# Patient Record
Sex: FEMALE | Race: WHITE | NOT HISPANIC OR LATINO | URBAN - METROPOLITAN AREA
[De-identification: names, ages, dates, MRNs, and addresses within clinical notes are randomized per-mention and may not be internally consistent; named-entity substitution may affect disease eponyms.]

---

## 2019-09-28 ENCOUNTER — EMERGENCY (EMERGENCY)
Facility: HOSPITAL | Age: 85
LOS: 0 days | Discharge: HOME | End: 2019-09-28
Attending: EMERGENCY MEDICINE
Payer: MEDICARE

## 2019-09-28 VITALS
DIASTOLIC BLOOD PRESSURE: 77 MMHG | SYSTOLIC BLOOD PRESSURE: 156 MMHG | OXYGEN SATURATION: 100 % | TEMPERATURE: 98 F | WEIGHT: 104.06 LBS | RESPIRATION RATE: 18 BRPM | HEART RATE: 86 BPM

## 2019-09-28 VITALS
TEMPERATURE: 98 F | OXYGEN SATURATION: 99 % | RESPIRATION RATE: 17 BRPM | SYSTOLIC BLOOD PRESSURE: 151 MMHG | DIASTOLIC BLOOD PRESSURE: 79 MMHG | HEART RATE: 79 BPM

## 2019-09-28 DIAGNOSIS — Z23 ENCOUNTER FOR IMMUNIZATION: ICD-10-CM

## 2019-09-28 DIAGNOSIS — I10 ESSENTIAL (PRIMARY) HYPERTENSION: ICD-10-CM
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
## 2019-09-28 DIAGNOSIS — S01.01XA LACERATION WITHOUT FOREIGN BODY OF SCALP, INITIAL ENCOUNTER: ICD-10-CM

## 2019-09-28 DIAGNOSIS — S09.90XA UNSPECIFIED INJURY OF HEAD, INITIAL ENCOUNTER: ICD-10-CM
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
## 2019-09-28 LAB
ALBUMIN SERPL ELPH-MCNC: 3.9 G/DL — SIGNIFICANT CHANGE UP (ref 3.3–5)
ALP SERPL-CCNC: 80 U/L — SIGNIFICANT CHANGE UP (ref 40–120)
ALT FLD-CCNC: 17 U/L — SIGNIFICANT CHANGE UP (ref 12–78)
ANION GAP SERPL CALC-SCNC: 10 MMOL/L — SIGNIFICANT CHANGE UP (ref 5–17)
AST SERPL-CCNC: 20 U/L — SIGNIFICANT CHANGE UP (ref 15–37)
BILIRUB SERPL-MCNC: 0.3 MG/DL — SIGNIFICANT CHANGE UP (ref 0.2–1.2)
BUN SERPL-MCNC: 35 MG/DL — HIGH (ref 7–23)
CALCIUM SERPL-MCNC: 9.4 MG/DL — SIGNIFICANT CHANGE UP (ref 8.5–10.1)
CHLORIDE SERPL-SCNC: 104 MMOL/L — SIGNIFICANT CHANGE UP (ref 96–108)
CO2 SERPL-SCNC: 26 MMOL/L — SIGNIFICANT CHANGE UP (ref 22–31)
CREAT SERPL-MCNC: 1.14 MG/DL — SIGNIFICANT CHANGE UP (ref 0.5–1.3)
GLUCOSE SERPL-MCNC: 132 MG/DL — HIGH (ref 70–99)
HCT VFR BLD CALC: 35.7 % — SIGNIFICANT CHANGE UP (ref 34.5–45)
HGB BLD-MCNC: 11.8 G/DL — SIGNIFICANT CHANGE UP (ref 11.5–15.5)
MCHC RBC-ENTMCNC: 31.6 PG — SIGNIFICANT CHANGE UP (ref 27–34)
MCHC RBC-ENTMCNC: 33.1 GM/DL — SIGNIFICANT CHANGE UP (ref 32–36)
MCV RBC AUTO: 95.5 FL — SIGNIFICANT CHANGE UP (ref 80–100)
PLATELET # BLD AUTO: 285 K/UL — SIGNIFICANT CHANGE UP (ref 150–400)
POTASSIUM SERPL-MCNC: 3.9 MMOL/L — SIGNIFICANT CHANGE UP (ref 3.5–5.3)
POTASSIUM SERPL-SCNC: 3.9 MMOL/L — SIGNIFICANT CHANGE UP (ref 3.5–5.3)
PROT SERPL-MCNC: 7.8 GM/DL — SIGNIFICANT CHANGE UP (ref 6–8.3)
RBC # BLD: 3.74 M/UL — LOW (ref 3.8–5.2)
RBC # FLD: 12.6 % — SIGNIFICANT CHANGE UP (ref 10.3–14.5)
SODIUM SERPL-SCNC: 140 MMOL/L — SIGNIFICANT CHANGE UP (ref 135–145)
WBC # BLD: 10.36 K/UL — SIGNIFICANT CHANGE UP (ref 3.8–10.5)
WBC # FLD AUTO: 10.36 K/UL — SIGNIFICANT CHANGE UP (ref 3.8–10.5)

## 2019-09-28 PROCEDURE — 12001 RPR S/N/AX/GEN/TRNK 2.5CM/<: CPT

## 2019-09-28 PROCEDURE — 99284 EMERGENCY DEPT VISIT MOD MDM: CPT

## 2019-09-28 PROCEDURE — 70450 CT HEAD/BRAIN W/O DYE: CPT | Mod: 26

## 2019-09-28 PROCEDURE — 93010 ELECTROCARDIOGRAM REPORT: CPT

## 2019-09-28 PROCEDURE — 99284 EMERGENCY DEPT VISIT MOD MDM: CPT | Mod: 25

## 2019-09-28 PROCEDURE — 36415 COLL VENOUS BLD VENIPUNCTURE: CPT

## 2019-09-28 PROCEDURE — 90471 IMMUNIZATION ADMIN: CPT

## 2019-09-28 PROCEDURE — 85027 COMPLETE CBC AUTOMATED: CPT

## 2019-09-28 PROCEDURE — 80053 COMPREHEN METABOLIC PANEL: CPT

## 2019-09-28 PROCEDURE — 90714 TD VACC NO PRESV 7 YRS+ IM: CPT

## 2019-09-28 PROCEDURE — 70450 CT HEAD/BRAIN W/O DYE: CPT

## 2019-09-28 PROCEDURE — 93005 ELECTROCARDIOGRAM TRACING: CPT | Mod: XU

## 2019-09-28 RX ORDER — TETANUS AND DIPHTHERIA TOXOIDS ADSORBED 2; 2 [LF]/.5ML; [LF]/.5ML
0.5 INJECTION INTRAMUSCULAR ONCE
Refills: 0 | Status: COMPLETED | OUTPATIENT
Start: 2019-09-28 | End: 2019-09-28

## 2019-09-28 RX ADMIN — TETANUS AND DIPHTHERIA TOXOIDS ADSORBED 0.5 MILLILITER(S): 2; 2 INJECTION INTRAMUSCULAR at 16:56

## 2019-09-28 NOTE — ED PROVIDER NOTE - SKIN, MLM
Skin normal color for race, warm, dry and intact. No evidence of rash. Skin normal color for race, warm, dry. No evidence of rash. +2cm bump on back of head, soft. No step off. +dried blood in hair

## 2019-09-28 NOTE — ED PROVIDER NOTE - PATIENT PORTAL LINK FT
You can access the FollowMyHealth Patient Portal offered by [facility] by registering at the following website: [email or web address]. By joining [employer]’s FollowMyHealth portal, you will also be able to view your health information using other applications (apps) compatible with our system.

## 2019-09-28 NOTE — ED PROVIDER NOTE - OBJECTIVE STATEMENT
89 y/o female with a PMHx of HTN on metoprolol presents to the ED c/o mechanical fall this morning. Pt was standing in the kitchen when she slipped on the kitchen floor and fell backward, hit head on cabinet. No LOC. Now with laceration to scalp in occipital region and bruising Pt was able to get up and ambulate immediately after fall. Denies neck pain, hip pain, HA, n/v/d, CP, SOB, dizziness. Bleeding stopped, but then pt brushed her hair and bleeding started again. Denies tobacco use, illicit drug use. +EtOH use- one glass of wine per day. NKDA.

## 2019-09-28 NOTE — ED ADULT TRIAGE NOTE - CHIEF COMPLAINT QUOTE
pt presents to ED due to fall on ASA 81mg hit head lac to back of head as per EMS son stated  she fell at 12pm

## 2019-09-28 NOTE — ED PROVIDER NOTE - CARDIAC, MLM
Normal rate, regular rhythm.  Heart sounds S1, S2.  No rubs or gallops. 3/6 NAJMA. +1 bilateral ankle edema

## 2019-10-04 ENCOUNTER — EMERGENCY (EMERGENCY)
Facility: HOSPITAL | Age: 85
LOS: 0 days | Discharge: HOME | End: 2019-10-04
Attending: EMERGENCY MEDICINE
Payer: MEDICARE

## 2019-10-04 VITALS — WEIGHT: 100.97 LBS | HEIGHT: 56 IN

## 2019-10-04 VITALS
TEMPERATURE: 98 F | HEART RATE: 86 BPM | OXYGEN SATURATION: 97 % | DIASTOLIC BLOOD PRESSURE: 70 MMHG | SYSTOLIC BLOOD PRESSURE: 185 MMHG | RESPIRATION RATE: 18 BRPM

## 2019-10-04 DIAGNOSIS — Z48.02 ENCOUNTER FOR REMOVAL OF SUTURES: ICD-10-CM

## 2019-10-04 DIAGNOSIS — I10 ESSENTIAL (PRIMARY) HYPERTENSION: ICD-10-CM

## 2019-10-04 PROCEDURE — G0463: CPT

## 2019-10-04 NOTE — ED STATDOCS - OBJECTIVE STATEMENT
89 y/o female with a PMHx of HTN presents to the ED c/o staple removal. Pt had three staples in the back of her head.

## 2019-10-04 NOTE — ED STATDOCS - ATTENDING CONTRIBUTION TO CARE
I, Tera Cope, performed the initial face to face bedside interview with this patient regarding history of present illness, review of symptoms and relevant past medical, social and family history.  I completed an independent physical examination.  I was the initial provider who evaluated this patient. I have signed out the follow up of any pending tests (i.e. labs, radiological studies) to the ACP.  I have communicated the patient’s plan of care and disposition with the ACP.  The history, relevant review of systems, past medical and surgical history, medical decision making, and physical examination was documented by the scribe in my presence and I attest to the accuracy of the documentation.

## 2019-10-04 NOTE — ED STATDOCS - PATIENT PORTAL LINK FT
You can access the FollowMyHealth Patient Portal offered by [facility] by registering at the following website: [email or web address]. By joining [employer]’s FollowMyHealth portal, you will also be able to view your health information using other applications (apps) compatible with our system.

## 2019-10-04 NOTE — ED STATDOCS - PROGRESS NOTE DETAILS
89 yo female presents with staple removal. Staples were placed on 9/28/19 s/p fall. Denies current pain, bleeding, discharge, fever. Staple removal and dc/h ome. BP slightly elevated because pt did not take her BP meds this morning. Daughter who accompanied pt will give her medication when they come home. -Michael Davis PA-C

## 2019-10-04 NOTE — ED PROVIDER NOTE - PATIENT PORTAL LINK FT
You can access the FollowMyHealth Patient Portal offered by [facility] by registering at the following website: [email or web address]. By joining [email or web address]’s FollowMyHealth portal, you will also be able to view your health information using other applications (apps) compatible with our system.

## 2020-01-14 NOTE — ED STATDOCS - CLINICAL SUMMARY MEDICAL DECISION MAKING FREE TEXT BOX
Sunita Moreno presents today for   Chief Complaint   Patient presents with   [facility] Follow Up       Is someone accompanying this pt? No    Is the patient using any DME equipment during OV? No    -DME Company None    Depression Screening:  3 most recent PHQ Screens 4/11/2019   Little interest or pleasure in doing things Not at all   Feeling down, depressed, irritable, or hopeless Not at all   Total Score PHQ 2 0       Learning Assessment:  Learning Assessment 8/17/2016   PRIMARY LEARNER Patient   CO-LEARNER CAREGIVER -   PRIMARY LANGUAGE ENGLISH   LEARNER PREFERENCE PRIMARY LISTENING     -   ANSWERED BY patient   RELATIONSHIP SELF   ASSESSMENT COMMENT -       Abuse Screening:  No flowsheet data found. Fall Risk  No flowsheet data found. Coordination of Care:  1. Have you been to the ER, urgent care clinic since your last visit? Hospitalized since your last visit? Yes; Where: [facility] , When: 12/2019    2. Have you seen or consulted any other health care providers outside of the [address] [place] [address] since your last visit? Include any pap smears or colon screening. No    Patient refused to be weighed. Brandon Wellington NP informed. Staple removal and d/c home. -Michael Davis PA-C

## 2024-05-21 NOTE — ED ADULT TRIAGE NOTE - DIRECT TO ROOM CARE INITIATED:
Requested Prescriptions     Refused Prescriptions Disp Refills    losartan (COZAAR) 25 MG tablet [Pharmacy Med Name: LOSARTAN POTASSIUM 25 MG TAB] 180 tablet 1     Sig: TAKE 1 TABLET BY MOUTH TWICE A DAY    Medication was sent to pharmacy 05/09/2024. Refill too soon.    04-Oct-2019 13:01

## 2024-05-21 NOTE — ED STATDOCS - CARE PLAN
Last Visit Date: 2/28/2024   Next Visit Date: 5/21/2024    Principal Discharge DX:	Removal of staples

## 2024-07-28 ENCOUNTER — INPATIENT (INPATIENT)
Facility: HOSPITAL | Age: 89
LOS: 6 days | Discharge: NURSING, REHAB OR LONG-TERM CARE | DRG: 948 | End: 2024-08-04
Attending: STUDENT IN AN ORGANIZED HEALTH CARE EDUCATION/TRAINING PROGRAM | Admitting: INTERNAL MEDICINE
Payer: MEDICARE

## 2024-07-28 VITALS
RESPIRATION RATE: 18 BRPM | SYSTOLIC BLOOD PRESSURE: 201 MMHG | TEMPERATURE: 97 F | DIASTOLIC BLOOD PRESSURE: 110 MMHG | HEART RATE: 96 BPM | OXYGEN SATURATION: 98 %

## 2024-07-28 DIAGNOSIS — R41.82 ALTERED MENTAL STATUS, UNSPECIFIED: ICD-10-CM

## 2024-07-28 DIAGNOSIS — Z29.9 ENCOUNTER FOR PROPHYLACTIC MEASURES, UNSPECIFIED: ICD-10-CM

## 2024-07-28 DIAGNOSIS — I16.0 HYPERTENSIVE URGENCY: ICD-10-CM

## 2024-07-28 DIAGNOSIS — R79.89 OTHER SPECIFIED ABNORMAL FINDINGS OF BLOOD CHEMISTRY: ICD-10-CM

## 2024-07-28 PROBLEM — I10 ESSENTIAL (PRIMARY) HYPERTENSION: Chronic | Status: ACTIVE | Noted: 2019-10-04

## 2024-07-28 LAB
ABO RH CONFIRMATION: SIGNIFICANT CHANGE UP
ALBUMIN SERPL ELPH-MCNC: 3.9 G/DL — SIGNIFICANT CHANGE UP (ref 3.3–5)
ALP SERPL-CCNC: 104 U/L — SIGNIFICANT CHANGE UP (ref 40–120)
ALT FLD-CCNC: 19 U/L — SIGNIFICANT CHANGE UP (ref 12–78)
AMMONIA BLD-MCNC: 15 UMOL/L — SIGNIFICANT CHANGE UP (ref 11–32)
ANION GAP SERPL CALC-SCNC: 10 MMOL/L — SIGNIFICANT CHANGE UP (ref 5–17)
APPEARANCE UR: CLEAR — SIGNIFICANT CHANGE UP
APTT BLD: 26.1 SEC — SIGNIFICANT CHANGE UP (ref 24.5–35.6)
AST SERPL-CCNC: 32 U/L — SIGNIFICANT CHANGE UP (ref 15–37)
B-OH-BUTYR SERPL-SCNC: 0.1 MMOL/L — SIGNIFICANT CHANGE UP
BACTERIA # UR AUTO: NEGATIVE /HPF — SIGNIFICANT CHANGE UP
BASE EXCESS BLDV CALC-SCNC: -2.5 MMOL/L — LOW (ref -2–3)
BASOPHILS # BLD AUTO: 0.02 K/UL — SIGNIFICANT CHANGE UP (ref 0–0.2)
BASOPHILS NFR BLD AUTO: 0.1 % — SIGNIFICANT CHANGE UP (ref 0–2)
BILIRUB SERPL-MCNC: 0.7 MG/DL — SIGNIFICANT CHANGE UP (ref 0.2–1.2)
BILIRUB UR-MCNC: NEGATIVE — SIGNIFICANT CHANGE UP
BLD GP AB SCN SERPL QL: SIGNIFICANT CHANGE UP
BUN SERPL-MCNC: 36 MG/DL — HIGH (ref 7–23)
CALCIUM SERPL-MCNC: 10.2 MG/DL — HIGH (ref 8.5–10.1)
CAST: 2 /LPF — SIGNIFICANT CHANGE UP (ref 0–4)
CHLORIDE SERPL-SCNC: 105 MMOL/L — SIGNIFICANT CHANGE UP (ref 96–108)
CK SERPL-CCNC: 570 U/L — HIGH (ref 26–192)
CO2 SERPL-SCNC: 21 MMOL/L — LOW (ref 22–31)
COLOR SPEC: YELLOW — SIGNIFICANT CHANGE UP
CREAT SERPL-MCNC: 1.52 MG/DL — HIGH (ref 0.5–1.3)
DIFF PNL FLD: ABNORMAL
EGFR: 31 ML/MIN/1.73M2 — LOW
EOSINOPHIL # BLD AUTO: 0 K/UL — SIGNIFICANT CHANGE UP (ref 0–0.5)
EOSINOPHIL NFR BLD AUTO: 0 % — SIGNIFICANT CHANGE UP (ref 0–6)
FLUAV AG NPH QL: SIGNIFICANT CHANGE UP
FLUBV AG NPH QL: SIGNIFICANT CHANGE UP
GAS PNL BLDV: SIGNIFICANT CHANGE UP
GLUCOSE BLDC GLUCOMTR-MCNC: 136 MG/DL — HIGH (ref 70–99)
GLUCOSE SERPL-MCNC: 231 MG/DL — HIGH (ref 70–99)
GLUCOSE UR QL: 500 MG/DL
HCO3 BLDV-SCNC: 22 MMOL/L — SIGNIFICANT CHANGE UP (ref 22–29)
HCT VFR BLD CALC: 40.2 % — SIGNIFICANT CHANGE UP (ref 34.5–45)
HGB BLD-MCNC: 13.5 G/DL — SIGNIFICANT CHANGE UP (ref 11.5–15.5)
IMM GRANULOCYTES NFR BLD AUTO: 0.7 % — SIGNIFICANT CHANGE UP (ref 0–0.9)
INR BLD: 0.89 RATIO — SIGNIFICANT CHANGE UP (ref 0.85–1.18)
KETONES UR-MCNC: ABNORMAL MG/DL
LACTATE SERPL-SCNC: 3.9 MMOL/L — HIGH (ref 0.7–2)
LACTATE SERPL-SCNC: 4.5 MMOL/L — CRITICAL HIGH (ref 0.7–2)
LEUKOCYTE ESTERASE UR-ACNC: ABNORMAL
LYMPHOCYTES # BLD AUTO: 1.16 K/UL — SIGNIFICANT CHANGE UP (ref 1–3.3)
LYMPHOCYTES # BLD AUTO: 5.2 % — LOW (ref 13–44)
MAGNESIUM SERPL-MCNC: 1.7 MG/DL — SIGNIFICANT CHANGE UP (ref 1.6–2.6)
MCHC RBC-ENTMCNC: 31.8 PG — SIGNIFICANT CHANGE UP (ref 27–34)
MCHC RBC-ENTMCNC: 33.6 GM/DL — SIGNIFICANT CHANGE UP (ref 32–36)
MCV RBC AUTO: 94.6 FL — SIGNIFICANT CHANGE UP (ref 80–100)
MONOCYTES # BLD AUTO: 1.06 K/UL — HIGH (ref 0–0.9)
MONOCYTES NFR BLD AUTO: 4.8 % — SIGNIFICANT CHANGE UP (ref 2–14)
NEUTROPHILS # BLD AUTO: 19.89 K/UL — HIGH (ref 1.8–7.4)
NEUTROPHILS NFR BLD AUTO: 89.2 % — HIGH (ref 43–77)
NITRITE UR-MCNC: NEGATIVE — SIGNIFICANT CHANGE UP
PCO2 BLDV: 39 MMHG — SIGNIFICANT CHANGE UP (ref 39–42)
PH BLDV: 7.37 — SIGNIFICANT CHANGE UP (ref 7.32–7.43)
PH UR: 5.5 — SIGNIFICANT CHANGE UP (ref 5–8)
PHOSPHATE SERPL-MCNC: 3.7 MG/DL — SIGNIFICANT CHANGE UP (ref 2.5–4.5)
PLATELET # BLD AUTO: 322 K/UL — SIGNIFICANT CHANGE UP (ref 150–400)
PO2 BLDV: 55 MMHG — HIGH (ref 25–45)
POTASSIUM SERPL-MCNC: 4 MMOL/L — SIGNIFICANT CHANGE UP (ref 3.5–5.3)
POTASSIUM SERPL-SCNC: 4 MMOL/L — SIGNIFICANT CHANGE UP (ref 3.5–5.3)
PROT SERPL-MCNC: 8.8 GM/DL — HIGH (ref 6–8.3)
PROT UR-MCNC: 100 MG/DL
PROTHROM AB SERPL-ACNC: 10.1 SEC — SIGNIFICANT CHANGE UP (ref 9.5–13)
RBC # BLD: 4.25 M/UL — SIGNIFICANT CHANGE UP (ref 3.8–5.2)
RBC # FLD: 12.7 % — SIGNIFICANT CHANGE UP (ref 10.3–14.5)
RBC CASTS # UR COMP ASSIST: 5 /HPF — HIGH (ref 0–4)
RSV RNA NPH QL NAA+NON-PROBE: SIGNIFICANT CHANGE UP
SALICYLATES SERPL-MCNC: <1.7 MG/DL — LOW (ref 2.8–20)
SAO2 % BLDV: 85 % — SIGNIFICANT CHANGE UP (ref 67–88)
SARS-COV-2 RNA SPEC QL NAA+PROBE: SIGNIFICANT CHANGE UP
SODIUM SERPL-SCNC: 136 MMOL/L — SIGNIFICANT CHANGE UP (ref 135–145)
SP GR SPEC: >1.03 — HIGH (ref 1–1.03)
SQUAMOUS # UR AUTO: 4 /HPF — SIGNIFICANT CHANGE UP (ref 0–5)
TROPONIN I, HIGH SENSITIVITY RESULT: 1692.82 NG/L — HIGH
TROPONIN I, HIGH SENSITIVITY RESULT: 1783.7 NG/L — HIGH
UROBILINOGEN FLD QL: 0.2 MG/DL — SIGNIFICANT CHANGE UP (ref 0.2–1)
WBC # BLD: 22.29 K/UL — HIGH (ref 3.8–10.5)
WBC # FLD AUTO: 22.29 K/UL — HIGH (ref 3.8–10.5)
WBC UR QL: 13 /HPF — HIGH (ref 0–5)

## 2024-07-28 PROCEDURE — 80061 LIPID PANEL: CPT

## 2024-07-28 PROCEDURE — 84443 ASSAY THYROID STIM HORMONE: CPT

## 2024-07-28 PROCEDURE — 82140 ASSAY OF AMMONIA: CPT

## 2024-07-28 PROCEDURE — 99223 1ST HOSP IP/OBS HIGH 75: CPT

## 2024-07-28 PROCEDURE — 92507 TX SP LANG VOICE COMM INDIV: CPT | Mod: GN

## 2024-07-28 PROCEDURE — 97166 OT EVAL MOD COMPLEX 45 MIN: CPT | Mod: GO

## 2024-07-28 PROCEDURE — 80053 COMPREHEN METABOLIC PANEL: CPT

## 2024-07-28 PROCEDURE — 70498 CT ANGIOGRAPHY NECK: CPT | Mod: 26,MC

## 2024-07-28 PROCEDURE — 93005 ELECTROCARDIOGRAM TRACING: CPT

## 2024-07-28 PROCEDURE — 97530 THERAPEUTIC ACTIVITIES: CPT | Mod: GO

## 2024-07-28 PROCEDURE — 93306 TTE W/DOPPLER COMPLETE: CPT

## 2024-07-28 PROCEDURE — 87040 BLOOD CULTURE FOR BACTERIA: CPT

## 2024-07-28 PROCEDURE — 95816 EEG AWAKE AND DROWSY: CPT

## 2024-07-28 PROCEDURE — 80307 DRUG TEST PRSMV CHEM ANLYZR: CPT

## 2024-07-28 PROCEDURE — 84484 ASSAY OF TROPONIN QUANT: CPT

## 2024-07-28 PROCEDURE — 74177 CT ABD & PELVIS W/CONTRAST: CPT | Mod: 26,MC

## 2024-07-28 PROCEDURE — 85025 COMPLETE CBC W/AUTO DIFF WBC: CPT

## 2024-07-28 PROCEDURE — 82550 ASSAY OF CK (CPK): CPT

## 2024-07-28 PROCEDURE — 92523 SPEECH SOUND LANG COMPREHEN: CPT | Mod: GN

## 2024-07-28 PROCEDURE — 82962 GLUCOSE BLOOD TEST: CPT

## 2024-07-28 PROCEDURE — 80048 BASIC METABOLIC PNL TOTAL CA: CPT

## 2024-07-28 PROCEDURE — 99285 EMERGENCY DEPT VISIT HI MDM: CPT

## 2024-07-28 PROCEDURE — 83036 HEMOGLOBIN GLYCOSYLATED A1C: CPT

## 2024-07-28 PROCEDURE — 84146 ASSAY OF PROLACTIN: CPT

## 2024-07-28 PROCEDURE — 36415 COLL VENOUS BLD VENIPUNCTURE: CPT

## 2024-07-28 PROCEDURE — 92526 ORAL FUNCTION THERAPY: CPT | Mod: GN

## 2024-07-28 PROCEDURE — 85027 COMPLETE CBC AUTOMATED: CPT

## 2024-07-28 PROCEDURE — 84100 ASSAY OF PHOSPHORUS: CPT

## 2024-07-28 PROCEDURE — 70551 MRI BRAIN STEM W/O DYE: CPT | Mod: MC

## 2024-07-28 PROCEDURE — 83735 ASSAY OF MAGNESIUM: CPT

## 2024-07-28 PROCEDURE — 97116 GAIT TRAINING THERAPY: CPT | Mod: GP

## 2024-07-28 PROCEDURE — 70496 CT ANGIOGRAPHY HEAD: CPT | Mod: 26,MC

## 2024-07-28 PROCEDURE — 92610 EVALUATE SWALLOWING FUNCTION: CPT | Mod: GN

## 2024-07-28 PROCEDURE — 71275 CT ANGIOGRAPHY CHEST: CPT | Mod: 26,MC

## 2024-07-28 RX ORDER — LABETALOL HCL 200 MG
10 TABLET ORAL ONCE
Refills: 0 | Status: COMPLETED | OUTPATIENT
Start: 2024-07-28 | End: 2024-07-28

## 2024-07-28 RX ORDER — GLUCAGON INJECTION, SOLUTION 0.5 MG/.1ML
1 INJECTION, SOLUTION SUBCUTANEOUS ONCE
Refills: 0 | Status: DISCONTINUED | OUTPATIENT
Start: 2024-07-28 | End: 2024-08-04

## 2024-07-28 RX ORDER — VANCOMYCIN HYDROCHLORIDE 5 G/100ML
750 INJECTION, POWDER, LYOPHILIZED, FOR SOLUTION INTRAVENOUS EVERY 24 HOURS
Refills: 0 | Status: DISCONTINUED | OUTPATIENT
Start: 2024-07-29 | End: 2024-07-29

## 2024-07-28 RX ORDER — PIPERACILLIN SODIUM, TAZOBACTAM SODIUM 3; .375 G/15ML; G/15ML
3.38 INJECTION, POWDER, LYOPHILIZED, FOR SOLUTION INTRAVENOUS ONCE
Refills: 0 | Status: COMPLETED | OUTPATIENT
Start: 2024-07-28 | End: 2024-07-28

## 2024-07-28 RX ORDER — ATORVASTATIN CALCIUM 40 MG/1
80 TABLET, FILM COATED ORAL AT BEDTIME
Refills: 0 | Status: DISCONTINUED | OUTPATIENT
Start: 2024-07-28 | End: 2024-08-04

## 2024-07-28 RX ORDER — BACTERIOSTATIC SODIUM CHLORIDE 0.9 %
1000 VIAL (ML) INJECTION
Refills: 0 | Status: DISCONTINUED | OUTPATIENT
Start: 2024-07-28 | End: 2024-07-31

## 2024-07-28 RX ORDER — VANCOMYCIN HYDROCHLORIDE 5 G/100ML
750 INJECTION, POWDER, LYOPHILIZED, FOR SOLUTION INTRAVENOUS ONCE
Refills: 0 | Status: COMPLETED | OUTPATIENT
Start: 2024-07-28 | End: 2024-07-28

## 2024-07-28 RX ORDER — VANCOMYCIN HYDROCHLORIDE 5 G/100ML
1000 INJECTION, POWDER, LYOPHILIZED, FOR SOLUTION INTRAVENOUS ONCE
Refills: 0 | Status: DISCONTINUED | OUTPATIENT
Start: 2024-07-28 | End: 2024-07-28

## 2024-07-28 RX ORDER — ASPIRIN 500 MG
81 TABLET ORAL DAILY
Refills: 0 | Status: DISCONTINUED | OUTPATIENT
Start: 2024-07-28 | End: 2024-08-04

## 2024-07-28 RX ORDER — DEXTROSE 4 G
12.5 TABLET,CHEWABLE ORAL ONCE
Refills: 0 | Status: DISCONTINUED | OUTPATIENT
Start: 2024-07-28 | End: 2024-08-04

## 2024-07-28 RX ORDER — DEXTROSE 4 G
25 TABLET,CHEWABLE ORAL ONCE
Refills: 0 | Status: DISCONTINUED | OUTPATIENT
Start: 2024-07-28 | End: 2024-08-04

## 2024-07-28 RX ORDER — CEFEPIME HYDROCHLORIDE 1 G/1
1000 INJECTION, POWDER, FOR SOLUTION INTRAMUSCULAR; INTRAVENOUS EVERY 12 HOURS
Refills: 0 | Status: DISCONTINUED | OUTPATIENT
Start: 2024-07-28 | End: 2024-07-28

## 2024-07-28 RX ORDER — INSULIN LISPRO 100/ML
VIAL (ML) SUBCUTANEOUS
Refills: 0 | Status: DISCONTINUED | OUTPATIENT
Start: 2024-07-28 | End: 2024-08-04

## 2024-07-28 RX ORDER — BACTERIOSTATIC SODIUM CHLORIDE 0.9 %
1000 VIAL (ML) INJECTION ONCE
Refills: 0 | Status: COMPLETED | OUTPATIENT
Start: 2024-07-28 | End: 2024-07-28

## 2024-07-28 RX ORDER — ONDANSETRON HCL/PF 4 MG/2 ML
4 VIAL (ML) INJECTION EVERY 8 HOURS
Refills: 0 | Status: DISCONTINUED | OUTPATIENT
Start: 2024-07-28 | End: 2024-08-04

## 2024-07-28 RX ORDER — BACTERIOSTATIC SODIUM CHLORIDE 0.9 %
1000 VIAL (ML) INJECTION ONCE
Refills: 0 | Status: DISCONTINUED | OUTPATIENT
Start: 2024-07-28 | End: 2024-07-28

## 2024-07-28 RX ORDER — DEXTROSE MONOHYDRATE, SODIUM CHLORIDE, SODIUM LACTATE, CALCIUM CHLORIDE, MAGNESIUM CHLORIDE 1.5; 538; 448; 18.4; 5.08 G/100ML; MG/100ML; MG/100ML; MG/100ML; MG/100ML
1000 SOLUTION INTRAPERITONEAL
Refills: 0 | Status: DISCONTINUED | OUTPATIENT
Start: 2024-07-28 | End: 2024-08-04

## 2024-07-28 RX ORDER — ASPIRIN 500 MG
300 TABLET ORAL ONCE
Refills: 0 | Status: COMPLETED | OUTPATIENT
Start: 2024-07-28 | End: 2024-07-28

## 2024-07-28 RX ORDER — CEFEPIME HYDROCHLORIDE 1 G/1
1000 INJECTION, POWDER, FOR SOLUTION INTRAMUSCULAR; INTRAVENOUS EVERY 12 HOURS
Refills: 0 | Status: DISCONTINUED | OUTPATIENT
Start: 2024-07-28 | End: 2024-07-29

## 2024-07-28 RX ORDER — DEXTROSE 4 G
15 TABLET,CHEWABLE ORAL ONCE
Refills: 0 | Status: DISCONTINUED | OUTPATIENT
Start: 2024-07-28 | End: 2024-08-04

## 2024-07-28 RX ADMIN — Medication 10 MILLIGRAM(S): at 11:36

## 2024-07-28 RX ADMIN — VANCOMYCIN HYDROCHLORIDE 250 MILLIGRAM(S): 5 INJECTION, POWDER, LYOPHILIZED, FOR SOLUTION INTRAVENOUS at 11:35

## 2024-07-28 RX ADMIN — Medication 1000 MILLILITER(S): at 11:37

## 2024-07-28 RX ADMIN — PIPERACILLIN SODIUM, TAZOBACTAM SODIUM 200 GRAM(S): 3; .375 INJECTION, POWDER, LYOPHILIZED, FOR SOLUTION INTRAVENOUS at 11:36

## 2024-07-28 RX ADMIN — CEFEPIME HYDROCHLORIDE 1000 MILLIGRAM(S): 1 INJECTION, POWDER, FOR SOLUTION INTRAMUSCULAR; INTRAVENOUS at 22:56

## 2024-07-28 RX ADMIN — Medication 300 MILLIGRAM(S): at 14:31

## 2024-07-28 NOTE — ED ADULT NURSE NOTE - OBJECTIVE STATEMENT
pt presents to the ED after being found by a family member 'covered In vomit and hanging off the side of the bed'. pt minimally responsive and only responding to painful stimuli upon arrival to ED. family members at bedside. hx of HTN. no recent medical emergencies or complaints as per family. placed on cardiac monitor in trauma room.

## 2024-07-28 NOTE — DISCHARGE NOTE NURSING/CASE MANAGEMENT/SOCIAL WORK - NSDCVIVACCINE_GEN_ALL_CORE_FT
Td (adult) preservative free; 28-Sep-2019 16:56; Klever Davis (TIFFANIE); [employer]; [id number] (Exp. Date: 04-Jul-2021); IntraMuscular; Deltoid Left.; 0.5 milliLiter(s); VIS (VIS Published: 28-Sep-2019, VIS Presented: 28-Sep-2019);

## 2024-07-28 NOTE — DISCHARGE NOTE NURSING/CASE MANAGEMENT/SOCIAL WORK - PATIENT PORTAL LINK FT
You can access the FollowMyHealth Patient Portal offered by [facility] by registering at the following website: [email or web address]. By joining [employer]’s FollowMyHealth portal, you will also be able to view your health information using other applications (apps) compatible with our system.

## 2024-07-28 NOTE — H&P ADULT - HISTORY OF PRESENT ILLNESS
89F with pmhx HTN (no longer on meds) came to the hospital today after she was found down by granddaughter this morning. Last known well the day prior. She was reportedly noted to have dried coffee ground emesis on her face and hair and was lethargic.     In ED vitals stable, CBC noted for WBC 22.29, H/H 13/40, Plt 322. CMP noted for BUN/Cr 36/1.52, Glu 231. Trop 1692 > 1783. UA noted for proteinuria, glucosuria, ketones, hematuria, mod leuks, 13 wbcs, neg bacteria. COVID/flu neg, CT Chest/abd/pelvis neg for PE, or bowel obstruction. noted for bronchiectasis in the posterior right upper and right middle   lobes with distal airway impaction and clusters of small nodules. Stroke w/u with CTH, CTA head and neck neg for acute infarct. Septic w/u was sent, patient was loaded with aspirin, given broad spectrum abx with vanc/zosyn.    89F with pmhx HTN (no longer on meds) came to the hospital today after she was found down by granddaughter this morning. Patient unable to provide history. History as per chart review. Patients last known well was on 7/26. She was reportedly noted to have dried coffee ground emesis on her face and hair and was lethargic.   In ED vitals stable, CBC noted for WBC 22.29, H/H 13/40, Plt 322. CMP noted for BUN/Cr 36/1.52, Glu 231. Trop 1692 > 1783. UA noted for proteinuria, glucosuria, ketones, hematuria, mod leuks, 13 wbcs, neg bacteria. COVID/flu neg, CT Chest/abd/pelvis neg for PE, or bowel obstruction. noted for bronchiectasis in the posterior right upper and right middle   lobes with distal airway impaction and clusters of small nodules. Stroke w/u with CTH, CTA head and neck neg for acute infarct. Septic w/u was sent, patient was loaded with aspirin, given broad spectrum abx with vanc/zosyn.

## 2024-07-28 NOTE — PATIENT PROFILE ADULT - VISION (WITH CORRECTIVE LENSES IF THE PATIENT USUALLY WEARS THEM):
Normal vision: sees adequately in most situations; can see medication labels, newsprint
Ventriculoperitoneal shunt

## 2024-07-28 NOTE — CONSULT NOTE ADULT - SUBJECTIVE AND OBJECTIVE BOX
Patient is a 89y old  Female who presents with a chief complaint of     HPI:   89-year-old female past medical history of hypertension not on medications presents ED brought in by EMS from home after being found slumped over in her bed covered in vomit.  Patient last was seen by family at 1 PM yesterday and was discovered by family member this morning in bed slumped over.  Patient was normal yesterday cooking doing normal activities.  Patient arrived to ER hypertensive in the 200s systolic, responsive to pain but not talking or providing any history covered in either feculent vomiting or hematemesis as it is very dark color    PAST MEDICAL & SURGICAL HISTORY:  HTN (hypertension)            Allergies    No Known Allergies    Intolerances        FAMILY HISTORY: unable to obtain    SOCIAL HISTORY  Unable to obtain    REVIEW OF SYSTEMS:  Unable to obtain    Vital Signs Last 24 Hrs  T(C): 36.8 (28 Jul 2024 16:02), Max: 36.8 (28 Jul 2024 16:02)  T(F): 98.3 (28 Jul 2024 16:02), Max: 98.3 (28 Jul 2024 16:02)  HR: 81 (28 Jul 2024 16:02) (74 - 96)  BP: 147/72 (28 Jul 2024 16:02) (147/72 - 201/110)  BP(mean): 102 (28 Jul 2024 12:13) (102 - 102)  RR: 18 (28 Jul 2024 16:02) (18 - 18)  SpO2: 99% (28 Jul 2024 16:02) (98% - 99%)    Parameters below as of 28 Jul 2024 16:02  Patient On (Oxygen Delivery Method): room air        Daily     Daily     I&O's Detail      PHYSICAL EXAM:  Appearance: No distress  HEENT:   Normal oral mucosa  Cardiovascular: Normal S1 S2, No JVD, No cardiac murmurs, No carotid bruits, No peripheral edema  Respiratory: Lungs clear to auscultation	  Psychiatry: does not respond to questions, awake  Gastrointestinal:  Soft, Non-tender, + BS, no bruits	  Skin: No rashes, No ecchymoses, No cyanosis  Neurologic: uncooperative with exam  Extremities: Normal range of motion, No clubbing, cyanosis or edema  Vascular: Peripheral pulses palpable 2+ bilaterally    EKG: SR, LVH    LABS:                        13.5   22.29 )-----------( 322      ( 28 Jul 2024 10:19 )             40.2     07-28    136  |  105  |  36<H>  ----------------------------<  231<H>  4.0   |  21<L>  |  1.52<H>    Ca    10.2<H>      28 Jul 2024 10:19  Phos  3.7     07-28  Mg     1.7     07-28    TPro  8.8<H>  /  Alb  3.9  /  TBili  0.7  /  DBili  x   /  AST  32  /  ALT  19  /  AlkPhos  104  07-28    PT/INR - ( 28 Jul 2024 10:19 )   PT: 10.1 sec;   INR: 0.89 ratio         PTT - ( 28 Jul 2024 10:19 )  PTT:26.1 sec    Troponin I, High Sensitivity Result: 1783.70 ng/L *H* (07-28-24 @ 13:22)  Troponin I, High Sensitivity Result: 1692.82 ng/L *H* (07-28-24 @ 10:19)        Admitting attending: Michaelle Mane  Outpatient provider:

## 2024-07-28 NOTE — ED ADULT TRIAGE NOTE - CHIEF COMPLAINT QUOTE
Pt brought in by ambulance from home, found unresponsive by daughter with blood and vomit on her face with her head hanging off the bed. Pt lives at home by herself, family lives down the block and checks on her a few times a day. Pt is very stubborn and will not move in with any of them. Pt takes aspirin and blood pressure medication but has not seen a physician in over 20 years and family does not know where she got it from.

## 2024-07-28 NOTE — ED PROVIDER NOTE - WET READ LAUNCH FT
-- DO NOT REPLY / DO NOT REPLY ALL --  -- Message is from [employer] ([facility]) --    General Patient Message:     ernst from [facility] called and would like to know if Dr. Medrano is in agreement and will follow the patient's plan of care?  Please call as soon as you can to confirm.  Thank You      Caller Information       Type Contact Phone/Fax    08/10/2023 09:42 AM CDT Phone (Incoming) janetfelicia from [facility] [phone]        Alternative phone number: na    Can a detailed message be left? Yes    Message Turnaround:     Is it Working Hours? Yes - Working Hours     [place]:    Please give this turnaround time to the caller:   \"This message will be sent to [state Provider's name]. The clinical team will fulfill your request as soon as they review your message.\"                
-- DO NOT REPLY / DO NOT REPLY ALL --  -- Message is from [employer] ([facility]) --    General Patient Message: just needs a verbal ok to start at home services for patient. No paperwork was sent as of yet. Please have doctor call back to give a verbal ok. Thank you      Caller Information       Type Contact Phone/Fax    08/10/2023 09:42 AM CDT Phone (Incoming) ernst from [facility] [phone]    08/11/2023 12:11 PM CDT Phone (Incoming) ernst from [facility] [phone]        Alternative phone number: no    Can a detailed message be left? Yes    Message Turnaround:     Is it Working Hours? Yes - Working Hours     [place]:    Please give this turnaround time to the caller:   \"This message will be sent to [state Provider's name]. The clinical team will fulfill your request as soon as they review your message.\"                
Called and let [facility] know to fax over the plan to us   
I have not received any paperwork about plan  Have them fax to [phone]
Ok to do home health  
There are no Wet Read(s) to document.

## 2024-07-28 NOTE — H&P ADULT - NSHPLABSRESULTS_GEN_ALL_CORE
LABS:  cret                        13.5   22.29 )-----------( 322      ( 28 Jul 2024 10:19 )             40.2     07-28    136  |  105  |  36<H>  ----------------------------<  231<H>  4.0   |  21<L>  |  1.52<H>    Ca    10.2<H>      28 Jul 2024 10:19  Phos  3.7     07-28  Mg     1.7     07-28    TPro  8.8<H>  /  Alb  3.9  /  TBili  0.7  /  DBili  x   /  AST  32  /  ALT  19  /  AlkPhos  104  07-28    PT/INR - ( 28 Jul 2024 10:19 )   PT: 10.1 sec;   INR: 0.89 ratio         PTT - ( 28 Jul 2024 10:19 )  PTT:26.1 sec      Urinalysis with Rflx Culture (collected 07-28-24 @ 13:44)      < from: CT Abdomen and Pelvis w/ IV Cont (07.28.24 @ 10:55) >    IMPRESSION:  No pulmonary embolism. No aortic dissection.    No bowel obstruction. Large rectal stool ball.    Other incidental/chronic findings, as above.    --- End of Report ---    < end of copied text >    < from: CT Angio Neck w/ IV Cont (07.28.24 @ 10:54) >    IMPRESSION:    HEAD CT:  1. No acute intracranial hemorrhage, mass effect, or shift of the midline   structures.  2. Mild chronic white matter microvascular type changes.    CTA NECK:  1. Atherosclerotic plaque affecting the bilateral carotid bifurcations,   and proximal internal carotid arteries with associated mild (less than   50%) stenosis of the bilateral internal carotid artery origins and   proximal segments by NASCET criteria.  2. Otherwise, no large vessel occlusion or major stenosis.    CTA HEAD:  1. No large vessel occlusion or major stenosis.    --- End of Report ---    < end of copied text >

## 2024-07-28 NOTE — ED PROVIDER NOTE - CLINICAL SUMMARY MEDICAL DECISION MAKING FREE TEXT BOX
Kamar Angeles DO (Attending): 89-year-old female past medical history of hypertension not on medications presents ED brought in by EMS from home after being found slumped over in her bed covered in vomit.  Patient last was seen by family at 1 PM yesterday and was discovered by family member this morning in bed slumped over.  Patient was normal yesterday cooking doing normal activities.  Patient arrives to ER hypertensive in the 200s systolic, responsive to pain but not talking or providing any history covered in either feculent vomiting or hematemesis as it is very dark color but not hypoxic or tachycardic and afebrile.  Access was established, EKG and fingerstick were obtained fingerstick was mildly elevated EKG was nonactionable.  Patient was brought to CAT scan for CT CTA head and neck, CTA of the chest, CTAP.  Differential for patient's altered mental status and vomiting includes not limited to ICH, LVO, SBO, ACS, infection.  Plan for septic workup, CTs, empiric antibiotics, troponin, telemetry.  Reassess

## 2024-07-28 NOTE — CONSULT NOTE ADULT - SUBJECTIVE AND OBJECTIVE BOX
CC:     HPI: 89F with pmhx HTN (no longer on meds) came to the [facility] today after she was found     Patient seen and examined at bedside.     Med rec:    ROS:   CONSTITUTIONAL: (-) fever, (-) chills  RESPIRATORY: (-) SOB, (-) cough  CV: (-) chest pain, (-) palpitations  GI: (-) abdominal pain, (-) nausea, (-) vomiting, (-) diarrhea, (-) constipation   NEURO: (-) headache, (-) weakness, (-) visual changes    [  ] Due to altered mental status/intubation, subjective information was not able to be obtained from the patient. History was obtained, to the extent possible, from review of the chart and collateral sources of information.    PMHx:   PSHx:  FamHx:   Social Hx:   Allergies:    PAST MEDICAL & SURGICAL HISTORY:  HTN (hypertension)          SOCIAL HISTORY:  EtOH:   Smoking:   Recreational drug use:     Medications:                                  ICU Vital Signs Last 24 Hrs  T(C): 36.8 (28 Jul 2024 16:02), Max: 36.8 (28 Jul 2024 16:02)  T(F): 98.3 (28 Jul 2024 16:02), Max: 98.3 (28 Jul 2024 16:02)  HR: 81 (28 Jul 2024 16:02) (74 - 96)  BP: 147/72 (28 Jul 2024 16:02) (147/72 - 201/110)  BP(mean): 102 (28 Jul 2024 12:13) (102 - 102)  ABP: --  ABP(mean): --  RR: 18 (28 Jul 2024 16:02) (18 - 18)  SpO2: 99% (28 Jul 2024 16:02) (98% - 99%)    O2 Parameters below as of 28 Jul 2024 16:02  Patient On (Oxygen Delivery Method): room air            Physical Examination:    General: Laying in bed comfortably in no acute distress  HEENT: Pupils equal, reactive to light.  Symmetric.  PULM: Clear to auscultation bilaterally, unlabored respirations on room air   CVS: Regular rate and rhythm, no murmurs, rubs, or gallops  ABD: Soft, nondistended, nontender, normoactive bowel sounds, no masses  EXT: No edema, nontender  SKIN: Warm and well perfused  NEURO: interactive, nonfocal         I&O's Detail      LABS:                        13.5   22.29 )-----------( 322      ( 28 Jul 2024 10:19 )             40.2     07-28    136  |  105  |  36<H>  ----------------------------<  231<H>  4.0   |  21<L>  |  1.52<H>    Ca    10.2<H>      28 Jul 2024 10:19  Phos  3.7     07-28  Mg     1.7     07-28    TPro  8.8<H>  /  Alb  3.9  /  TBili  0.7  /  DBili  x   /  AST  32  /  ALT  19  /  AlkPhos  104  07-28          CAPILLARY BLOOD GLUCOSE      POCT Blood Glucose.: 270 mg/dL (28 Jul 2024 10:19)    PT/INR - ( 28 Jul 2024 10:19 )   PT: 10.1 sec;   INR: 0.89 ratio         PTT - ( 28 Jul 2024 10:19 )  PTT:26.1 sec  Urinalysis Basic - ( 28 Jul 2024 13:44 )    Color: Yellow / Appearance: Clear / SG: >1.030 / pH: x  Gluc: x / Ketone: Trace mg/dL  / Bili: Negative / Urobili: 0.2 mg/dL   Blood: x / Protein: 100 mg/dL / Nitrite: Negative   Leuk Esterase: Moderate / RBC: 5 /HPF / WBC 13 /HPF   Sq Epi: x / Non Sq Epi: 4 /HPF / Bacteria: Negative /HPF      CULTURES:        RADIOLOGY: ***      INVASIVE LINES:  INDWELLING BECKER:  VTE PROPHYLAXIS:  CODE STATUS:      CRITICAL CARE TIME SPENT: *** minutes spent performing frequent bedside reassessments and augmenting plan of care to address problems of acute critical illness that pose high probability of life threatening deterioration and/or end organ damage/dysfunction and discussing goals of care, non-inclusive of time spent on procedures performed. Date of entry of this note is equal to the date of services rendered.    Admitting 40+/55+/75+  Follow up 25+/35+/50+    Time spent on this patient encounter, which includes documenting this note in the electronic medical record, was * minutes including assessing the presenting problems with associated risks, reviewing the medical record to prepare for the encounter, and meeting face to face with the patient to obtain additional history. I have also performed an appropriate physical exam, made interventions listed and ordered and interpreted appropriate diagnostic studies as documented. To improve communication and patient safety, I have coordinated care with the multidisciplinary team including the bedside nurse, appropriate attending of record and consultants as needed. Date of entry of this note is equal to the date of services rendered.       CC: AMS    HPI: 89F with pmhx HTN (no longer on meds) came to the hospital today after she was found down by granddaughter this morning. Last known well the day prior. She was reportedly noted to have dried coffee ground emesis on her face and hair and was lethargic. On arrival to the ED, labs significant for lactate 4.5, BUN/SUB 36/1.52, trop 1600. Hgb and ABG wnl. CT angio head/neck negative for acute pathology or LVO and CT c/a/pnegative for PE, aortic disection but revealed large rectal stool ball. ICU called given lethargy.    Events: Pt seen at bedside in the Ed. Hx provided as above by daughter and son in law. Pt with fixed right sided gaze. Moving purposefully but not following commands.     ROS:  Due to altered mental status/intubation, subjective information was not able to be obtained from the patient. History was obtained, to the extent possible, from review of the chart and collateral sources of information.      PAST MEDICAL & SURGICAL HISTORY:  HTN (hypertension)      Medications:      ICU Vital Signs Last 24 Hrs  T(C): 36.8 (28 Jul 2024 16:02), Max: 36.8 (28 Jul 2024 16:02)  T(F): 98.3 (28 Jul 2024 16:02), Max: 98.3 (28 Jul 2024 16:02)  HR: 81 (28 Jul 2024 16:02) (74 - 96)  BP: 147/72 (28 Jul 2024 16:02) (147/72 - 201/110)  BP(mean): 102 (28 Jul 2024 12:13) (102 - 102)  ABP: --  ABP(mean): --  RR: 18 (28 Jul 2024 16:02) (18 - 18)  SpO2: 99% (28 Jul 2024 16:02) (98% - 99%)    O2 Parameters below as of 28 Jul 2024 16:02  Patient On (Oxygen Delivery Method): room air    Physical Examination:    General: Laying in bed, opens eyes  HEENT: Pupils equal, reactive to light.  Symmetric.  PULM: Clear to auscultation bilaterally, unlabored respirations on room air   CVS: Regular rate and rhythm, no murmurs  ABD: Soft, nondistended, nontender, normoactive bowel sounds  EXT: No edema, nontender  SKIN: Warm and well perfused  NEURO: moving all extremities x4, nonpurposeful, does not follow commands       I&O's Detail      LABS:                        13.5   22.29 )-----------( 322      ( 28 Jul 2024 10:19 )             40.2     07-28    136  |  105  |  36<H>  ----------------------------<  231<H>  4.0   |  21<L>  |  1.52<H>    Ca    10.2<H>      28 Jul 2024 10:19  Phos  3.7     07-28  Mg     1.7     07-28    TPro  8.8<H>  /  Alb  3.9  /  TBili  0.7  /  DBili  x   /  AST  32  /  ALT  19  /  AlkPhos  104  07-28          CAPILLARY BLOOD GLUCOSE      POCT Blood Glucose.: 270 mg/dL (28 Jul 2024 10:19)    PT/INR - ( 28 Jul 2024 10:19 )   PT: 10.1 sec;   INR: 0.89 ratio         PTT - ( 28 Jul 2024 10:19 )  PTT:26.1 sec  Urinalysis Basic - ( 28 Jul 2024 13:44 )    Color: Yellow / Appearance: Clear / SG: >1.030 / pH: x  Gluc: x / Ketone: Trace mg/dL  / Bili: Negative / Urobili: 0.2 mg/dL   Blood: x / Protein: 100 mg/dL / Nitrite: Negative   Leuk Esterase: Moderate / RBC: 5 /HPF / WBC 13 /HPF   Sq Epi: x / Non Sq Epi: 4 /HPF / Bacteria: Negative /HPF      CULTURES:     pend    RADIOLOGY: < from: CT Angio Neck w/ IV Cont (07.28.24 @ 10:54) >    ACC: [id number] EXAM:  CT ANGIO NECK (W)AW IC   ORDERED BY: ADITHYA GODINEZ     ACC: [id number] EXAM:  CT ANGIO BRAIN (W)AW IC   ORDERED BY: ADITHYA GODINEZ     PROCEDURE DATE:  07/28/2024          INTERPRETATION:  .    CLINICAL INFORMATION: Altered mentalstatus. Evaluate for large vessel   occlusion.    TECHNIQUE: Transaxial CT images were acquired through the head without   the administration of IV contrast. Thin section CT angiography from the   aortic arch to the cranial vertex was also performed using a multislice   CT scanner, following the infusion of intravenous contrast material. 70   cc's of IV Omnipaque 350 was administered without immediate complication.   0 cc's was discarded. Sagittal and coronal MIP reformatted images were   obtained from the source data. Both the axial source and reconstructed   images were reviewed.    COMPARISON: Prior head CT exam from 9/28/2019. No prior CT angiogram   studies of the head or neck are available for comparison.    FINDINGS:    Head CT: There is no acute intracranial hemorrhage, mass effect, shift of   the midline structures, herniation, extra-axial fluid collection, or   hydrocephalus.    There is diffuse cerebral volume loss with prominence of the sulci,   fissures, and cisternal spaces which is normal for the patient's age.   Mild ventriculomegaly appears unchanged. There is mild deep and   periventricular white matter hypoattenuation statistically compatible   with microvascular changes given calcific atherosclerotic disease of the  intracranial arteries.    The paranasal sinuses and tympanomastoid cavities are clear. The   calvarium is intact. The imaged orbits are unremarkable.    CTA NECK: There is a standard anatomic configuration to the aortic arch.   Atherosclerosis affects the arch and origins of the great vessels without   associated stenosis.    The bilateral common carotid arteries demonstrate scattered calcified   plaque without significant stenosis.    Atherosclerotic plaque affects the right  carotid bifurcation and   proximal internal carotid artery with associated mild (less than 50%)   stenosis of the right internal carotid artery origin and proximal   internal carotid artery by NASCET criteria. The remainder of the right   internal carotid artery is patent extending to the skull base.    Calcified plaque affects the left carotid bifurcation and proximal left   internal carotid artery with associated mild (less than 50%) stenosis of   the left proximal internal carotid artery by NASCET criteria. The  remainder of the left internal carotid artery is patent extending to the   skull base.    The origins of the bilateral vertebral arteries demonstrate calcified   plaques without associated stenosis. The right vertebral artery is   dominant comparedto the left. 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 The bilateral vertebral arteries are patent.    CTA HEAD: Calcified plaques affect the bilateral V4 segments without   significant stenosis. The left V4 segment is hypoplastic and terminates   in PICA. The right V4 segment is dominant and continues as the basilar   artery. The basilar artery and basilar tip appear unremarkable as well as   the bilateral posterior cerebral arteries.    The anterior and bilateral posterior communicating arteries are seen.    The left A1 segment is hypoplastic.    Calcified plaques affect the bilateral carotid siphons without   significant stenosis. Otherwise, the bilateral intracranial internal   carotid, anterior, and middle cerebral arteries appear unremarkable.    The intracranial venous structures are patent.    IMPRESSION:    HEAD CT:  1. No acute intracranial hemorrhage, mass effect, or shift of the midline   structures.  2. Mild chronic white matter microvascular type changes.    CTA NECK:  1. Atherosclerotic plaque affecting the bilateral carotid bifurcations,   and proximal internal carotid arteries with associated mild (less than   50%) stenosis of the bilateral internal carotid artery origins and   proximal segments by NASCET criteria.  2. Otherwise, no large vessel occlusion or major stenosis.    CTA HEAD:  1. No large vessel occlusion or major stenosis.    --- End of Report ---    < end of copied text >  < from: CT Abdomen and Pelvis w/ IV Cont (07.28.24 @ 10:55) >  ACC: [id number] EXAM:  CT ABDOMEN AND PELVIS IC   ORDERED BY: ADITHYA GODINEZ     ACC: [id number] EXAM:  CT ANGIO CHEST PULM ART WAWIC   ORDERED BY: ADITHYA GODINEZ     PROCEDURE DATE:  07/28/2024          INTERPRETATION:  CLINICAL INDICATION: Altered mental status, vomiting,   possible hematemesis. Evaluate for pulmonary embolism    TECHNIQUE: Enhanced helical images were obtained of the chest, abdomen   and pelvis. Coronal and sagittal images were reconstructed.  Images were   obtained after the uneventful administration of 70 cc of nonionic   intravenous contrast (Omnipaque 350) and enteric contrast. 3D MIP images   were provided.    COMPARISON: None    FINDINGS:  CTA: No pulmonary embolism.. 3.7 cm mid ascending aorta. No aortic   dissection.The heart is mildly enlarged. No pericardial effusion. Aortic   valvular, coronary arterial and mitral annular calcification.    Lungs/Airways/Pleura: Mild dependent lingula and dependent lower lobe   atelectasis. No pleural effusion or pneumothorax.The central airways are   patent. Mild bronchiectasis in the posterior right upper and right middle   lobes with distal airway impaction and clusters of small nodules, related   to a small airways process.    Mediastinum/Lymph nodes: No thoracic adenopathy.    Hepatobiliary: Unremarkable liver. Normal caliber bile ducts.   Nondistended gallbladder.    Pancreas: Unremarkable.    Spleen: Unremarkable.    Adrenal glands: Unremarkable.    Kidneys: Left upper renal scarring. No hydronephrosis.    Bowel: No bowel obstruction. The appendix is not visualized; no   periappendiceal fat stranding. Large rectal stool ball.    Abdominal lymph nodes: No lymphadenopathy.    Abdominal vessels. Atherosclerotic changes.    Peritoneum: No ascites.    Pelvis: Limited evaluation due to metallic streak artifact from hip   arthroplasties. 1.5 cm left ovarian cyst. Calcified uterine fibroid.    Bones/soft tissues: Bilateral hip arthroplasties. Old bilateral anterior   rib and right inferior pubic ramus fractures. Diffuse bone   demineralization. Multilevel thoracolumbar compression deformities, most   severe at T9, T12 and L1.    IMPRESSION:  No pulmonary embolism. No aortic dissection.    No bowel obstruction. Large rectal stool ball.    Other incidental/chronic findings, as above.    --- End of Report ---      < end of copied text >    Time spent on this patient encounter, which includes documenting this note in the electronic medical record, was 55 minutes including assessing the presenting problems with associated risks, reviewing the medical record to prepare for the encounter, and meeting face to face with the patient to obtain additional history. I have also performed an appropriate physical exam, made interventions listed and ordered and interpreted appropriate diagnostic studies as documented. To improve communication and patient safety, I have coordinated care with the multidisciplinary team including the bedside nurse, appropriate attending of record and consultants as needed. Date of entry of this note is equal to the date of services rendered.       CC: AMS    HPI: 89F with pmhx HTN (no longer on meds) came to the hospital today after she was found down by granddaughter this morning. Last known well the day prior. She was reportedly noted to have dried coffee ground emesis on her face and hair and was lethargic. On arrival to the ED, labs significant for lactate 4.5, BUN/SUB 36/1.52, trop 1600. Hgb and ABG wnl. CT angio head/neck negative for acute pathology or LVO and CT c/a/pnegative for PE, aortic disection but revealed large rectal stool ball. ICU called given lethargy.    Events: Pt seen at bedside in the Ed. Hx provided as above by daughter and son in law. Pt with fixed right sided gaze. Moving purposefully but not following commands.     ROS:  Due to altered mental status/intubation, subjective information was not able to be obtained from the patient. History was obtained, to the extent possible, from review of the chart and collateral sources of information.      PAST MEDICAL & SURGICAL HISTORY:  HTN (hypertension)      Medications:      ICU Vital Signs Last 24 Hrs  T(C): 36.8 (28 Jul 2024 16:02), Max: 36.8 (28 Jul 2024 16:02)  T(F): 98.3 (28 Jul 2024 16:02), Max: 98.3 (28 Jul 2024 16:02)  HR: 81 (28 Jul 2024 16:02) (74 - 96)  BP: 147/72 (28 Jul 2024 16:02) (147/72 - 201/110)  BP(mean): 102 (28 Jul 2024 12:13) (102 - 102)  ABP: --  ABP(mean): --  RR: 18 (28 Jul 2024 16:02) (18 - 18)  SpO2: 99% (28 Jul 2024 16:02) (98% - 99%)    O2 Parameters below as of 28 Jul 2024 16:02  Patient On (Oxygen Delivery Method): room air    Physical Examination:    General: Laying in bed, opens eyes  HEENT: Pupils equal, reactive to light.  Symmetric.  PULM: Clear to auscultation bilaterally, unlabored respirations on room air   CVS: Regular rate and rhythm, no murmurs  ABD: Soft, nondistended, nontender, normoactive bowel sounds  EXT: No edema, nontender  SKIN: Warm and well perfused  NEURO: moving all extremities x4, nonpurposeful, does not follow commands. R sided gaze no nystagmus      I&O's Detail      LABS:                        13.5   22.29 )-----------( 322      ( 28 Jul 2024 10:19 )             40.2     07-28    136  |  105  |  36<H>  ----------------------------<  231<H>  4.0   |  21<L>  |  1.52<H>    Ca    10.2<H>      28 Jul 2024 10:19  Phos  3.7     07-28  Mg     1.7     07-28    TPro  8.8<H>  /  Alb  3.9  /  TBili  0.7  /  DBili  x   /  AST  32  /  ALT  19  /  AlkPhos  104  07-28          CAPILLARY BLOOD GLUCOSE      POCT Blood Glucose.: 270 mg/dL (28 Jul 2024 10:19)    PT/INR - ( 28 Jul 2024 10:19 )   PT: 10.1 sec;   INR: 0.89 ratio         PTT - ( 28 Jul 2024 10:19 )  PTT:26.1 sec  Urinalysis Basic - ( 28 Jul 2024 13:44 )    Color: Yellow / Appearance: Clear / SG: >1.030 / pH: x  Gluc: x / Ketone: Trace mg/dL  / Bili: Negative / Urobili: 0.2 mg/dL   Blood: x / Protein: 100 mg/dL / Nitrite: Negative   Leuk Esterase: Moderate / RBC: 5 /HPF / WBC 13 /HPF   Sq Epi: x / Non Sq Epi: 4 /HPF / Bacteria: Negative /HPF      CULTURES:     pend    RADIOLOGY: < from: CT Angio Neck w/ IV Cont (07.28.24 @ 10:54) >    ACC: [id number] EXAM:  CT ANGIO NECK (W)AW IC   ORDERED BY: ADITHYA GODINEZ     ACC: [id number] EXAM:  CT ANGIO BRAIN (W)AW IC   ORDERED BY: ADITHYA GODINEZ     PROCEDURE DATE:  07/28/2024          INTERPRETATION:  .    CLINICAL INFORMATION: Altered mentalstatus. Evaluate for large vessel   occlusion.    TECHNIQUE: Transaxial CT images were acquired through the head without   the administration of IV contrast. Thin section CT angiography from the   aortic arch to the cranial vertex was also performed using a multislice   CT scanner, following the infusion of intravenous contrast material. 70   cc's of IV Omnipaque 350 was administered without immediate complication.   0 cc's was discarded. Sagittal and coronal MIP reformatted images were   obtained from the source data. Both the axial source and reconstructed   images were reviewed.    COMPARISON: Prior head CT exam from 9/28/2019. No prior CT angiogram   studies of the head or neck are available for comparison.    FINDINGS:    Head CT: There is no acute intracranial hemorrhage, mass effect, shift of   the midline structures, herniation, extra-axial fluid collection, or   hydrocephalus.    There is diffuse cerebral volume loss with prominence of the sulci,   fissures, and cisternal spaces which is normal for the patient's age.   Mild ventriculomegaly appears unchanged. There is mild deep and   periventricular white matter hypoattenuation statistically compatible   with microvascular changes given calcific atherosclerotic disease of the  intracranial arteries.    The paranasal sinuses and tympanomastoid cavities are clear. The   calvarium is intact. The imaged orbits are unremarkable.    CTA NECK: There is a standard anatomic configuration to the aortic arch.   Atherosclerosis affects the arch and origins of the great vessels without   associated stenosis.    The bilateral common carotid arteries demonstrate scattered calcified   plaque without significant stenosis.    Atherosclerotic plaque affects the right  carotid bifurcation and   proximal internal carotid artery with associated mild (less than 50%)   stenosis of the right internal carotid artery origin and proximal   internal carotid artery by NASCET criteria. The remainder of the right   internal carotid artery is patent extending to the skull base.    Calcified plaque affects the left carotid bifurcation and proximal left   internal carotid artery with associated mild (less than 50%) stenosis of   the left proximal internal carotid artery by NASCET criteria. The  remainder of the left internal carotid artery is patent extending to the   skull base.    The origins of the bilateral vertebral arteries demonstrate calcified   plaques without associated stenosis. The right vertebral artery is   dominant comparedto the left. 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 The bilateral vertebral arteries are patent.    CTA HEAD: Calcified plaques affect the bilateral V4 segments without   significant stenosis. The left V4 segment is hypoplastic and terminates   in PICA. The right V4 segment is dominant and continues as the basilar   artery. The basilar artery and basilar tip appear unremarkable as well as   the bilateral posterior cerebral arteries.    The anterior and bilateral posterior communicating arteries are seen.    The left A1 segment is hypoplastic.    Calcified plaques affect the bilateral carotid siphons without   significant stenosis. Otherwise, the bilateral intracranial internal   carotid, anterior, and middle cerebral arteries appear unremarkable.    The intracranial venous structures are patent.    IMPRESSION:    HEAD CT:  1. No acute intracranial hemorrhage, mass effect, or shift of the midline   structures.  2. Mild chronic white matter microvascular type changes.    CTA NECK:  1. Atherosclerotic plaque affecting the bilateral carotid bifurcations,   and proximal internal carotid arteries with associated mild (less than   50%) stenosis of the bilateral internal carotid artery origins and   proximal segments by NASCET criteria.  2. Otherwise, no large vessel occlusion or major stenosis.    CTA HEAD:  1. No large vessel occlusion or major stenosis.    --- End of Report ---    < end of copied text >  < from: CT Abdomen and Pelvis w/ IV Cont (07.28.24 @ 10:55) >  ACC: [id number] EXAM:  CT ABDOMEN AND PELVIS IC   ORDERED BY: ADITHYA GODINEZ     ACC: [id number] EXAM:  CT ANGIO CHEST PULM ART WAWIC   ORDERED BY: ADITHYA GODINEZ     PROCEDURE DATE:  07/28/2024          INTERPRETATION:  CLINICAL INDICATION: Altered mental status, vomiting,   possible hematemesis. Evaluate for pulmonary embolism    TECHNIQUE: Enhanced helical images were obtained of the chest, abdomen   and pelvis. Coronal and sagittal images were reconstructed.  Images were   obtained after the uneventful administration of 70 cc of nonionic   intravenous contrast (Omnipaque 350) and enteric contrast. 3D MIP images   were provided.    COMPARISON: None    FINDINGS:  CTA: No pulmonary embolism.. 3.7 cm mid ascending aorta. No aortic   dissection.The heart is mildly enlarged. No pericardial effusion. Aortic   valvular, coronary arterial and mitral annular calcification.    Lungs/Airways/Pleura: Mild dependent lingula and dependent lower lobe   atelectasis. No pleural effusion or pneumothorax.The central airways are   patent. Mild bronchiectasis in the posterior right upper and right middle   lobes with distal airway impaction and clusters of small nodules, related   to a small airways process.    Mediastinum/Lymph nodes: No thoracic adenopathy.    Hepatobiliary: Unremarkable liver. Normal caliber bile ducts.   Nondistended gallbladder.    Pancreas: Unremarkable.    Spleen: Unremarkable.    Adrenal glands: Unremarkable.    Kidneys: Left upper renal scarring. No hydronephrosis.    Bowel: No bowel obstruction. The appendix is not visualized; no   periappendiceal fat stranding. Large rectal stool ball.    Abdominal lymph nodes: No lymphadenopathy.    Abdominal vessels. Atherosclerotic changes.    Peritoneum: No ascites.    Pelvis: Limited evaluation due to metallic streak artifact from hip   arthroplasties. 1.5 cm left ovarian cyst. Calcified uterine fibroid.    Bones/soft tissues: Bilateral hip arthroplasties. Old bilateral anterior   rib and right inferior pubic ramus fractures. Diffuse bone   demineralization. Multilevel thoracolumbar compression deformities, most   severe at T9, T12 and L1.    IMPRESSION:  No pulmonary embolism. No aortic dissection.    No bowel obstruction. Large rectal stool ball.    Other incidental/chronic findings, as above.    --- End of Report ---      < end of copied text >    Time spent on this patient encounter, which includes documenting this note in the electronic medical record, was 55 minutes including assessing the presenting problems with associated risks, reviewing the medical record to prepare for the encounter, and meeting face to face with the patient to obtain additional history. I have also performed an appropriate physical exam, made interventions listed and ordered and interpreted appropriate diagnostic studies as documented. To improve communication and patient safety, I have coordinated care with the multidisciplinary team including the bedside nurse, appropriate attending of record and consultants as needed. Date of entry of this note is equal to the date of services rendered.

## 2024-07-28 NOTE — CONSULT NOTE ADULT - ASSESSMENT
89F with pmhx HTN (no longer on meds) came to the hospital today after she was found down by granddaughter this morning, now with AMS/lethargy of unclear etiology. Also with ?JACKY and lactic acidosis. DDx includes hypertensive encephalopathy, metabolic encephalopathy.     - unclear etiology of event  - pt's family states she stopped taking her two antihypertensive agents a few months back as she did not wish to follow up with [facility]  - takes large amounts of asa for her arthritis per daughter, consider tox/sal level   - hgb stable, no hypotension, no stigmata of active GIB  - CTH/CTA h/n negative for acute pathology  - lactate improving  - BP improved s/p labetalol    - would consult neuro will likely need MR head   - would pursue infectious workup, may need LP   - discussed with daughter/HCP at bedside pt is DNR/DNI, she provides HCP form naming her, would need MOL[facility] paperwork completed with pt's daughter for validity inpatient  - VSS, now reportedly more awake, VSS, pt has no icu needs at this time, please reconsult as needed    - above discussed with ED attending Dr. Angeles     case d/w ICU attending Dr. garcia   89F with pmhx HTN (no longer on meds) came to the hospital today after she was found down by granddaughter this morning, now with AMS/lethargy of unclear etiology. Also with ?JACKY and lactic acidosis. DDx includes hypertensive encephalopathy, metabolic encephalopathy.     - unclear etiology of event  - pt's family states she stopped taking her two antihypertensive agents a few months back as she did not wish to follow up with [facility]  - takes large amounts of asa for her arthritis per daughter, consider tox/sal level   - hgb stable, no hypotension, no stigmata of active GIB  - CTH/CTA h/n negative for acute pathology  - lactate improving  - BP improved s/p labetalol    - would consult neuro will likely need MR head   - would pursue infectious workup, may need LP   - discussed with daughter/HCP at bedside pt is DNR/DNI, she provides HCP form naming her, would need [facility] paperwork completed with pt's daughter for validity inpatient  - VSS, now reportedly more awake, VSS, pt has no icu needs at this time,rec admission to tele, please reconsult as needed    - above discussed with ED attending Dr. Angeles     case d/w ICU attending Dr. garcia

## 2024-07-28 NOTE — ED ADULT NURSE REASSESSMENT NOTE - NS ED NURSE REASSESS COMMENT FT1
Handoff report received from TIFFANIE Eduardo. Pt is resting comfortably in the stretcher. No acute distress noted. Breathing is even and unlabored at this time.

## 2024-07-28 NOTE — PHARMACOTHERAPY INTERVENTION NOTE - COMMENTS
Medication reconciliation complete.  Home medications reviewed with patient's family at bedside and confirmed against Dr. First murali garner.  As far as the family is aware, the patient only takes two blood pressure medications and takes Aspirin although they are unsure of the dose.  Family reports no known medication allergies.    Home Medications:  amlodipine-benazepril 5 mg-20 mg oral capsule: 1 cap(s) orally once a day ***LAST FILLED 03/03/24 for a 90 day supply*** (28 Jul 2024 17:10)  aspirin:  (28 Jul 2024 17:12)  metoprolol succinate 50 mg oral tablet, extended release: 1 tab(s) orally once a day ***LAST FILLED 12/04/23 for a 90 day supply*** (28 Jul 2024 17:12)

## 2024-07-28 NOTE — PATIENT PROFILE ADULT - FALL HARM RISK - HARM RISK INTERVENTIONS
Assistance with ambulation/Assistance OOB with selected safe patient handling equipment/Communicate Risk of Fall with Harm to all staff/Discuss with provider need for PT consult/Monitor gait and stability/Provide patient with walking aids - walker, cane, crutches/Reinforce activity limits and safety measures with patient and family/Sit up slowly, dangle for a short time, stand at bedside before walking/Tailored Fall Risk Interventions/Visual Cue: Yellow wristband and red socks/Bed in lowest position, wheels locked, appropriate side rails in place/Call bell, personal items and telephone in reach/Instruct patient to call for assistance before getting out of bed or chair/Non-slip footwear when patient is out of bed/[place] to call system/Physically safe environment - no spills, clutter or unnecessary equipment/Purposeful Proactive Rounding/Room/bathroom lighting operational, light cord in reach

## 2024-07-28 NOTE — ED PROVIDER NOTE - PROGRESS NOTE DETAILS
Kamar Angeles DO (Attending): His labs show leukocytosis 22,000, elevated troponin of 1600 which would be repeated, repeat EKG is unchanged and shows no ischemia, elevated lactate of 4.5.  CTs showed no acute findings aside from large stool burden.  Patient given 10 mg of labetalol IV push for blood pressure control, blood pressure responded to systolic 160.  Patient now looking around much more alert but still not talking.  ICU was consulted and will evaluate patient.  Differential includes now but not limited to press syndrome, hypertensive encephalopathy, patient pending ICU evaluation, urine, repeat troponin and lactate. Kamar Angeles DO (Attending): ICU rejects patient, will admit to medicine on telemetry.

## 2024-07-28 NOTE — ED PROVIDER NOTE - PHYSICAL EXAMINATION
GEN: Patient responsive to painful stimuli, protecting airway  HEENT: normocephalic, atraumatic, EOMI, no scleral icterus, moist MM, face and hair covered in dried vomitus   CARDIAC: RRR, S1, S2, no murmur.   PULM: CTA B/L no wheeze, rhonchi, rales.   ABD: soft NT, ND, no rebound no guarding,  MSK: not following commands, withdraws to pain  NEURO: A&Ox0, responsive to painful stimuli   SKIN: warm, dry, no rash.

## 2024-07-28 NOTE — H&P ADULT - NSHPPHYSICALEXAM_GEN_ALL_CORE
T(C): 37.3 (07-28-24 @ 20:48), Max: 37.3 (07-28-24 @ 20:48)  HR: 83 (07-28-24 @ 20:48) (74 - 96)  BP: 130/61 (07-28-24 @ 20:48) (130/61 - 201/110)  RR: 16 (07-28-24 @ 20:48) (16 - 18)  SpO2: 100% (07-28-24 @ 20:48) (98% - 100%)    General: Ill appearing   HEENT: non-traumatic, perrla, eomi  Cardio: s1s2 regular rate and rhythm  Lungs: comfortable breathing, clear to auscultation  Abdomen: Soft, non-tender, non-distended  Neuro: AOx0

## 2024-07-28 NOTE — H&P ADULT - PROBLEM SELECTOR PLAN 1
unknown etiology, r/o CVA  CTH, CTA head and neck neg for acute infarct  MRI non con   NELDA in am  Permissive HTN  PT/OT/SLP  Bedside dysphagia screen  Neuro-checks  Monitor on tele  Neurology consult unknown etiology, r/o CVA  CTH, CTA head and neck neg for acute infarct  MRI non con   NELDA in am  f/u procalcitonin, CK, ammonia  Permissive HTN  PT/OT/SLP  Bedside dysphagia screen  Neuro-checks  Monitor on tele  Neurology consult

## 2024-07-29 LAB
A1C WITH ESTIMATED AVERAGE GLUCOSE RESULT: 5.4 % — SIGNIFICANT CHANGE UP (ref 4–5.6)
ALBUMIN SERPL ELPH-MCNC: 2.9 G/DL — LOW (ref 3.3–5)
ALP SERPL-CCNC: 66 U/L — SIGNIFICANT CHANGE UP (ref 40–120)
ALT FLD-CCNC: 17 U/L — SIGNIFICANT CHANGE UP (ref 12–78)
ANION GAP SERPL CALC-SCNC: 9 MMOL/L — SIGNIFICANT CHANGE UP (ref 5–17)
AST SERPL-CCNC: 37 U/L — SIGNIFICANT CHANGE UP (ref 15–37)
BASOPHILS # BLD AUTO: 0.03 K/UL — SIGNIFICANT CHANGE UP (ref 0–0.2)
BASOPHILS NFR BLD AUTO: 0.2 % — SIGNIFICANT CHANGE UP (ref 0–2)
BILIRUB SERPL-MCNC: 0.8 MG/DL — SIGNIFICANT CHANGE UP (ref 0.2–1.2)
BUN SERPL-MCNC: 29 MG/DL — HIGH (ref 7–23)
CALCIUM SERPL-MCNC: 9.1 MG/DL — SIGNIFICANT CHANGE UP (ref 8.5–10.1)
CHLORIDE SERPL-SCNC: 112 MMOL/L — HIGH (ref 96–108)
CHOLEST SERPL-MCNC: 244 MG/DL — HIGH
CO2 SERPL-SCNC: 22 MMOL/L — SIGNIFICANT CHANGE UP (ref 22–31)
CREAT SERPL-MCNC: 1.32 MG/DL — HIGH (ref 0.5–1.3)
CULTURE RESULTS: SIGNIFICANT CHANGE UP
EGFR: 37 ML/MIN/1.73M2 — LOW
EOSINOPHIL # BLD AUTO: 0.01 K/UL — SIGNIFICANT CHANGE UP (ref 0–0.5)
EOSINOPHIL NFR BLD AUTO: 0.1 % — SIGNIFICANT CHANGE UP (ref 0–6)
ESTIMATED AVERAGE GLUCOSE: 108 MG/DL — SIGNIFICANT CHANGE UP (ref 68–114)
GLUCOSE BLDC GLUCOMTR-MCNC: 109 MG/DL — HIGH (ref 70–99)
GLUCOSE BLDC GLUCOMTR-MCNC: 117 MG/DL — HIGH (ref 70–99)
GLUCOSE BLDC GLUCOMTR-MCNC: 126 MG/DL — HIGH (ref 70–99)
GLUCOSE BLDC GLUCOMTR-MCNC: 99 MG/DL — SIGNIFICANT CHANGE UP (ref 70–99)
GLUCOSE SERPL-MCNC: 115 MG/DL — HIGH (ref 70–99)
HCT VFR BLD CALC: 32.2 % — LOW (ref 34.5–45)
HDLC SERPL-MCNC: 72 MG/DL — SIGNIFICANT CHANGE UP
HGB BLD-MCNC: 10.8 G/DL — LOW (ref 11.5–15.5)
IMM GRANULOCYTES NFR BLD AUTO: 0.5 % — SIGNIFICANT CHANGE UP (ref 0–0.9)
LIPID PNL WITH DIRECT LDL SERPL: 158 MG/DL — HIGH
LYMPHOCYTES # BLD AUTO: 1.91 K/UL — SIGNIFICANT CHANGE UP (ref 1–3.3)
LYMPHOCYTES # BLD AUTO: 12.4 % — LOW (ref 13–44)
MAGNESIUM SERPL-MCNC: 2.1 MG/DL — SIGNIFICANT CHANGE UP (ref 1.6–2.6)
MCHC RBC-ENTMCNC: 31.5 PG — SIGNIFICANT CHANGE UP (ref 27–34)
MCHC RBC-ENTMCNC: 33.5 GM/DL — SIGNIFICANT CHANGE UP (ref 32–36)
MCV RBC AUTO: 93.9 FL — SIGNIFICANT CHANGE UP (ref 80–100)
MONOCYTES # BLD AUTO: 0.99 K/UL — HIGH (ref 0–0.9)
MONOCYTES NFR BLD AUTO: 6.4 % — SIGNIFICANT CHANGE UP (ref 2–14)
NEUTROPHILS # BLD AUTO: 12.38 K/UL — HIGH (ref 1.8–7.4)
NEUTROPHILS NFR BLD AUTO: 80.4 % — HIGH (ref 43–77)
NON HDL CHOLESTEROL: 172 MG/DL — HIGH
PHOSPHATE SERPL-MCNC: 3.4 MG/DL — SIGNIFICANT CHANGE UP (ref 2.5–4.5)
PLATELET # BLD AUTO: 250 K/UL — SIGNIFICANT CHANGE UP (ref 150–400)
POTASSIUM SERPL-MCNC: 3.7 MMOL/L — SIGNIFICANT CHANGE UP (ref 3.5–5.3)
POTASSIUM SERPL-SCNC: 3.7 MMOL/L — SIGNIFICANT CHANGE UP (ref 3.5–5.3)
PROLACTIN SERPL-MCNC: 13.9 NG/ML — SIGNIFICANT CHANGE UP (ref 3.4–24.1)
PROT SERPL-MCNC: 6.5 GM/DL — SIGNIFICANT CHANGE UP (ref 6–8.3)
RBC # BLD: 3.43 M/UL — LOW (ref 3.8–5.2)
RBC # FLD: 13 % — SIGNIFICANT CHANGE UP (ref 10.3–14.5)
SODIUM SERPL-SCNC: 143 MMOL/L — SIGNIFICANT CHANGE UP (ref 135–145)
SPECIMEN SOURCE: SIGNIFICANT CHANGE UP
TRIGL SERPL-MCNC: 84 MG/DL — SIGNIFICANT CHANGE UP
TROPONIN I, HIGH SENSITIVITY RESULT: 1029.27 NG/L — HIGH
TSH SERPL-MCNC: 0.33 UU/ML — LOW (ref 0.34–4.82)
WBC # BLD: 15.4 K/UL — HIGH (ref 3.8–10.5)
WBC # FLD AUTO: 15.4 K/UL — HIGH (ref 3.8–10.5)

## 2024-07-29 PROCEDURE — 99233 SBSQ HOSP IP/OBS HIGH 50: CPT

## 2024-07-29 PROCEDURE — 93010 ELECTROCARDIOGRAM REPORT: CPT

## 2024-07-29 PROCEDURE — 95816 EEG AWAKE AND DROWSY: CPT | Mod: 26

## 2024-07-29 PROCEDURE — 93306 TTE W/DOPPLER COMPLETE: CPT | Mod: 26

## 2024-07-29 PROCEDURE — 99223 1ST HOSP IP/OBS HIGH 75: CPT

## 2024-07-29 RX ORDER — PANTOPRAZOLE SODIUM 20 MG/1
40 TABLET, DELAYED RELEASE ORAL EVERY 12 HOURS
Refills: 0 | Status: DISCONTINUED | OUTPATIENT
Start: 2024-07-29 | End: 2024-07-30

## 2024-07-29 RX ORDER — ASPIRIN 325 MG
10 TABLET ORAL ONCE
Refills: 0 | Status: COMPLETED | OUTPATIENT
Start: 2024-07-29 | End: 2024-07-29

## 2024-07-29 RX ORDER — INSULIN LISPRO 100/ML
VIAL (ML) SUBCUTANEOUS AT BEDTIME
Refills: 0 | Status: DISCONTINUED | OUTPATIENT
Start: 2024-07-29 | End: 2024-08-04

## 2024-07-29 RX ORDER — HYDRALAZINE HYDROCHLORIDE 100 MG/1
10 TABLET ORAL EVERY 6 HOURS
Refills: 0 | Status: DISCONTINUED | OUTPATIENT
Start: 2024-07-29 | End: 2024-08-03

## 2024-07-29 RX ORDER — PIPERACILLIN SODIUM, TAZOBACTAM SODIUM 3; .375 G/15ML; G/15ML
3.38 INJECTION, POWDER, LYOPHILIZED, FOR SOLUTION INTRAVENOUS EVERY 8 HOURS
Refills: 0 | Status: DISCONTINUED | OUTPATIENT
Start: 2024-07-29 | End: 2024-07-31

## 2024-07-29 RX ORDER — BACTERIOSTATIC SODIUM CHLORIDE 0.9 %
1000 VIAL (ML) INJECTION
Refills: 0 | Status: DISCONTINUED | OUTPATIENT
Start: 2024-07-29 | End: 2024-07-31

## 2024-07-29 RX ORDER — LORATADINE 10 MG
17 TABLET,DISINTEGRATING ORAL DAILY
Refills: 0 | Status: DISCONTINUED | OUTPATIENT
Start: 2024-07-29 | End: 2024-08-04

## 2024-07-29 RX ORDER — GUAIFENESIN 100 MG/5ML
1200 SOLUTION ORAL EVERY 12 HOURS
Refills: 0 | Status: DISCONTINUED | OUTPATIENT
Start: 2024-07-29 | End: 2024-08-04

## 2024-07-29 RX ORDER — AMLODIPINE BESYLATE 2.5 MG/1
5 TABLET ORAL DAILY
Refills: 0 | Status: DISCONTINUED | OUTPATIENT
Start: 2024-07-29 | End: 2024-08-03

## 2024-07-29 RX ADMIN — Medication 75 MILLILITER(S): at 17:21

## 2024-07-29 RX ADMIN — Medication 10 MILLIGRAM(S): at 10:16

## 2024-07-29 RX ADMIN — PANTOPRAZOLE SODIUM 40 MILLIGRAM(S): 20 TABLET, DELAYED RELEASE ORAL at 10:17

## 2024-07-29 RX ADMIN — ATORVASTATIN CALCIUM 80 MILLIGRAM(S): 40 TABLET, FILM COATED ORAL at 22:22

## 2024-07-29 RX ADMIN — PIPERACILLIN SODIUM, TAZOBACTAM SODIUM 25 GRAM(S): 3; .375 INJECTION, POWDER, LYOPHILIZED, FOR SOLUTION INTRAVENOUS at 13:19

## 2024-07-29 RX ADMIN — PIPERACILLIN SODIUM, TAZOBACTAM SODIUM 25 GRAM(S): 3; .375 INJECTION, POWDER, LYOPHILIZED, FOR SOLUTION INTRAVENOUS at 22:22

## 2024-07-29 RX ADMIN — GUAIFENESIN 1200 MILLIGRAM(S): 100 SOLUTION ORAL at 22:22

## 2024-07-29 RX ADMIN — PANTOPRAZOLE SODIUM 40 MILLIGRAM(S): 20 TABLET, DELAYED RELEASE ORAL at 22:22

## 2024-07-29 NOTE — SWALLOW BEDSIDE ASSESSMENT ADULT - ASR SWALLOW LABIAL MOBILITY
Date of Surgery Update:  Alona Santos was seen and examined. History and physical has been reviewed. The patient has been examined.  There have been no significant clinical changes since the completion of the originally dated History and Physical.    Signed By: Gela Ceballos MD     May 20, 2021 8:44 AM Unable to formally assess due to altered mentation with reduced active participation/resistive mouth closing on stimulation.

## 2024-07-29 NOTE — SWALLOW BEDSIDE ASSESSMENT ADULT - ASR SWALLOW DENTITION
Unable to formally assess due to altered mentation with reduced active participation/resistive mouth closing on stimulation.

## 2024-07-29 NOTE — SWALLOW BEDSIDE ASSESSMENT ADULT - SWALLOW EVAL: RECOMMENDED DIET
SUGGEST A REGULAR CONSISTENCY DIET WITH THIN LIQUID TEXTURES AS THE PATIENT APPEARED CLINICALLY TOLERANT OF THESE FOOD CONSISTENCIES FROM AN OROPHARYNGEAL SWALLOWING PERSPECTIVE WHEN IN AN ALERT/CALM STATE WHICH MAY NOT ALWAYS BE THE CASE. PT REQUIRED FEEDING ASSISTANCE ON EXAM FOR COGNITIVE REASONS.

## 2024-07-29 NOTE — PHYSICAL THERAPY INITIAL EVALUATION ADULT - REFERRING PHYSICIAN, REHAB EVAL
Findings consistent with right proximal humerus comminuted fracture with mild displacement, DOI 8/29/23. Findings and treatment options were discussed with the patient and her . X-rays were reviewed with them today that revealed stable fractures with evidence of healing. She will start formal physical therapy at this time. Continue to avoid any heavy lifting, pushing or pulling. Follow-up in 6 weeks with repeat x-rays of the right shoulder. All patient's questions were answered to their satisfaction. This note is created using dictation transcription. It may contain typographical errors, grammatical errors, improperly dictated words, background noise and other errors. Dr PRASHANT Card 7/28/24 @ 2109pm due to Neuro Event

## 2024-07-29 NOTE — PHYSICAL THERAPY INITIAL EVALUATION ADULT - LEVEL OF INDEPENDENCE: SIT/STAND, REHAB EVAL
TBA; not tested  due to increasing agitation/restlessness when seated ,required continuous support in sitting due to posterior lean to pushing ; son expressing concerns that pt not ready /too soon so pt eventually returned to supine

## 2024-07-29 NOTE — PROGRESS NOTE ADULT - PROBLEM SELECTOR PLAN 2
·  Problem: Elevated troponin: 1029->1783->1692   ·  Recommendation: high elevation, pt. confused, cannot determine if she is experiencing anginal symptoms, Likely demand ischemia 2/2 HTN urgency, will check TTE for now

## 2024-07-29 NOTE — PHYSICAL THERAPY INITIAL EVALUATION ADULT - FOLLOWS COMMANDS/ANSWERS QUESTIONS, REHAB EVAL
occasionally followed simple commands when repeated/redirected/demonstrated/unable to follow commands

## 2024-07-29 NOTE — PHYSICAL THERAPY INITIAL EVALUATION ADULT - ACTIVE RANGE OF MOTION EXAMINATION, REHAB EVAL
perhaps mild decreased AROM L vs R UE/LE ; pt having difficulty attending/following examiner but is observed Mac purposefully to pull at lines,move blankets/pillow etc ;face appears relatively symmetrical and with much interaction is able to smile B toward end of Rx session/bilateral upper extremity Active ROM was WFL (within functional limits)/bilateral  lower extremity Active ROM was WFL (within functional limits)/deficits as listed below

## 2024-07-29 NOTE — PHYSICAL THERAPY INITIAL EVALUATION ADULT - GENERAL OBSERVATIONS, REHAB EVAL
Pt resting supine in bed with son and grand-daughter at bedside ,per family pt just started speaking again but otherwise "in trance mode ,LA-LA land" not able to name family members ,intermittently staring at wall ; Pt has Gallo catheter in place, IV L proximal forearm and heart moniter. Speech is often jumbled ,exhibits ++perseveration and is difficult to redirect

## 2024-07-29 NOTE — PHYSICAL THERAPY INITIAL EVALUATION ADULT - PERSONAL SAFETY AND JUDGMENT, REHAB EVAL
very pre-occupied with lines during this exam pulling at gown,IV,HM wires,Gallo tubing/at risk behaviors demonstrated

## 2024-07-29 NOTE — SWALLOW BEDSIDE ASSESSMENT ADULT - NS SPL SWALLOW CLINIC TRIAL FT
The pt demonstrated periodically reduced orientation to feeding which was related to altered cognition & she displayed signs of discomfort on right side of mouth. Pt with ecchymosis on right side of tongue apex(likely bit her tongue). Pt was assisted to feed due to cognitive deficits of unclear cause. During intakes, the pt exhibited Oropharyngeal Swallowing integrity which subjectively appeared to be grossly within functional parameters for age when she was in an alert calm state. Bolus formation/transfer were mechanically functional for age & laryngeal lift on palpation during swallowing trials was also felt to be functional for age when pt in alert state. NO behavioral aspiration signs exhibited. No change in O2 sats noted. Suggest a Regular consistency Diet with Thin Liquids as pt tolerates these food textures from an oropharyngeal swallowing perspective when she is alert/calm as she appears clinically tolerant of these food consistencies from an oropharyngeal swallowing perspective during clinical probes.

## 2024-07-29 NOTE — PHYSICAL THERAPY INITIAL EVALUATION ADULT - MANUAL MUSCLE TESTING RESULTS, REHAB EVAL
unable to attend/follow formal MMT testing ,appears at least FAIR range BUE ; BLEs with difficulty/discomfort on SLR ( ~FAIR minus) otherwise >/= FAIR B

## 2024-07-29 NOTE — PHYSICAL THERAPY INITIAL EVALUATION ADULT - PASSIVE RANGE OF MOTION EXAMINATION, REHAB EVAL
pt objects to elevation B legs during exam and does not want anyone else lifting her legs ,she wants to do it herself/bilateral upper extremity Passive ROM was WFL (within functional limits)/bilateral lower extremity Passive ROM was WFL (within functional limits)

## 2024-07-29 NOTE — DIETITIAN INITIAL EVALUATION ADULT - NAME AND PHONE
Mireille Pearl RDN, CDN, [facility] [place]      [phone]   [email or web address]

## 2024-07-29 NOTE — SWALLOW BEDSIDE ASSESSMENT ADULT - ASR SWALLOW LINGUAL MOBILITY
Unable to formally assess due to altered mentation with reduced active participation/resistive mouth closing on stimulation. With that being stated, ecchymosis was noted at site of right tongue apex.

## 2024-07-29 NOTE — CONSULT NOTE ADULT - ASSESSMENT
89 F with pmhx HTN not on meds because she ran out per son, came to the hospital 7/28 after she was found down by omaira in the morning.   Patients last known well was on 7/26 where she was oriented x 4, doing ADL's independently.  As per her son at bedside patient was fpund with "fecal material" on her face and hair and was lethargic.  She was unresponsive, not following commands, blinking her eyes rapidly and body was shaking.  In ED vitals stable, CBC noted for WBC 22.29,  CMP noted for BUN/Cr 36/1.52,  Trop 1783.  Lactate 4.5. UA was neg for bacteria,  Chest/abd/pelvis noted for bronchiectasis in the posterior right upper and right middle lobes with distal airway impaction and clusters of small nodules and large rectal stool ball. Stroke w/u with CTH, CTA head and neck neg for acute infarct.  Septic w/u was sent, patient was loaded with aspirin, given broad spectrum abx with vanc/zosyn. Neurology is consulted for AMS.  On PE she is not engaging, not following commands, +echolalia, global aphasia ?+LNLFD, R gaze deviation seems to be moving all extremities with possible LUE injury.  EEG is neg for epileptiform activity, findings are consistent with diffuse cerebral dysfunction/encephalopathy.      #AMS in the setting of possible sepsis/bronchiectasis-PE with R gaze deviation, ?L NLFD, global aphasia.  Cannot r/o stroke.  -NIHSS 10  -EEG c/w diffuse cerebral dysfunction, no epileptiform activity    Recommendations  -F/U MRI brain  -monitor on tele  -Neurochecks/VS q 4  -Check TTE  -DVT prophylaxis  -S&S eval  -PT eval  -Check A1c, LDL-if MRI + for stroke LDL goal should be <70  -Please give ASA rectally QD as patient is currently NPO, and when able to take NPO please start Atorvastatin 80mg PO QHS  -will follow    D/W Dr. Douglas, Dr. Burks 89 F with pmhx HTN not on meds because she ran out per son, came to the hospital 7/28 after she was found down by omaira in the morning.   Patients last known well was on 7/26 where she was oriented x 4, doing ADL's independently.  As per her son at bedside patient was fpund with "fecal material" on her face and hair and was lethargic.  She was unresponsive, not following commands, blinking her eyes rapidly and body was shaking.  In ED vitals stable, CBC noted for WBC 22.29,  CMP noted for BUN/Cr 36/1.52,  Trop 1783.  Lactate 4.5. UA was neg for bacteria,  Chest/abd/pelvis noted for bronchiectasis in the posterior right upper and right middle lobes with distal airway impaction and clusters of small nodules and large rectal stool ball. Stroke w/u with CTH, CTA head and neck neg for acute infarct.  Septic w/u was sent, patient was loaded with aspirin, given broad spectrum abx with vanc/zosyn. Neurology is consulted for AMS.  On PE she is not engaging, not following commands, +echolalia, global aphasia ?+LNLFD, R gaze deviation seems to be moving all extremities with possible LUE injury.  EEG is neg for epileptiform activity, findings are consistent with diffuse cerebral dysfunction/encephalopathy.      #AMS in the setting of possible sepsis/bronchiectasis-PE with R gaze deviation, ?L NLFD, global aphasia.  Cannot r/o stroke.  -NIHSS 10  -EEG c/w diffuse cerebral dysfunction, no epileptiform activity    Recommendations  -F/U MRI brain  -monitor on tele  -Neurochecks/VS q 4  -Check TTE  -DVT prophylaxis  -S&S eval  -PT eval  -Check A1c, LDL-if MRI + for stroke LDL goal should be <70  -Please give ASA rectally QD if able as patient is currently NPO, and when able to take NPO please start Atorvastatin 80mg PO QHS  -will follow    D/W Dr. Douglas, Dr. Burks 89 F with pmhx HTN not on meds because she ran out per son, came to the hospital 7/28 after she was found down by niece in the morning.   Patients last known well was on 7/26 where she was oriented x 4, doing ADL's independently.  As per her son at bedside patient was found with "fecal material" on her face and hair and was lethargic.  She was unresponsive, not following commands, blinking her eyes rapidly and body was shaking.  In ED vitals stable, CBC noted for WBC 22.29,  CMP noted for BUN/Cr 36/1.52,  Trop 1783.  Lactate 4.5. UA was neg for bacteria,  Chest/abd/pelvis noted for bronchiectasis in the posterior right upper and right middle lobes with distal airway impaction and clusters of small nodules and large rectal stool ball. Stroke w/u with CTH, CTA head and neck neg for acute infarct.  Septic w/u was sent, patient was loaded with aspirin, given broad spectrum abx with vanc/zosyn. Neurology is consulted for AMS.  On PE she is not engaging, not following commands, +echolalia, global aphasia ?+LNLFD, R gaze deviation seems to be moving all extremities with possible LUE injury.  EEG is neg for epileptiform activity, findings are consistent with diffuse cerebral dysfunction/encephalopathy.      #AMS in the setting of possible sepsis/bronchiectasis-PE with R gaze deviation, ?L NLFD, global aphasia.  Cannot r/o stroke.  -NIHSS 10  -EEG c/w diffuse cerebral dysfunction, no epileptiform activity    Recommendations  -F/U MRI brain  -monitor on tele  -Neurochecks/VS q 4  -Check TTE  -DVT prophylaxis  -S&S eval  -PT eval  -Check A1c, LDL-if MRI + for stroke LDL goal should be <70  -Please give ASA rectally QD if able as patient is currently NPO, and when able to take NPO please start Atorvastatin 80mg PO QHS  -will follow    D/W Dr. Douglas, Dr. Burks

## 2024-07-29 NOTE — DIETITIAN INITIAL EVALUATION ADULT - ADD RECOMMEND
Advance to clear liquids to low fiber when medically feasible.  If unable to advance in 24 hours, consider nutritional support.  Suggest Confirm Goals of Care regarding nutrition support. Will provide nutrition/ hydration within goals of care.  MVI w/ minerals daily to ensure 100% RDA met   Record PO intake in EMR after each meal (flowsheet, nursing.)   Consider adding thiamine 100 mg daily 2/2 poor PO intake/ malnutrition  Monitor bowel movements, if no BM for >3 days, consider implementing bowel regimen.  suggest Confirm Goals of Care regarding nutrition support. Will provide nutrition/ hydration within goals of care.   No MOLST in chart  Consider adding appetite stimulant such as Remeron or Marinol 2/2 chronically poor appetite/ PO intake  Monitor PO intake, tolerance, labs and weight.

## 2024-07-29 NOTE — PHYSICAL THERAPY INITIAL EVALUATION ADULT - DIAGNOSIS, PT EVAL
unwitnessed collapse/fall in home AMS, r/o TIA/CVA, bronchiectasis post. RUL/RML , r/o sepsis unwitnessed collapse on bed  in home associated with vomiting ;+ AMS/acute encephalopathy , r/o TIA/CVA, bronchiectasis post. RUL/RML , r/o sepsis

## 2024-07-29 NOTE — DIETITIAN INITIAL EVALUATION ADULT - PROBLEM SELECTOR PLAN 1
unknown etiology, r/o CVA  CTH, CTA head and neck neg for acute infarct  MRI non con   NELDA in am  f/u procalcitonin, CK, ammonia  Permissive HTN  PT/OT/SLP  Bedside dysphagia screen  Neuro-checks  Monitor on tele  Neurology consult

## 2024-07-29 NOTE — SWALLOW BEDSIDE ASSESSMENT ADULT - SWALLOW EVAL: PROGNOSIS
2) The pt demonstrates periodically reduced orientation to feeding which was related to altered cognition & she displayed signs of discomfort on right side of mouth. Pt with ecchymosis on right side of tongue apex(likely bit her tongue). During intakes, the pt exhibited Oropharyngeal Swallowing integrity which subjectively appeared to be grossly within functional parameters for age when she is in an alert calm state. Bolus formation/transfer were mechanically functional for age & laryngeal lift on palpation during swallowing trials was also felt to be functional for age when pt in alert state. NO behavioral aspiration signs exhibited. No change in O2 sats noted.

## 2024-07-29 NOTE — PROGRESS NOTE ADULT - SUBJECTIVE AND OBJECTIVE BOX
Patient is a 89y old  Female who presents with a chief complaint of     HPI:   89-year-old female past medical history of hypertension not on medications presents ED brought in by EMS from home after being found slumped over in her bed covered in vomit.  Patient last was seen by family at 1 PM yesterday and was discovered by family member this morning in bed slumped over.  Patient was normal yesterday cooking doing normal activities.  Patient arrived to ER hypertensive in the 200s systolic, responsive to pain but not talking or providing any history covered in either feculent vomiting or hematemesis as it is very dark color      7/29/24 - seen in bed, sleepy, confused, Tele: NSR       Home Medications:  amlodipine-benazepril 5 mg-20 mg oral capsule: 1 cap(s) orally once a day ***LAST FILLED 03/03/24 for a 90 day supply*** (28 Jul 2024 17:10)  aspirin:  (28 Jul 2024 17:12)  metoprolol succinate 50 mg oral tablet, extended release: 1 tab(s) orally once a day ***LAST FILLED 12/04/23 for a 90 day supply*** (28 Jul 2024 17:12)    MEDICATIONS  (STANDING):  amLODIPine   Tablet 5 milliGRAM(s) Oral daily  aspirin enteric coated 81 milliGRAM(s) Oral daily  atorvastatin 80 milliGRAM(s) Oral at bedtime  dextrose 5%. 1000 milliLiter(s) (50 mL/Hr) IV Continuous <Continuous>  dextrose 5%. 1000 milliLiter(s) (100 mL/Hr) IV Continuous <Continuous>  dextrose 50% Injectable 25 Gram(s) IV Push once  dextrose 50% Injectable 25 Gram(s) IV Push once  dextrose 50% Injectable 12.5 Gram(s) IV Push once  glucagon  Injectable 1 milliGRAM(s) IntraMuscular once  guaiFENesin ER 1200 milliGRAM(s) Oral every 12 hours  insulin lispro (ADMELOG) corrective regimen sliding scale   SubCutaneous three times a day before meals  insulin lispro (ADMELOG) corrective regimen sliding scale.   SubCutaneous at bedtime  pantoprazole  Injectable 40 milliGRAM(s) IV Push every 12 hours  piperacillin/tazobactam IVPB.. 3.375 Gram(s) IV Intermittent every 8 hours  sodium chloride 0.9%. 1000 milliLiter(s) (100 mL/Hr) IV Continuous <Continuous>    MEDICATIONS  (PRN):  dextrose Oral Gel 15 Gram(s) Oral once PRN Blood Glucose LESS THAN 70 milliGRAM(s)/deciliter  hydrALAZINE Injectable 10 milliGRAM(s) IV Push every 6 hours PRN for SBP>150  ondansetron Injectable 4 milliGRAM(s) IV Push every 8 hours PRN Nausea and/or Vomiting  polyethylene glycol 3350 17 Gram(s) Oral daily PRN Constipation      Vital Signs Last 24 Hrs  T(C): 37.6 (29 Jul 2024 09:30), Max: 37.8 (29 Jul 2024 07:59)  T(F): 99.6 (29 Jul 2024 09:30), Max: 100 (29 Jul 2024 07:59)  HR: 91 (29 Jul 2024 07:59) (74 - 91)  BP: 150/60 (29 Jul 2024 07:59) (130/50 - 161/78)  BP(mean): 82 (28 Jul 2024 19:35) (82 - 102)  RR: 18 (29 Jul 2024 07:59) (16 - 18)  SpO2: 94% (29 Jul 2024 07:59) (94% - 100%)    Parameters below as of 29 Jul 2024 07:59  Patient On (Oxygen Delivery Method): room air        PHYSICAL EXAM:  Appearance: No distress  HEENT:   Normal oral mucosa  Cardiovascular: Normal S1 S2, No JVD, No cardiac murmurs, No carotid bruits, No peripheral edema  Respiratory: Lungs clear to auscultation	  Psychiatry: does not respond to questions, awake  Gastrointestinal:  Soft, Non-tender, + BS, no bruits	  Skin: No rashes, No ecchymoses, No cyanosis  Neurologic: uncooperative with exam  Extremities: Normal range of motion, No clubbing, cyanosis or edema  Vascular: Peripheral pulses palpable 2+ bilaterally      LABS: reviewed              CARDIAC MARKERS ( 28 Jul 2024 22:30 )  x     / x     / 570 U/L / x     / x                           10.8   15.40 )-----------( 250      ( 29 Jul 2024 06:34 )             32.2     07-29    143  |  112<H>  |  29<H>  ----------------------------<  115<H>  3.7   |  22  |  1.32<H>    Ca    9.1      29 Jul 2024 06:34  Phos  3.4     07-29  Mg     2.1     07-29    TPro  6.5  /  Alb  2.9<L>  /  TBili  0.8  /  DBili  x   /  AST  37  /  ALT  17  /  AlkPhos  66  07-29    - TroponinI hsT: <-1029.27, <-1783.70, <-1692.82    Troponin I, High Sensitivity Result: 1783.70 ng/L *H* (07-28-24 @ 13:22)  Troponin I, High Sensitivity Result: 1692.82 ng/L *H* (07-28-24 @ 10:19)    Radiology/EKG/TTE: reviewed     TTE pending     EKG: SR, LVH

## 2024-07-29 NOTE — SWALLOW BEDSIDE ASSESSMENT ADULT - SLP GENERAL OBSERVATIONS
On encounter, pt was alert. Eye gaze was often distant. She was communicatively passive/hypoactive & inattentive/internally distractible. Pt was also variably oppositional at times when engaged. She followed 1 step verbal commands with no greater than 20% accuracy or less, given gestural cues/repetition of stimuli. Expressively, pt verbalized on very limited occasions during low structured Q/A dyads. Interrogatives were concrete and personally relevant. At these limited times when verbal responses were elicited, pt's motor speech integrity was relatively preserved. However, her verbalizations were brief, variably fragmented, variably perseverative & variably tangential or contextually irrelevant. 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 Unclear if Cognitive Linguistic Dysfunction is related to Encephalopathy associated with acute illness/infection(i.e leukocytosis, elevated lactate, right bronchiectasis on imaging, elevated Troponin, elevated BP, renal insufficiency, etc) or if Cognitive Linguistic Dysfunction is neurogenic(? CVA, ? seizures, etc). In any case, response effectiveness during Q/A dyads was no greater than 15%. Verbal elaboration cues, and cognitive reorientation/redirection cues were of little communicative benefit.

## 2024-07-29 NOTE — DIETITIAN INITIAL EVALUATION ADULT - PERTINENT LABORATORY DATA
07-29    143  |  112<H>  |  29<H>  ----------------------------<  115<H>  3.7   |  22  |  1.32<H>    Ca    9.1      29 Jul 2024 06:34  Phos  3.4     07-29  Mg     2.1     07-29    TPro  6.5  /  Alb  2.9<L>  /  TBili  0.8  /  DBili  x   /  AST  37  /  ALT  17  /  AlkPhos  66  07-29  POCT Blood Glucose.: 136 mg/dL (07-28-24 @ 23:11)

## 2024-07-29 NOTE — PROGRESS NOTE ADULT - SUBJECTIVE AND OBJECTIVE BOX
HOSPITALIST ATTENDING PROGRESS NOTE     Chart and meds reviewed. Patient seen and examined     Interval Hx/Events: Pt seen and evaluated this AM. Currently, awake, occasionally responding. Family at bedside      All other systems and founds to be negative with exception of what has been described above.       PHYSICAL EXAM:  Vital Signs Last 24 Hrs  T(C): 37.6 (29 Jul 2024 09:30), Max: 37.8 (29 Jul 2024 07:59)  T(F): 99.6 (29 Jul 2024 09:30), Max: 100 (29 Jul 2024 07:59)  HR: 91 (29 Jul 2024 07:59) (83 - 91)  BP: 150/60 (29 Jul 2024 07:59) (130/50 - 150/60)  BP(mean): 82 (28 Jul 2024 19:35) (82 - 82)  RR: 18 (29 Jul 2024 07:59) (16 - 18)  SpO2: 94% (29 Jul 2024 07:59) (94% - 100%)    Parameters below as of 29 Jul 2024 07:59  Patient On (Oxygen Delivery Method): room air      GEN: NAD,   HEENT: EOMI, +unequal pupils but reactive   NECK : Soft and supple, no JVD  LUNG: CTABL, No wheezing, rales or rhonchi  CVS: S1S2+, RRR, no M/G/R  GI: BS+, soft, NT/ND, no guarding, no rebound, +delgado in place   EXTREMITIES: No peripheral edema  VASCULAR: 2+ peripheral pulses  NEURO: AAOxO, limited neurological exam due to pt's   SKIN: +ecchymotic     HOME MEDICATIONS:  Home Medications:  amlodipine-benazepril 5 mg-20 mg oral capsule: 1 cap(s) orally once a day ***LAST FILLED 03/03/24 for a 90 day supply*** (28 Jul 2024 17:10)  aspirin:  (28 Jul 2024 17:12)  metoprolol succinate 50 mg oral tablet, extended release: 1 tab(s) orally once a day ***LAST FILLED 12/04/23 for a 90 day supply*** (28 Jul 2024 17:12)      MEDICATIONS  MEDICATIONS  (STANDING):  amLODIPine   Tablet 5 milliGRAM(s) Oral daily  aspirin enteric coated 81 milliGRAM(s) Oral daily  atorvastatin 80 milliGRAM(s) Oral at bedtime  dextrose 5%. 1000 milliLiter(s) (50 mL/Hr) IV Continuous <Continuous>  dextrose 5%. 1000 milliLiter(s) (100 mL/Hr) IV Continuous <Continuous>  dextrose 50% Injectable 25 Gram(s) IV Push once  dextrose 50% Injectable 25 Gram(s) IV Push once  dextrose 50% Injectable 12.5 Gram(s) IV Push once  glucagon  Injectable 1 milliGRAM(s) IntraMuscular once  guaiFENesin ER 1200 milliGRAM(s) Oral every 12 hours  insulin lispro (ADMELOG) corrective regimen sliding scale   SubCutaneous three times a day before meals  insulin lispro (ADMELOG) corrective regimen sliding scale.   SubCutaneous at bedtime  pantoprazole  Injectable 40 milliGRAM(s) IV Push every 12 hours  piperacillin/tazobactam IVPB.. 3.375 Gram(s) IV Intermittent every 8 hours  sodium chloride 0.9%. 1000 milliLiter(s) (100 mL/Hr) IV Continuous <Continuous>      LABS: All Labs Reviewed:                        10.8   15.40 )-----------( 250      ( 29 Jul 2024 06:34 )             32.2     07-29    143  |  112<H>  |  29<H>  ----------------------------<  115<H>  3.7   |  22  |  1.32<H>    Ca    9.1      29 Jul 2024 06:34  Phos  3.4     07-29  Mg     2.1     07-29    TPro  6.5  /  Alb  2.9<L>  /  TBili  0.8  /  DBili  x   /  AST  37  /  ALT  17  /  AlkPhos  66  07-29    PT/INR - ( 28 Jul 2024 10:19 )   PT: 10.1 sec;   INR: 0.89 ratio       PTT - ( 28 Jul 2024 10:19 )  PTT:26.1 sec    Urinalysis Basic - ( 29 Jul 2024 06:34 )    Color: x / Appearance: x / SG: x / pH: x  Gluc: 115 mg/dL / Ketone: x  / Bili: x / Urobili: x   Blood: x / Protein: x / Nitrite: x   Leuk Esterase: x / RBC: x / WBC x   Sq Epi: x / Non Sq Epi: x / Bacteria: x        Culture - Urine (collected 28 Jul 2024 13:44)  Source: Catheterized None  Final Report (29 Jul 2024 14:57):    <10,000 CFU/mL Normal Urogenital Emilia    Urinalysis with Rflx Culture (collected 28 Jul 2024 13:44)    Culture - Blood (collected 28 Jul 2024 11:24)  Source: .Blood None  Preliminary Report (29 Jul 2024 17:02):    No growth at 24 hours    Culture - Blood (collected 28 Jul 2024 11:24)  Source: .Blood None  Preliminary Report (29 Jul 2024 17:02):    No growth at 24 hours      Blood Culture: 07-28 @ 13:44  Organism --  Gram Stain Blood -- Gram Stain --  Specimen Source Catheterized None  Culture-Blood --    07-28 @ 11:24  Organism --  Gram Stain Blood -- Gram Stain --  Specimen Source .Blood None  Culture-Blood --      I&O's Detail    28 Jul 2024 07:01  -  29 Jul 2024 07:00  --------------------------------------------------------  IN:    sodium chloride 0.9%: 800 mL  Total IN: 800 mL    OUT:    Voided (mL): 1200 mL  Total OUT: 1200 mL    Total NET: -400 mL        CAPILLARY BLOOD GLUCOSE      POCT Blood Glucose.: 117 mg/dL (29 Jul 2024 16:30)  POCT Blood Glucose.: 99 mg/dL (29 Jul 2024 12:11)  POCT Blood Glucose.: 109 mg/dL (29 Jul 2024 08:45)  POCT Blood Glucose.: 136 mg/dL (28 Jul 2024 23:11)        CARDIOLOGY TESTING   CARDIAC MARKERS ( 28 Jul 2024 22:30 )  x     / x     / 570 U/L / x     / x        EKG : reviewed   ECHO: pending       RADIOLOGY  < from: CT Angio Head w/ IV Cont (07.28.24 @ 10:54) >  HEAD CT:  1. No acute intracranial hemorrhage, mass effect, or shift of the midline   structures.  2. Mild chronic white matter microvascular type changes.    CTA NECK:  1. Atherosclerotic plaque affecting the bilateral carotid bifurcations,   and proximal internal carotid arteries with associated mild (less than   50%) stenosis of the bilateral internal carotid artery origins and   proximal segments by NASCET criteria.  2. Otherwise, no large vessel occlusion or major stenosis.    CTA HEAD:  1. No large vessel occlusion or major stenosis.    < from: CT Abdomen and Pelvis w/ IV Cont (07.28.24 @ 10:55) >  FINDINGS:  CTA: No pulmonary embolism.. 3.7 cm mid ascending aorta. No aortic   dissection.The heart is mildly enlarged. No pericardial effusion. Aortic   valvular, coronary arterial and mitral annular calcification.    Lungs/Airways/Pleura: Mild dependent lingula and dependent lower lobe   atelectasis. No pleural effusion or pneumothorax.The central airways are   patent. Mild bronchiectasis in the posterior right upper and right middle   lobes with distal airway impaction and clusters of small nodules, related   to a small airways process.    Mediastinum/Lymph nodes: No thoracic adenopathy.    Hepatobiliary: Unremarkable liver. Normal caliber bile ducts.   Nondistended gallbladder.    Pancreas: Unremarkable.    Spleen: Unremarkable.    Adrenal glands: Unremarkable.    Kidneys: Left upper renal scarring. No hydronephrosis.    Bowel: No bowel obstruction. The appendix is not visualized; no   periappendiceal fat stranding. Large rectal stool ball.    Abdominal lymph nodes: No lymphadenopathy.    Abdominal vessels. Atherosclerotic changes.    Peritoneum: No ascites.    Pelvis: Limited evaluation due to metallic streak artifact from hip   arthroplasties. 1.5 cm left ovarian cyst. Calcified uterine fibroid.    Bones/soft tissues: Bilateral hip arthroplasties. Old bilateral anterior   rib and right inferior pubic ramus fractures. Diffuse bone   demineralization. Multilevel thoracolumbar compression deformities, most   severe at T9, T12 and L1.    IMPRESSION:  No pulmonary embolism. No aortic dissection.    No bowel obstruction. Large rectal stool ball.    Other incidental/chronic findings, as above.    < end of copied text >     HOSPITALIST ATTENDING PROGRESS NOTE     Chart and meds reviewed. Patient seen and examined     Interval Hx/Events: Pt seen and evaluated this AM. Currently, awake, alert. occasionally responding appropriately.  Family at bedside      All other systems and founds to be negative with exception of what has been described above.       PHYSICAL EXAM:  Vital Signs Last 24 Hrs  T(C): 37.6 (29 Jul 2024 09:30), Max: 37.8 (29 Jul 2024 07:59)  T(F): 99.6 (29 Jul 2024 09:30), Max: 100 (29 Jul 2024 07:59)  HR: 91 (29 Jul 2024 07:59) (83 - 91)  BP: 150/60 (29 Jul 2024 07:59) (130/50 - 150/60)  BP(mean): 82 (28 Jul 2024 19:35) (82 - 82)  RR: 18 (29 Jul 2024 07:59) (16 - 18)  SpO2: 94% (29 Jul 2024 07:59) (94% - 100%)    Parameters below as of 29 Jul 2024 07:59  Patient On (Oxygen Delivery Method): room air      GEN: NAD,   HEENT: EOMI, +unequal pupils but reactive   NECK : Soft and supple, no JVD  LUNG: CTABL, No wheezing, rales or rhonchi  CVS: S1S2+, RRR, no M/G/R  GI: BS+, soft, NT/ND, no guarding, no rebound, +delgado in place   EXTREMITIES: No peripheral edema  VASCULAR: 2+ peripheral pulses  NEURO: AAOxO, limited neurological exam due to pt's   SKIN: +ecchymotic     HOME MEDICATIONS:  Home Medications:  amlodipine-benazepril 5 mg-20 mg oral capsule: 1 cap(s) orally once a day ***LAST FILLED 03/03/24 for a 90 day supply*** (28 Jul 2024 17:10)  aspirin:  (28 Jul 2024 17:12)  metoprolol succinate 50 mg oral tablet, extended release: 1 tab(s) orally once a day ***LAST FILLED 12/04/23 for a 90 day supply*** (28 Jul 2024 17:12)      MEDICATIONS  MEDICATIONS  (STANDING):  amLODIPine   Tablet 5 milliGRAM(s) Oral daily  aspirin enteric coated 81 milliGRAM(s) Oral daily  atorvastatin 80 milliGRAM(s) Oral at bedtime  dextrose 5%. 1000 milliLiter(s) (50 mL/Hr) IV Continuous <Continuous>  dextrose 5%. 1000 milliLiter(s) (100 mL/Hr) IV Continuous <Continuous>  dextrose 50% Injectable 25 Gram(s) IV Push once  dextrose 50% Injectable 25 Gram(s) IV Push once  dextrose 50% Injectable 12.5 Gram(s) IV Push once  glucagon  Injectable 1 milliGRAM(s) IntraMuscular once  guaiFENesin ER 1200 milliGRAM(s) Oral every 12 hours  insulin lispro (ADMELOG) corrective regimen sliding scale   SubCutaneous three times a day before meals  insulin lispro (ADMELOG) corrective regimen sliding scale.   SubCutaneous at bedtime  pantoprazole  Injectable 40 milliGRAM(s) IV Push every 12 hours  piperacillin/tazobactam IVPB.. 3.375 Gram(s) IV Intermittent every 8 hours  sodium chloride 0.9%. 1000 milliLiter(s) (100 mL/Hr) IV Continuous <Continuous>      LABS: All Labs Reviewed:                        10.8   15.40 )-----------( 250      ( 29 Jul 2024 06:34 )             32.2     07-29    143  |  112<H>  |  29<H>  ----------------------------<  115<H>  3.7   |  22  |  1.32<H>    Ca    9.1      29 Jul 2024 06:34  Phos  3.4     07-29  Mg     2.1     07-29    TPro  6.5  /  Alb  2.9<L>  /  TBili  0.8  /  DBili  x   /  AST  37  /  ALT  17  /  AlkPhos  66  07-29    PT/INR - ( 28 Jul 2024 10:19 )   PT: 10.1 sec;   INR: 0.89 ratio       PTT - ( 28 Jul 2024 10:19 )  PTT:26.1 sec    Urinalysis Basic - ( 29 Jul 2024 06:34 )    Color: x / Appearance: x / SG: x / pH: x  Gluc: 115 mg/dL / Ketone: x  / Bili: x / Urobili: x   Blood: x / Protein: x / Nitrite: x   Leuk Esterase: x / RBC: x / WBC x   Sq Epi: x / Non Sq Epi: x / Bacteria: x        Culture - Urine (collected 28 Jul 2024 13:44)  Source: Catheterized None  Final Report (29 Jul 2024 14:57):    <10,000 CFU/mL Normal Urogenital Emilia    Urinalysis with Rflx Culture (collected 28 Jul 2024 13:44)    Culture - Blood (collected 28 Jul 2024 11:24)  Source: .Blood None  Preliminary Report (29 Jul 2024 17:02):    No growth at 24 hours    Culture - Blood (collected 28 Jul 2024 11:24)  Source: .Blood None  Preliminary Report (29 Jul 2024 17:02):    No growth at 24 hours      Blood Culture: 07-28 @ 13:44  Organism --  Gram Stain Blood -- Gram Stain --  Specimen Source Catheterized None  Culture-Blood --    07-28 @ 11:24  Organism --  Gram Stain Blood -- Gram Stain --  Specimen Source .Blood None  Culture-Blood --      I&O's Detail    28 Jul 2024 07:01  -  29 Jul 2024 07:00  --------------------------------------------------------  IN:    sodium chloride 0.9%: 800 mL  Total IN: 800 mL    OUT:    Voided (mL): 1200 mL  Total OUT: 1200 mL    Total NET: -400 mL        CAPILLARY BLOOD GLUCOSE      POCT Blood Glucose.: 117 mg/dL (29 Jul 2024 16:30)  POCT Blood Glucose.: 99 mg/dL (29 Jul 2024 12:11)  POCT Blood Glucose.: 109 mg/dL (29 Jul 2024 08:45)  POCT Blood Glucose.: 136 mg/dL (28 Jul 2024 23:11)        CARDIOLOGY TESTING   CARDIAC MARKERS ( 28 Jul 2024 22:30 )  x     / x     / 570 U/L / x     / x        EKG : reviewed   ECHO: pending       RADIOLOGY  < from: CT Angio Head w/ IV Cont (07.28.24 @ 10:54) >  HEAD CT:  1. No acute intracranial hemorrhage, mass effect, or shift of the midline   structures.  2. Mild chronic white matter microvascular type changes.    CTA NECK:  1. Atherosclerotic plaque affecting the bilateral carotid bifurcations,   and proximal internal carotid arteries with associated mild (less than   50%) stenosis of the bilateral internal carotid artery origins and   proximal segments by NASCET criteria.  2. Otherwise, no large vessel occlusion or major stenosis.    CTA HEAD:  1. No large vessel occlusion or major stenosis.    < from: CT Abdomen and Pelvis w/ IV Cont (07.28.24 @ 10:55) >  FINDINGS:  CTA: No pulmonary embolism.. 3.7 cm mid ascending aorta. No aortic   dissection.The heart is mildly enlarged. No pericardial effusion. Aortic   valvular, coronary arterial and mitral annular calcification.    Lungs/Airways/Pleura: Mild dependent lingula and dependent lower lobe   atelectasis. No pleural effusion or pneumothorax.The central airways are   patent. Mild bronchiectasis in the posterior right upper and right middle   lobes with distal airway impaction and clusters of small nodules, related   to a small airways process.    Mediastinum/Lymph nodes: No thoracic adenopathy.    Hepatobiliary: Unremarkable liver. Normal caliber bile ducts.   Nondistended gallbladder.    Pancreas: Unremarkable.    Spleen: Unremarkable.    Adrenal glands: Unremarkable.    Kidneys: Left upper renal scarring. No hydronephrosis.    Bowel: No bowel obstruction. The appendix is not visualized; no   periappendiceal fat stranding. Large rectal stool ball.    Abdominal lymph nodes: No lymphadenopathy.    Abdominal vessels. Atherosclerotic changes.    Peritoneum: No ascites.    Pelvis: Limited evaluation due to metallic streak artifact from hip   arthroplasties. 1.5 cm left ovarian cyst. Calcified uterine fibroid.    Bones/soft tissues: Bilateral hip arthroplasties. Old bilateral anterior   rib and right inferior pubic ramus fractures. Diffuse bone   demineralization. Multilevel thoracolumbar compression deformities, most   severe at T9, T12 and L1.    IMPRESSION:  No pulmonary embolism. No aortic dissection.    No bowel obstruction. Large rectal stool ball.    Other incidental/chronic findings, as above.    < end of copied text >

## 2024-07-29 NOTE — SWALLOW BEDSIDE ASSESSMENT ADULT - COMMENTS
The pt was admitted to  after son found her in a slumped position in bed with altered sensorium. Hospital course is remarkable for elevated Troponin, elevated blood pressure, leukocytosis, elevated lactate, right bronchiectasis on chest imaging, renal insufficiency and encephalopathy. W/U in progress. Brain CT/MRI pending. She has an underlying h/o HTN. The pt was admitted to  after son found her in a slumped position in bed with altered sensorium. Hospital course is remarkable for elevated Troponin, elevated blood pressure, leukocytosis, elevated lactate, right bronchiectasis on chest imaging, renal insufficiency and encephalopathy. W/U in progress. CTH of brain negative for acute stroke. Brain MRI pending. She has an underlying h/o HTN.

## 2024-07-29 NOTE — DIETITIAN INITIAL EVALUATION ADULT - DIET TYPE
Advance to clear liquids to low fiber when medically feasible.  If unable to advance in 24 hours, consider nutritional support.  Suggest Confirm Goals of Care regarding nutrition support. Will provide nutrition/ hydration within goals of care.

## 2024-07-29 NOTE — PHYSICAL THERAPY INITIAL EVALUATION ADULT - PERTINENT HX OF CURRENT PROBLEM, REHAB EVAL
89F with PMH of HTN (no longer on meds) came to the hospital today after she was found down by granddaughter this morning. Patient unable to provide history. History as per chart review. Patients last known well was on 7/26. She was reportedly noted to have dried coffee ground emesis on her face and hair and was lethargic.   In ED vitals stable, CBC noted for WBC 22.29, H/H 13/40, Plt 322. CMP noted for BUN/Cr 36/1.52, Glu 231. Trop 1692 > 1783. UA noted for proteinuria, glucosuria, ketones, hematuria, mod leuks, 13 wbcs, neg bacteria. COVID/flu neg, CT Chest/abd/pelvis neg for PE, or bowel obstruction. noted for bronchiectasis in the posterior right upper and right middle   lobes with distal airway impaction and clusters of small nodules. Stroke w/u with CTH, CTA head and neck neg for acute infarct. Septic w/u was sent, patient was loaded with aspirin, given broad spectrum abx with vanco/zosyn.

## 2024-07-29 NOTE — CONSULT NOTE ADULT - SUBJECTIVE AND OBJECTIVE BOX
CC: AMS    HPI:  89 F with pmhx HTN not on meds because she ran out per son, came to the hospital 7/28 after she was found down by omaira in the morning.   Patients last known well was on 7/26 where she was oriented x 4, doing ADL's independently.  As per her son at bedside patient was fpund with "fecal material" on her face and hair and was lethargic.  She was unresponsive, not following commands, blinking her eyes rapidly and body was shaking.  In ED vitals stable, CBC noted for WBC 22.29,  CMP noted for BUN/Cr 36/1.52,  Trop 1783.  Lactate 4.5.  UA was neg for bacteria,  Chest/abd/pelvis noted for bronchiectasis in the posterior right upper and right middle lobes with distal airway impaction and clusters of small nodules and large rectal stool ball. Stroke w/u with CTH, CTA head and neck neg for acute infarct.  Septic w/u was sent, patient was loaded with aspirin, given broad spectrum abx with vanc/zosyn. Neurology is consulted for AMS.        PAST MEDICAL & SURGICAL HISTORY:  HTN (hypertension)          FAMILY HISTORY: Non-contributory      Social Hx:  Nonsmoker, no drug or alcohol use    MEDICATIONS  (STANDING):  amLODIPine   Tablet 5 milliGRAM(s) Oral daily  aspirin enteric coated 81 milliGRAM(s) Oral daily  atorvastatin 80 milliGRAM(s) Oral at bedtime  dextrose 5%. 1000 milliLiter(s) (50 mL/Hr) IV Continuous <Continuous>  dextrose 5%. 1000 milliLiter(s) (100 mL/Hr) IV Continuous <Continuous>  dextrose 50% Injectable 25 Gram(s) IV Push once  dextrose 50% Injectable 12.5 Gram(s) IV Push once  dextrose 50% Injectable 25 Gram(s) IV Push once  glucagon  Injectable 1 milliGRAM(s) IntraMuscular once  guaiFENesin ER 1200 milliGRAM(s) Oral every 12 hours  insulin lispro (ADMELOG) corrective regimen sliding scale   SubCutaneous three times a day before meals  insulin lispro (ADMELOG) corrective regimen sliding scale.   SubCutaneous at bedtime  pantoprazole  Injectable 40 milliGRAM(s) IV Push every 12 hours  piperacillin/tazobactam IVPB.. 3.375 Gram(s) IV Intermittent every 8 hours  sodium chloride 0.9%. 1000 milliLiter(s) (100 mL/Hr) IV Continuous <Continuous>       Allergies  No Known Allergies        ROS: Pertinent positives in HPI, all other ROS were reviewed and are negative.      Vital Signs Last 24 Hrs  T(C): 37.6 (29 Jul 2024 09:30), Max: 37.8 (29 Jul 2024 07:59)  T(F): 99.6 (29 Jul 2024 09:30), Max: 100 (29 Jul 2024 07:59)  HR: 91 (29 Jul 2024 07:59) (81 - 91)  BP: 150/60 (29 Jul 2024 07:59) (130/50 - 150/60)  BP(mean): 82 (28 Jul 2024 19:35) (82 - 82)  RR: 18 (29 Jul 2024 07:59) (16 - 18)  SpO2: 94% (29 Jul 2024 07:59) (94% - 100%)    Parameters below as of 29 Jul 2024 07:59  Patient On (Oxygen Delivery Method): room air      GEN PE: Well developed  Constitutional: somnolent, but wakes up when name called, NAD  HEAD: Normocephalic  Neck: Supple.  Extremities:  no edema  Musculoskeletal: no abnormal movements  Skin: No rashes    Neurological exam:  HF: awake, not alert, not engaging, not following commands, not speaking at first then when trying to move her LUE she said some words fluently but did not answer questions appropriately.  +Echolalia.  +global aphasia, does not name or repeat  CN: 2mm-1mm JUDE, Gaze deviation to right but can cross midline,  does not blink to threat on L side, grimaces when touched, ?+L NLFD, does not stick out tongue   Motor: Unable to check for drift, moving RUE spontaneously, does not lift up LE's but withdraws with tactile stimulous, LLE seems injured-patient actually stated stop you're hurting my shoulder when trying to move LUE.  Normal bulk and tone, no tremors  Sens: Reacts when touched and moves extremities with tactile stimulous  Reflexes: Symmetric and normal, downgoing toes b/l  Coord:  unable to check  Gait/Balance: Cannot test    NIHSS: 10          Labs:   07-29    143  |  112<H>  |  29<H>  ----------------------------<  115<H>  3.7   |  22  |  1.32<H>    Ca    9.1      29 Jul 2024 06:34  Phos  3.4     07-29  Mg     2.1     07-29    TPro  6.5  /  Alb  2.9<L>  /  TBili  0.8  /  DBili  x   /  AST  37  /  ALT  17  /  AlkPhos  66  07-29 07-29 Chol 244<H> LDL -- HDL 72 Trig 84                          10.8   15.40 )-----------( 250      ( 29 Jul 2024 06:34 )             32.2       Radiology:  < from: CT Angio Head w/ IV Cont (07.28.24 @ 10:54) >    IMPRESSION:    HEAD CT:  1. No acute intracranial hemorrhage, mass effect, or shift of the midline   structures.  2. Mild chronic white matter microvascular type changes.    CTA NECK:  1. Atherosclerotic plaque affecting the bilateral carotid bifurcations,   and proximal internal carotid arteries with associated mild (less than   50%) stenosis of the bilateral internal carotid artery origins and   proximal segments by NASCET criteria.  2. Otherwise, no large vessel occlusion or major stenosis.    CTA HEAD:  1. No large vessel occlusion or major stenosis.      < from: EEG Awake or Drowsy (07.29.24 @ 11:45) >  EEG Summary/Classification:  This was an abnormal EEG study in the awake and drowsy states due to the   presence of mild generalized slowing.    EEG Clinical Correlate/  Impression:  The findings are consistent with diffuse cerebral   dysfunction/encephalopathy.  No epileptiform activity was seen and no clinical events or seizures were   recorded. Consider a repeat study if clinically indicated.                 CC: AMS    HPI:  89 F with pmhx HTN not on meds because she ran out per son, came to the hospital 7/28 after she was found down by omaira in the morning.   Patients last known well was on 7/26 where she was oriented x 4, doing ADL's independently.  As per her son at bedside patient was found with "fecal material" on her face and hair and was lethargic.  She was unresponsive, not following commands, blinking her eyes rapidly and body was shaking.  In ED vitals stable, CBC noted for WBC 22.29,  CMP noted for BUN/Cr 36/1.52,  Trop 1783.  Lactate 4.5.  UA was neg for bacteria,  Chest/abd/pelvis noted for bronchiectasis in the posterior right upper and right middle lobes with distal airway impaction and clusters of small nodules and large rectal stool ball. Stroke w/u with CTH, CTA head and neck neg for acute infarct.  Septic w/u was sent, patient was loaded with aspirin, given broad spectrum abx with vanc/zosyn. Neurology is consulted for AMS.        PAST MEDICAL & SURGICAL HISTORY:  HTN (hypertension)          FAMILY HISTORY: Non-contributory      Social Hx:  Nonsmoker, no drug or alcohol use    MEDICATIONS  (STANDING):  amLODIPine   Tablet 5 milliGRAM(s) Oral daily  aspirin enteric coated 81 milliGRAM(s) Oral daily  atorvastatin 80 milliGRAM(s) Oral at bedtime  dextrose 5%. 1000 milliLiter(s) (50 mL/Hr) IV Continuous <Continuous>  dextrose 5%. 1000 milliLiter(s) (100 mL/Hr) IV Continuous <Continuous>  dextrose 50% Injectable 25 Gram(s) IV Push once  dextrose 50% Injectable 12.5 Gram(s) IV Push once  dextrose 50% Injectable 25 Gram(s) IV Push once  glucagon  Injectable 1 milliGRAM(s) IntraMuscular once  guaiFENesin ER 1200 milliGRAM(s) Oral every 12 hours  insulin lispro (ADMELOG) corrective regimen sliding scale   SubCutaneous three times a day before meals  insulin lispro (ADMELOG) corrective regimen sliding scale.   SubCutaneous at bedtime  pantoprazole  Injectable 40 milliGRAM(s) IV Push every 12 hours  piperacillin/tazobactam IVPB.. 3.375 Gram(s) IV Intermittent every 8 hours  sodium chloride 0.9%. 1000 milliLiter(s) (100 mL/Hr) IV Continuous <Continuous>       Allergies  No Known Allergies        ROS: Pertinent positives in HPI, all other ROS were reviewed and are negative.      Vital Signs Last 24 Hrs  T(C): 37.6 (29 Jul 2024 09:30), Max: 37.8 (29 Jul 2024 07:59)  T(F): 99.6 (29 Jul 2024 09:30), Max: 100 (29 Jul 2024 07:59)  HR: 91 (29 Jul 2024 07:59) (81 - 91)  BP: 150/60 (29 Jul 2024 07:59) (130/50 - 150/60)  BP(mean): 82 (28 Jul 2024 19:35) (82 - 82)  RR: 18 (29 Jul 2024 07:59) (16 - 18)  SpO2: 94% (29 Jul 2024 07:59) (94% - 100%)    Parameters below as of 29 Jul 2024 07:59  Patient On (Oxygen Delivery Method): room air      GEN PE: Well developed  Constitutional: somnolent, but wakes up when name called, NAD  HEAD: Normocephalic  Neck: Supple.  Extremities:  no edema  Musculoskeletal: no abnormal movements  Skin: No rashes    Neurological exam:  HF: awake, not alert, not engaging, not following commands, not speaking at first then when trying to move her LUE she said some words fluently but did not answer questions appropriately.  +Echolalia.  +global aphasia, does not name or repeat  CN: 2mm-1mm JUDE, Gaze deviation to right but can cross midline,  does not blink to threat on L side, grimaces when touched, ?+L NLFD, does not stick out tongue   Motor: Unable to check for drift, moving RUE spontaneously, does not lift up LE's but withdraws with tactile stimulus LLE seems injured-patient actually stated stop you're hurting my shoulder when trying to move LUE.  Normal bulk and tone, no tremors  Sens: Reacts when touched and moves extremities with tactile stimulus  Reflexes: Symmetric and normal, downgoing toes b/l  Coord:  unable to check  Gait/Balance: Cannot test    NIHSS: 10          Labs:   07-29    143  |  112<H>  |  29<H>  ----------------------------<  115<H>  3.7   |  22  |  1.32<H>    Ca    9.1      29 Jul 2024 06:34  Phos  3.4     07-29  Mg     2.1     07-29    TPro  6.5  /  Alb  2.9<L>  /  TBili  0.8  /  DBili  x   /  AST  37  /  ALT  17  /  AlkPhos  66  07-29 07-29 Chol 244<H> LDL -- HDL 72 Trig 84                          10.8   15.40 )-----------( 250      ( 29 Jul 2024 06:34 )             32.2       Radiology:  < from: CT Angio Head w/ IV Cont (07.28.24 @ 10:54) >    IMPRESSION:    HEAD CT:  1. No acute intracranial hemorrhage, mass effect, or shift of the midline   structures.  2. Mild chronic white matter microvascular type changes.    CTA NECK:  1. Atherosclerotic plaque affecting the bilateral carotid bifurcations,   and proximal internal carotid arteries with associated mild (less than   50%) stenosis of the bilateral internal carotid artery origins and   proximal segments by NASCET criteria.  2. Otherwise, no large vessel occlusion or major stenosis.    CTA HEAD:  1. No large vessel occlusion or major stenosis.      < from: EEG Awake or Drowsy (07.29.24 @ 11:45) >  EEG Summary/Classification:  This was an abnormal EEG study in the awake and drowsy states due to the   presence of mild generalized slowing.    EEG Clinical Correlate/  Impression:  The findings are consistent with diffuse cerebral   dysfunction/encephalopathy.  No epileptiform activity was seen and no clinical events or seizures were   recorded. Consider a repeat study if clinically indicated.

## 2024-07-29 NOTE — PHYSICAL THERAPY INITIAL EVALUATION ADULT - LEVEL OF INDEPENDENCE: SCOOT/BRIDGE, REHAB EVAL
poorly cooperative during mobility portion of exam becoming increasingly agitated and  difficult to direct/re-direct/moderate assist (50% patients effort)/maximum assist (25% patients effort)

## 2024-07-29 NOTE — DIETITIAN INITIAL EVALUATION ADULT - OTHER INFO
89F with pmhx HTN (no longer on meds) came to the hospital today after she was found down by granddaughter this morning. Patient unable to provide history. History as per chart review. Patients last known well was on 7/26. She was reportedly noted to have dried coffee ground emesis on her face and hair and was lethargic.   In ED vitals stable, CBC noted for WBC 22.29, H/H 13/40, Plt 322. CMP noted for BUN/Cr 36/1.52, Glu 231. Trop 1692 > 1783. UA noted for proteinuria, glucosuria, ketones, hematuria, mod leuks, 13 wbcs, neg bacteria. COVID/flu neg, CT Chest/abd/pelvis neg for PE, or bowel obstruction. noted for bronchiectasis in the posterior right upper and right middle   lobes with distal airway impaction and clusters of small nodules. Stroke w/u with CTH, CTA head and neck neg for acute infarct. Septic w/u was sent, patient was loaded with aspirin, given broad spectrum abx with vanc/zosyn.  89F with pmhx HTN (no longer on meds) came to the hospital today after she was found down by granddaughter this morning. Patient unable to provide history. History as per chart review. Patients last known well was on 7/26. She was reportedly noted to have dried coffee ground emesis on her face and hair and was lethargic.   In ED vitals stable, CBC noted for WBC 22.29, H/H 13/40, Plt 322. CMP noted for BUN/Cr 36/1.52, Glu 231. Trop 1692 > 1783. UA noted for proteinuria, glucosuria, ketones, hematuria, mod leuks, 13 wbcs, neg bacteria. COVID/flu neg, CT Chest/abd/pelvis neg for PE, or bowel obstruction. noted for bronchiectasis in the posterior right upper and right middle   lobes with distal airway impaction and clusters of small nodules. Stroke w/u with CTH, CTA head and neck neg for acute infarct. Septic w/u was sent, patient was loaded with aspirin, given broad spectrum abx with vanc/zosyn.     Admit dx   AMS  Per EMR, pt was admitted with dried coffee ground emesis.  Pt's son feel it was fecal matter in vomitus.  Son reported that pt's niece had BM on 7/28 in bed, but pt's son reports that pt has been constipated for a while  Pt is NPO  Glucose elevated, suggest check HgbA1c  Pt not on home meds for DM  suggest Confirm Goals of Care regarding nutrition support. Will provide nutrition/ hydration within goals of care, as pt is likely to need nutrition support to meet needs if support is feasible.  NFPE reveals muscle wasting, fat wasting, severe  Recommendations to follow in Plan/Intervention

## 2024-07-29 NOTE — DIETITIAN INITIAL EVALUATION ADULT - PERTINENT MEDS FT
MEDICATIONS  (STANDING):  aspirin enteric coated 81 milliGRAM(s) Oral daily  atorvastatin 80 milliGRAM(s) Oral at bedtime  cefepime  Injectable. 1000 milliGRAM(s) IV Push every 12 hours  dextrose 5%. 1000 milliLiter(s) (50 mL/Hr) IV Continuous <Continuous>  dextrose 5%. 1000 milliLiter(s) (100 mL/Hr) IV Continuous <Continuous>  dextrose 50% Injectable 25 Gram(s) IV Push once  dextrose 50% Injectable 25 Gram(s) IV Push once  dextrose 50% Injectable 12.5 Gram(s) IV Push once  glucagon  Injectable 1 milliGRAM(s) IntraMuscular once  insulin lispro (ADMELOG) corrective regimen sliding scale   SubCutaneous three times a day before meals  pantoprazole  Injectable 40 milliGRAM(s) IV Push every 12 hours  sodium chloride 0.9%. 1000 milliLiter(s) (100 mL/Hr) IV Continuous <Continuous>  vancomycin  IVPB 750 milliGRAM(s) IV Intermittent every 24 hours    MEDICATIONS  (PRN):  dextrose Oral Gel 15 Gram(s) Oral once PRN Blood Glucose LESS THAN 70 milliGRAM(s)/deciliter  ondansetron Injectable 4 milliGRAM(s) IV Push every 8 hours PRN Nausea and/or Vomiting

## 2024-07-29 NOTE — DIETITIAN INITIAL EVALUATION ADULT - ORAL INTAKE PTA/DIET HISTORY
Spoke with pt's son, as pt unable to communicate.  Pt lives with daughter, and she shops and cooks with her.  Pt eats a variety of foods, but usually eats 1 1/2 meals a day.  Pt reported to her son that she "doesn't get hungry anymore."  PO intake estimated < 75% ENN > one month.

## 2024-07-29 NOTE — PHYSICAL THERAPY INITIAL EVALUATION ADULT - PATIENT PROFILE REVIEW, REHAB EVAL
per son normally completely independent though suffers with terrible arthritis B knees and has some difficulty with stairs but able to climb up/down; he last saw her Saturday and she was making meatballs and was fine, on Sunday found with upper body off side of bed , had vomited forcefully "backwards" hitting wall etc

## 2024-07-29 NOTE — SWALLOW BEDSIDE ASSESSMENT ADULT - ORAL PREPARATORY PHASE
Grossly within functional parameters for age when pt in an alert/calm state. See below for specific clinical swallowing information.

## 2024-07-29 NOTE — PROGRESS NOTE ADULT - ASSESSMENT
89F with pmhx HTN (no longer on meds) came to the hospital today after she was found down by granddaughter this morning. Patient unable to provide history. History as per chart review. Patients last known well was on 7/26. She was reportedly noted to have dried coffee ground emesis on her face and hair and was lethargic.     #Acute Encephalopathy, unknown etiology  -EKG: reviewed   -EEG: negative for seizure   -CTA head/neck: negative for acute ischemia   -MRI pending   -TTE: pending   -UA: reviewed   -TSH: noted   -CPK: noted   -A1c: 5.4  -LDL: 158  -UCx/BCx: pending   -s/p Vanc and Cefepime   -Start Zosyn  empirically  -ASA/Atorvastatin   -aspiration precautions   -neurochecks   -neurology following    #           89F with pmhx HTN (no longer on meds) came to the hospital today after she was found down by granddaughter this morning. Patient unable to provide history. History as per chart review. Patients last known well was on 7/26. She was reportedly noted to have dried coffee ground emesis on her face and hair and was lethargic.     #Acute Encephalopathy, unknown etiology  -EKG: reviewed   -EEG: negative for seizure   -CTA head/neck: negative for acute ischemia   -MRI pending   -TTE: pending   -UA: reviewed   -TSH: noted   -CPK: noted   -A1c: 5.4  -LDL: 158  -UCx/BCx: pending   -s/p Vanc and Cefepime   -Start Zosyn  empirically  -ASA/Atorvastatin   -aspiration precautions   -neurochecks   -neurology following    #HTN urgency   -BP improving   -Norvasc   -Hydralazine PRN     #Elevated Troponins  -likely from demand ischemia   -TTE             89F with pmhx HTN (no longer on meds) came to the hospital today after she was found down by granddaughter this morning. Patient unable to provide history. History as per chart review. Patients last known well was on 7/26. She was reportedly noted to have dried coffee ground emesis on her face and hair and was lethargic.     #Acute Encephalopathy, unknown etiology  -EKG: reviewed   -EEG: negative for seizure   -CTA head/neck: negative for acute ischemia   -MRI pending   -TTE: pending   -UA: reviewed   -TSH: noted   -CPK: noted   -A1c: 5.4  -LDL: 158  -UCx/BCx: pending   -s/p Vanc and Cefepime   -Start Zosyn  empirically  -ASA/Atorvastatin   -aspiration precautions   -neurochecks   -neurology following    #HTN urgency   -BP improving   -Norvasc   -Hydralazine PRN     #Elevated Troponins  -likely from demand ischemia   -TTE: pending     #Fecal impaction  -bowel regimen   -monitor BMs     #VTE prophylaxis  -Lovenox    #Advance care directives  -DNR/DNI with trial of NIV  -MOSLT in chart     Discussed with son and daughter at bedside           89F with pmhx HTN (no longer on meds) came to the hospital today after she was found down by granddaughter this morning. Patient unable to provide history. History as per chart review. Patients last known well was on 7/26. She was reportedly noted to have dried coffee ground emesis on her face and hair and was lethargic.     #Acute Encephalopathy, unknown etiology  -EKG: reviewed   -EEG: negative for seizure   -CTA head/neck: negative for acute ischemia   -MRI pending   -TTE: pending   -UA: reviewed   -TSH: noted   -CPK: noted   -A1c: 5.4  -LDL: 158  -UCx/BCx: pending   -s/p Vanc and Cefepime   -Start Zosyn  empirically  -ASA/Atorvastatin   -aspiration precautions   -neurochecks   -neurology following    #HTN urgency   -BP improving   -Norvasc   -Hydralazine PRN     #Elevated Troponins  -likely from demand ischemia   -TTE: pending     #Fecal impaction  -bowel regimen   -monitor BMs     #Severe protein-calorie malnutrition.    #VTE prophylaxis  -Lovenox    #Advance care directives  -DNR/DNI with trial of NIV  -MOSLT in chart     Discussed with son and daughter at bedside

## 2024-07-29 NOTE — DIETITIAN INITIAL EVALUATION ADULT - PROBLEM SELECTOR PROBLEM 4
Prophylactic measure Quinolones Counseling:  I discussed with the patient the risks of fluoroquinolones including but not limited to GI upset, allergic reaction, drug rash, diarrhea, dizziness, photosensitivity, yeast infections, liver function test abnormalities, tendonitis/tendon rupture.

## 2024-07-29 NOTE — SWALLOW BEDSIDE ASSESSMENT ADULT - SWALLOW EVAL: DIAGNOSIS
1) On encounter, pt was alert. Eye gaze was often distant. She was communicatively passive/hypoactive & internally distractible. Pt was also oppositional at times when engaged. She followed 1 step verbal commands with no greater than 20% accuracy or less, despite cues. Expressively, pt verbalized during communicative probes on very limited occasions. At these limited times, pt's motor speech integrity was relatively preserved. However, her verbalizations were brief, variably fragmented, variably perseverative & variably tangential or contextually irrelevant. 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 Unclear if Cognitive Linguistic Dysfunction is related to Encephalopathy associated with acute illness/infection(i.e leukocytosis, elevated lactate, right bronchiectasis on imaging, elevated Troponin, elevated BP, renal insufficiency, etc) or if Cognitive Linguistic Dysfunction is neurogenic(? CVA, ? seizures, etc).

## 2024-07-30 LAB
-  BLOOD PCR PANEL: SIGNIFICANT CHANGE UP
ALBUMIN SERPL ELPH-MCNC: 2.7 G/DL — LOW (ref 3.3–5)
ALP SERPL-CCNC: 64 U/L — SIGNIFICANT CHANGE UP (ref 40–120)
ALT FLD-CCNC: 17 U/L — SIGNIFICANT CHANGE UP (ref 12–78)
ANION GAP SERPL CALC-SCNC: 8 MMOL/L — SIGNIFICANT CHANGE UP (ref 5–17)
AST SERPL-CCNC: 34 U/L — SIGNIFICANT CHANGE UP (ref 15–37)
BASOPHILS # BLD AUTO: 0.03 K/UL — SIGNIFICANT CHANGE UP (ref 0–0.2)
BASOPHILS NFR BLD AUTO: 0.2 % — SIGNIFICANT CHANGE UP (ref 0–2)
BILIRUB SERPL-MCNC: 1.4 MG/DL — HIGH (ref 0.2–1.2)
BUN SERPL-MCNC: 26 MG/DL — HIGH (ref 7–23)
CALCIUM SERPL-MCNC: 8.7 MG/DL — SIGNIFICANT CHANGE UP (ref 8.5–10.1)
CHLORIDE SERPL-SCNC: 112 MMOL/L — HIGH (ref 96–108)
CK SERPL-CCNC: 291 U/L — HIGH (ref 26–192)
CO2 SERPL-SCNC: 22 MMOL/L — SIGNIFICANT CHANGE UP (ref 22–31)
CREAT SERPL-MCNC: 1.33 MG/DL — HIGH (ref 0.5–1.3)
CULTURE RESULTS: ABNORMAL
EGFR: 37 ML/MIN/1.73M2 — LOW
EOSINOPHIL # BLD AUTO: 0.03 K/UL — SIGNIFICANT CHANGE UP (ref 0–0.5)
EOSINOPHIL NFR BLD AUTO: 0.2 % — SIGNIFICANT CHANGE UP (ref 0–6)
GLUCOSE BLDC GLUCOMTR-MCNC: 129 MG/DL — HIGH (ref 70–99)
GLUCOSE BLDC GLUCOMTR-MCNC: 140 MG/DL — HIGH (ref 70–99)
GLUCOSE BLDC GLUCOMTR-MCNC: 148 MG/DL — HIGH (ref 70–99)
GLUCOSE BLDC GLUCOMTR-MCNC: 211 MG/DL — HIGH (ref 70–99)
GLUCOSE SERPL-MCNC: 140 MG/DL — HIGH (ref 70–99)
GRAM STN FLD: ABNORMAL
HCT VFR BLD CALC: 30.6 % — LOW (ref 34.5–45)
HGB BLD-MCNC: 10.3 G/DL — LOW (ref 11.5–15.5)
IMM GRANULOCYTES NFR BLD AUTO: 0.6 % — SIGNIFICANT CHANGE UP (ref 0–0.9)
LYMPHOCYTES # BLD AUTO: 1.63 K/UL — SIGNIFICANT CHANGE UP (ref 1–3.3)
LYMPHOCYTES # BLD AUTO: 12.8 % — LOW (ref 13–44)
MAGNESIUM SERPL-MCNC: 2.1 MG/DL — SIGNIFICANT CHANGE UP (ref 1.6–2.6)
MCHC RBC-ENTMCNC: 31.8 PG — SIGNIFICANT CHANGE UP (ref 27–34)
MCHC RBC-ENTMCNC: 33.7 GM/DL — SIGNIFICANT CHANGE UP (ref 32–36)
MCV RBC AUTO: 94.4 FL — SIGNIFICANT CHANGE UP (ref 80–100)
METHOD TYPE: SIGNIFICANT CHANGE UP
MONOCYTES # BLD AUTO: 0.94 K/UL — HIGH (ref 0–0.9)
MONOCYTES NFR BLD AUTO: 7.4 % — SIGNIFICANT CHANGE UP (ref 2–14)
NEUTROPHILS # BLD AUTO: 10.07 K/UL — HIGH (ref 1.8–7.4)
NEUTROPHILS NFR BLD AUTO: 78.8 % — HIGH (ref 43–77)
ORGANISM # SPEC MICROSCOPIC CNT: ABNORMAL
ORGANISM # SPEC MICROSCOPIC CNT: SIGNIFICANT CHANGE UP
PHOSPHATE SERPL-MCNC: 2.5 MG/DL — SIGNIFICANT CHANGE UP (ref 2.5–4.5)
PLATELET # BLD AUTO: 226 K/UL — SIGNIFICANT CHANGE UP (ref 150–400)
POTASSIUM SERPL-MCNC: 3.3 MMOL/L — LOW (ref 3.5–5.3)
POTASSIUM SERPL-SCNC: 3.3 MMOL/L — LOW (ref 3.5–5.3)
PROT SERPL-MCNC: 6.3 GM/DL — SIGNIFICANT CHANGE UP (ref 6–8.3)
RBC # BLD: 3.24 M/UL — LOW (ref 3.8–5.2)
RBC # FLD: 13 % — SIGNIFICANT CHANGE UP (ref 10.3–14.5)
SODIUM SERPL-SCNC: 142 MMOL/L — SIGNIFICANT CHANGE UP (ref 135–145)
SPECIMEN SOURCE: SIGNIFICANT CHANGE UP
TSH SERPL-MCNC: 0.32 UU/ML — LOW (ref 0.34–4.82)
WBC # BLD: 12.78 K/UL — HIGH (ref 3.8–10.5)
WBC # FLD AUTO: 12.78 K/UL — HIGH (ref 3.8–10.5)

## 2024-07-30 PROCEDURE — 99233 SBSQ HOSP IP/OBS HIGH 50: CPT

## 2024-07-30 PROCEDURE — 70551 MRI BRAIN STEM W/O DYE: CPT | Mod: 26

## 2024-07-30 PROCEDURE — 99232 SBSQ HOSP IP/OBS MODERATE 35: CPT

## 2024-07-30 RX ORDER — POTASSIUM CHLORIDE 1500 MG/1
40 TABLET, EXTENDED RELEASE ORAL ONCE
Refills: 0 | Status: COMPLETED | OUTPATIENT
Start: 2024-07-30 | End: 2024-07-30

## 2024-07-30 RX ORDER — LORAZEPAM 1 MG/1
1 TABLET ORAL ONCE
Refills: 0 | Status: DISCONTINUED | OUTPATIENT
Start: 2024-07-30 | End: 2024-07-30

## 2024-07-30 RX ORDER — LORAZEPAM 1 MG/1
1 TABLET ORAL ONCE
Refills: 0 | Status: COMPLETED | OUTPATIENT
Start: 2024-07-30

## 2024-07-30 RX ORDER — PANTOPRAZOLE SODIUM 20 MG/1
40 TABLET, DELAYED RELEASE ORAL
Refills: 0 | Status: DISCONTINUED | OUTPATIENT
Start: 2024-07-30 | End: 2024-08-04

## 2024-07-30 RX ORDER — ENOXAPARIN SODIUM 120 MG/.8ML
30 INJECTION SUBCUTANEOUS EVERY 24 HOURS
Refills: 0 | Status: DISCONTINUED | OUTPATIENT
Start: 2024-07-30 | End: 2024-08-04

## 2024-07-30 RX ORDER — ENOXAPARIN SODIUM 120 MG/.8ML
40 INJECTION SUBCUTANEOUS EVERY 24 HOURS
Refills: 0 | Status: DISCONTINUED | OUTPATIENT
Start: 2024-07-30 | End: 2024-07-30

## 2024-07-30 RX ADMIN — PIPERACILLIN SODIUM, TAZOBACTAM SODIUM 25 GRAM(S): 3; .375 INJECTION, POWDER, LYOPHILIZED, FOR SOLUTION INTRAVENOUS at 05:15

## 2024-07-30 RX ADMIN — ENOXAPARIN SODIUM 30 MILLIGRAM(S): 120 INJECTION SUBCUTANEOUS at 10:50

## 2024-07-30 RX ADMIN — LORAZEPAM 1 MILLIGRAM(S): 1 TABLET ORAL at 10:48

## 2024-07-30 RX ADMIN — PIPERACILLIN SODIUM, TAZOBACTAM SODIUM 25 GRAM(S): 3; .375 INJECTION, POWDER, LYOPHILIZED, FOR SOLUTION INTRAVENOUS at 21:10

## 2024-07-30 RX ADMIN — GUAIFENESIN 1200 MILLIGRAM(S): 100 SOLUTION ORAL at 10:13

## 2024-07-30 RX ADMIN — AMLODIPINE BESYLATE 5 MILLIGRAM(S): 2.5 TABLET ORAL at 10:13

## 2024-07-30 RX ADMIN — GUAIFENESIN 1200 MILLIGRAM(S): 100 SOLUTION ORAL at 21:11

## 2024-07-30 RX ADMIN — POTASSIUM CHLORIDE 40 MILLIEQUIVALENT(S): 1500 TABLET, EXTENDED RELEASE ORAL at 10:13

## 2024-07-30 RX ADMIN — Medication 81 MILLIGRAM(S): at 10:13

## 2024-07-30 RX ADMIN — ATORVASTATIN CALCIUM 80 MILLIGRAM(S): 40 TABLET, FILM COATED ORAL at 21:11

## 2024-07-30 NOTE — PROGRESS NOTE ADULT - ASSESSMENT
89 F with pmhx HTN not on meds because she ran out per son, came to the hospital 7/28 after she was found down by niece in the morning.   Patients last known well was on 7/26 where she was oriented x 4, doing ADL's independently.  As per her son at bedside patient was found with "fecal material" on her face and hair and was lethargic.  She was unresponsive, not following commands, blinking her eyes rapidly and body was shaking.  In ED vitals stable, CBC noted for WBC 22.29,  CMP noted for BUN/Cr 36/1.52,  Trop 1783.  Lactate 4.5. UA was neg for bacteria,  Chest/abd/pelvis noted for bronchiectasis in the posterior right upper and right middle lobes with distal airway impaction and clusters of small nodules and large rectal stool ball. Stroke w/u with CTH, CTA head and neck neg for acute infarct.  Septic w/u was sent, patient was loaded with aspirin, given broad spectrum abx with vanc/zosyn. Neurology is consulted for AMS.  On PE she is not engaging, not following commands, +echolalia, global aphasia ?+LNLFD, R gaze deviation seems to be moving all extremities with possible LUE injury.  EEG is neg for epileptiform activity, findings are consistent with diffuse cerebral dysfunction/encephalopathy.      #AMS in the setting of possible sepsis/bronchiectasis-PE with R gaze deviation, ?L NLFD, global aphasia.  Cannot r/o stroke.  -NIHSS 10  -EEG c/w diffuse cerebral dysfunction, no epileptiform activity    #Found on Echo: severe AS and dilated ascending aorta 4.05 cm    Recommendations  -F/U MRI brain- Dr. Douglas d/w hospitalist to give anxiolytic prior to MRI since patient was combative last night  -monitor on tele  -Neurochecks/VS q 4  -would be cautious about BP and bring down toward normotension <140 with dilated ascending aorta finding  -DVT prophylaxis  -PT eval  -Check A1c, LDL-if MRI + for stroke LDL goal should be <70  -Continue ASA 81mg QD, and Atorvastatin 80mg QD  -will follow    D/W Dr. Douglas, Dr. Burks     89 F with pmhx HTN not on meds because she ran out per son, came to the hospital 7/28 after she was found down by niece in the morning.   Patients last known well was on 7/26 where she was oriented x 4, doing ADL's independently.  As per her son at bedside patient was found with "fecal material" on her face and hair and was lethargic.  She was unresponsive, not following commands, blinking her eyes rapidly and body was shaking.  In ED vitals stable, CBC noted for WBC 22.29,  CMP noted for BUN/Cr 36/1.52,  Trop 1783.  Lactate 4.5. UA was neg for bacteria,  Chest/abd/pelvis noted for bronchiectasis in the posterior right upper and right middle lobes with distal airway impaction and clusters of small nodules and large rectal stool ball. Stroke w/u with CTH, CTA head and neck neg for acute infarct.  Septic w/u was sent, patient was loaded with aspirin, given broad spectrum abx with vanc/zosyn. Neurology is consulted for AMS.  On PE she is not engaging, not following commands, +echolalia, global aphasia ?+LNLFD, R gaze deviation seems to be moving all extremities with possible LUE injury.  EEG is neg for epileptiform activity, findings are consistent with diffuse cerebral dysfunction/encephalopathy.      #AMS in the setting of possible sepsis/bronchiectasis-PE with R gaze deviation, ?L NLFD, global aphasia.  Cannot r/o stroke.  Evidence of R tongue bite on exam today  -NIHSS 10  -EEG c/w diffuse cerebral dysfunction, no epileptiform activity    #Found on Echo: severe AS and dilated ascending aorta 4.05 cm    Recommendations  -F/U MRI brain- Dr. Douglas d/w hospitalist to give anxiolytic prior to MRI since patient was combative last night  -monitor on tele  -consider v EEG once MRI done   -Neurochecks/VS q 4  -would be cautious about BP and bring down toward normotension <140 with dilated ascending aorta finding  -DVT prophylaxis  -PT eval  -Check A1c, LDL-if MRI + for stroke LDL goal should be <70  -Continue ASA 81mg QD, and Atorvastatin 80mg QD  -will follow    D/W Dr. Douglas, Dr. Burks

## 2024-07-30 NOTE — PROGRESS NOTE ADULT - SUBJECTIVE AND OBJECTIVE BOX
No acute events overnight, in mittens for pulling at lines.  Awaiting MRI.  Patient was combative for MRI last night so will speak to medicine team to order anxiolytic prior to MRI.  Severe AS and dilated ascending aorta on echo.    ROS: As stated above otherwise neg    MEDICATIONS  (STANDING):  amLODIPine   Tablet 5 milliGRAM(s) Oral daily  aspirin enteric coated 81 milliGRAM(s) Oral daily  atorvastatin 80 milliGRAM(s) Oral at bedtime  dextrose 5%. 1000 milliLiter(s) (50 mL/Hr) IV Continuous <Continuous>  dextrose 5%. 1000 milliLiter(s) (100 mL/Hr) IV Continuous <Continuous>  dextrose 50% Injectable 25 Gram(s) IV Push once  dextrose 50% Injectable 12.5 Gram(s) IV Push once  dextrose 50% Injectable 25 Gram(s) IV Push once  enoxaparin Injectable 30 milliGRAM(s) SubCutaneous every 24 hours  glucagon  Injectable 1 milliGRAM(s) IntraMuscular once  guaiFENesin ER 1200 milliGRAM(s) Oral every 12 hours  insulin lispro (ADMELOG) corrective regimen sliding scale   SubCutaneous three times a day before meals  insulin lispro (ADMELOG) corrective regimen sliding scale.   SubCutaneous at bedtime  LORazepam   Injectable 1 milliGRAM(s) IV Push once  pantoprazole    Tablet 40 milliGRAM(s) Oral before breakfast  piperacillin/tazobactam IVPB.. 3.375 Gram(s) IV Intermittent every 12 hours  sodium chloride 0.9%. 1000 milliLiter(s) (100 mL/Hr) IV Continuous <Continuous>  sodium chloride 0.9%. 1000 milliLiter(s) (75 mL/Hr) IV Continuous <Continuous>      Vital Signs Last 24 Hrs  T(C): 37.1 (2024 10:05), Max: 37.1 (2024 20:39)  T(F): 98.7 (2024 10:05), Max: 98.7 (2024 20:39)  HR: 94 (2024 10:05) (94 - 96)  BP: 178/73 (2024 10:05) (158/68 - 178/73)  BP(mean): 101 (2024 10:05) (101 - 101)  RR: 18 (2024 10:05) (18 - 18)  SpO2: 98% (2024 10:05) (98% - 98%)    Parameters below as of 2024 10:05  Patient On (Oxygen Delivery Method): room air      Neurological exam:  HF: awake and alert, oriented to  only.  Needs multiple redirection to engage, follows simple commands, tangential speech, fluent, ?receptive aphasia, ?paraphrasing.  Can name and repeat  CN: 3mm-2mm JUDE, EOMI, sensation intact don't see much of a NLFD today, when smiling muscles are intact, No tongue deviation, palate rises equally  Motor: NO drift upper extremities, but does not cooperate with LLE so not clear if there is a drift.  4/5 b/l UE, 3/5 b/l LE.  Poor effort.  Spontaneously moving all extremities.  Normal bulk and tone, no tremors  Sens: Intact to FT  Reflexes: Symmetric and normal, downgoing toes b/l  Coord:  unable to check   Gait/Balance: Cannot test    NIHSS: 10-->5                        10.3   12.78 )-----------( 226      ( 2024 06:48 )             30.6     07-30    142  |  112<H>  |  26<H>  ----------------------------<  140<H>  3.3<L>   |  22  |  1.33<H>    Ca    8.7      2024 06:48  Phos  2.5     07-30  Mg     2.1     07-30    TPro  6.3  /  Alb  2.7<L>  /  TBili  1.4<H>  /  DBili  x   /  AST  34  /  ALT  17  /  AlkPhos  64  07-30      07-29 Chol 244<H> LDL -- HDL 72 Trig 84    Radiology report:  < from: CT Angio Head w/ IV Cont (24 @ 10:54) >    IMPRESSION:  HEAD CT:  1. No acute intracranial hemorrhage, mass effect, or shift of the midline   structures.  2. Mild chronic white matter microvascular type changes.    CTA NECK:  1. Atherosclerotic plaque affecting the bilateral carotid bifurcations,   and proximal internal carotid arteries with associated mild (less than   50%) stenosis of the bilateral internal carotid artery origins and   proximal segments by NASCET criteria.  2. Otherwise, no large vessel occlusion or major stenosis.    CTA HEAD:  1. No large vessel occlusion or major stenosis.      < from: EEG Awake or Drowsy (24 @ 11:45) >  EEG Summary/Classification:  This was an abnormal EEG study in the awake and drowsy states due to the   presence of mild generalized slowing.    EEG Clinical Correlate/  Impression:  The findings are consistent with diffuse cerebral   dysfunction/encephalopathy.  No epileptiform activity was seen and no clinical events or seizures were   recorded. Consider a repeat study if clinically indicated.    < from: TTE W or WO Ultrasound Enhancing Agent (24 @ 13:38) >     CONCLUSIONS:      1. Left ventricular wall thickness is mildly increased. Left ventricular systolic function is normal with an ejection fraction of 57 % by Neal's method of disks.   2. There is mild (grade 1) left ventricular diastolic dysfunction.   3. Normal right ventricular cavity size and normal right ventricular systolic function.   4. The right atrium is mildly dilated.   5. Mild mitral regurgitation.   6. Structurally normal pulmonic valve with normal leaflet excursion.   7. Structurally normal tricuspid valve with normal leaflet excursion. Trace tricuspid regurgitation.   8. Ascending aorta diameter is dilated, measuring 4.05 cm (indexed 2.84 cm/m²).   9. Left atrium is normal in size.  10. Mild left ventricular hypertrophy.  11. Moderate aortic regurgitation.  12. No evidence of pulmonic regurgitation.  13. Technically difficult image quality.  14. There is moderate calcification of the mitral valve annulus.  15. Trileaflet aortic valve with reduced systolic excursion. There is severe calcification of the aortic valve leaflets. Severe aortic stenosis.     No acute events overnight, in mittens for pulling at lines.  Awaiting MRI.  Patient was combative for MRI last night so will speak to medicine team to order anxiolytic prior to MRI.  Severe AS and dilated ascending aorta on echo.  Speech therapist noticed tongue bite R side.    ROS: As stated above otherwise neg    MEDICATIONS  (STANDING):  amLODIPine   Tablet 5 milliGRAM(s) Oral daily  aspirin enteric coated 81 milliGRAM(s) Oral daily  atorvastatin 80 milliGRAM(s) Oral at bedtime  dextrose 5%. 1000 milliLiter(s) (50 mL/Hr) IV Continuous <Continuous>  dextrose 5%. 1000 milliLiter(s) (100 mL/Hr) IV Continuous <Continuous>  dextrose 50% Injectable 25 Gram(s) IV Push once  dextrose 50% Injectable 12.5 Gram(s) IV Push once  dextrose 50% Injectable 25 Gram(s) IV Push once  enoxaparin Injectable 30 milliGRAM(s) SubCutaneous every 24 hours  glucagon  Injectable 1 milliGRAM(s) IntraMuscular once  guaiFENesin ER 1200 milliGRAM(s) Oral every 12 hours  insulin lispro (ADMELOG) corrective regimen sliding scale   SubCutaneous three times a day before meals  insulin lispro (ADMELOG) corrective regimen sliding scale.   SubCutaneous at bedtime  LORazepam   Injectable 1 milliGRAM(s) IV Push once  pantoprazole    Tablet 40 milliGRAM(s) Oral before breakfast  piperacillin/tazobactam IVPB.. 3.375 Gram(s) IV Intermittent every 12 hours  sodium chloride 0.9%. 1000 milliLiter(s) (100 mL/Hr) IV Continuous <Continuous>  sodium chloride 0.9%. 1000 milliLiter(s) (75 mL/Hr) IV Continuous <Continuous>      Vital Signs Last 24 Hrs  T(C): 37.1 (2024 10:05), Max: 37.1 (2024 20:39)  T(F): 98.7 (2024 10:05), Max: 98.7 (2024 20:39)  HR: 94 (2024 10:05) (94 - 96)  BP: 178/73 (2024 10:05) (158/68 - 178/73)  BP(mean): 101 (2024 10:05) (101 - 101)  RR: 18 (2024 10:05) (18 - 18)  SpO2: 98% (2024 10:05) (98% - 98%)    Parameters below as of 2024 10:05  Patient On (Oxygen Delivery Method): room air      Neurological exam:  HF: awake and alert, oriented to  only.  Needs multiple redirection to engage, follows simple commands, tangential speech, fluent, ?receptive aphasia, ?paraphrasing.  Can name and repeat  CN: 3mm-2mm JUDE, EOMI, sensation intact don't see much of a NLFD today, when smiling muscles are intact, No tongue deviation, palate rises equally.  Evidence of R tongue bruise on right, no active bleeding.  Motor: NO drift upper extremities, but does not cooperate with LLE so not clear if there is a drift.  4/5 b/l UE, 3/5 b/l LE.  Poor effort.  Spontaneously moving all extremities.  Normal bulk and tone, no tremors  Sens: Intact to FT  Reflexes: Symmetric and normal, downgoing toes b/l  Coord:  unable to check   Gait/Balance: Cannot test    NIHSS: 10-->5                        10.3   12.78 )-----------( 226      ( 2024 06:48 )             30.6     07-30    142  |  112<H>  |  26<H>  ----------------------------<  140<H>  3.3<L>   |  22  |  1.33<H>    Ca    8.7      2024 06:48  Phos  2.5     07-30  Mg     2.1     07-30    TPro  6.3  /  Alb  2.7<L>  /  TBili  1.4<H>  /  DBili  x   /  AST  34  /  ALT  17  /  AlkPhos  64  07-30      07-29 Chol 244<H> LDL -- HDL 72 Trig 84    Radiology report:  < from: CT Angio Head w/ IV Cont (24 @ 10:54) >    IMPRESSION:  HEAD CT:  1. No acute intracranial hemorrhage, mass effect, or shift of the midline   structures.  2. Mild chronic white matter microvascular type changes.    CTA NECK:  1. Atherosclerotic plaque affecting the bilateral carotid bifurcations,   and proximal internal carotid arteries with associated mild (less than   50%) stenosis of the bilateral internal carotid artery origins and   proximal segments by NASCET criteria.  2. Otherwise, no large vessel occlusion or major stenosis.    CTA HEAD:  1. No large vessel occlusion or major stenosis.      < from: EEG Awake or Drowsy (24 @ 11:45) >  EEG Summary/Classification:  This was an abnormal EEG study in the awake and drowsy states due to the   presence of mild generalized slowing.    EEG Clinical Correlate/  Impression:  The findings are consistent with diffuse cerebral   dysfunction/encephalopathy.  No epileptiform activity was seen and no clinical events or seizures were   recorded. Consider a repeat study if clinically indicated.    < from: TTE W or WO Ultrasound Enhancing Agent (24 @ 13:38) >     CONCLUSIONS:      1. Left ventricular wall thickness is mildly increased. Left ventricular systolic function is normal with an ejection fraction of 57 % by Neal's method of disks.   2. There is mild (grade 1) left ventricular diastolic dysfunction.   3. Normal right ventricular cavity size and normal right ventricular systolic function.   4. The right atrium is mildly dilated.   5. Mild mitral regurgitation.   6. Structurally normal pulmonic valve with normal leaflet excursion.   7. Structurally normal tricuspid valve with normal leaflet excursion. Trace tricuspid regurgitation.   8. Ascending aorta diameter is dilated, measuring 4.05 cm (indexed 2.84 cm/m²).   9. Left atrium is normal in size.  10. Mild left ventricular hypertrophy.  11. Moderate aortic regurgitation.  12. No evidence of pulmonic regurgitation.  13. Technically difficult image quality.  14. There is moderate calcification of the mitral valve annulus.  15. Trileaflet aortic valve with reduced systolic excursion. There is severe calcification of the aortic valve leaflets. Severe aortic stenosis.

## 2024-07-30 NOTE — PROGRESS NOTE ADULT - SUBJECTIVE AND OBJECTIVE BOX
Patient is a 89y old  Female who presents with a chief complaint of     HPI:   89-year-old female past medical history of hypertension not on medications presents ED brought in by EMS from home after being found slumped over in her bed covered in vomit.  Patient last was seen by family at 1 PM yesterday and was discovered by family member this morning in bed slumped over.  Patient was normal yesterday cooking doing normal activities.  Patient arrived to ER hypertensive in the 200s systolic, responsive to pain but not talking or providing any history covered in either feculent vomiting or hematemesis as it is very dark color      7/29/24 - seen in bed, sleepy, confused, Tele: NSR   7/30/24: Pt awake, confused on 1:1.  Without complaints of cp, sob, palpitations or dizziness.  Tele: RSR 80's-100      Home Medications:  amlodipine-benazepril 5 mg-20 mg oral capsule: 1 cap(s) orally once a day ***LAST FILLED 03/03/24 for a 90 day supply*** (28 Jul 2024 17:10)  aspirin:  (28 Jul 2024 17:12)  metoprolol succinate 50 mg oral tablet, extended release: 1 tab(s) orally once a day ***LAST FILLED 12/04/23 for a 90 day supply*** (28 Jul 2024 17:12)    MEDICATIONS  (STANDING):  amLODIPine   Tablet 5 milliGRAM(s) Oral daily  aspirin enteric coated 81 milliGRAM(s) Oral daily  atorvastatin 80 milliGRAM(s) Oral at bedtime  dextrose 5%. 1000 milliLiter(s) (50 mL/Hr) IV Continuous <Continuous>  dextrose 5%. 1000 milliLiter(s) (100 mL/Hr) IV Continuous <Continuous>  dextrose 50% Injectable 25 Gram(s) IV Push once  dextrose 50% Injectable 25 Gram(s) IV Push once  dextrose 50% Injectable 12.5 Gram(s) IV Push once  glucagon  Injectable 1 milliGRAM(s) IntraMuscular once  guaiFENesin ER 1200 milliGRAM(s) Oral every 12 hours  insulin lispro (ADMELOG) corrective regimen sliding scale   SubCutaneous three times a day before meals  insulin lispro (ADMELOG) corrective regimen sliding scale.   SubCutaneous at bedtime  pantoprazole  Injectable 40 milliGRAM(s) IV Push every 12 hours  piperacillin/tazobactam IVPB.. 3.375 Gram(s) IV Intermittent every 8 hours  potassium chloride    Tablet ER 40 milliEquivalent(s) Oral once  sodium chloride 0.9%. 1000 milliLiter(s) (75 mL/Hr) IV Continuous <Continuous>  sodium chloride 0.9%. 1000 milliLiter(s) (100 mL/Hr) IV Continuous <Continuous>    MEDICATIONS  (PRN):  dextrose Oral Gel 15 Gram(s) Oral once PRN Blood Glucose LESS THAN 70 milliGRAM(s)/deciliter  hydrALAZINE Injectable 10 milliGRAM(s) IV Push every 6 hours PRN for SBP>150  ondansetron Injectable 4 milliGRAM(s) IV Push every 8 hours PRN Nausea and/or Vomiting  polyethylene glycol 3350 17 Gram(s) Oral daily PRN Constipation    Vital Signs Last 24 Hrs  T(C): 37.1 (29 Jul 2024 20:39), Max: 37.6 (29 Jul 2024 09:30)  T(F): 98.7 (29 Jul 2024 20:39), Max: 99.6 (29 Jul 2024 09:30)  HR: 96 (29 Jul 2024 20:39) (96 - 96)  BP: 158/68 (29 Jul 2024 20:39) (158/68 - 158/68)  BP(mean): --  RR: 18 (29 Jul 2024 20:39) (18 - 18)  SpO2: 98% (29 Jul 2024 20:39) (98% - 98%)    Parameters below as of 29 Jul 2024 20:39  Patient On (Oxygen Delivery Method): room air        PHYSICAL EXAM:  Appearance: No distress  HEENT:   Normal oral mucosa  Cardiovascular: Normal S1 S2, No JVD, +NAJMA, No carotid bruits, No peripheral edema  Respiratory: Lungs clear to auscultation	  Psychiatry: does not respond to questions, awake, confused  Gastrointestinal:  Soft, Non-tender, + BS, no bruits	  Skin: No rashes, No ecchymoses, No cyanosis  Neurologic: uncooperative with exam  Extremities: Normal range of motion, No clubbing, cyanosis or edema  Vascular: Peripheral pulses palpable 2+ bilaterally      LABS: reviewed                           10.3   12.78 )-----------( 226      ( 30 Jul 2024 06:48 )             30.6     07-30    142  |  112<H>  |  26<H>  ----------------------------<  140<H>  3.3<L>   |  22  |  1.33<H>    Ca    8.7      30 Jul 2024 06:48  Phos  2.5     07-30  Mg     2.1     07-30    TPro  6.3  /  Alb  2.7<L>  /  TBili  1.4<H>  /  DBili  x   /  AST  34  /  ALT  17  /  AlkPhos  64  07-30                 CARDIAC MARKERS ( 28 Jul 2024 22:30 )  x     / x     / 570 U/L / x     / x                           10.8   15.40 )-----------( 250      ( 29 Jul 2024 06:34 )             32.2     07-29    143  |  112<H>  |  29<H>  ----------------------------<  115<H>  3.7   |  22  |  1.32<H>    Ca    9.1      29 Jul 2024 06:34  Phos  3.4     07-29  Mg     2.1     07-29    TPro  6.5  /  Alb  2.9<L>  /  TBili  0.8  /  DBili  x   /  AST  37  /  ALT  17  /  AlkPhos  66  07-29    - TroponinI hsT: <-1029.27, <-1783.70, <-1692.82    Troponin I, High Sensitivity Result: 1783.70 ng/L *H* (07-28-24 @ 13:22)  Troponin I, High Sensitivity Result: 1692.82 ng/L *H* (07-28-24 @ 10:19)      EKG: SR, LVH      < from: TTE W or WO Ultrasound Enhancing Agent (07.29.24 @ 13:38) >  CONCLUSIONS:      1. Left ventricular wall thickness is mildly increased. Left ventricular systolic function is normal with an ejection fraction of 57 % by Neal's method of disks.   2. There is mild (grade 1) left ventricular diastolic dysfunction.   3. Normal right ventricular cavity size and normal right ventricular systolic function.   4. The right atrium is mildly dilated.   5. Mild mitral regurgitation.   6. Structurally normal pulmonic valve with normal leaflet excursion.   7. Structurally normal tricuspid valve with normal leaflet excursion. Trace tricuspid regurgitation.   8. Ascending aorta diameter is dilated, measuring 4.05 cm (indexed 2.84 cm/m²).   9. Left atrium is normal in size.  10. Mild left ventricular hypertrophy.  11. Moderate aortic regurgitation.  12. No evidence of pulmonic regurgitation.  13. Technically difficult image quality.  14. There is moderate calcification of the mitral valve annulus.  15. Trileaflet aortic valve with reduced systolic excursion. There is severe calcification of the aortic valve leaflets. Severe aortic stenosis.    < end of copied text >

## 2024-07-30 NOTE — PROGRESS NOTE ADULT - PROBLEM SELECTOR PLAN 2
·  Problem: Elevated troponin: 1029->1783->1692   ·  Recommendation: Pt confused but denies cp, sob, dizziness.  Possibly due to demand ischemia 2/2 HTN urgency .  Echocardiogram reveals NL LVF, mild LVH. Mild MR, Mod AI, severe AS (PPG 75.7, MPG 43, ALLIE 0.64, uncertain if this is a new.  Will attempt to reach out to family to discuss if pt has had prior echo and if they would want to proceed with further testing ·  Problem: Elevated troponin: 1029->1783->1692   ·  Recommendation: Pt confused but denies cp, sob, dizziness.  Possibly due to demand ischemia 2/2 HTN urgency .  Echocardiogram reveals NL LVF, mild LVH. Mild MR, Mod AI, severe AS (PPG 75.7, MPG 43, ALLIE 0.64, uncertain if this is a new.  Will attempt to reach out to family to discuss     would not recommend any cardiac intervention due to her advanced age , poor mental state ,  consider palliative care evaluation ,

## 2024-07-30 NOTE — PROGRESS NOTE ADULT - SUBJECTIVE AND OBJECTIVE BOX
HOSPITALIST ATTENDING PROGRESS NOTE     Chart and meds reviewed. Patient seen and examined     Interval Hx/Events: Pt seen and evaluated this AM. Currently, awake, alert. occasionally responding appropriately.  Family at bedside    7/30: Pt seen and evaluated. Overnight events noted. Remains confused. MR reviewed       All other systems and founds to be negative with exception of what has been described above.       PHYSICAL EXAM:  Vital Signs Last 24 Hrs  T(C): 37.1 (30 Jul 2024 10:05), Max: 37.1 (29 Jul 2024 20:39)  T(F): 98.7 (30 Jul 2024 10:05), Max: 98.7 (29 Jul 2024 20:39)  HR: 94 (30 Jul 2024 10:05) (94 - 96)  BP: 178/73 (30 Jul 2024 10:05) (158/68 - 178/73)  BP(mean): 101 (30 Jul 2024 10:05) (101 - 101)  RR: 18 (30 Jul 2024 10:05) (18 - 18)  SpO2: 98% (30 Jul 2024 10:05) (98% - 98%)    Parameters below as of 30 Jul 2024 10:05  Patient On (Oxygen Delivery Method): room air        GEN: NAD,   HEENT: EOMI, +unequal pupils but reactive   NECK : Soft and supple, no JVD  LUNG: CTABL, No wheezing, rales or rhonchi  CVS: S1S2+, RRR, no M/G/R  GI: BS+, soft, NT/ND, no guarding, no rebound, +delgado in place   EXTREMITIES: No peripheral edema  VASCULAR: 2+ peripheral pulses  NEURO: AAOxO, limited neurological exam due to pt's   SKIN: +ecchymotic     HOME MEDICATIONS:  Home Medications:  amlodipine-benazepril 5 mg-20 mg oral capsule: 1 cap(s) orally once a day ***LAST FILLED 03/03/24 for a 90 day supply*** (28 Jul 2024 17:10)  aspirin:  (28 Jul 2024 17:12)  metoprolol succinate 50 mg oral tablet, extended release: 1 tab(s) orally once a day ***LAST FILLED 12/04/23 for a 90 day supply*** (28 Jul 2024 17:12)      MEDICATIONS  MEDICATIONS  (STANDING):  amLODIPine   Tablet 5 milliGRAM(s) Oral daily  aspirin enteric coated 81 milliGRAM(s) Oral daily  atorvastatin 80 milliGRAM(s) Oral at bedtime  dextrose 5%. 1000 milliLiter(s) (50 mL/Hr) IV Continuous <Continuous>  dextrose 5%. 1000 milliLiter(s) (100 mL/Hr) IV Continuous <Continuous>  dextrose 50% Injectable 25 Gram(s) IV Push once  dextrose 50% Injectable 25 Gram(s) IV Push once  dextrose 50% Injectable 12.5 Gram(s) IV Push once  enoxaparin Injectable 30 milliGRAM(s) SubCutaneous every 24 hours  glucagon  Injectable 1 milliGRAM(s) IntraMuscular once  guaiFENesin ER 1200 milliGRAM(s) Oral every 12 hours  insulin lispro (ADMELOG) corrective regimen sliding scale   SubCutaneous three times a day before meals  insulin lispro (ADMELOG) corrective regimen sliding scale.   SubCutaneous at bedtime  pantoprazole    Tablet 40 milliGRAM(s) Oral before breakfast  piperacillin/tazobactam IVPB.. 3.375 Gram(s) IV Intermittent every 12 hours  sodium chloride 0.9%. 1000 milliLiter(s) (100 mL/Hr) IV Continuous <Continuous>  sodium chloride 0.9%. 1000 milliLiter(s) (75 mL/Hr) IV Continuous <Continuous>    MEDICATIONS  (PRN):  dextrose Oral Gel 15 Gram(s) Oral once PRN Blood Glucose LESS THAN 70 milliGRAM(s)/deciliter  hydrALAZINE Injectable 10 milliGRAM(s) IV Push every 6 hours PRN for SBP>150  ondansetron Injectable 4 milliGRAM(s) IV Push every 8 hours PRN Nausea and/or Vomiting  polyethylene glycol 3350 17 Gram(s) Oral daily PRN Constipation      LABS: All Labs Reviewed:                        10.3   12.78 )-----------( 226      ( 30 Jul 2024 06:48 )             30.6   07-30    142  |  112<H>  |  26<H>  ----------------------------<  140<H>  3.3<L>   |  22  |  1.33<H>    Ca    8.7      30 Jul 2024 06:48  Phos  2.5     07-30  Mg     2.1     07-30    TPro  6.3  /  Alb  2.7<L>  /  TBili  1.4<H>  /  DBili  x   /  AST  34  /  ALT  17  /  AlkPhos  64  07-30      TPro  6.5  /  Alb  2.9<L>  /  TBili  0.8  /  DBili  x   /  AST  37  /  ALT  17  /  AlkPhos  66  07-29    PT/INR - ( 28 Jul 2024 10:19 )   PT: 10.1 sec;   INR: 0.89 ratio       PTT - ( 28 Jul 2024 10:19 )  PTT:26.1 sec    Urinalysis Basic - ( 29 Jul 2024 06:34 )    Color: x / Appearance: x / SG: x / pH: x  Gluc: 115 mg/dL / Ketone: x  / Bili: x / Urobili: x   Blood: x / Protein: x / Nitrite: x   Leuk Esterase: x / RBC: x / WBC x   Sq Epi: x / Non Sq Epi: x / Bacteria: x        Culture - Urine (collected 28 Jul 2024 13:44)  Source: Catheterized None  Final Report (29 Jul 2024 14:57):    <10,000 CFU/mL Normal Urogenital Emilia    Urinalysis with Rflx Culture (collected 28 Jul 2024 13:44)    Culture - Blood (07.28.24 @ 11:24)    Specimen Source: .Blood None   Culture Results:   No growth at 48 Hours    Culture - Blood (07.28.24 @ 11:24)    -  Blood PCR Panel: NEG   Gram Stain:   Growth in aerobic bottle: Gram Positive Rods   Specimen Source: .Blood None   Organism: Blood Culture PCR   Culture Results:   Growth in aerobic bottle: Corynebacterium aurimucosum group  Direct identification is available within approximately 3-5  hours either by Blood Panel Multiplexed PCR or Direct  MALDI-TOF. Details: [email or web address]   Organism Identification: Blood Culture PCR   Method Type: PCR    I&O's Detail    29 Jul 2024 07:01  -  30 Jul 2024 07:00  --------------------------------------------------------  IN:  Total IN: 0 mL    OUT:    Indwelling Catheter - Urethral (mL): 250 mL  Total OUT: 250 mL    Total NET: -250 mL      30 Jul 2024 07:01  -  30 Jul 2024 17:47  --------------------------------------------------------  IN:  Total IN: 0 mL    OUT:    Indwelling Catheter - Urethral (mL): 500 mL  Total OUT: 500 mL    Total NET: -500 mL    CAPILLARY BLOOD GLUCOSE      POCT Blood Glucose.: 129 mg/dL (30 Jul 2024 16:53)  POCT Blood Glucose.: 148 mg/dL (30 Jul 2024 11:51)  POCT Blood Glucose.: 140 mg/dL (30 Jul 2024 07:45)  POCT Blood Glucose.: 126 mg/dL (29 Jul 2024 20:45)        CARDIOLOGY TESTING   CARDIAC MARKERS ( 28 Jul 2024 22:30 )  x     / x     / 570 U/L / x     / x        EKG : reviewed   ECHO:  < from: TTE W or WO Ultrasound Enhancing Agent (07.29.24 @ 13:38) >  CONCLUSIONS:      1. Left ventricular wall thickness is mildly increased. Left ventricular systolic function is normal with an ejection fraction of 57 % by Neal's method of disks.   2. There is mild (grade 1) left ventricular diastolic dysfunction.   3. Normal right ventricular cavity size and normal right ventricular systolic function.   4. The right atrium is mildly dilated.   5. Mild mitral regurgitation.   6. Structurally normal pulmonic valve with normal leaflet excursion.   7. Structurally normal tricuspid valve with normal leaflet excursion. Trace tricuspid regurgitation.   8. Ascending aorta diameter is dilated, measuring 4.05 cm (indexed 2.84 cm/m²).   9. Left atrium is normal in size.  10. Mild left ventricular hypertrophy.  11. Moderate aortic regurgitation.  12. No evidence of pulmonic regurgitation.  13. Technically difficult image quality.  14. There is moderate calcification of the mitral valve annulus.  15. Trileaflet aortic valve with reduced systolic excursion. There is severe calcification of the aortic valve leaflets. Severe aortic stenosis.    < end of copied text >        RADIOLOGY  < from: CT Angio Head w/ IV Cont (07.28.24 @ 10:54) >  HEAD CT:  1. No acute intracranial hemorrhage, mass effect, or shift of the midline   structures.  2. Mild chronic white matter microvascular type changes.    CTA NECK:  1. Atherosclerotic plaque affecting the bilateral carotid bifurcations,   and proximal internal carotid arteries with associated mild (less than   50%) stenosis of the bilateral internal carotid artery origins and   proximal segments by NASCET criteria.  2. Otherwise, no large vessel occlusion or major stenosis.    CTA HEAD:  1. No large vessel occlusion or major stenosis.    < from: CT Abdomen and Pelvis w/ IV Cont (07.28.24 @ 10:55) >  FINDINGS:  CTA: No pulmonary embolism.. 3.7 cm mid ascending aorta. No aortic   dissection.The heart is mildly enlarged. No pericardial effusion. Aortic   valvular, coronary arterial and mitral annular calcification.    Lungs/Airways/Pleura: Mild dependent lingula and dependent lower lobe   atelectasis. No pleural effusion or pneumothorax.The central airways are   patent. Mild bronchiectasis in the posterior right upper and right middle   lobes with distal airway impaction and clusters of small nodules, related   to a small airways process.    Mediastinum/Lymph nodes: No thoracic adenopathy.    Hepatobiliary: Unremarkable liver. Normal caliber bile ducts.   Nondistended gallbladder.    Pancreas: Unremarkable.    Spleen: Unremarkable.    Adrenal glands: Unremarkable.    Kidneys: Left upper renal scarring. No hydronephrosis.    Bowel: No bowel obstruction. The appendix is not visualized; no   periappendiceal fat stranding. Large rectal stool ball.    Abdominal lymph nodes: No lymphadenopathy.    Abdominal vessels. Atherosclerotic changes.    Peritoneum: No ascites.    Pelvis: Limited evaluation due to metallic streak artifact from hip   arthroplasties. 1.5 cm left ovarian cyst. Calcified uterine fibroid.    Bones/soft tissues: Bilateral hip arthroplasties. Old bilateral anterior   rib and right inferior pubic ramus fractures. Diffuse bone   demineralization. Multilevel thoracolumbar compression deformities, most   severe at T9, T12 and L1.    IMPRESSION:  No pulmonary embolism. No aortic dissection.    No bowel obstruction. Large rectal stool ball.    Other incidental/chronic findings, as above.    < from: MR Head No Cont (07.30.24 @ 11:41) >  FINDINGS:  There is no abnormal restricted diffusion to suggest acute infarction.   Scattered periventricular and subcortical white matter T2 /FLAIR   hyperintensities are seen without mass effect, nonspecific, likely   representing moderate to severe chronic microvascular changes. Normal T2   flow-voids are seen within  the intracranial vasculature. The lateral   ventricles and cortical sulci are age-appropriate in size and   configuration. There is no mass, mass effect, or extra-axial fluid   collection. There is no susceptibility artifact to suggest hemorrhage.   Midline structures are normal.  Mild to moderate inflammatory mucosal   changes are seen throughout the various portions of the paranasal   sinuses. Left intraorbital extraconal lesion along the superior aspect of   the left orbit measures 6.7 x 16 mm on image 12 of series 7 minutes image   27 series 12. Differential diagnosis includes orbital varix versus   orbital cavernous venous malformation. Consider dedicated MRI of the   orbits with contrast as clinically warranted. The orbits and mastoid air   cells are otherwise unremarkable.    IMPRESSION: No acute infarction.

## 2024-07-30 NOTE — PROGRESS NOTE ADULT - ASSESSMENT
89F with pmhx HTN (no longer on meds) came to the hospital today after she was found down by granddaughter this morning. Patient unable to provide history. History as per chart review. Patients last known well was on 7/26. She was reportedly noted to have dried coffee ground emesis on her face and hair and was lethargic.     #Acute Encephalopathy, unknown etiology  -EKG: reviewed   -EEG: negative for seizure   -CTA head/neck: negative for acute ischemia   -MRI: negative for stroke   -TTE: TTE: EF: 57%, grade 1 diastolic dysfunction, severe aortic stenosis  -UA: reviewed   -TSH: noted   -CPK: noted   -A1c: 5.4  -LDL: 158  -UCx: NGTD  -BCx: G+ve in aerobic   -s/p Vanc and Cefepime   -c/w Zosyn  empirically  -ASA/Atorvastatin   -aspiration precautions   -neurochecks   -neurology following    #HTN urgency   -BP improving   -Norvasc   -Hydralazine PRN     #Elevated Troponin  -likely from demand ischemia   TTE: EF: 57%, grade 1 diastolic dysfunction, severe aortic stenosis    #Fecal impaction  -bowel regimen   -monitor BMs     #Severe protein-calorie malnutrition.    #VTE prophylaxis  -Lovenox    #Advance care directives  -DNR/DNI with trial of NIV  -MOSLT in chart     Discussed with son and daughter at bedside           89F with pmhx HTN (no longer on meds) came to the hospital today after she was found down by granddaughter this morning. Patient unable to provide history. History as per chart review. Patients last known well was on 7/26. She was reportedly noted to have dried coffee ground emesis on her face and hair and was lethargic.     #Acute Encephalopathy, unknown etiology  -EKG: reviewed   -EEG: negative for seizure   -CTA head/neck: negative for acute ischemia   -MRI: negative for stroke   -TTE: TTE: EF: 57%, grade 1 diastolic dysfunction, severe aortic stenosis  -UA: reviewed   -TSH: noted   -CPK: noted   -A1c: 5.4  -LDL: 158  -UCx: NGTD  -BCx: G+ve in aerobic   -s/p Vanc and Cefepime   -c/w Zosyn  empirically  -ASA/Atorvastatin   -aspiration precautions   -neurochecks   -neurology following    #HTN urgency   -BP improving   -Norvasc   -Hydralazine PRN     #Elevated Troponin  -likely from demand ischemia   TTE: EF: 57%, grade 1 diastolic dysfunction, severe aortic stenosis    #Fecal impaction  -bowel regimen   -monitor BMs     #Severe protein-calorie malnutrition.    #VTE prophylaxis  -Lovenox    #Advance care directives  -DNR/DNI with trial of NIV  -MOSLT in chart     Discussed with son and daughter at bedside    guarded prognosis

## 2024-07-31 DIAGNOSIS — I35.0 NONRHEUMATIC AORTIC (VALVE) STENOSIS: ICD-10-CM

## 2024-07-31 LAB
-  COAGULASE NEGATIVE STAPHYLOCOCCUS: SIGNIFICANT CHANGE UP
ANION GAP SERPL CALC-SCNC: 8 MMOL/L — SIGNIFICANT CHANGE UP (ref 5–17)
BUN SERPL-MCNC: 22 MG/DL — SIGNIFICANT CHANGE UP (ref 7–23)
CALCIUM SERPL-MCNC: 9.2 MG/DL — SIGNIFICANT CHANGE UP (ref 8.5–10.1)
CHLORIDE SERPL-SCNC: 109 MMOL/L — HIGH (ref 96–108)
CO2 SERPL-SCNC: 21 MMOL/L — LOW (ref 22–31)
CREAT SERPL-MCNC: 1.24 MG/DL — SIGNIFICANT CHANGE UP (ref 0.5–1.3)
EGFR: 40 ML/MIN/1.73M2 — LOW
GLUCOSE BLDC GLUCOMTR-MCNC: 121 MG/DL — HIGH (ref 70–99)
GLUCOSE BLDC GLUCOMTR-MCNC: 143 MG/DL — HIGH (ref 70–99)
GLUCOSE BLDC GLUCOMTR-MCNC: 148 MG/DL — HIGH (ref 70–99)
GLUCOSE BLDC GLUCOMTR-MCNC: 158 MG/DL — HIGH (ref 70–99)
GLUCOSE SERPL-MCNC: 127 MG/DL — HIGH (ref 70–99)
GRAM STN FLD: ABNORMAL
HCT VFR BLD CALC: 31.2 % — LOW (ref 34.5–45)
HGB BLD-MCNC: 10.3 G/DL — LOW (ref 11.5–15.5)
MAGNESIUM SERPL-MCNC: 2.1 MG/DL — SIGNIFICANT CHANGE UP (ref 1.6–2.6)
MCHC RBC-ENTMCNC: 31.4 PG — SIGNIFICANT CHANGE UP (ref 27–34)
MCHC RBC-ENTMCNC: 33 GM/DL — SIGNIFICANT CHANGE UP (ref 32–36)
MCV RBC AUTO: 95.1 FL — SIGNIFICANT CHANGE UP (ref 80–100)
METHOD TYPE: SIGNIFICANT CHANGE UP
PHOSPHATE SERPL-MCNC: 2.4 MG/DL — LOW (ref 2.5–4.5)
PLATELET # BLD AUTO: 226 K/UL — SIGNIFICANT CHANGE UP (ref 150–400)
POTASSIUM SERPL-MCNC: 3.4 MMOL/L — LOW (ref 3.5–5.3)
POTASSIUM SERPL-SCNC: 3.4 MMOL/L — LOW (ref 3.5–5.3)
RBC # BLD: 3.28 M/UL — LOW (ref 3.8–5.2)
RBC # FLD: 12.7 % — SIGNIFICANT CHANGE UP (ref 10.3–14.5)
SODIUM SERPL-SCNC: 138 MMOL/L — SIGNIFICANT CHANGE UP (ref 135–145)
WBC # BLD: 12.91 K/UL — HIGH (ref 3.8–10.5)
WBC # FLD AUTO: 12.91 K/UL — HIGH (ref 3.8–10.5)

## 2024-07-31 PROCEDURE — 99232 SBSQ HOSP IP/OBS MODERATE 35: CPT

## 2024-07-31 PROCEDURE — 99233 SBSQ HOSP IP/OBS HIGH 50: CPT

## 2024-07-31 PROCEDURE — 99223 1ST HOSP IP/OBS HIGH 75: CPT

## 2024-07-31 PROCEDURE — 95816 EEG AWAKE AND DROWSY: CPT | Mod: 26

## 2024-07-31 RX ORDER — ACETAMINOPHEN 500 MG
650 TABLET ORAL EVERY 6 HOURS
Refills: 0 | Status: DISCONTINUED | OUTPATIENT
Start: 2024-07-31 | End: 2024-08-04

## 2024-07-31 RX ORDER — LOSARTAN POTASSIUM 50 MG/1
25 TABLET, FILM COATED ORAL DAILY
Refills: 0 | Status: DISCONTINUED | OUTPATIENT
Start: 2024-07-31 | End: 2024-08-01

## 2024-07-31 RX ORDER — POTASSIUM PHOSPHATE, MONOBASIC AND POTASSIUM PHOSPHATE, DIBASIC 224; 236 MG/ML; MG/ML
30 INJECTION, SOLUTION INTRAVENOUS ONCE
Refills: 0 | Status: COMPLETED | OUTPATIENT
Start: 2024-07-31 | End: 2024-08-01

## 2024-07-31 RX ADMIN — AMLODIPINE BESYLATE 5 MILLIGRAM(S): 2.5 TABLET ORAL at 09:04

## 2024-07-31 RX ADMIN — ENOXAPARIN SODIUM 30 MILLIGRAM(S): 120 INJECTION SUBCUTANEOUS at 09:05

## 2024-07-31 RX ADMIN — ATORVASTATIN CALCIUM 80 MILLIGRAM(S): 40 TABLET, FILM COATED ORAL at 22:38

## 2024-07-31 RX ADMIN — Medication 650 MILLIGRAM(S): at 15:14

## 2024-07-31 RX ADMIN — PANTOPRAZOLE SODIUM 40 MILLIGRAM(S): 20 TABLET, DELAYED RELEASE ORAL at 06:05

## 2024-07-31 RX ADMIN — PIPERACILLIN SODIUM, TAZOBACTAM SODIUM 25 GRAM(S): 3; .375 INJECTION, POWDER, LYOPHILIZED, FOR SOLUTION INTRAVENOUS at 09:04

## 2024-07-31 RX ADMIN — Medication 1: at 16:47

## 2024-07-31 RX ADMIN — GUAIFENESIN 1200 MILLIGRAM(S): 100 SOLUTION ORAL at 22:41

## 2024-07-31 RX ADMIN — Medication 81 MILLIGRAM(S): at 09:04

## 2024-07-31 RX ADMIN — Medication 650 MILLIGRAM(S): at 14:07

## 2024-07-31 RX ADMIN — GUAIFENESIN 1200 MILLIGRAM(S): 100 SOLUTION ORAL at 09:04

## 2024-07-31 RX ADMIN — Medication 12.5 MILLIGRAM(S): at 22:38

## 2024-07-31 NOTE — PROGRESS NOTE ADULT - SUBJECTIVE AND OBJECTIVE BOX
Interval History:    MEDICATIONS  (STANDING):  amLODIPine   Tablet 5 milliGRAM(s) Oral daily  aspirin enteric coated 81 milliGRAM(s) Oral daily  atorvastatin 80 milliGRAM(s) Oral at bedtime  dextrose 5%. 1000 milliLiter(s) (50 mL/Hr) IV Continuous <Continuous>  dextrose 5%. 1000 milliLiter(s) (100 mL/Hr) IV Continuous <Continuous>  dextrose 50% Injectable 25 Gram(s) IV Push once  dextrose 50% Injectable 25 Gram(s) IV Push once  dextrose 50% Injectable 12.5 Gram(s) IV Push once  enoxaparin Injectable 30 milliGRAM(s) SubCutaneous every 24 hours  glucagon  Injectable 1 milliGRAM(s) IntraMuscular once  guaiFENesin ER 1200 milliGRAM(s) Oral every 12 hours  insulin lispro (ADMELOG) corrective regimen sliding scale   SubCutaneous three times a day before meals  insulin lispro (ADMELOG) corrective regimen sliding scale.   SubCutaneous at bedtime  losartan 25 milliGRAM(s) Oral daily  pantoprazole    Tablet 40 milliGRAM(s) Oral before breakfast  sodium chloride 0.9%. 1000 milliLiter(s) (100 mL/Hr) IV Continuous <Continuous>  sodium chloride 0.9%. 1000 milliLiter(s) (75 mL/Hr) IV Continuous <Continuous>    MEDICATIONS  (PRN):  acetaminophen     Tablet .. 650 milliGRAM(s) Oral every 6 hours PRN Mild Pain (1 - 3)  dextrose Oral Gel 15 Gram(s) Oral once PRN Blood Glucose LESS THAN 70 milliGRAM(s)/deciliter  hydrALAZINE Injectable 10 milliGRAM(s) IV Push every 6 hours PRN for SBP>150  ondansetron Injectable 4 milliGRAM(s) IV Push every 8 hours PRN Nausea and/or Vomiting  polyethylene glycol 3350 17 Gram(s) Oral daily PRN Constipation      Allergies    No Known Allergies    Intolerances        PHYSICAL EXAM:  Vital Signs Last 24 Hrs  T(F): 98.6 (07-31-24 @ 07:58)  HR: 95 (07-31-24 @ 07:58)  BP: 164/68 (07-31-24 @ 07:58)  RR: 18 (07-31-24 @ 07:58)    GENERAL: NAD, hands in mittens  HEAD:  Atraumatic, Normocephalic  Neuro:  Awake, alert, follows simple commands with repeated instructions  Able to state her name and that she is in the hospital but could not recall the name of the hospital  Speaks in full sentences, not always answering the question  CN: PERRL, EOMI, no nystagmus, no facial asymmetry noted  motor: moving all extremities equally  sensory: intact to light touch  coordination: no involuntary movements noted  gait: not tested    LABS:                        10.3   12.91 )-----------( 226      ( 31 Jul 2024 06:41 )             31.2     07-31    138  |  109<H>  |  22  ----------------------------<  127<H>  3.4<L>   |  21<L>  |  1.24    Ca    9.2      31 Jul 2024 06:41  Phos  2.4     07-31  Mg     2.1     07-31    TPro  6.3  /  Alb  2.7<L>  /  TBili  1.4<H>  /  DBili  x   /  AST  34  /  ALT  17  /  AlkPhos  64  07-30      Urinalysis Basic - ( 31 Jul 2024 06:41 )    Color: x / Appearance: x / SG: x / pH: x  Gluc: 127 mg/dL / Ketone: x  / Bili: x / Urobili: x   Blood: x / Protein: x / Nitrite: x   Leuk Esterase: x / RBC: x / WBC x   Sq Epi: x / Non Sq Epi: x / Bacteria: x        RADIOLOGY & ADDITIONAL STUDIES:    HEAD CT:  1. No acute intracranial hemorrhage, mass effect, or shift of the midline   structures.  2. Mild chronic white matter microvascular type changes.    CTA NECK:  1. Atherosclerotic plaque affecting the bilateral carotid bifurcations,   and proximal internal carotid arteries with associated mild (less than   50%) stenosis of the bilateral internal carotid artery origins and   proximal segments by NASCET criteria.  2. Otherwise, no large vessel occlusion or major stenosis.    CTA HEAD:  1. No large vessel occlusion or major stenosis.      EEG 7/29/24: mild generalized slowing    TTE 7/29/24:   1. Left ventricular wall thickness is mildly increased. Left ventricular systolic function is normal with an ejection fraction of 57 % by Neal's method of disks.   2. There is mild (grade 1) left ventricular diastolic dysfunction.   3. Normal right ventricular cavity size and normal right ventricular systolic function.   4. The right atrium is mildly dilated.   5. Mild mitral regurgitation.   6. Structurally normal pulmonic valve with normal leaflet excursion.   7. Structurally normal tricuspid valve with normal leaflet excursion. Trace tricuspid regurgitation.   8. Ascending aorta diameter is dilated, measuring 4.05 cm (indexed 2.84 cm/m²).   9. Left atrium is normal in size.  10. Mild left ventricular hypertrophy.  11. Moderate aortic regurgitation.  12. No evidence of pulmonic regurgitation.  13. Technically difficult image quality.  14. There is moderate calcification of the mitral valve annulus.  15. Trileaflet aortic valve with reduced systolic excursion. There is severe calcification of the aortic valve leaflets. Severe aortic stenosis.    MRI brain 7/30/24:  No acute infarction  Mild to moderate inflammatory mucosal changes are seen throughout the various portions of the paranasal sinuses.   Left intraorbital extraconal lesion along the superior aspect of the left orbit measures 6.7 x 16 mm on image 12 of series 7 minutes image 7 series 12. Differential diagnosis includes orbital varix versus orbital cavernous venous malformation.    EEG 7/31/24: mild generalized slowing

## 2024-07-31 NOTE — OCCUPATIONAL THERAPY INITIAL EVALUATION ADULT - PERTINENT HX OF CURRENT PROBLEM, REHAB EVAL
Pt is a 89 y/o female with PMHx HTN (no longer on meds)  admitted on 7/28 for evaluation of being found on floor at home by granddaughter; she was lying with coffee ground emesis on her face, last seen 2 days prior; she lives alone per daughter at bedside. Patient unable to provide history and family at bedside states she is able to live alone taking care of all her daily needs. She never had a problem before. EEG: negative for seizure. CTA head/neck: negative for acute ischemia. MRI: negative for stroke, TTE: TTE: EF: 57%, grade 1 diastolic dysfunction, severe aortic stenosis

## 2024-07-31 NOTE — OCCUPATIONAL THERAPY INITIAL EVALUATION ADULT - MD ORDER
"OT Evaluate and Treat"- MD orders received. Chart reviewed, contents noted, conferred with RN- cleared for OT IE. Please refer to Therapeutic Interventions under Adult A & I for future documentation and treatment notes.

## 2024-07-31 NOTE — OCCUPATIONAL THERAPY INITIAL EVALUATION ADULT - PRECAUTIONS/LIMITATIONS, REHAB EVAL
diet: DASH/TLC, constant observation, elevate HOB 30 degrees, supervise meals/aspiration precautions/cardiac precautions/fall precautions/seizure precautions

## 2024-07-31 NOTE — CONSULT NOTE ADULT - SUBJECTIVE AND OBJECTIVE BOX
Patient is a 89y old  Female who presents with a chief complaint of AMS (29 Jul 2024 11:10)    HPI:  89F with pmhx HTN (no longer on meds)  admitted on 7/28 for evaluation of being found on floor at home by grand daughter; she was lying with coffee ground emesis on her face, last seen 2 days prior; she lives alone per daughter at bedside. Patient unable to provide history and family at bedside states she is able to live alone taking care of all her daily needs. She never had a problem before.           PMH: as above  PSH: as above  Meds: per reconciliation sheet, noted below  MEDICATIONS  (STANDING):  amLODIPine   Tablet 5 milliGRAM(s) Oral daily  aspirin enteric coated 81 milliGRAM(s) Oral daily  atorvastatin 80 milliGRAM(s) Oral at bedtime  dextrose 5%. 1000 milliLiter(s) (50 mL/Hr) IV Continuous <Continuous>  dextrose 5%. 1000 milliLiter(s) (100 mL/Hr) IV Continuous <Continuous>  dextrose 50% Injectable 25 Gram(s) IV Push once  dextrose 50% Injectable 25 Gram(s) IV Push once  dextrose 50% Injectable 12.5 Gram(s) IV Push once  enoxaparin Injectable 30 milliGRAM(s) SubCutaneous every 24 hours  glucagon  Injectable 1 milliGRAM(s) IntraMuscular once  guaiFENesin ER 1200 milliGRAM(s) Oral every 12 hours  insulin lispro (ADMELOG) corrective regimen sliding scale   SubCutaneous three times a day before meals  insulin lispro (ADMELOG) corrective regimen sliding scale.   SubCutaneous at bedtime  losartan 25 milliGRAM(s) Oral daily  pantoprazole    Tablet 40 milliGRAM(s) Oral before breakfast  sodium chloride 0.9%. 1000 milliLiter(s) (75 mL/Hr) IV Continuous <Continuous>  sodium chloride 0.9%. 1000 milliLiter(s) (100 mL/Hr) IV Continuous <Continuous>    MEDICATIONS  (PRN):  dextrose Oral Gel 15 Gram(s) Oral once PRN Blood Glucose LESS THAN 70 milliGRAM(s)/deciliter  hydrALAZINE Injectable 10 milliGRAM(s) IV Push every 6 hours PRN for SBP>150  ondansetron Injectable 4 milliGRAM(s) IV Push every 8 hours PRN Nausea and/or Vomiting  polyethylene glycol 3350 17 Gram(s) Oral daily PRN Constipation    Allergies    No Known Allergies    Intolerances      Social: no smoking, no alcohol, no illegal drugs; no recent travel, no exposure to TB  FAMILY HISTORY: unable to obtain secondary to patient medical condition      ROS unable to obtain secondary to patient medical condition     Vital Signs Last 24 Hrs  T(C): 37 (31 Jul 2024 07:58), Max: 37.4 (30 Jul 2024 20:29)  T(F): 98.6 (31 Jul 2024 07:58), Max: 99.3 (30 Jul 2024 20:29)  HR: 95 (31 Jul 2024 07:58) (86 - 95)  BP: 164/68 (31 Jul 2024 07:58) (133/73 - 164/68)  BP(mean): --  RR: 18 (31 Jul 2024 07:58) (18 - 18)  SpO2: 92% (31 Jul 2024 07:58) (92% - 95%)    Parameters below as of 31 Jul 2024 07:58  Patient On (Oxygen Delivery Method): room air      Daily     Daily     PE:    Constitutional: frail looking  HEENT: NC/AT, EOMI, PERRLA, conjunctivae clear; ears and nose atraumatic; pharynx clear  Neck: supple; thyroid not palpable  Back: no tenderness  Respiratory: respiratory effort normal; clear to auscultation  Cardiovascular: S1S2 regular, no murmurs  Abdomen: soft, not tender, not distended, positive BS; no liver or spleen organomegaly  Genitourinary: no suprapubic tenderness  Musculoskeletal: no muscle tenderness, no joint swelling or tenderness  Neurological/ Psychiatric: Alert but not oriented  Skin: no rashes; no palpable lesions    Labs: all available labs reviewed                        10.3   12.91 )-----------( 226      ( 31 Jul 2024 06:41 )             31.2     07-31    138  |  109<H>  |  22  ----------------------------<  127<H>  3.4<L>   |  21<L>  |  1.24    Ca    9.2      31 Jul 2024 06:41  Phos  2.4     07-31  Mg     2.1     07-31    TPro  6.3  /  Alb  2.7<L>  /  TBili  1.4<H>  /  DBili  x   /  AST  34  /  ALT  17  /  AlkPhos  64  07-30     LIVER FUNCTIONS - ( 30 Jul 2024 06:48 )  Alb: 2.7 g/dL / Pro: 6.3 gm/dL / ALK PHOS: 64 U/L / ALT: 17 U/L / AST: 34 U/L / GGT: x           Urinalysis Basic - ( 31 Jul 2024 06:41 )    Color: x / Appearance: x / SG: x / pH: x  Gluc: 127 mg/dL / Ketone: x  / Bili: x / Urobili: x   Blood: x / Protein: x / Nitrite: x   Leuk Esterase: x / RBC: x / WBC x   Sq Epi: x / Non Sq Epi: x / Bacteria: x      Culture - Urine (07.28.24 @ 13:44)   Specimen Source: Catheterized None  Culture Results:   <10,000 CFU/mL Normal Urogenital FloraCulture - Blood (07.28.24 @ 11:24)   Specimen Source: .Blood None  Culture Results:   No growth at 48 HoursCulture - Blood (07.28.24 @ 11:24)   - Blood PCR Panel: NEG  Gram Stain:   Growth in aerobic bottle: Gram Positive Rods  Specimen Source: .Blood None  Organism: Blood Culture PCR  Culture Results:   Growth in aerobic bottle: Corynebacterium aurimucosum group   "Susceptibilities not performed"   Direct identification is available within approximately 3-5   hours either by Blood Panel Multiplexed PCR or Direct     Radiology: all available radiological tests reviewed    Advanced directives addressed: DNR

## 2024-07-31 NOTE — OCCUPATIONAL THERAPY INITIAL EVALUATION ADULT - BED MOBILITY TRAINING, PT EVAL
Pt will perform sit<->supine with minimal assist in 2 weeks to facilitate participation in EOB ADLs.

## 2024-07-31 NOTE — CONSULT NOTE ADULT - NS ATTEND AMEND GEN_ALL_CORE FT
Altered mental status.  Initial presentation there was some concern for possible seizure with staring/blinking.  Routine EEG is normal.  MRI brain pending to r/o stroke.  Will continue to follow.
case d/w np   agree w/ note  goals at this time is plan for [facility]   dnr dni trial of niv confirmed by team

## 2024-07-31 NOTE — CONSULT NOTE ADULT - SUBJECTIVE AND OBJECTIVE BOX
HPI: Pt is a 89y old Female with hx of HTN (no longer on meds) came to the hospital today after she was found down by granddaughter this morning. Patient unable to provide history. History as per chart review. Patients last known well was on 7/26. She was reportedly noted to have dried coffee ground emesis on her face and hair and was lethargic. Palliative medicine consulted to help establish GOC and advance care planning    7/31/2024 pt seen and examined with daughter at bedside, patient alert and oriented x2 self and place. patient appears comfortable at this time, patient recently was living at home alone. Will reach out to family to schedule GOC meeting     PAIN: ( )Yes   (x )No  DYSPNEA: ( ) Yes  (x ) No  Level:    PAST MEDICAL & SURGICAL HISTORY:  HTN (hypertension)    SOCIAL HX: lives at home alone     Hx opiate tolerance ( )YES  (x )NO    Baseline ADLs  (Prior to Admission)  (x ) Independent   ( )Dependent    FAMILY HISTORY:    Review of Systems:    Unable to obtain/Limited due to: AMS     PHYSICAL EXAM:    Vital Signs Last 24 Hrs  T(C): 37 (31 Jul 2024 07:58), Max: 37.4 (30 Jul 2024 20:29)  T(F): 98.6 (31 Jul 2024 07:58), Max: 99.3 (30 Jul 2024 20:29)  HR: 95 (31 Jul 2024 07:58) (86 - 95)  BP: 164/68 (31 Jul 2024 07:58) (133/73 - 164/68)  RR: 18 (31 Jul 2024 07:58) (18 - 18)  SpO2: 92% (31 Jul 2024 07:58) (92% - 95%)    Parameters below as of 31 Jul 2024 07:58  Patient On (Oxygen Delivery Method): room air    PPSV2: 30-40  %    General: ill appearing female in bed, NAD   Mental Status: alert and oriented x2   HEENT: dry oral mucosa   Lungs: diminuend breath sounds   Cardiac: s1s2 +   GI: nontender, nondistended +BS   : + voiding   Ext: no edema, PATINO   Neuro: confusion       LABS:                        10.3   12.91 )-----------( 226      ( 31 Jul 2024 06:41 )             31.2     07-31    138  |  109<H>  |  22  ----------------------------<  127<H>  3.4<L>   |  21<L>  |  1.24    Ca    9.2      31 Jul 2024 06:41  Phos  2.4     07-31  Mg     2.1     07-31    TPro  6.3  /  Alb  2.7<L>  /  TBili  1.4<H>  /  DBili  x   /  AST  34  /  ALT  17  /  AlkPhos  64  07-30      Albumin: Albumin: 2.7 g/dL (07-30 @ 06:48)      Allergies    No Known Allergies    Intolerances      MEDICATIONS  (STANDING):  amLODIPine   Tablet 5 milliGRAM(s) Oral daily  aspirin enteric coated 81 milliGRAM(s) Oral daily  atorvastatin 80 milliGRAM(s) Oral at bedtime  dextrose 5%. 1000 milliLiter(s) (50 mL/Hr) IV Continuous <Continuous>  dextrose 5%. 1000 milliLiter(s) (100 mL/Hr) IV Continuous <Continuous>  dextrose 50% Injectable 25 Gram(s) IV Push once  dextrose 50% Injectable 25 Gram(s) IV Push once  dextrose 50% Injectable 12.5 Gram(s) IV Push once  enoxaparin Injectable 30 milliGRAM(s) SubCutaneous every 24 hours  glucagon  Injectable 1 milliGRAM(s) IntraMuscular once  guaiFENesin ER 1200 milliGRAM(s) Oral every 12 hours  insulin lispro (ADMELOG) corrective regimen sliding scale   SubCutaneous three times a day before meals  insulin lispro (ADMELOG) corrective regimen sliding scale.   SubCutaneous at bedtime  losartan 25 milliGRAM(s) Oral daily  pantoprazole    Tablet 40 milliGRAM(s) Oral before breakfast  sodium chloride 0.9%. 1000 milliLiter(s) (100 mL/Hr) IV Continuous <Continuous>  sodium chloride 0.9%. 1000 milliLiter(s) (75 mL/Hr) IV Continuous <Continuous>    MEDICATIONS  (PRN):  acetaminophen     Tablet .. 650 milliGRAM(s) Oral every 6 hours PRN Mild Pain (1 - 3)  dextrose Oral Gel 15 Gram(s) Oral once PRN Blood Glucose LESS THAN 70 milliGRAM(s)/deciliter  hydrALAZINE Injectable 10 milliGRAM(s) IV Push every 6 hours PRN for SBP>150  ondansetron Injectable 4 milliGRAM(s) IV Push every 8 hours PRN Nausea and/or Vomiting  polyethylene glycol 3350 17 Gram(s) Oral daily PRN Constipation      RADIOLOGY/ADDITIONAL STUDIES:    ACC: [id number] EXAM:  MR BRAIN   ORDERED BY: BERNARDO BAILON   PROCEDURE DATE:  07/30/2024    INTERPRETATION:  CLINICAL INDICATIONS: Altered mental status, evaluate for CVA  COMPARISON: Head CT dated 7/20/2024.  TECHNIQUE: MRI brain: Multiplanar, multisequence MR imaging of the brain   are obtained without the administration of intravenous Gadavist contrast.  FINDINGS:  There is no abnormal restricted diffusion to suggest acute infarction.   Scattered periventricular and subcortical white matter T2 /FLAIR   hyperintensities are seen without mass effect, nonspecific, likely   representing moderate to severe chronic microvascular changes. Normal T2   flow-voids are seen within  the intracranial vasculature. The lateral   ventricles and cortical sulci are age-appropriate in size and   configuration. There is no mass, mass effect, or extra-axial fluid   collection. There is no susceptibility artifact to suggest hemorrhage.   Midline structures are normal.  Mild to moderate inflammatory mucosal   changes are seen throughout the various portions of the paranasal   sinuses. Left intraorbital extraconal lesion along the superior aspect of   the left orbit measures 6.7 x 16 mm on image 12 of series 7 minutes image   27 series 12. Differential diagnosis includes orbital varix versus   orbital cavernous venous malformation. Consider dedicated MRI of the   orbits with contrast as clinically warranted. The orbits and mastoid air   cells are otherwise unremarkable.    IMPRESSION: No acute infarction.    ACC: [id number] EXAM:  CT ABDOMEN AND PELVIS IC   ORDERED BY: ADITHYA GODINEZ   ACC: [id number] [facility]   ORDERED BY: ADITHYA GODINEZ   PROCEDURE DATE:  07/28/2024    INTERPRETATION:  CLINICAL INDICATION: Altered mental status, vomiting,  possible hematemesis. Evaluate for pulmonary embolism  TECHNIQUE: Enhanced helical images were obtained of the chest, abdomen   and pelvis. Coronal and sagittal images were reconstructed.  Images were   obtained after the uneventful administration of 70 cc of nonionic   intravenous contrast (Omnipaque 350) and enteric contrast. 3D MIP images   were provided.  COMPARISON: None  FINDINGS:  CTA: No pulmonary embolism.. 3.7 cm mid ascending aorta. No aortic   dissection.The heart is mildly enlarged. No pericardial effusion. Aortic   valvular, coronary arterial and mitral annular calcification.  Lungs/Airways/Pleura: Mild dependent lingula and dependent lower lobe   atelectasis. No pleural effusion or pneumothorax.The central airways are   patent. Mild bronchiectasis in the posterior right upper and right middle   lobes with distal airway impaction and clusters of small nodules, related   to a small airways process.  Mediastinum/Lymph nodes: No thoracic adenopathy.  Hepatobiliary: Unremarkable liver. Normal caliber bile ducts.   Nondistended gallbladder.  Pancreas: Unremarkable.  Spleen: Unremarkable.  Adrenal glands: Unremarkable.  Kidneys: Left upper renal scarring. No hydronephrosis.  Bowel: No bowel obstruction. The appendix is not visualized; no   periappendiceal fat stranding. Large rectal stool ball.  Abdominal lymph nodes: No lymphadenopathy.  Abdominal vessels. Atherosclerotic changes.  Peritoneum: No ascites.  Pelvis: Limited evaluation due to metallic streak artifact from hip   arthroplasties. 1.5 cm left ovarian cyst. Calcified uterine fibroid.  Bones/soft tissues: Bilateral hip arthroplasties. Old bilateral anterior   rib and right inferior pubic ramus fractures. Diffuse bone   demineralization. Multilevel thoracolumbar compression deformities, most   severe at T9, T12 and L1.    IMPRESSION:  No pulmonary embolism. No aortic dissection.  No bowel obstruction. Large rectal stool ball.  Other incidental/chronic findings, as above.

## 2024-07-31 NOTE — OCCUPATIONAL THERAPY INITIAL EVALUATION ADULT - GENERAL OBSERVATIONS, REHAB EVAL
Pt rec'd/left semi-paulino position in bed, bed alarmed, 1:1 present, +b/l wrist mitts, +HM, +pure wick, +IV infusing, lines intact, agreeable to OT IE.

## 2024-07-31 NOTE — PROGRESS NOTE ADULT - ASSESSMENT
89F with pmhx HTN (no longer on meds) came to the hospital today after she was found down by granddaughter this morning. Patient unable to provide history. History as per chart review. Patients last known well was on 7/26. She was reportedly noted to have dried coffee ground emesis on her face and hair and was lethargic.     #Acute Encephalopathy, unknown etiology  -EKG: reviewed   -EEG: negative for seizure   -Growth in aerobic bottle: Corynebacterium aurimucosum group  -Growth in anaerobic bottle: Gram positive cocci in pairs  -CTA head/neck: negative for acute ischemia   -MRI: negative for stroke   TTE: EF: 57%, grade 1 diastolic dysfunction, severe aortic stenosis  -UA: reviewed   -TSH: noted   -CPK: noted   -A1c: 5.4  -LDL: 158  -UCx: NGTD  -BCx: G+ve in aerobic   -s/p Vanc and Cefepime   -c/w Zosyn  empirically  -ASA/Atorvastatin   -aspiration precautions   -neurochecks   -neurology following    #HTN urgency   -BP improving   -Norvasc   -Hydralazine PRN     #Elevated Troponin  -likely from demand ischemia   TTE: EF: 57%, grade 1 diastolic dysfunction, severe aortic stenosis    #Fecal impaction  -bowel regimen   -monitor BMs     #Severe protein-calorie malnutrition.    #VTE prophylaxis  -Lovenox    #Advance care directives  -DNR/DNI with trial of NIV  -MOSLT in chart     Discussed with son and daughter at bedside    guarded prognosis        89F with pmhx HTN (no longer on meds) came to the hospital today after she was found down by granddaughter this morning. Patient unable to provide history. History as per chart review. Patients last known well was on 7/26. She was reportedly noted to have dried coffee ground emesis on her face and hair and was lethargic.     #Acute Encephalopathy, unknown etiology  -EKG: reviewed   -EEG: negative for seizure   -Growth in aerobic bottle: Corynebacterium aurimucosum group  -Growth in anaerobic bottle: Gram positive cocci in pairs  -CTA head/neck: negative for acute ischemia   -MRI: negative for stroke   -TTE: EF: 57%, grade 1 diastolic dysfunction, severe aortic stenosis  -UA: reviewed   -TSH: noted   -CPK: noted   -A1c: 5.4  -LDL: 158  -UCx: NGTD  -s/p Vanc and Cefepime   -s/p Zosyn   -ASA/Atorvastatin   -aspiration precautions   -repeat blood culture   -neurochecks   -neurology following    #HTN urgency   -BP improving   -Norvasc   -Hydralazine PRN     #Elevated Troponin  -likely from demand ischemia   -TTE: EF: 57%, grade 1 diastolic dysfunction, severe aortic stenosis  -discussed with family: no further intervention      #Fecal impaction  -bowel regimen   -monitor BMs     #Severe protein-calorie malnutrition.    #VTE prophylaxis  -Lovenox    #Advance care directives  -DNR/DNI with trial of NIV  -MOSLT in chart     Discussed with son and daughter at bedside    guarded prognosis

## 2024-07-31 NOTE — CONSULT NOTE ADULT - CONVERSATION DETAILS
Team spoke with pts daughter/HCP Tammy via phone to discuss goals of care, assist with planning and provide supportive counseling. Pt. confused and cannot be apart of the discussion. Pt. was living alone prior to admission and was independent. Pts current state is a significant change for pt.     Goals of care discussed. Pts daughter would like pt. to go to [facility] upon d/c as she was very independent prior to admission therefore, they would like to see if she can regain strength. We also discussed Home hospice services and the philosophy of care for the future if pt. gets to a point where she only wants to focus on comfort. For now, they would like to pursue [facility].     Plan at this time is for [facility]. Emotional support provided. Our team will continue to follow.

## 2024-07-31 NOTE — OCCUPATIONAL THERAPY INITIAL EVALUATION ADULT - STRENGTHENING, PT EVAL
Pt will improve UE strength by 1/2 grade in order to promote increased ease w/ fxl transfers in 2 weeks.

## 2024-07-31 NOTE — PROGRESS NOTE ADULT - ASSESSMENT
89 F with pmhx HTN not on meds because she ran out per son, came to the hospital 7/28 after she was found down by niece in the morning.   Patients last known well was on 7/26 where she was oriented x 4, doing ADL's independently.  As per her son at bedside patient was found with "fecal material" on her face and hair and was lethargic.  She was unresponsive, not following commands, blinking her eyes rapidly and body was shaking.  In ED vitals stable, CBC noted for WBC 22.29,  CMP noted for BUN/Cr 36/1.52,  Trop 1783.  Lactate 4.5. UA was neg for bacteria,  Chest/abd/pelvis noted for bronchiectasis in the posterior right upper and right middle lobes with distal airway impaction and clusters of small nodules and large rectal stool ball. Stroke w/u with CTH, CTA head and neck neg for acute infarct.  Septic w/u was sent, patient was loaded with aspirin, given broad spectrum abx with vanc/zosyn. Neurology is consulted for AMS.  On PE she is not engaging, not following commands, +echolalia, global aphasia ?+LNLFD, R gaze deviation seems to be moving all extremities with possible LUE injury.  EEG is neg for epileptiform activity, findings are consistent with diffuse cerebral dysfunction/encephalopathy.      #AMS in the setting of possible sepsis/bronchiectasis  -Initially with R gaze deviation, and significant aphasia as well as possible tongue bite.  -Symptoms improving  -routine EEG x 2 showed mild generalized slowing, no epileptiform activity  -MRI brain negative for acute infarct.    Likely gradually resolving encephalopathy in the setting of infection/sepsis  -Continue supportive care      #Found on Echo: severe AS and dilated ascending aorta 4.05 cm  Management as per medicine, cardiology    Will f/u as needed

## 2024-07-31 NOTE — CHART NOTE - NSCHARTNOTEFT_GEN_A_CORE
Per MD,  pt has been advanced to regular diet, thin liquid.  Pt is tolerating.     Will continue to monitor PO intake

## 2024-07-31 NOTE — CONSULT NOTE ADULT - NS ATTEND BILL GEN_ALL_CORE
Attending to bill
Attending to bill
Niacinamide Pregnancy And Lactation Text: These medications are considered safe during pregnancy.

## 2024-07-31 NOTE — OCCUPATIONAL THERAPY INITIAL EVALUATION ADULT - ADDITIONAL COMMENTS
Pt is a questionable historian, daughter at bedside provided history- reports pt lives alone in a 4 story PH, reports her bedroom/bathroom is on the 3rd floor. Pt has a tub/shower combo +curtain, standard toilet. P was (I) with all ADL tasks, walked s AD, non-compliant c her medication per her daughter's reports.

## 2024-07-31 NOTE — GOALS OF CARE CONVERSATION - ADVANCED CARE PLANNING - CONVERSATION DETAILS
HPI:  89F with pmhx HTN (no longer on meds) came to the hospital today after she was found down by granddaughter this morning. Patient unable to provide history.    (28 Jul 2024 20:50)      PERTINENT PMH REVIEWED:  [ X ] YES [ ] NO           Primary Contact: Daughter Tammy     HCP [ X ] Surrogate [   ] Guardian [   ]    Mental Status: [ ] Alert  [  ] Oriented [  ] Confused [ X ] Lethargic [  ]  Concerns of Depression [  ]- Not identified   Anxiety [   ]- Not identified   Baseline ADLs (prior to admission):  Independent [  ] moderately [ X ] fully   Dependent   [ ] moderately [ ] fully    Anticipated Grief: Patient [  ] Family [ X ]    Caregiver [place] Assessed: Yes [ X ] No [  ]    Muslim: Christian     Spiritual Concerns: Not identified     Goals of Care: Comfort [  ] Rehabilitation [ X   ] Curative [  ] Life Prolonging [  ]    Previous Services: None     ADVANCE DIRECTIVES: DNR/DNI     Anticipated D/C Plan: [facility]                     Summary:    Team spoke with pts daughter/HCP Tammy via phone to discuss goals of care, assist with planning and provide supportive counseling. Pt. confused and cannot be apart of the discussion. Pt. was living alone prior to admission and was independent. Pts current state is a significant change for pt.     Goals of care discussed. Pts daughter would like pt. to go to [facility] upon d/c as she was very independent prior to admission therefore, they would like to see if she can regain strength. We also discussed Home hospice services and the philosophy of care for the future if pt. gets to a point where she only wants to focus on comfort. For now, they would like to pursue [facility].     Plan at this time is for [facility]. Emotional support provided. Our team will continue to follow.

## 2024-07-31 NOTE — OCCUPATIONAL THERAPY INITIAL EVALUATION ADULT - DIAGNOSIS, OT EVAL
decreased ADL participation s/p unwitnessed collapse associated with vomiting ;+ AMS/acute encephalopathy

## 2024-07-31 NOTE — OCCUPATIONAL THERAPY INITIAL EVALUATION ADULT - RANGE OF MOTION EXAMINATION, UPPER EXTREMITY
pt with difficulty following AROM assessment, PROM WFL (shoulders assessed to 90 degrees, pt c r/o R shoulder discomfort)

## 2024-07-31 NOTE — CONSULT NOTE ADULT - ASSESSMENT
89F with pmhx HTN (no longer on meds)  admitted on 7/28 for evaluation of being found on floor at home by grand daughter; she was lying with coffee ground emesis on her face, last seen 2 days prior; she lives alone per daughter at bedside. Patient unable to provide history and family at bedside states she is able to live alone taking care of all her daily needs. She never had a problem before.     1. Patient admitted s/p fall; blood culture with Corynebacterium aurimucosum is a skin contaminant and of no clinical significance; the patient also does not have a urinary tract infection  - admitted with altered mental status which is most likely due to early dementia or other metabolic deficiency; no obvious infectious etiology  - will stop all antibiotics and have discussed this at length with the family  - patient family is concerned about patient catching COVID-19 in the hospital but do not "believe" in vaccinations  2. other issues; per medicine    [facility] token not applicable as patient is not on antibiotics

## 2024-07-31 NOTE — PROGRESS NOTE ADULT - SUBJECTIVE AND OBJECTIVE BOX
HOSPITALIST ATTENDING PROGRESS NOTE     Chart and meds reviewed. Patient seen and examined     Interval Hx/Events: Pt seen and evaluated this AM. Currently, awake, alert. occasionally responding appropriately.  Family at bedside    7/30: Pt seen and evaluated. Overnight events noted. Remains confused. MR reviewed     7/31: Pt seen and evaluated. More alert and awake, however, still confused.   -d/c delgado     All other systems and founds to be negative with exception of what has been described above.       PHYSICAL EXAM:  Vital Signs Last 24 Hrs  T(C): 37 (31 Jul 2024 07:58), Max: 37.4 (30 Jul 2024 20:29)  T(F): 98.6 (31 Jul 2024 07:58), Max: 99.3 (30 Jul 2024 20:29)  HR: 95 (31 Jul 2024 07:58) (86 - 95)  BP: 164/68 (31 Jul 2024 07:58) (133/73 - 164/68)  RR: 18 (31 Jul 2024 07:58) (18 - 18)  SpO2: 92% (31 Jul 2024 07:58) (92% - 95%)    Parameters below as of 31 Jul 2024 07:58  Patient On (Oxygen Delivery Method): room air            GEN: NAD,   HEENT: EOMI, +unequal pupils but reactive   NECK : Soft and supple, no JVD  LUNG: CTABL, No wheezing, rales or rhonchi  CVS: S1S2+, RRR, no M/G/R  GI: BS+, soft, NT/ND, no guarding, no rebound,   EXTREMITIES: No peripheral edema  VASCULAR: 2+ peripheral pulses  NEURO: AAOx1-2, limited neurological exam   SKIN: +ecchymotic     HOME MEDICATIONS:  Home Medications:  amlodipine-benazepril 5 mg-20 mg oral capsule: 1 cap(s) orally once a day ***LAST FILLED 03/03/24 for a 90 day supply*** (28 Jul 2024 17:10)  aspirin:  (28 Jul 2024 17:12)  metoprolol succinate 50 mg oral tablet, extended release: 1 tab(s) orally once a day ***LAST FILLED 12/04/23 for a 90 day supply*** (28 Jul 2024 17:12)      MEDICATIONS  MEDICATIONS  (STANDING):  amLODIPine   Tablet 5 milliGRAM(s) Oral daily  aspirin enteric coated 81 milliGRAM(s) Oral daily  atorvastatin 80 milliGRAM(s) Oral at bedtime  dextrose 5%. 1000 milliLiter(s) (50 mL/Hr) IV Continuous <Continuous>  dextrose 5%. 1000 milliLiter(s) (100 mL/Hr) IV Continuous <Continuous>  dextrose 50% Injectable 25 Gram(s) IV Push once  dextrose 50% Injectable 12.5 Gram(s) IV Push once  dextrose 50% Injectable 25 Gram(s) IV Push once  enoxaparin Injectable 30 milliGRAM(s) SubCutaneous every 24 hours  glucagon  Injectable 1 milliGRAM(s) IntraMuscular once  guaiFENesin ER 1200 milliGRAM(s) Oral every 12 hours  insulin lispro (ADMELOG) corrective regimen sliding scale   SubCutaneous three times a day before meals  insulin lispro (ADMELOG) corrective regimen sliding scale.   SubCutaneous at bedtime  losartan 25 milliGRAM(s) Oral daily  pantoprazole    Tablet 40 milliGRAM(s) Oral before breakfast  potassium phosphate IVPB 30 milliMole(s) IV Intermittent once    MEDICATIONS  (PRN):  acetaminophen     Tablet .. 650 milliGRAM(s) Oral every 6 hours PRN Mild Pain (1 - 3)  dextrose Oral Gel 15 Gram(s) Oral once PRN Blood Glucose LESS THAN 70 milliGRAM(s)/deciliter  hydrALAZINE Injectable 10 milliGRAM(s) IV Push every 6 hours PRN for SBP>150  ondansetron Injectable 4 milliGRAM(s) IV Push every 8 hours PRN Nausea and/or Vomiting  polyethylene glycol 3350 17 Gram(s) Oral daily PRN Constipation      LABS: All Labs Reviewed:                        10.3   12.91 )-----------( 226      ( 31 Jul 2024 06:41 )             31.2   07-31    138  |  109<H>  |  22  ----------------------------<  127<H>  3.4<L>   |  21<L>  |  1.24    Ca    9.2      31 Jul 2024 06:41  Phos  2.4     07-31  Mg     2.1     07-31    TPro  6.3  /  Alb  2.7<L>  /  TBili  1.4<H>  /  DBili  x   /  AST  34  /  ALT  17  /  AlkPhos  64  07-30               Urinalysis Basic - ( 29 Jul 2024 06:34 )    Color: x / Appearance: x / SG: x / pH: x  Gluc: 115 mg/dL / Ketone: x  / Bili: x / Urobili: x   Blood: x / Protein: x / Nitrite: x   Leuk Esterase: x / RBC: x / WBC x   Sq Epi: x / Non Sq Epi: x / Bacteria: x        Culture - Urine (collected 28 Jul 2024 13:44)  Source: Catheterized None  Final Report (29 Jul 2024 14:57):    <10,000 CFU/mL Normal Urogenital Emilia    Urinalysis with Rflx Culture (collected 28 Jul 2024 13:44)    Culture - Blood (07.28.24 @ 11:24)    Specimen Source: .Blood None   Culture Results:   No growth at 48 Hours    Culture - Blood (07.28.24 @ 11:24)    Gram Stain:   Growth in anaerobic bottle: Gram positive cocci in pairs   Specimen Source: .Blood None   Culture Results:   Growth in anaerobic bottle: Gram positive cocci in pairs  Direct identification is available within approximately 3-5  hours either by Blood Panel Multiplexed PCR or Direct  MALDI-TOF. Details: [email or web address]    Culture - Blood (07.28.24 @ 11:24)    Gram Stain:   Growth in aerobic bottle: Gram Positive Rods   -  Blood PCR Panel: NEG   Specimen Source: .Blood None   Organism: Blood Culture PCR   Culture Results:   Growth in aerobic bottle: Corynebacterium aurimucosum group  "Susceptibilities not performed"  Direct identification is available within approximately 3-5  hours either by Blood Panel Multiplexed PCR or Direct  MALDI-TOF. Details: [email or web address]   Organism Identification: Blood Culture PCR   Method Type: PCR      I&O's Detail    29 Jul 2024 07:01  -  30 Jul 2024 07:00  --------------------------------------------------------  IN:  Total IN: 0 mL    OUT:    Indwelling Catheter - Urethral (mL): 250 mL  Total OUT: 250 mL    Total NET: -250 mL      30 Jul 2024 07:01  -  30 Jul 2024 17:47  --------------------------------------------------------  IN:  Total IN: 0 mL    OUT:    Indwelling Catheter - Urethral (mL): 500 mL  Total OUT: 500 mL    Total NET: -500 mL    CAPILLARY BLOOD GLUCOSE      POCT Blood Glucose.: 129 mg/dL (30 Jul 2024 16:53)  POCT Blood Glucose.: 148 mg/dL (30 Jul 2024 11:51)  POCT Blood Glucose.: 140 mg/dL (30 Jul 2024 07:45)  POCT Blood Glucose.: 126 mg/dL (29 Jul 2024 20:45)        CARDIOLOGY TESTING   CARDIAC MARKERS ( 28 Jul 2024 22:30 )  x     / x     / 570 U/L / x     / x        EKG : reviewed   ECHO:  < from: TTE W or WO Ultrasound Enhancing Agent (07.29.24 @ 13:38) >  CONCLUSIONS:      1. Left ventricular wall thickness is mildly increased. Left ventricular systolic function is normal with an ejection fraction of 57 % by Neal's method of disks.   2. There is mild (grade 1) left ventricular diastolic dysfunction.   3. Normal right ventricular cavity size and normal right ventricular systolic function.   4. The right atrium is mildly dilated.   5. Mild mitral regurgitation.   6. Structurally normal pulmonic valve with normal leaflet excursion.   7. Structurally normal tricuspid valve with normal leaflet excursion. Trace tricuspid regurgitation.   8. Ascending aorta diameter is dilated, measuring 4.05 cm (indexed 2.84 cm/m²).   9. Left atrium is normal in size.  10. Mild left ventricular hypertrophy.  11. Moderate aortic regurgitation.  12. No evidence of pulmonic regurgitation.  13. Technically difficult image quality.  14. There is moderate calcification of the mitral valve annulus.  15. Trileaflet aortic valve with reduced systolic excursion. There is severe calcification of the aortic valve leaflets. Severe aortic stenosis.    < end of copied text >        RADIOLOGY  < from: CT Angio Head w/ IV Cont (07.28.24 @ 10:54) >  HEAD CT:  1. No acute intracranial hemorrhage, mass effect, or shift of the midline   structures.  2. Mild chronic white matter microvascular type changes.    CTA NECK:  1. Atherosclerotic plaque affecting the bilateral carotid bifurcations,   and proximal internal carotid arteries with associated mild (less than   50%) stenosis of the bilateral internal carotid artery origins and   proximal segments by NASCET criteria.  2. Otherwise, no large vessel occlusion or major stenosis.    CTA HEAD:  1. No large vessel occlusion or major stenosis.    < from: CT Abdomen and Pelvis w/ IV Cont (07.28.24 @ 10:55) >  FINDINGS:  CTA: No pulmonary embolism.. 3.7 cm mid ascending aorta. No aortic   dissection.The heart is mildly enlarged. No pericardial effusion. Aortic   valvular, coronary arterial and mitral annular calcification.    Lungs/Airways/Pleura: Mild dependent lingula and dependent lower lobe   atelectasis. No pleural effusion or pneumothorax.The central airways are   patent. Mild bronchiectasis in the posterior right upper and right middle   lobes with distal airway impaction and clusters of small nodules, related   to a small airways process.    Mediastinum/Lymph nodes: No thoracic adenopathy.    Hepatobiliary: Unremarkable liver. Normal caliber bile ducts.   Nondistended gallbladder.    Pancreas: Unremarkable.    Spleen: Unremarkable.    Adrenal glands: Unremarkable.    Kidneys: Left upper renal scarring. No hydronephrosis.    Bowel: No bowel obstruction. The appendix is not visualized; no   periappendiceal fat stranding. Large rectal stool ball.    Abdominal lymph nodes: No lymphadenopathy.    Abdominal vessels. Atherosclerotic changes.    Peritoneum: No ascites.    Pelvis: Limited evaluation due to metallic streak artifact from hip   arthroplasties. 1.5 cm left ovarian cyst. Calcified uterine fibroid.    Bones/soft tissues: Bilateral hip arthroplasties. Old bilateral anterior   rib and right inferior pubic ramus fractures. Diffuse bone   demineralization. Multilevel thoracolumbar compression deformities, most   severe at T9, T12 and L1.    IMPRESSION:  No pulmonary embolism. No aortic dissection.    No bowel obstruction. Large rectal stool ball.    Other incidental/chronic findings, as above.    < from: MR Head No Cont (07.30.24 @ 11:41) >  FINDINGS:  There is no abnormal restricted diffusion to suggest acute infarction.   Scattered periventricular and subcortical white matter T2 /FLAIR   hyperintensities are seen without mass effect, nonspecific, likely   representing moderate to severe chronic microvascular changes. Normal T2   flow-voids are seen within  the intracranial vasculature. The lateral   ventricles and cortical sulci are age-appropriate in size and   configuration. There is no mass, mass effect, or extra-axial fluid   collection. There is no susceptibility artifact to suggest hemorrhage.   Midline structures are normal.  Mild to moderate inflammatory mucosal   changes are seen throughout the various portions of the paranasal   sinuses. Left intraorbital extraconal lesion along the superior aspect of   the left orbit measures 6.7 x 16 mm on image 12 of series 7 minutes image   27 series 12. Differential diagnosis includes orbital varix versus   orbital cavernous venous malformation. Consider dedicated MRI of the   orbits with contrast as clinically warranted. The orbits and mastoid air   cells are otherwise unremarkable.    IMPRESSION: No acute infarction.

## 2024-07-31 NOTE — PROGRESS NOTE ADULT - SUBJECTIVE AND OBJECTIVE BOX
Interval history:  Reports feeling generally better    Altered mental status    DNI: Trial NIV    Handoff    MEWS Score    HTN (hypertension)    HTN (hypertension)    AMS (altered mental status)    Hypertensive urgency    Elevated troponin    AMS (altered mental status)    Prophylactic measure    UNRESPONSIVE BLOOD VOMIT DRIED ON FACE    90+    Elevated troponin    SysAdmin_VisitLink          amLODIPine   Tablet   5 milliGRAM(s) Oral (07-31-24 @ 09:04)   5 milliGRAM(s) Oral (07-30-24 @ 10:13)    aspirin enteric coated   81 milliGRAM(s) Oral (07-31-24 @ 09:04)   81 milliGRAM(s) Oral (07-30-24 @ 10:13)    atorvastatin   80 milliGRAM(s) Oral (07-30-24 @ 21:11)   80 milliGRAM(s) Oral (07-29-24 @ 22:22)    enoxaparin Injectable   30 milliGRAM(s) SubCutaneous (07-31-24 @ 09:05)   30 milliGRAM(s) SubCutaneous (07-30-24 @ 10:50)    guaiFENesin ER   1200 milliGRAM(s) Oral (07-31-24 @ 09:04)   1200 milliGRAM(s) Oral (07-30-24 @ 21:11)   1200 milliGRAM(s) Oral (07-30-24 @ 10:13)   1200 milliGRAM(s) Oral (07-29-24 @ 22:22)    LORazepam   Injectable   1 milliGRAM(s) IV Push (07-30-24 @ 10:48)    pantoprazole    Tablet   40 milliGRAM(s) Oral (07-31-24 @ 06:05)    pantoprazole  Injectable   40 milliGRAM(s) IV Push (07-29-24 @ 22:22)    piperacillin/tazobactam IVPB..   25 mL/Hr IV Intermittent (07-31-24 @ 09:04)   25 mL/Hr IV Intermittent (07-30-24 @ 21:10)   25 mL/Hr IV Intermittent (07-30-24 @ 05:15)   25 mL/Hr IV Intermittent (07-29-24 @ 22:22)   25 mL/Hr IV Intermittent (07-29-24 @ 13:19)    potassium chloride    Tablet ER   40 milliEquivalent(s) Oral (07-30-24 @ 10:13)    sodium chloride 0.9%.   75 mL/Hr IV Continuous (07-29-24 @ 17:16)        amLODIPine   Tablet 5 milliGRAM(s) Oral daily  aspirin enteric coated 81 milliGRAM(s) Oral daily  atorvastatin 80 milliGRAM(s) Oral at bedtime  dextrose 5%. 1000 milliLiter(s) IV Continuous <Continuous>  dextrose 5%. 1000 milliLiter(s) IV Continuous <Continuous>  dextrose 50% Injectable 25 Gram(s) IV Push once  dextrose 50% Injectable 12.5 Gram(s) IV Push once  dextrose 50% Injectable 25 Gram(s) IV Push once  dextrose Oral Gel 15 Gram(s) Oral once PRN  enoxaparin Injectable 30 milliGRAM(s) SubCutaneous every 24 hours  glucagon  Injectable 1 milliGRAM(s) IntraMuscular once  guaiFENesin ER 1200 milliGRAM(s) Oral every 12 hours  hydrALAZINE Injectable 10 milliGRAM(s) IV Push every 6 hours PRN  insulin lispro (ADMELOG) corrective regimen sliding scale   SubCutaneous three times a day before meals  insulin lispro (ADMELOG) corrective regimen sliding scale.   SubCutaneous at bedtime  ondansetron Injectable 4 milliGRAM(s) IV Push every 8 hours PRN  pantoprazole    Tablet 40 milliGRAM(s) Oral before breakfast  polyethylene glycol 3350 17 Gram(s) Oral daily PRN  sodium chloride 0.9%. 1000 milliLiter(s) IV Continuous <Continuous>  sodium chloride 0.9%. 1000 milliLiter(s) IV Continuous <Continuous>      T(C): 37 (07-31-24 @ 07:58), Max: 37.4 (07-30-24 @ 20:29)  HR: 95 (07-31-24 @ 07:58) (86 - 95)  BP: 164/68 (07-31-24 @ 07:58) (133/73 - 164/68)  RR: 18 (07-31-24 @ 07:58) (18 - 18)  SpO2: 92% (07-31-24 @ 07:58) (92% - 95%)    07-30-24 @ 07:01  -  07-31-24 @ 07:00  --------------------------------------------------------  IN: 0 mL / OUT: 500 mL / NET: -500 mL      Weight (kg): 49.895 (07-28-24 @ 11:09)  Daily     Daily     Gen: no distress  CV: + NAJMA, distant heart sounds, no edema  Resp: Lungs CTA    07-31    138  |  109<H>  |  22  ----------------------------<  127<H>  3.4<L>   |  21<L>  |  1.24    Ca    9.2      31 Jul 2024 06:41  Phos  2.4     07-31  Mg     2.1     07-31    TPro  6.3  /  Alb  2.7<L>  /  TBili  1.4<H>  /  DBili  x   /  AST  34  /  ALT  17  /  AlkPhos  64  07-30    Magnesium: 2.1 mg/dL (07-31-24 @ 06:41)  Magnesium: 2.1 mg/dL (07-30-24 @ 06:48)                          10.3   12.91 )-----------( 226      ( 31 Jul 2024 06:41 )             31.2

## 2024-07-31 NOTE — CONSULT NOTE ADULT - ASSESSMENT
Pt is a 89y old Female with hx of HTN (no longer on meds) came to the hospital today after she was found down by granddaughter this morning. Patient unable to provide history. History as per chart review. Patients last known well was on 7/26. She was reportedly noted to have dried coffee ground emesis on her face and hair and was lethargic. Palliative medicine consulted to help establish GOC and advance care planning    1) Severe aortic stenosis   - hypertensive emergency   - cardiology consult appreciated   - c/w losartan   - as per chart poor candidate for invasive cardiac testing     Process of Care  --Reviewed dx/treatment problems and alignment with Goals of Care    Physical Aspects of Care  --Pain  patient denies at this time    --Bowel Regimen  denies constipation  risk for constipation d/t immobility  daily dulcolax    --Dyspnea  No SOB at this time  comfortable and in NAD    --Nausea Vomiting  denies    --Weakness  PT as tolerated     Psychological and Psychiatric Aspects of Care:   --Greif/Bereavment: emotional support provided  -Pastoral Care Available PRN     Social Aspects of Care  -SW involved     Cultural Aspects  -Primary Language: English    Goals of Care:     We discussed Palliative Care team being a supportive team when a patient has ongoing illnesses.  We also discussed that it is not an end of life care service, but can help navigate symptoms and emotional support througout their hospital stay here.    Ethical and Legal Aspects: NA    Capacity- pt at this time does not appear to have capacity or insight into complex decisions - will continue to evaluate     HCP/Surrogate:     Code Status- DNR/I with trial of NIV   MOLST-  Dispo Plan-     Discussed With: Dr. Kelly coordinated with attending and SW and RN     Time Spent: 60 minutes including the care, coordination and counseling of this patient, 50% of which was spent coordinating and counseling.

## 2024-07-31 NOTE — OCCUPATIONAL THERAPY INITIAL EVALUATION ADULT - ADL RETRAINING, OT EVAL
Pt will perform UBD with supervision assist in 2 weeks. Pt will perform LBD with moderate assist in 2 weeks.

## 2024-08-01 LAB
ANION GAP SERPL CALC-SCNC: 8 MMOL/L — SIGNIFICANT CHANGE UP (ref 5–17)
ANION GAP SERPL CALC-SCNC: 8 MMOL/L — SIGNIFICANT CHANGE UP (ref 5–17)
BUN SERPL-MCNC: 20 MG/DL — SIGNIFICANT CHANGE UP (ref 7–23)
BUN SERPL-MCNC: 20 MG/DL — SIGNIFICANT CHANGE UP (ref 7–23)
CALCIUM SERPL-MCNC: 8.9 MG/DL — SIGNIFICANT CHANGE UP (ref 8.5–10.1)
CALCIUM SERPL-MCNC: 9.2 MG/DL — SIGNIFICANT CHANGE UP (ref 8.5–10.1)
CHLORIDE SERPL-SCNC: 109 MMOL/L — HIGH (ref 96–108)
CHLORIDE SERPL-SCNC: 110 MMOL/L — HIGH (ref 96–108)
CO2 SERPL-SCNC: 22 MMOL/L — SIGNIFICANT CHANGE UP (ref 22–31)
CO2 SERPL-SCNC: 22 MMOL/L — SIGNIFICANT CHANGE UP (ref 22–31)
CREAT SERPL-MCNC: 1.16 MG/DL — SIGNIFICANT CHANGE UP (ref 0.5–1.3)
CREAT SERPL-MCNC: 1.23 MG/DL — SIGNIFICANT CHANGE UP (ref 0.5–1.3)
EGFR: 40 ML/MIN/1.73M2 — LOW
EGFR: 43 ML/MIN/1.73M2 — LOW
GLUCOSE BLDC GLUCOMTR-MCNC: 103 MG/DL — HIGH (ref 70–99)
GLUCOSE BLDC GLUCOMTR-MCNC: 118 MG/DL — HIGH (ref 70–99)
GLUCOSE BLDC GLUCOMTR-MCNC: 120 MG/DL — HIGH (ref 70–99)
GLUCOSE BLDC GLUCOMTR-MCNC: 143 MG/DL — HIGH (ref 70–99)
GLUCOSE SERPL-MCNC: 107 MG/DL — HIGH (ref 70–99)
GLUCOSE SERPL-MCNC: 114 MG/DL — HIGH (ref 70–99)
HCT VFR BLD CALC: 30.3 % — LOW (ref 34.5–45)
HGB BLD-MCNC: 10 G/DL — LOW (ref 11.5–15.5)
MAGNESIUM SERPL-MCNC: 2.2 MG/DL — SIGNIFICANT CHANGE UP (ref 1.6–2.6)
MCHC RBC-ENTMCNC: 31.4 PG — SIGNIFICANT CHANGE UP (ref 27–34)
MCHC RBC-ENTMCNC: 33 GM/DL — SIGNIFICANT CHANGE UP (ref 32–36)
MCV RBC AUTO: 95.3 FL — SIGNIFICANT CHANGE UP (ref 80–100)
PHOSPHATE SERPL-MCNC: 5 MG/DL — HIGH (ref 2.5–4.5)
PHOSPHATE SERPL-MCNC: 6.2 MG/DL — HIGH (ref 2.5–4.5)
PLATELET # BLD AUTO: 228 K/UL — SIGNIFICANT CHANGE UP (ref 150–400)
POTASSIUM SERPL-MCNC: 3.5 MMOL/L — SIGNIFICANT CHANGE UP (ref 3.5–5.3)
POTASSIUM SERPL-MCNC: 3.8 MMOL/L — SIGNIFICANT CHANGE UP (ref 3.5–5.3)
POTASSIUM SERPL-SCNC: 3.5 MMOL/L — SIGNIFICANT CHANGE UP (ref 3.5–5.3)
POTASSIUM SERPL-SCNC: 3.8 MMOL/L — SIGNIFICANT CHANGE UP (ref 3.5–5.3)
RBC # BLD: 3.18 M/UL — LOW (ref 3.8–5.2)
RBC # FLD: 12.7 % — SIGNIFICANT CHANGE UP (ref 10.3–14.5)
SODIUM SERPL-SCNC: 139 MMOL/L — SIGNIFICANT CHANGE UP (ref 135–145)
SODIUM SERPL-SCNC: 140 MMOL/L — SIGNIFICANT CHANGE UP (ref 135–145)
WBC # BLD: 11.26 K/UL — HIGH (ref 3.8–10.5)
WBC # FLD AUTO: 11.26 K/UL — HIGH (ref 3.8–10.5)

## 2024-08-01 PROCEDURE — 99232 SBSQ HOSP IP/OBS MODERATE 35: CPT

## 2024-08-01 RX ORDER — LOSARTAN POTASSIUM 50 MG/1
25 TABLET, FILM COATED ORAL ONCE
Refills: 0 | Status: COMPLETED | OUTPATIENT
Start: 2024-08-01 | End: 2024-08-01

## 2024-08-01 RX ORDER — LOSARTAN POTASSIUM 50 MG/1
50 TABLET, FILM COATED ORAL DAILY
Refills: 0 | Status: DISCONTINUED | OUTPATIENT
Start: 2024-08-02 | End: 2024-08-04

## 2024-08-01 RX ORDER — TAMSULOSIN HCL 0.4 MG
0.4 CAPSULE ORAL AT BEDTIME
Refills: 0 | Status: DISCONTINUED | OUTPATIENT
Start: 2024-08-01 | End: 2024-08-04

## 2024-08-01 RX ADMIN — GUAIFENESIN 1200 MILLIGRAM(S): 100 SOLUTION ORAL at 09:03

## 2024-08-01 RX ADMIN — Medication 12.5 MILLIGRAM(S): at 21:48

## 2024-08-01 RX ADMIN — Medication 650 MILLIGRAM(S): at 17:32

## 2024-08-01 RX ADMIN — Medication 81 MILLIGRAM(S): at 09:03

## 2024-08-01 RX ADMIN — POTASSIUM PHOSPHATE, MONOBASIC AND POTASSIUM PHOSPHATE, DIBASIC 83.33 MILLIMOLE(S): 224; 236 INJECTION, SOLUTION INTRAVENOUS at 00:09

## 2024-08-01 RX ADMIN — LOSARTAN POTASSIUM 25 MILLIGRAM(S): 50 TABLET, FILM COATED ORAL at 09:04

## 2024-08-01 RX ADMIN — AMLODIPINE BESYLATE 5 MILLIGRAM(S): 2.5 TABLET ORAL at 09:04

## 2024-08-01 RX ADMIN — ATORVASTATIN CALCIUM 80 MILLIGRAM(S): 40 TABLET, FILM COATED ORAL at 21:48

## 2024-08-01 RX ADMIN — Medication 650 MILLIGRAM(S): at 10:22

## 2024-08-01 RX ADMIN — Medication 650 MILLIGRAM(S): at 11:41

## 2024-08-01 RX ADMIN — GUAIFENESIN 1200 MILLIGRAM(S): 100 SOLUTION ORAL at 21:48

## 2024-08-01 RX ADMIN — PANTOPRAZOLE SODIUM 40 MILLIGRAM(S): 20 TABLET, DELAYED RELEASE ORAL at 06:05

## 2024-08-01 RX ADMIN — ENOXAPARIN SODIUM 30 MILLIGRAM(S): 120 INJECTION SUBCUTANEOUS at 09:06

## 2024-08-01 RX ADMIN — Medication 0.4 MILLIGRAM(S): at 21:48

## 2024-08-01 RX ADMIN — LOSARTAN POTASSIUM 25 MILLIGRAM(S): 50 TABLET, FILM COATED ORAL at 15:26

## 2024-08-01 RX ADMIN — Medication 650 MILLIGRAM(S): at 15:26

## 2024-08-01 NOTE — PROGRESS NOTE ADULT - ASSESSMENT
89F with pmhx HTN (no longer on meds) came to the hospital today after she was found down by granddaughter this morning. Patient unable to provide history. History as per chart review. Patients last known well was on 7/26. She was reportedly noted to have dried coffee ground emesis on her face and hair and was lethargic.     #Acute Encephalopathy, unknown etiology  -EKG: reviewed   -EEG: negative for seizure   -Growth in aerobic bottle: Corynebacterium aurimucosum group  -Growth in anaerobic bottle: Gram positive cocci in pairs  -CTA head/neck: negative for acute ischemia   -MRI: negative for stroke   -TTE: EF: 57%, grade 1 diastolic dysfunction, severe aortic stenosis  -UA: reviewed   -TSH: noted   -CPK: noted   -A1c: 5.4  -LDL: 158  -UCx: NGTD  -s/p Vanc and Cefepime   -s/p Zosyn   -ASA/Atorvastatin   -aspiration precautions   -repeat blood culture   -neurochecks   -neurology following    #HTN urgency   -BP improving   -Norvasc   -Hydralazine PRN     #Elevated Troponin  -likely from demand ischemia   -TTE: EF: 57%, grade 1 diastolic dysfunction, severe aortic stenosis  -discussed with family: no further intervention      #Urinary Retention   -failed TOV   -delgado reinstated     #Fecal impaction  -bowel regimen   -monitor BMs     #Severe protein-calorie malnutrition.    #VTE prophylaxis  -Lovenox    #Advance care directives  -DNR/DNI with trial of NIV  -MOSLT in chart     Discussed with son and daughter at bedside    guarded prognosis

## 2024-08-01 NOTE — CONSULT NOTE ADULT - SUBJECTIVE AND OBJECTIVE BOX
HPI:  "89F with pmhx HTN (no longer on meds) came to the hospital today after she was found down by granddaughter this morning. Patient unable to provide history. History as per chart review. Patients last known well was on 7/26. She was reportedly noted to have dried coffee ground emesis on her face and hair and was lethargic.   In ED vitals stable, CBC noted for WBC 22.29, H/H 13/40, Plt 322. CMP noted for BUN/Cr 36/1.52, Glu 231. Trop 1692 > 1783. UA noted for proteinuria, glucosuria, ketones, hematuria, mod leuks, 13 wbcs, neg bacteria. COVID/flu neg, CT Chest/abd/pelvis neg for PE, or bowel obstruction. noted for bronchiectasis in the posterior right upper and right middle   lobes with distal airway impaction and clusters of small nodules. Stroke w/u with CTH, CTA head and neck neg for acute infarct. Septic w/u was sent, patient was loaded with aspirin, given broad spectrum abx with vanc/zosyn.    (28 Jul 2024 20:50)"      89 yo female with PMH as above admitted for AMS/ coffee ground emesis. Urology consulted for pt with urinary retention in the hospital with PVR >400 mL and had a delgado placement. Pt seen at bedside with family at bedside who report that pt was voiding freely at home and was not wearing diapers, and they state she was not incontinent. Pt is noted to be tired and sleeping. She is a poor historian and unable to obtain further information at this time.    PAST MEDICAL & SURGICAL HISTORY:  HTN (hypertension)          REVIEW OF SYSTEMS     Pt is a poor historian unable to assess    MEDICATIONS  (STANDING):  amLODIPine   Tablet 5 milliGRAM(s) Oral daily  aspirin enteric coated 81 milliGRAM(s) Oral daily  atorvastatin 80 milliGRAM(s) Oral at bedtime  dextrose 5%. 1000 milliLiter(s) (50 mL/Hr) IV Continuous <Continuous>  dextrose 5%. 1000 milliLiter(s) (100 mL/Hr) IV Continuous <Continuous>  dextrose 50% Injectable 25 Gram(s) IV Push once  dextrose 50% Injectable 25 Gram(s) IV Push once  dextrose 50% Injectable 12.5 Gram(s) IV Push once  enoxaparin Injectable 30 milliGRAM(s) SubCutaneous every 24 hours  glucagon  Injectable 1 milliGRAM(s) IntraMuscular once  guaiFENesin ER 1200 milliGRAM(s) Oral every 12 hours  insulin lispro (ADMELOG) corrective regimen sliding scale   SubCutaneous three times a day before meals  insulin lispro (ADMELOG) corrective regimen sliding scale.   SubCutaneous at bedtime  losartan 25 milliGRAM(s) Oral once  pantoprazole    Tablet 40 milliGRAM(s) Oral before breakfast  tamsulosin 0.4 milliGRAM(s) Oral at bedtime    MEDICATIONS  (PRN):  acetaminophen     Tablet .. 650 milliGRAM(s) Oral every 6 hours PRN Mild Pain (1 - 3)  dextrose Oral Gel 15 Gram(s) Oral once PRN Blood Glucose LESS THAN 70 milliGRAM(s)/deciliter  hydrALAZINE Injectable 10 milliGRAM(s) IV Push every 6 hours PRN for SBP>150  ondansetron Injectable 4 milliGRAM(s) IV Push every 8 hours PRN Nausea and/or Vomiting  polyethylene glycol 3350 17 Gram(s) Oral daily PRN Constipation  QUEtiapine 12.5 milliGRAM(s) Oral at bedtime PRN agitation      Allergies    No Known Allergies    Intolerances        SOCIAL HISTORY:    FAMILY HISTORY:      Vital Signs Last 24 Hrs  T(C): 37.9 (01 Aug 2024 12:48), Max: 37.9 (01 Aug 2024 12:48)  T(F): 100.3 (01 Aug 2024 12:48), Max: 100.3 (01 Aug 2024 12:48)  HR: 80 (01 Aug 2024 08:10) (80 - 91)  BP: 168/61 (01 Aug 2024 08:10) (162/72 - 168/61)  BP(mean): --  RR: 18 (01 Aug 2024 08:10) (18 - 18)  SpO2: 97% (01 Aug 2024 08:10) (95% - 97%)    Parameters below as of 01 Aug 2024 08:10  Patient On (Oxygen Delivery Method): room air        PHYSICAL EXAM:     General: No distress, No anxiety  VITALS  T(C): 37.9 (08-01-24 @ 12:48), Max: 37.9 (08-01-24 @ 12:48)  HR: 80 (08-01-24 @ 08:10) (80 - 91)  BP: 168/61 (08-01-24 @ 08:10) (162/72 - 168/61)  RR: 18 (08-01-24 @ 08:10) (18 - 18)  SpO2: 97% (08-01-24 @ 08:10) (95% - 97%)            Skin     : No jaundice   Lung    : No resp distress  Abdo:   : Soft, Non tender, No guarding, No distension   Back    : No CVAT b/l  Genitalia Female: Delgado with yellow urine    LABS:                        10.0   11.26 )-----------( 228      ( 01 Aug 2024 06:06 )             30.3     08-01    140  |  110<H>  |  20  ----------------------------<  107<H>  3.5   |  22  |  1.16    Ca    9.2      01 Aug 2024 08:56  Phos  5.0     08-01  Mg     2.2     08-01        Urinalysis Basic - ( 01 Aug 2024 08:56 )    Color: x / Appearance: x / SG: x / pH: x  Gluc: 107 mg/dL / Ketone: x  / Bili: x / Urobili: x   Blood: x / Protein: x / Nitrite: x   Leuk Esterase: x / RBC: x / WBC x   Sq Epi: x / Non Sq Epi: x / Bacteria: x        RADIOLOGY & ADDITIONAL STUDIES:< from: CT Abdomen and Pelvis w/ IV Cont (07.28.24 @ 10:55) >  ACC: [id number] EXAM:  CT ABDOMEN AND PELVIS IC   ORDERED BY: ADITHYA GODINEZ     ACC: [id number] EXAM:  CT ANGIO CHEST PULM [facility]   ORDERED BY: ADITHYA GODINEZ     PROCEDURE DATE:  07/28/2024          INTERPRETATION:  CLINICAL INDICATION: Altered mental status, vomiting,   possible hematemesis. Evaluate for pulmonary embolism    TECHNIQUE: Enhanced helical images were obtained of the chest, abdomen   and pelvis. Coronal and sagittal images were reconstructed.  Images were   obtained after the uneventful administration of 70 cc of nonionic   intravenous contrast (Omnipaque 350) and enteric contrast. 3D MIP images   were provided.    COMPARISON: None    FINDINGS:  CTA: No pulmonary embolism.. 3.7 cm mid ascending aorta. No aortic   dissection.The heart is mildly enlarged. No pericardial effusion. Aortic   valvular, coronary arterial and mitral annular calcification.    Lungs/Airways/Pleura: Mild dependent lingula and dependent lower lobe   atelectasis. No pleural effusion or pneumothorax.The central airways are   patent. Mild bronchiectasis in the posterior right upper and right middle   lobes with distal airway impaction and clusters of small nodules, related   to a small airways process.    Mediastinum/Lymph nodes: No thoracic adenopathy.    Hepatobiliary: Unremarkable liver. Normal caliber bile ducts.   Nondistended gallbladder.    Pancreas: Unremarkable.    Spleen: Unremarkable.    Adrenal glands: Unremarkable.    Kidneys: Left upper renal scarring. No hydronephrosis.    Bowel: No bowel obstruction. The appendix is not visualized; no   periappendiceal fat stranding. Large rectal stool ball.    Abdominal lymph nodes: No lymphadenopathy.    Abdominal vessels. Atherosclerotic changes.    Peritoneum: No ascites.    Pelvis: Limited evaluation due to metallic streak artifact from hip   arthroplasties. 1.5 cm left ovarian cyst. Calcified uterine fibroid.    Bones/soft tissues: Bilateral hip arthroplasties. Old bilateral anterior   rib and right inferior pubic ramus fractures. Diffuse bone   demineralization. Multilevel thoracolumbar compression deformities, most   severe at T9, T12 and L1.    IMPRESSION:  No pulmonary embolism. No aortic dissection.    No bowel obstruction. Large rectal stool ball.    Other incidental/chronic findings, as above.    --- End of Report ---            SAGAR ESTEVEZ M.D., Attending Radiologist  This document has been electronically signed. Jul 28 2024 12:30PM    < end of copied text >

## 2024-08-01 NOTE — CONSULT NOTE ADULT - ASSESSMENT
89 yo female with PMH as above admitted for AMS/ coffee ground emesis. Urology consulted for pt with urinary retention in the hospital with PVR >400 mL and had a delgado placement.  Pt was treated with ABX for possible UTI  Imaging reviewed with Dr. Garcia- pt with significant rectal gas which may be causing compression of the bladder contributing to retention  Recommend  - Bowel Regimen for constipation  - After bowel regimen perform trial of void in 2-3 days, check post void residual if significant replace delgado  - Follow up with Dr. Garcia outpatient for further management.    Case discussed with Dr. Garcia

## 2024-08-01 NOTE — PROGRESS NOTE ADULT - SUBJECTIVE AND OBJECTIVE BOX
No acute events overnight, no new complaints, not in mittens, seems calmer.  Repeat EEG: diffuse cerebral dysfunction, no epileptiform activity.    ROS: As stated above otherwise negative    MEDICATIONS  (STANDING):  amLODIPine   Tablet 5 milliGRAM(s) Oral daily  aspirin enteric coated 81 milliGRAM(s) Oral daily  atorvastatin 80 milliGRAM(s) Oral at bedtime  dextrose 5%. 1000 milliLiter(s) (50 mL/Hr) IV Continuous <Continuous>  dextrose 5%. 1000 milliLiter(s) (100 mL/Hr) IV Continuous <Continuous>  dextrose 50% Injectable 25 Gram(s) IV Push once  dextrose 50% Injectable 25 Gram(s) IV Push once  dextrose 50% Injectable 12.5 Gram(s) IV Push once  enoxaparin Injectable 30 milliGRAM(s) SubCutaneous every 24 hours  glucagon  Injectable 1 milliGRAM(s) IntraMuscular once  guaiFENesin ER 1200 milliGRAM(s) Oral every 12 hours  insulin lispro (ADMELOG) corrective regimen sliding scale   SubCutaneous three times a day before meals  insulin lispro (ADMELOG) corrective regimen sliding scale.   SubCutaneous at bedtime  losartan 25 milliGRAM(s) Oral daily  pantoprazole    Tablet 40 milliGRAM(s) Oral before breakfast  tamsulosin 0.4 milliGRAM(s) Oral at bedtime      Vital Signs Last 24 Hrs  T(C): 36.8 (01 Aug 2024 08:10), Max: 36.8 (01 Aug 2024 08:10)  T(F): 98.2 (01 Aug 2024 08:10), Max: 98.2 (01 Aug 2024 08:10)  HR: 80 (01 Aug 2024 08:10) (80 - 91)  BP: 168/61 (01 Aug 2024 08:10) (162/72 - 168/61)  BP(mean): --  RR: 18 (01 Aug 2024 08:10) (18 - 18)  SpO2: 97% (01 Aug 2024 08:10) (95% - 97%)    Parameters below as of 01 Aug 2024 08:10  Patient On (Oxygen Delivery Method): room air      GENERAL: NAD, sitting up in bed, cooperating, not in mittens today, [place]  HEAD:  Normocephalic  Neuro:  Awake, alert, follows simple commands with repeated instructions  Able to state her name and that she is in the hospital but could not recall the name of the hospital, knows her age  Speaks in full sentences, not always answering the question, having trouble reapeating, can name, speech fluent, some receptive aphasia on testing  CN: PERRL, EOMI, no nystagmus, no facial asymmetry noted  motor: moving all extremities but limited on R side 2/2 pain R elbow and RLE  sensory: intact to light touch  coordination: no involuntary movements noted  gait: not tested                          10.0   11.26 )-----------( 228      ( 01 Aug 2024 06:06 )             30.3     08-01    139  |  109<H>  |  20  ----------------------------<  114<H>  3.8   |  22  |  1.23    Ca    8.9      01 Aug 2024 06:06  Phos  6.2     08-01  Mg     2.2     08-01        07-29 Chol 244<H> LDL -- HDL 72 Trig 84    Radiology report:    HEAD CT:  1. No acute intracranial hemorrhage, mass effect, or shift of the midline   structures.  2. Mild chronic white matter microvascular type changes.    CTA NECK:  1. Atherosclerotic plaque affecting the bilateral carotid bifurcations,   and proximal internal carotid arteries with associated mild (less than   50%) stenosis of the bilateral internal carotid artery origins and   proximal segments by NASCET criteria.  2. Otherwise, no large vessel occlusion or major stenosis.    CTA HEAD:  1. No large vessel occlusion or major stenosis.      EEG 7/29/24: mild generalized slowing    TTE 7/29/24:   1. Left ventricular wall thickness is mildly increased. Left ventricular systolic function is normal with an ejection fraction of 57 % by Neal's method of disks.   2. There is mild (grade 1) left ventricular diastolic dysfunction.   3. Normal right ventricular cavity size and normal right ventricular systolic function.   4. The right atrium is mildly dilated.   5. Mild mitral regurgitation.   6. Structurally normal pulmonic valve with normal leaflet excursion.   7. Structurally normal tricuspid valve with normal leaflet excursion. Trace tricuspid regurgitation.   8. Ascending aorta diameter is dilated, measuring 4.05 cm (indexed 2.84 cm/m²).   9. Left atrium is normal in size.  10. Mild left ventricular hypertrophy.  11. Moderate aortic regurgitation.  12. No evidence of pulmonic regurgitation.  13. Technically difficult image quality.  14. There is moderate calcification of the mitral valve annulus.  15. Trileaflet aortic valve with reduced systolic excursion. There is severe calcification of the aortic valve leaflets. Severe aortic stenosis.    MRI brain 7/30/24:  No acute infarction  Mild to moderate inflammatory mucosal changes are seen throughout the various portions of the paranasal sinuses.   Left intraorbital extraconal lesion along the superior aspect of the left orbit measures 6.7 x 16 mm on image 12 of series 7 minutes image 7 series 12. Differential diagnosis includes orbital varix versus orbital cavernous venous malformation.    EEG 7/31/24: mild generalized slowing

## 2024-08-01 NOTE — PROGRESS NOTE ADULT - ASSESSMENT
89 F with pmhx HTN not on meds because she ran out per son, came to the hospital 7/28 after she was found down by niece in the morning.   Patients last known well was on 7/26 where she was oriented x 4, doing ADL's independently.  As per her son at bedside patient was found with "fecal material" on her face and hair and was lethargic.  She was unresponsive, not following commands, blinking her eyes rapidly and body was shaking.  In ED vitals stable, CBC noted for WBC 22.29,  CMP noted for BUN/Cr 36/1.52,  Trop 1783.  Lactate 4.5. UA was neg for bacteria,  Chest/abd/pelvis noted for bronchiectasis in the posterior right upper and right middle lobes with distal airway impaction and clusters of small nodules and large rectal stool ball. Stroke w/u with CTH, CTA head and neck neg for acute infarct.  Septic w/u was sent, patient was loaded with aspirin, given broad spectrum abx with vanc/zosyn. Neurology is consulted for AMS.  On PE she is not engaging, not following commands, +echolalia, global aphasia ?+LNLFD, R gaze deviation seems to be moving all extremities with possible LUE injury.  EEG is neg for epileptiform activity, findings are consistent with diffuse cerebral dysfunction/encephalopathy.      #AMS in the setting of possible sepsis/bronchiectasis  -Initially with R gaze deviation, and significant aphasia as well as possible tongue bite.  -Symptoms improving  -routine EEG x 2 showed mild generalized slowing, no epileptiform activity  -MRI brain negative for acute infarct    Likely gradually resolving encephalopathy in the setting of infection/sepsis  -Continue supportive care      #Found on Echo: severe AS and dilated ascending aorta 4.05 cm  Management as per medicine, cardiology    Will f/u as needed    D/W Dr. Douglas

## 2024-08-01 NOTE — PROGRESS NOTE ADULT - SUBJECTIVE AND OBJECTIVE BOX
Interval history:  Reports feeling generally better  8/1/24 - seen ini bed, sleepy, with Family at bedside, Tele: NSR 80-90, occ. PVCs       Home Medications:  amlodipine-benazepril 5 mg-20 mg oral capsule: 1 cap(s) orally once a day ***LAST FILLED 03/03/24 for a 90 day supply*** (28 Jul 2024 17:10)  aspirin:  (28 Jul 2024 17:12)  metoprolol succinate 50 mg oral tablet, extended release: 1 tab(s) orally once a day ***LAST FILLED 12/04/23 for a 90 day supply*** (28 Jul 2024 17:12)    MEDICATIONS  (STANDING):  amLODIPine   Tablet 5 milliGRAM(s) Oral daily  aspirin enteric coated 81 milliGRAM(s) Oral daily  atorvastatin 80 milliGRAM(s) Oral at bedtime  dextrose 5%. 1000 milliLiter(s) (50 mL/Hr) IV Continuous <Continuous>  dextrose 5%. 1000 milliLiter(s) (100 mL/Hr) IV Continuous <Continuous>  dextrose 50% Injectable 25 Gram(s) IV Push once  dextrose 50% Injectable 25 Gram(s) IV Push once  dextrose 50% Injectable 12.5 Gram(s) IV Push once  enoxaparin Injectable 30 milliGRAM(s) SubCutaneous every 24 hours  glucagon  Injectable 1 milliGRAM(s) IntraMuscular once  guaiFENesin ER 1200 milliGRAM(s) Oral every 12 hours  insulin lispro (ADMELOG) corrective regimen sliding scale   SubCutaneous three times a day before meals  insulin lispro (ADMELOG) corrective regimen sliding scale.   SubCutaneous at bedtime  losartan 25 milliGRAM(s) Oral daily  pantoprazole    Tablet 40 milliGRAM(s) Oral before breakfast  tamsulosin 0.4 milliGRAM(s) Oral at bedtime      PHYSICAL EXAM 6  Gen: no distress  CV: + NAJMA, distant heart sounds, no edema  Resp: Lungs CTA    07-31    138  |  109<H>  |  22  ----------------------------<  127<H>  3.4<L>   |  21<L>  |  1.24    Ca    9.2      31 Jul 2024 06:41  Phos  2.4     07-31  Mg     2.1     07-31    TPro  6.3  /  Alb  2.7<L>  /  TBili  1.4<H>  /  DBili  x   /  AST  34  /  ALT  17  /  AlkPhos  64  07-30    Magnesium: 2.1 mg/dL (07-31-24 @ 06:41)  Magnesium: 2.1 mg/dL (07-30-24 @ 06:48)                          10.3   12.91 )-----------( 226 ( 31 Jul 2024 06:41 )             31.2

## 2024-08-01 NOTE — PROGRESS NOTE ADULT - NS ATTEND AMEND GEN_ALL_CORE FT
Mental status improved this morning. However, she has what appears to be a fluent aphasia.  Awaiting MRI brain to help determine if she has had a stroke.  Echo findings noted.  Speech therapy appreciated.  If she again has fluctuating mental status (after benzos for MRI wear off) can consider repeat EEG vs empiric trial of anti seizure medicaitons.
She is more coherent today but she is still somewhat drowsy.   She did receive quetiapine last night.   Gradually resolving encephalopathy.  Will f/u if additional input is needed from neurology service.
trop elevation - await echo  HTN - hydralazine.
Patient with Advanced age , poor mental status confused , with Above hx , severe Aortic stenosis , not a candidate for any cardiac intervention ,   patient is DNR DNI ,    recommend palliative care ,  discussed with dr Vitaliy Ferreira     continue antihypertensive medication

## 2024-08-01 NOTE — PROGRESS NOTE ADULT - PROBLEM SELECTOR PLAN 2
pt. taking norvasc/benazpril and home will change to losartan - started on 25 mg po daily - will increase to 50 mg po daily starting tomorrow 8/2 and give an exrta dose losartan 25 mg po x 1 now, will continue home dose norvasc 5 mg po daily - can titrate up if needed Enbrel Counseling:  I discussed with the patient the risks of etanercept including but not limited to myelosuppression, immunosuppression, autoimmune hepatitis, demyelinating diseases, lymphoma, and infections.  The patient understands that monitoring is required including a PPD at baseline and must alert us or the primary physician if symptoms of infection or other concerning signs are noted.

## 2024-08-01 NOTE — PROGRESS NOTE ADULT - SUBJECTIVE AND OBJECTIVE BOX
HOSPITALIST ATTENDING PROGRESS NOTE     Chart and meds reviewed. Patient seen and examined     Interval Hx/Events: Pt seen and evaluated this AM. Currently, awake, alert. occasionally responding appropriately.  Family at bedside    7/30: Pt seen and evaluated. Overnight events noted. Remains confused. MR reviewed     7/31: Pt seen and evaluated. More alert and awake, however, still confused.   -d/c sandy     8/1: Pt seen and evaluated. More alert, awake and less confused. Sitting in chair. Failed TOV, delgado reinstated     All other systems and founds to be negative with exception of what has been described above.       PHYSICAL EXAM:  Vital Signs Last 24 Hrs  T(C): 37.9 (01 Aug 2024 12:48), Max: 37.9 (01 Aug 2024 12:48)  T(F): 100.3 (01 Aug 2024 12:48), Max: 100.3 (01 Aug 2024 12:48)  HR: 80 (01 Aug 2024 08:10) (80 - 91)  BP: 168/61 (01 Aug 2024 08:10) (162/72 - 168/61)  RR: 18 (01 Aug 2024 08:10) (18 - 18)  SpO2: 97% (01 Aug 2024 08:10) (95% - 97%)    Parameters below as of 01 Aug 2024 08:10  Patient On (Oxygen Delivery Method): room air        GEN: NAD,   HEENT: EOMI, +unequal pupils but reactive   NECK : Soft and supple, no JVD  LUNG: CTABL, No wheezing, rales or rhonchi  CVS: S1S2+, RRR, no M/G/R  GI: BS+, soft, NT/ND, no guarding, no rebound,   EXTREMITIES: No peripheral edema  VASCULAR: 2+ peripheral pulses  NEURO: AAOx1-2, limited neurological exam   SKIN: +ecchymotic     HOME MEDICATIONS:  Home Medications:  amlodipine-benazepril 5 mg-20 mg oral capsule: 1 cap(s) orally once a day ***LAST FILLED 03/03/24 for a 90 day supply*** (28 Jul 2024 17:10)  aspirin:  (28 Jul 2024 17:12)  metoprolol succinate 50 mg oral tablet, extended release: 1 tab(s) orally once a day ***LAST FILLED 12/04/23 for a 90 day supply*** (28 Jul 2024 17:12)      MEDICATIONS  MEDICATIONS  (STANDING):  amLODIPine   Tablet 5 milliGRAM(s) Oral daily  aspirin enteric coated 81 milliGRAM(s) Oral daily  atorvastatin 80 milliGRAM(s) Oral at bedtime  dextrose 5%. 1000 milliLiter(s) (50 mL/Hr) IV Continuous <Continuous>  dextrose 5%. 1000 milliLiter(s) (100 mL/Hr) IV Continuous <Continuous>  dextrose 50% Injectable 25 Gram(s) IV Push once  dextrose 50% Injectable 25 Gram(s) IV Push once  dextrose 50% Injectable 12.5 Gram(s) IV Push once  enoxaparin Injectable 30 milliGRAM(s) SubCutaneous every 24 hours  glucagon  Injectable 1 milliGRAM(s) IntraMuscular once  guaiFENesin ER 1200 milliGRAM(s) Oral every 12 hours  insulin lispro (ADMELOG) corrective regimen sliding scale   SubCutaneous three times a day before meals  insulin lispro (ADMELOG) corrective regimen sliding scale.   SubCutaneous at bedtime  pantoprazole    Tablet 40 milliGRAM(s) Oral before breakfast  tamsulosin 0.4 milliGRAM(s) Oral at bedtime    MEDICATIONS  (PRN):  acetaminophen     Tablet .. 650 milliGRAM(s) Oral every 6 hours PRN Mild Pain (1 - 3)  dextrose Oral Gel 15 Gram(s) Oral once PRN Blood Glucose LESS THAN 70 milliGRAM(s)/deciliter  hydrALAZINE Injectable 10 milliGRAM(s) IV Push every 6 hours PRN for SBP>150  ondansetron Injectable 4 milliGRAM(s) IV Push every 8 hours PRN Nausea and/or Vomiting  polyethylene glycol 3350 17 Gram(s) Oral daily PRN Constipation  QUEtiapine 12.5 milliGRAM(s) Oral at bedtime PRN agitation      LABS: All Labs Reviewed:                        10.0   11.26 )-----------( 228      ( 01 Aug 2024 06:06 )             30.3   08-01    140  |  110<H>  |  20  ----------------------------<  107<H>  3.5   |  22  |  1.16    Ca    9.2      01 Aug 2024 08:56  Phos  5.0     08-01  Mg     2.2     08-01            Culture - Urine (collected 28 Jul 2024 13:44)  Source: Catheterized None  Final Report (29 Jul 2024 14:57):    <10,000 CFU/mL Normal Urogenital Emilia    Urinalysis with Rflx Culture (collected 28 Jul 2024 13:44)    Culture - Blood (07.28.24 @ 11:24)    Specimen Source: .Blood None   Culture Results:   No growth at 48 Hours    Culture - Blood (07.28.24 @ 11:24)    Gram Stain:   Growth in anaerobic bottle: Gram positive cocci in pairs   Specimen Source: .Blood None   Culture Results:   Growth in anaerobic bottle: Gram positive cocci in pairs  Direct identification is available within approximately 3-5  hours either by Blood Panel Multiplexed PCR or Direct  MALDI-TOF. Details: [email or web address]    Culture - Blood (07.28.24 @ 11:24)    Gram Stain:   Growth in aerobic bottle: Gram Positive Rods   -  Blood PCR Panel: NEG   Specimen Source: .Blood None   Organism: Blood Culture PCR   Culture Results:   Growth in aerobic bottle: Corynebacterium aurimucosum group  "Susceptibilities not performed"  Direct identification is available within approximately 3-5  hours either by Blood Panel Multiplexed PCR or Direct  MALDI-TOF. Details: [email or web address]   Organism Identification: Blood Culture PCR   Method Type: PCR    I&O's Detail    31 Jul 2024 07:01  -  01 Aug 2024 07:00  --------------------------------------------------------  IN:  Total IN: 0 mL    OUT:    Indwelling Catheter - Urethral (mL): 900 mL  Total OUT: 900 mL    Total NET: -900 mL    CAPILLARY BLOOD GLUCOSE      POCT Blood Glucose.: 120 mg/dL (01 Aug 2024 17:13)  POCT Blood Glucose.: 118 mg/dL (01 Aug 2024 11:58)  POCT Blood Glucose.: 103 mg/dL (01 Aug 2024 08:30)  POCT Blood Glucose.: 143 mg/dL (31 Jul 2024 21:35)          CARDIOLOGY TESTING   CARDIAC MARKERS ( 28 Jul 2024 22:30 )  x     / x     / 570 U/L / x     / x        EKG : reviewed   ECHO:  < from: TTE W or WO Ultrasound Enhancing Agent (07.29.24 @ 13:38) >  CONCLUSIONS:      1. Left ventricular wall thickness is mildly increased. Left ventricular systolic function is normal with an ejection fraction of 57 % by Neal's method of disks.   2. There is mild (grade 1) left ventricular diastolic dysfunction.   3. Normal right ventricular cavity size and normal right ventricular systolic function.   4. The right atrium is mildly dilated.   5. Mild mitral regurgitation.   6. Structurally normal pulmonic valve with normal leaflet excursion.   7. Structurally normal tricuspid valve with normal leaflet excursion. Trace tricuspid regurgitation.   8. Ascending aorta diameter is dilated, measuring 4.05 cm (indexed 2.84 cm/m²).   9. Left atrium is normal in size.  10. Mild left ventricular hypertrophy.  11. Moderate aortic regurgitation.  12. No evidence of pulmonic regurgitation.  13. Technically difficult image quality.  14. There is moderate calcification of the mitral valve annulus.  15. Trileaflet aortic valve with reduced systolic excursion. There is severe calcification of the aortic valve leaflets. Severe aortic stenosis.    < end of copied text >        RADIOLOGY  < from: CT Angio Head w/ IV Cont (07.28.24 @ 10:54) >  HEAD CT:  1. No acute intracranial hemorrhage, mass effect, or shift of the midline   structures.  2. Mild chronic white matter microvascular type changes.    CTA NECK:  1. Atherosclerotic plaque affecting the bilateral carotid bifurcations,   and proximal internal carotid arteries with associated mild (less than   50%) stenosis of the bilateral internal carotid artery origins and   proximal segments by NASCET criteria.  2. Otherwise, no large vessel occlusion or major stenosis.    CTA HEAD:  1. No large vessel occlusion or major stenosis.    < from: CT Abdomen and Pelvis w/ IV Cont (07.28.24 @ 10:55) >  FINDINGS:  CTA: No pulmonary embolism.. 3.7 cm mid ascending aorta. No aortic   dissection.The heart is mildly enlarged. No pericardial effusion. Aortic   valvular, coronary arterial and mitral annular calcification.    Lungs/Airways/Pleura: Mild dependent lingula and dependent lower lobe   atelectasis. No pleural effusion or pneumothorax.The central airways are   patent. Mild bronchiectasis in the posterior right upper and right middle   lobes with distal airway impaction and clusters of small nodules, related   to a small airways process.    Mediastinum/Lymph nodes: No thoracic adenopathy.    Hepatobiliary: Unremarkable liver. Normal caliber bile ducts.   Nondistended gallbladder.    Pancreas: Unremarkable.    Spleen: Unremarkable.    Adrenal glands: Unremarkable.    Kidneys: Left upper renal scarring. No hydronephrosis.    Bowel: No bowel obstruction. The appendix is not visualized; no   periappendiceal fat stranding. Large rectal stool ball.    Abdominal lymph nodes: No lymphadenopathy.    Abdominal vessels. Atherosclerotic changes.    Peritoneum: No ascites.    Pelvis: Limited evaluation due to metallic streak artifact from hip   arthroplasties. 1.5 cm left ovarian cyst. Calcified uterine fibroid.    Bones/soft tissues: Bilateral hip arthroplasties. Old bilateral anterior   rib and right inferior pubic ramus fractures. Diffuse bone   demineralization. Multilevel thoracolumbar compression deformities, most   severe at T9, T12 and L1.    IMPRESSION:  No pulmonary embolism. No aortic dissection.    No bowel obstruction. Large rectal stool ball.    Other incidental/chronic findings, as above.    < from: MR Head No Cont (07.30.24 @ 11:41) >  FINDINGS:  There is no abnormal restricted diffusion to suggest acute infarction.   Scattered periventricular and subcortical white matter T2 /FLAIR   hyperintensities are seen without mass effect, nonspecific, likely   representing moderate to severe chronic microvascular changes. Normal T2   flow-voids are seen within  the intracranial vasculature. The lateral   ventricles and cortical sulci are age-appropriate in size and   configuration. There is no mass, mass effect, or extra-axial fluid   collection. There is no susceptibility artifact to suggest hemorrhage.   Midline structures are normal.  Mild to moderate inflammatory mucosal   changes are seen throughout the various portions of the paranasal   sinuses. Left intraorbital extraconal lesion along the superior aspect of   the left orbit measures 6.7 x 16 mm on image 12 of series 7 minutes image   27 series 12. Differential diagnosis includes orbital varix versus   orbital cavernous venous malformation. Consider dedicated MRI of the   orbits with contrast as clinically warranted. The orbits and mastoid air   cells are otherwise unremarkable.    IMPRESSION: No acute infarction.

## 2024-08-02 LAB
CULTURE RESULTS: ABNORMAL
GLUCOSE BLDC GLUCOMTR-MCNC: 116 MG/DL — HIGH (ref 70–99)
GLUCOSE BLDC GLUCOMTR-MCNC: 118 MG/DL — HIGH (ref 70–99)
GLUCOSE BLDC GLUCOMTR-MCNC: 123 MG/DL — HIGH (ref 70–99)
GLUCOSE BLDC GLUCOMTR-MCNC: 159 MG/DL — HIGH (ref 70–99)
ORGANISM # SPEC MICROSCOPIC CNT: ABNORMAL
ORGANISM # SPEC MICROSCOPIC CNT: SIGNIFICANT CHANGE UP
SPECIMEN SOURCE: SIGNIFICANT CHANGE UP

## 2024-08-02 PROCEDURE — 99232 SBSQ HOSP IP/OBS MODERATE 35: CPT

## 2024-08-02 RX ADMIN — Medication 0.4 MILLIGRAM(S): at 21:46

## 2024-08-02 RX ADMIN — Medication 650 MILLIGRAM(S): at 04:10

## 2024-08-02 RX ADMIN — AMLODIPINE BESYLATE 5 MILLIGRAM(S): 2.5 TABLET ORAL at 10:36

## 2024-08-02 RX ADMIN — LOSARTAN POTASSIUM 50 MILLIGRAM(S): 50 TABLET, FILM COATED ORAL at 10:36

## 2024-08-02 RX ADMIN — Medication 650 MILLIGRAM(S): at 15:20

## 2024-08-02 RX ADMIN — Medication 650 MILLIGRAM(S): at 03:40

## 2024-08-02 RX ADMIN — GUAIFENESIN 1200 MILLIGRAM(S): 100 SOLUTION ORAL at 21:46

## 2024-08-02 RX ADMIN — Medication 12.5 MILLIGRAM(S): at 21:46

## 2024-08-02 RX ADMIN — ATORVASTATIN CALCIUM 80 MILLIGRAM(S): 40 TABLET, FILM COATED ORAL at 21:46

## 2024-08-02 RX ADMIN — Medication 81 MILLIGRAM(S): at 10:36

## 2024-08-02 RX ADMIN — Medication 1: at 12:08

## 2024-08-02 RX ADMIN — ENOXAPARIN SODIUM 30 MILLIGRAM(S): 120 INJECTION SUBCUTANEOUS at 10:35

## 2024-08-02 RX ADMIN — PANTOPRAZOLE SODIUM 40 MILLIGRAM(S): 20 TABLET, DELAYED RELEASE ORAL at 05:32

## 2024-08-02 NOTE — PROGRESS NOTE ADULT - SUBJECTIVE AND OBJECTIVE BOX
HOSPITALIST ATTENDING PROGRESS NOTE     Chart and meds reviewed. Patient seen and examined     Interval Hx/Events: Pt seen and evaluated this AM. Currently, awake, alert. occasionally responding appropriately.  Family at bedside    7/30: Pt seen and evaluated. Overnight events noted. Remains confused. MR reviewed     7/31: Pt seen and evaluated. More alert and awake, however, still confused.   -d/c delgado     8/1: Pt seen and evaluated. More alert, awake and less confused. Sitting in chair. Failed TOV, delgado reinstated     8/2: Pt seen and evaluated. Almost at baseline but still consfused. Sitting in chair     All other systems and founds to be negative with exception of what has been described above.       PHYSICAL EXAM:  Vital Signs Last 24 Hrs  T(C): 36.5 (02 Aug 2024 08:00), Max: 37.2 (01 Aug 2024 20:41)  T(F): 97.7 (02 Aug 2024 08:00), Max: 99 (01 Aug 2024 20:41)  HR: 79 (02 Aug 2024 08:00) (79 - 80)  BP: 105/47 (02 Aug 2024 08:00) (105/47 - 120/60)  RR: 18 (02 Aug 2024 08:00) (18 - 18)  SpO2: 97% (02 Aug 2024 08:00) (96% - 97%)    Parameters below as of 02 Aug 2024 08:00  Patient On (Oxygen Delivery Method): room air            GEN: NAD,   HEENT: EOMI, +unequal pupils but reactive   NECK : Soft and supple, no JVD  LUNG: CTABL, No wheezing, rales or rhonchi  CVS: S1S2+, RRR, no M/G/R  GI: BS+, soft, NT/ND, no guarding, no rebound, +delgado in place   EXTREMITIES: No peripheral edema  VASCULAR: 2+ peripheral pulses  NEURO: AAOx3, grossly non-focal   SKIN: +ecchymotic     HOME MEDICATIONS:  Home Medications:  amlodipine-benazepril 5 mg-20 mg oral capsule: 1 cap(s) orally once a day ***LAST FILLED 03/03/24 for a 90 day supply*** (28 Jul 2024 17:10)  aspirin:  (28 Jul 2024 17:12)  metoprolol succinate 50 mg oral tablet, extended release: 1 tab(s) orally once a day ***LAST FILLED 12/04/23 for a 90 day supply*** (28 Jul 2024 17:12)      MEDICATIONS  MEDICATIONS  (STANDING):  amLODIPine   Tablet 5 milliGRAM(s) Oral daily  aspirin enteric coated 81 milliGRAM(s) Oral daily  atorvastatin 80 milliGRAM(s) Oral at bedtime  dextrose 5%. 1000 milliLiter(s) (50 mL/Hr) IV Continuous <Continuous>  dextrose 5%. 1000 milliLiter(s) (100 mL/Hr) IV Continuous <Continuous>  dextrose 50% Injectable 25 Gram(s) IV Push once  dextrose 50% Injectable 25 Gram(s) IV Push once  dextrose 50% Injectable 12.5 Gram(s) IV Push once  enoxaparin Injectable 30 milliGRAM(s) SubCutaneous every 24 hours  glucagon  Injectable 1 milliGRAM(s) IntraMuscular once  guaiFENesin ER 1200 milliGRAM(s) Oral every 12 hours  insulin lispro (ADMELOG) corrective regimen sliding scale   SubCutaneous three times a day before meals  insulin lispro (ADMELOG) corrective regimen sliding scale.   SubCutaneous at bedtime  losartan 50 milliGRAM(s) Oral daily  pantoprazole    Tablet 40 milliGRAM(s) Oral before breakfast  tamsulosin 0.4 milliGRAM(s) Oral at bedtime    MEDICATIONS  (PRN):  acetaminophen     Tablet .. 650 milliGRAM(s) Oral every 6 hours PRN Mild Pain (1 - 3)  dextrose Oral Gel 15 Gram(s) Oral once PRN Blood Glucose LESS THAN 70 milliGRAM(s)/deciliter  hydrALAZINE Injectable 10 milliGRAM(s) IV Push every 6 hours PRN for SBP>150  ondansetron Injectable 4 milliGRAM(s) IV Push every 8 hours PRN Nausea and/or Vomiting  polyethylene glycol 3350 17 Gram(s) Oral daily PRN Constipation  QUEtiapine 12.5 milliGRAM(s) Oral at bedtime PRN agitation      LABS: All Labs Reviewed:                        10.0   11.26 )-----------( 228      ( 01 Aug 2024 06:06 )             30.3   08-01    140  |  110<H>  |  20  ----------------------------<  107<H>  3.5   |  22  |  1.16    Ca    9.2      01 Aug 2024 08:56  Phos  5.0     08-01  Mg     2.2     08-01                       Culture - Urine (collected 28 Jul 2024 13:44)  Source: Catheterized None  Final Report (29 Jul 2024 14:57):    <10,000 CFU/mL Normal Urogenital Emilia    Urinalysis with Rflx Culture (collected 28 Jul 2024 13:44)    Culture - Blood (07.28.24 @ 11:24)    Specimen Source: .Blood None   Culture Results:   No growth at 48 Hours    Culture - Blood (07.28.24 @ 11:24)    Gram Stain:   Growth in anaerobic bottle: Gram positive cocci in pairs   Specimen Source: .Blood None   Culture Results:   Growth in anaerobic bottle: Gram positive cocci in pairs  Direct identification is available within approximately 3-5  hours either by Blood Panel Multiplexed PCR or Direct  MALDI-TOF. Details: [email or web address]    Culture - Blood (07.28.24 @ 11:24)    Gram Stain:   Growth in aerobic bottle: Gram Positive Rods   -  Blood PCR Panel: NEG   Specimen Source: .Blood None   Organism: Blood Culture PCR   Culture Results:   Growth in aerobic bottle: Corynebacterium aurimucosum group  "Susceptibilities not performed"  Direct identification is available within approximately 3-5  hours either by Blood Panel Multiplexed PCR or Direct  MALDI-TOF. Details: [email or web address]   Organism Identification: Blood Culture PCR   Method Type: PCR    I&O's Detail    31 Jul 2024 07:01  -  01 Aug 2024 07:00  --------------------------------------------------------  IN:  Total IN: 0 mL    OUT:    Indwelling Catheter - Urethral (mL): 900 mL  Total OUT: 900 mL    Total NET: -900 mL    CAPILLARY BLOOD GLUCOSE      POCT Blood Glucose.: 120 mg/dL (01 Aug 2024 17:13)  POCT Blood Glucose.: 118 mg/dL (01 Aug 2024 11:58)  POCT Blood Glucose.: 103 mg/dL (01 Aug 2024 08:30)  POCT Blood Glucose.: 143 mg/dL (31 Jul 2024 21:35)          CARDIOLOGY TESTING   CARDIAC MARKERS ( 28 Jul 2024 22:30 )  x     / x     / 570 U/L / x     / x        EKG : reviewed   ECHO:  < from: TTE W or WO Ultrasound Enhancing Agent (07.29.24 @ 13:38) >  CONCLUSIONS:      1. Left ventricular wall thickness is mildly increased. Left ventricular systolic function is normal with an ejection fraction of 57 % by Neal's method of disks.   2. There is mild (grade 1) left ventricular diastolic dysfunction.   3. Normal right ventricular cavity size and normal right ventricular systolic function.   4. The right atrium is mildly dilated.   5. Mild mitral regurgitation.   6. Structurally normal pulmonic valve with normal leaflet excursion.   7. Structurally normal tricuspid valve with normal leaflet excursion. Trace tricuspid regurgitation.   8. Ascending aorta diameter is dilated, measuring 4.05 cm (indexed 2.84 cm/m²).   9. Left atrium is normal in size.  10. Mild left ventricular hypertrophy.  11. Moderate aortic regurgitation.  12. No evidence of pulmonic regurgitation.  13. Technically difficult image quality.  14. There is moderate calcification of the mitral valve annulus.  15. Trileaflet aortic valve with reduced systolic excursion. There is severe calcification of the aortic valve leaflets. Severe aortic stenosis.    < end of copied text >        RADIOLOGY  < from: CT Angio Head w/ IV Cont (07.28.24 @ 10:54) >  HEAD CT:  1. No acute intracranial hemorrhage, mass effect, or shift of the midline   structures.  2. Mild chronic white matter microvascular type changes.    CTA NECK:  1. Atherosclerotic plaque affecting the bilateral carotid bifurcations,   and proximal internal carotid arteries with associated mild (less than   50%) stenosis of the bilateral internal carotid artery origins and   proximal segments by NASCET criteria.  2. Otherwise, no large vessel occlusion or major stenosis.    CTA HEAD:  1. No large vessel occlusion or major stenosis.    < from: CT Abdomen and Pelvis w/ IV Cont (07.28.24 @ 10:55) >  FINDINGS:  CTA: No pulmonary embolism.. 3.7 cm mid ascending aorta. No aortic   dissection.The heart is mildly enlarged. No pericardial effusion. Aortic   valvular, coronary arterial and mitral annular calcification.    Lungs/Airways/Pleura: Mild dependent lingula and dependent lower lobe   atelectasis. No pleural effusion or pneumothorax.The central airways are   patent. Mild bronchiectasis in the posterior right upper and right middle   lobes with distal airway impaction and clusters of small nodules, related   to a small airways process.    Mediastinum/Lymph nodes: No thoracic adenopathy.    Hepatobiliary: Unremarkable liver. Normal caliber bile ducts.   Nondistended gallbladder.    Pancreas: Unremarkable.    Spleen: Unremarkable.    Adrenal glands: Unremarkable.    Kidneys: Left upper renal scarring. No hydronephrosis.    Bowel: No bowel obstruction. The appendix is not visualized; no   periappendiceal fat stranding. Large rectal stool ball.    Abdominal lymph nodes: No lymphadenopathy.    Abdominal vessels. Atherosclerotic changes.    Peritoneum: No ascites.    Pelvis: Limited evaluation due to metallic streak artifact from hip   arthroplasties. 1.5 cm left ovarian cyst. Calcified uterine fibroid.    Bones/soft tissues: Bilateral hip arthroplasties. Old bilateral anterior   rib and right inferior pubic ramus fractures. Diffuse bone   demineralization. Multilevel thoracolumbar compression deformities, most   severe at T9, T12 and L1.    IMPRESSION:  No pulmonary embolism. No aortic dissection.    No bowel obstruction. Large rectal stool ball.    Other incidental/chronic findings, as above.    < from: MR Head No Cont (07.30.24 @ 11:41) >  FINDINGS:  There is no abnormal restricted diffusion to suggest acute infarction.   Scattered periventricular and subcortical white matter T2 /FLAIR   hyperintensities are seen without mass effect, nonspecific, likely   representing moderate to severe chronic microvascular changes. Normal T2   flow-voids are seen within  the intracranial vasculature. The lateral   ventricles and cortical sulci are age-appropriate in size and   configuration. There is no mass, mass effect, or extra-axial fluid   collection. There is no susceptibility artifact to suggest hemorrhage.   Midline structures are normal.  Mild to moderate inflammatory mucosal   changes are seen throughout the various portions of the paranasal   sinuses. Left intraorbital extraconal lesion along the superior aspect of   the left orbit measures 6.7 x 16 mm on image 12 of series 7 minutes image   27 series 12. Differential diagnosis includes orbital varix versus   orbital cavernous venous malformation. Consider dedicated MRI of the   orbits with contrast as clinically warranted. The orbits and mastoid air   cells are otherwise unremarkable.    IMPRESSION: No acute infarction.

## 2024-08-02 NOTE — PROGRESS NOTE ADULT - ASSESSMENT
89F with pmhx HTN (no longer on meds) came to the hospital today after she was found down by granddaughter this morning. Patient unable to provide history. History as per chart review. Patients last known well was on 7/26. She was reportedly noted to have dried coffee ground emesis on her face and hair and was lethargic.     #Acute Encephalopathy, likely metabolic in nature.   -EKG: reviewed   -EEG: negative for seizure   -Growth in aerobic bottle: Corynebacterium aurimucosum group  -Growth in anaerobic bottle: Gram positive cocci in pairs  -CTA head/neck: negative for acute ischemia   -MRI: negative for stroke   -TTE: EF: 57%, grade 1 diastolic dysfunction, severe aortic stenosis  -UA: reviewed   -TSH: noted   -CPK: noted   -A1c: 5.4  -LDL: 158  -UCx: NGTD  -s/p Vanc and Cefepime   -s/p Zosyn   -ASA/Atorvastatin   -aspiration precautions   -repeat blood culture: NGTD   -neurochecks   -neurology following    #HTN urgency   -BP improving   -Norvasc   -Hydralazine PRN     #Elevated Troponin  -likely from demand ischemia   -TTE: EF: 57%, grade 1 diastolic dysfunction, severe aortic stenosis  -discussed with family: no further intervention      #Urinary Retention   -failed TOV   -delgado reinstated     #Fecal impaction  -bowel regimen   -monitor BMs     #Severe protein-calorie malnutrition.    #VTE prophylaxis  -Lovenox    #Advance care directives  -DNR/DNI with trial of NIV  -MOSLT in chart     Discussed with son and daughter at bedside    discharge plan to rehab

## 2024-08-03 DIAGNOSIS — R55 SYNCOPE AND COLLAPSE: ICD-10-CM

## 2024-08-03 LAB
ANION GAP SERPL CALC-SCNC: 9 MMOL/L — SIGNIFICANT CHANGE UP (ref 5–17)
BUN SERPL-MCNC: 32 MG/DL — HIGH (ref 7–23)
CALCIUM SERPL-MCNC: 9.1 MG/DL — SIGNIFICANT CHANGE UP (ref 8.5–10.1)
CHLORIDE SERPL-SCNC: 109 MMOL/L — HIGH (ref 96–108)
CO2 SERPL-SCNC: 20 MMOL/L — LOW (ref 22–31)
CREAT SERPL-MCNC: 1.44 MG/DL — HIGH (ref 0.5–1.3)
EGFR: 33 ML/MIN/1.73M2 — LOW
GLUCOSE BLDC GLUCOMTR-MCNC: 100 MG/DL — HIGH (ref 70–99)
GLUCOSE BLDC GLUCOMTR-MCNC: 117 MG/DL — HIGH (ref 70–99)
GLUCOSE BLDC GLUCOMTR-MCNC: 133 MG/DL — HIGH (ref 70–99)
GLUCOSE BLDC GLUCOMTR-MCNC: 135 MG/DL — HIGH (ref 70–99)
GLUCOSE SERPL-MCNC: 135 MG/DL — HIGH (ref 70–99)
HCT VFR BLD CALC: 29.5 % — LOW (ref 34.5–45)
HGB BLD-MCNC: 9.4 G/DL — LOW (ref 11.5–15.5)
MCHC RBC-ENTMCNC: 31 PG — SIGNIFICANT CHANGE UP (ref 27–34)
MCHC RBC-ENTMCNC: 31.9 GM/DL — LOW (ref 32–36)
MCV RBC AUTO: 97.4 FL — SIGNIFICANT CHANGE UP (ref 80–100)
PLATELET # BLD AUTO: 305 K/UL — SIGNIFICANT CHANGE UP (ref 150–400)
POTASSIUM SERPL-MCNC: 3.4 MMOL/L — LOW (ref 3.5–5.3)
POTASSIUM SERPL-SCNC: 3.4 MMOL/L — LOW (ref 3.5–5.3)
RBC # BLD: 3.03 M/UL — LOW (ref 3.8–5.2)
RBC # FLD: 12.7 % — SIGNIFICANT CHANGE UP (ref 10.3–14.5)
SODIUM SERPL-SCNC: 138 MMOL/L — SIGNIFICANT CHANGE UP (ref 135–145)
WBC # BLD: 9.57 K/UL — SIGNIFICANT CHANGE UP (ref 3.8–10.5)
WBC # FLD AUTO: 9.57 K/UL — SIGNIFICANT CHANGE UP (ref 3.8–10.5)

## 2024-08-03 PROCEDURE — 99232 SBSQ HOSP IP/OBS MODERATE 35: CPT

## 2024-08-03 PROCEDURE — 99233 SBSQ HOSP IP/OBS HIGH 50: CPT

## 2024-08-03 RX ORDER — BACTERIOSTATIC SODIUM CHLORIDE 0.9 %
250 VIAL (ML) INJECTION ONCE
Refills: 0 | Status: COMPLETED | OUTPATIENT
Start: 2024-08-03 | End: 2024-08-03

## 2024-08-03 RX ORDER — AMLODIPINE BESYLATE 2.5 MG/1
2.5 TABLET ORAL DAILY
Refills: 0 | Status: DISCONTINUED | OUTPATIENT
Start: 2024-08-03 | End: 2024-08-04

## 2024-08-03 RX ORDER — POTASSIUM CHLORIDE 1500 MG/1
40 TABLET, EXTENDED RELEASE ORAL ONCE
Refills: 0 | Status: COMPLETED | OUTPATIENT
Start: 2024-08-03 | End: 2024-08-03

## 2024-08-03 RX ADMIN — GUAIFENESIN 1200 MILLIGRAM(S): 100 SOLUTION ORAL at 20:58

## 2024-08-03 RX ADMIN — PANTOPRAZOLE SODIUM 40 MILLIGRAM(S): 20 TABLET, DELAYED RELEASE ORAL at 05:55

## 2024-08-03 RX ADMIN — ATORVASTATIN CALCIUM 80 MILLIGRAM(S): 40 TABLET, FILM COATED ORAL at 20:58

## 2024-08-03 RX ADMIN — LOSARTAN POTASSIUM 50 MILLIGRAM(S): 50 TABLET, FILM COATED ORAL at 09:29

## 2024-08-03 RX ADMIN — Medication 12.5 MILLIGRAM(S): at 20:59

## 2024-08-03 RX ADMIN — Medication 250 MILLILITER(S): at 14:41

## 2024-08-03 RX ADMIN — AMLODIPINE BESYLATE 2.5 MILLIGRAM(S): 2.5 TABLET ORAL at 20:58

## 2024-08-03 RX ADMIN — Medication 0.4 MILLIGRAM(S): at 20:58

## 2024-08-03 RX ADMIN — Medication 81 MILLIGRAM(S): at 09:29

## 2024-08-03 RX ADMIN — AMLODIPINE BESYLATE 5 MILLIGRAM(S): 2.5 TABLET ORAL at 09:29

## 2024-08-03 RX ADMIN — POTASSIUM CHLORIDE 40 MILLIEQUIVALENT(S): 1500 TABLET, EXTENDED RELEASE ORAL at 14:41

## 2024-08-03 RX ADMIN — ENOXAPARIN SODIUM 30 MILLIGRAM(S): 120 INJECTION SUBCUTANEOUS at 09:29

## 2024-08-03 NOTE — PROGRESS NOTE ADULT - SUBJECTIVE AND OBJECTIVE BOX
HOSPITALIST ATTENDING PROGRESS NOTE     Chart and meds reviewed. Patient seen and examined     Interval Hx/Events: Pt seen and evaluated this AM. Currently, awake, alert. occasionally responding appropriately.  Family at bedside    7/30: Pt seen and evaluated. Overnight events noted. Remains confused. MR reviewed     7/31: Pt seen and evaluated. More alert and awake, however, still confused.   -d/c delgado     8/1: Pt seen and evaluated. More alert, awake and less confused. Sitting in chair. Failed TOV, delgado reinstated     8/2: Pt seen and evaluated. Almost at baseline but still consfused. Sitting in chair     8/3: Seen and evaluated. Had RRT this morning for syncope episode while defecating. At baseline, within 1-2 mins. No falls. transferred to bed. Currently, at baseline  -tele reviewed: NSR      All other systems and founds to be negative with exception of what has been described above.       PHYSICAL EXAM:  Vital Signs Last 24 Hrs  T(C): 36.4 (03 Aug 2024 07:44), Max: 36.4 (02 Aug 2024 20:37)  T(F): 97.6 (03 Aug 2024 07:44), Max: 97.6 (03 Aug 2024 07:44)  HR: 93 (03 Aug 2024 07:44) (92 - 93)  BP: 120/56 (03 Aug 2024 07:44) (120/56 - 135/66)  RR: 19 (03 Aug 2024 07:44) (18 - 19)  SpO2: 94% (03 Aug 2024 07:44) (94% - 95%)    Parameters below as of 03 Aug 2024 07:44  Patient On (Oxygen Delivery Method): room air        GEN: NAD,   HEENT: EOMI, +unequal pupils but reactive   NECK : Soft and supple, no JVD  LUNG: CTABL, No wheezing, rales or rhonchi  CVS: S1S2+, RRR, +murmur   GI: BS+, soft, NT/ND, no guarding, no rebound, +delgado in place   EXTREMITIES: No peripheral edema  VASCULAR: 2+ peripheral pulses  NEURO: AAOx3, grossly non-focal   SKIN: +ecchymotic     HOME MEDICATIONS:  Home Medications:  amlodipine-benazepril 5 mg-20 mg oral capsule: 1 cap(s) orally once a day ***LAST FILLED 03/03/24 for a 90 day supply*** (28 Jul 2024 17:10)  aspirin:  (28 Jul 2024 17:12)  metoprolol succinate 50 mg oral tablet, extended release: 1 tab(s) orally once a day ***LAST FILLED 12/04/23 for a 90 day supply*** (28 Jul 2024 17:12)      MEDICATIONS  MEDICATIONS  (STANDING):  amLODIPine   Tablet 2.5 milliGRAM(s) Oral daily  aspirin enteric coated 81 milliGRAM(s) Oral daily  atorvastatin 80 milliGRAM(s) Oral at bedtime  dextrose 5%. 1000 milliLiter(s) (50 mL/Hr) IV Continuous <Continuous>  dextrose 5%. 1000 milliLiter(s) (100 mL/Hr) IV Continuous <Continuous>  dextrose 50% Injectable 25 Gram(s) IV Push once  dextrose 50% Injectable 25 Gram(s) IV Push once  dextrose 50% Injectable 12.5 Gram(s) IV Push once  enoxaparin Injectable 30 milliGRAM(s) SubCutaneous every 24 hours  glucagon  Injectable 1 milliGRAM(s) IntraMuscular once  guaiFENesin ER 1200 milliGRAM(s) Oral every 12 hours  insulin lispro (ADMELOG) corrective regimen sliding scale   SubCutaneous three times a day before meals  insulin lispro (ADMELOG) corrective regimen sliding scale.   SubCutaneous at bedtime  losartan 50 milliGRAM(s) Oral daily  pantoprazole    Tablet 40 milliGRAM(s) Oral before breakfast  tamsulosin 0.4 milliGRAM(s) Oral at bedtime    MEDICATIONS  (PRN):  acetaminophen     Tablet .. 650 milliGRAM(s) Oral every 6 hours PRN Mild Pain (1 - 3)  dextrose Oral Gel 15 Gram(s) Oral once PRN Blood Glucose LESS THAN 70 milliGRAM(s)/deciliter  hydrALAZINE Injectable 10 milliGRAM(s) IV Push every 6 hours PRN for SBP>150  ondansetron Injectable 4 milliGRAM(s) IV Push every 8 hours PRN Nausea and/or Vomiting  polyethylene glycol 3350 17 Gram(s) Oral daily PRN Constipation  QUEtiapine 12.5 milliGRAM(s) Oral at bedtime PRN agitation    LABS: All Labs Reviewed:                                   9.4    9.57  )-----------( 305      ( 03 Aug 2024 11:27 )             29.5   08-03    138  |  109<H>  |  32<H>  ----------------------------<  135<H>  3.4<L>   |  20<L>  |  1.44<H>    Ca    9.1      03 Aug 2024 11:27                         Culture - Urine (collected 28 Jul 2024 13:44)  Source: Catheterized None  Final Report (29 Jul 2024 14:57):    <10,000 CFU/mL Normal Urogenital Emilia    Urinalysis with Rflx Culture (collected 28 Jul 2024 13:44)    Culture - Blood (07.28.24 @ 11:24)    Specimen Source: .Blood None   Culture Results:   No growth at 48 Hours    Culture - Blood (07.28.24 @ 11:24)    Gram Stain:   Growth in anaerobic bottle: Gram positive cocci in pairs   Specimen Source: .Blood None   Culture Results:   Growth in anaerobic bottle: Gram positive cocci in pairs  Direct identification is available within approximately 3-5  hours either by Blood Panel Multiplexed PCR or Direct  MALDI-TOF. Details: [email or web address]    Culture - Blood (07.28.24 @ 11:24)    Gram Stain:   Growth in aerobic bottle: Gram Positive Rods   -  Blood PCR Panel: NEG   Specimen Source: .Blood None   Organism: Blood Culture PCR   Culture Results:   Growth in aerobic bottle: Corynebacterium aurimucosum group  "Susceptibilities not performed"  Direct identification is available within approximately 3-5  hours either by Blood Panel Multiplexed PCR or Direct  MALDI-TOF. Details: [email or web address]   Organism Identification: Blood Culture PCR   Method Type: PCR    I&O's Detail    31 Jul 2024 07:01  -  01 Aug 2024 07:00  --------------------------------------------------------  IN:  Total IN: 0 mL    OUT:    Indwelling Catheter - Urethral (mL): 900 mL  Total OUT: 900 mL    Total NET: -900 mL    CAPILLARY BLOOD GLUCOSE      POCT Blood Glucose.: 120 mg/dL (01 Aug 2024 17:13)  POCT Blood Glucose.: 118 mg/dL (01 Aug 2024 11:58)  POCT Blood Glucose.: 103 mg/dL (01 Aug 2024 08:30)  POCT Blood Glucose.: 143 mg/dL (31 Jul 2024 21:35)          CARDIOLOGY TESTING   CARDIAC MARKERS ( 28 Jul 2024 22:30 )  x     / x     / 570 U/L / x     / x        EKG : reviewed   ECHO:  < from: TTE W or WO Ultrasound Enhancing Agent (07.29.24 @ 13:38) >  CONCLUSIONS:      1. Left ventricular wall thickness is mildly increased. Left ventricular systolic function is normal with an ejection fraction of 57 % by Neal's method of disks.   2. There is mild (grade 1) left ventricular diastolic dysfunction.   3. Normal right ventricular cavity size and normal right ventricular systolic function.   4. The right atrium is mildly dilated.   5. Mild mitral regurgitation.   6. Structurally normal pulmonic valve with normal leaflet excursion.   7. Structurally normal tricuspid valve with normal leaflet excursion. Trace tricuspid regurgitation.   8. Ascending aorta diameter is dilated, measuring 4.05 cm (indexed 2.84 cm/m²).   9. Left atrium is normal in size.  10. Mild left ventricular hypertrophy.  11. Moderate aortic regurgitation.  12. No evidence of pulmonic regurgitation.  13. Technically difficult image quality.  14. There is moderate calcification of the mitral valve annulus.  15. Trileaflet aortic valve with reduced systolic excursion. There is severe calcification of the aortic valve leaflets. Severe aortic stenosis.    < end of copied text >        RADIOLOGY  < from: CT Angio Head w/ IV Cont (07.28.24 @ 10:54) >  HEAD CT:  1. No acute intracranial hemorrhage, mass effect, or shift of the midline   structures.  2. Mild chronic white matter microvascular type changes.    CTA NECK:  1. Atherosclerotic plaque affecting the bilateral carotid bifurcations,   and proximal internal carotid arteries with associated mild (less than   50%) stenosis of the bilateral internal carotid artery origins and   proximal segments by NASCET criteria.  2. Otherwise, no large vessel occlusion or major stenosis.    CTA HEAD:  1. No large vessel occlusion or major stenosis.    < from: CT Abdomen and Pelvis w/ IV Cont (07.28.24 @ 10:55) >  FINDINGS:  CTA: No pulmonary embolism.. 3.7 cm mid ascending aorta. No aortic   dissection.The heart is mildly enlarged. No pericardial effusion. Aortic   valvular, coronary arterial and mitral annular calcification.    Lungs/Airways/Pleura: Mild dependent lingula and dependent lower lobe   atelectasis. No pleural effusion or pneumothorax.The central airways are   patent. Mild bronchiectasis in the posterior right upper and right middle   lobes with distal airway impaction and clusters of small nodules, related   to a small airways process.    Mediastinum/Lymph nodes: No thoracic adenopathy.    Hepatobiliary: Unremarkable liver. Normal caliber bile ducts.   Nondistended gallbladder.    Pancreas: Unremarkable.    Spleen: Unremarkable.    Adrenal glands: Unremarkable.    Kidneys: Left upper renal scarring. No hydronephrosis.    Bowel: No bowel obstruction. The appendix is not visualized; no   periappendiceal fat stranding. Large rectal stool ball.    Abdominal lymph nodes: No lymphadenopathy.    Abdominal vessels. Atherosclerotic changes.    Peritoneum: No ascites.    Pelvis: Limited evaluation due to metallic streak artifact from hip   arthroplasties. 1.5 cm left ovarian cyst. Calcified uterine fibroid.    Bones/soft tissues: Bilateral hip arthroplasties. Old bilateral anterior   rib and right inferior pubic ramus fractures. Diffuse bone   demineralization. Multilevel thoracolumbar compression deformities, most   severe at T9, T12 and L1.    IMPRESSION:  No pulmonary embolism. No aortic dissection.    No bowel obstruction. Large rectal stool ball.    Other incidental/chronic findings, as above.    < from: MR Head No Cont (07.30.24 @ 11:41) >  FINDINGS:  There is no abnormal restricted diffusion to suggest acute infarction.   Scattered periventricular and subcortical white matter T2 /FLAIR   hyperintensities are seen without mass effect, nonspecific, likely   representing moderate to severe chronic microvascular changes. Normal T2   flow-voids are seen within  the intracranial vasculature. The lateral   ventricles and cortical sulci are age-appropriate in size and   configuration. There is no mass, mass effect, or extra-axial fluid   collection. There is no susceptibility artifact to suggest hemorrhage.   Midline structures are normal.  Mild to moderate inflammatory mucosal   changes are seen throughout the various portions of the paranasal   sinuses. Left intraorbital extraconal lesion along the superior aspect of   the left orbit measures 6.7 x 16 mm on image 12 of series 7 minutes image   27 series 12. Differential diagnosis includes orbital varix versus   orbital cavernous venous malformation. Consider dedicated MRI of the   orbits with contrast as clinically warranted. The orbits and mastoid air   cells are otherwise unremarkable.    IMPRESSION: No acute infarction.

## 2024-08-03 NOTE — PROGRESS NOTE ADULT - SUBJECTIVE AND OBJECTIVE BOX
Interval history:  Reports feeling generally better  8/1/24 - seen ini bed, sleepy, with Family at bedside, Tele: NSR 80-90, occ. PVCs   8/3/24 asked to evaluate as patient passed out in rest room , while having BM , was straining at that time , did have loose BM after the straining then she became unresponsive , without events on monitor , patient blood pressure immediately done while she was put back on bed  low 100s , patient regained consciousness quickly , but confused , denies any chest pain or shortness of breath ,       Home Medications:  amlodipine-benazepril 5 mg-20 mg oral capsule: 1 cap(s) orally once a day ***LAST FILLED 03/03/24 for a 90 day supply*** (28 Jul 2024 17:10)  aspirin:  (28 Jul 2024 17:12)  metoprolol succinate 50 mg oral tablet, extended release: 1 tab(s) orally once a day ***LAST FILLED 12/04/23 for a 90 day supply*** (28 Jul 2024 17:12)    MEDICATIONS  (STANDING):  amLODIPine   Tablet 2.5 milliGRAM(s) Oral daily  aspirin enteric coated 81 milliGRAM(s) Oral daily  atorvastatin 80 milliGRAM(s) Oral at bedtime  dextrose 5%. 1000 milliLiter(s) (50 mL/Hr) IV Continuous <Continuous>  dextrose 5%. 1000 milliLiter(s) (100 mL/Hr) IV Continuous <Continuous>  dextrose 50% Injectable 25 Gram(s) IV Push once  dextrose 50% Injectable 12.5 Gram(s) IV Push once  dextrose 50% Injectable 25 Gram(s) IV Push once  enoxaparin Injectable 30 milliGRAM(s) SubCutaneous every 24 hours  glucagon  Injectable 1 milliGRAM(s) IntraMuscular once  guaiFENesin ER 1200 milliGRAM(s) Oral every 12 hours  insulin lispro (ADMELOG) corrective regimen sliding scale   SubCutaneous three times a day before meals  insulin lispro (ADMELOG) corrective regimen sliding scale.   SubCutaneous at bedtime  losartan 50 milliGRAM(s) Oral daily  pantoprazole    Tablet 40 milliGRAM(s) Oral before breakfast  tamsulosin 0.4 milliGRAM(s) Oral at bedtime    MEDICATIONS  (PRN):  acetaminophen     Tablet .. 650 milliGRAM(s) Oral every 6 hours PRN Mild Pain (1 - 3)  dextrose Oral Gel 15 Gram(s) Oral once PRN Blood Glucose LESS THAN 70 milliGRAM(s)/deciliter  hydrALAZINE Injectable 10 milliGRAM(s) IV Push every 6 hours PRN for SBP>150  ondansetron Injectable 4 milliGRAM(s) IV Push every 8 hours PRN Nausea and/or Vomiting  polyethylene glycol 3350 17 Gram(s) Oral daily PRN Constipation  QUEtiapine 12.5 milliGRAM(s) Oral at bedtime PRN agitation    Vital Signs Last 24 Hrs  T(C): 36.4 (03 Aug 2024 07:44), Max: 36.4 (02 Aug 2024 20:37)  T(F): 97.6 (03 Aug 2024 07:44), Max: 97.6 (03 Aug 2024 07:44)  HR: 93 (03 Aug 2024 07:44) (92 - 93)  BP: 120/56 (03 Aug 2024 07:44) (120/56 - 135/66)  BP(mean): --  RR: 19 (03 Aug 2024 07:44) (18 - 19)  SpO2: 94% (03 Aug 2024 07:44) (94% - 95%)    Parameters below as of 03 Aug 2024 07:44  Patient On (Oxygen Delivery Method): room air        I&O's Summary    02 Aug 2024 07:01  -  03 Aug 2024 07:00  --------------------------------------------------------  IN: 0 mL / OUT: 800 mL / NET: -800 mL    PHYSICAL EXAM:  Appearance: No distress  HEENT:   Normal oral mucosa  Cardiovascular: Normal S1 S2, No JVD, +NAJMA, No carotid bruits, No peripheral edema  Respiratory: Lungs clear to auscultation	  Psychiatry: does not respond to questions, awake, confused  Gastrointestinal:  Soft, Non-tender, + BS, no bruits	  Skin: No rashes, No ecchymoses, No cyanosis  Neurologic: uncooperative with exam  Extremities: Normal range of motion, No clubbing, cyanosis or edema  Vascular: Peripheral pulses palpable 2+ bilaterally        Labs     Monitor sinus rhythm     07-29 Chol 244<H> LDL -- HDL 72 Trig 84  Culture Results:   No growth at 48 Hours (08-01-24 @ 06:06)  Culture Results:   No growth at 48 Hours (08-01-24 @ 06:06)

## 2024-08-03 NOTE — RAPID RESPONSE TEAM SUMMARY - NSDATETIMERRT_GEN_ALL_CORE
03-Aug-2024 10:24 Chart was reviewed.     Yessi Alba was seen on 12/11/2022.     [place]et [place], Gonorrhea/Chlamydia and Trichomonas testing was ordered and has resulted to be negative. Urine bacterial culture was found to have \">100,000 CFU/mL Escherichia coli Abnormal.     Macrobid medication was ordered from at time of visit in clinic as a prescription, pt reports sx are improving.             Action needed from providers: No action needed     First attempt made to reach the patient. Spoke with patient or parent/guardian who was notified in regards to above information. There are no further questions. Patient is in agreement of plan of care. Closing encounter at this time.     Miesha Hartmann RN    breastfeeding exclusively

## 2024-08-03 NOTE — PROGRESS NOTE ADULT - PROBLEM SELECTOR PLAN 2
mean gradient 43mmHg  Poor candidate for invasive cardiac testing and therapies. Family does not want any invasive procedures    DNR DNI

## 2024-08-03 NOTE — RAPID RESPONSE TEAM SUMMARY - NSOTHERINTERVENTIONSRRT_GEN_ALL_CORE
No intervention at this time  Hospitalist (Dr Burks) present    Pt evaluated for any urgent, emergent or critical conditions; none found  Awake and alert with AVSS  May remains on unit, place back into bed  Remainder of care per primary team    Total time spent 40+ minutes

## 2024-08-04 VITALS
OXYGEN SATURATION: 95 % | HEART RATE: 85 BPM | SYSTOLIC BLOOD PRESSURE: 137 MMHG | DIASTOLIC BLOOD PRESSURE: 62 MMHG | TEMPERATURE: 98 F | RESPIRATION RATE: 18 BRPM

## 2024-08-04 LAB
ANION GAP SERPL CALC-SCNC: 7 MMOL/L — SIGNIFICANT CHANGE UP (ref 5–17)
BUN SERPL-MCNC: 32 MG/DL — HIGH (ref 7–23)
CALCIUM SERPL-MCNC: 9.5 MG/DL — SIGNIFICANT CHANGE UP (ref 8.5–10.1)
CHLORIDE SERPL-SCNC: 112 MMOL/L — HIGH (ref 96–108)
CO2 SERPL-SCNC: 22 MMOL/L — SIGNIFICANT CHANGE UP (ref 22–31)
CREAT SERPL-MCNC: 1.37 MG/DL — HIGH (ref 0.5–1.3)
EGFR: 35 ML/MIN/1.73M2 — LOW
GLUCOSE BLDC GLUCOMTR-MCNC: 104 MG/DL — HIGH (ref 70–99)
GLUCOSE BLDC GLUCOMTR-MCNC: 118 MG/DL — HIGH (ref 70–99)
GLUCOSE SERPL-MCNC: 98 MG/DL — SIGNIFICANT CHANGE UP (ref 70–99)
HCT VFR BLD CALC: 31.1 % — LOW (ref 34.5–45)
HGB BLD-MCNC: 10 G/DL — LOW (ref 11.5–15.5)
MCHC RBC-ENTMCNC: 31.1 PG — SIGNIFICANT CHANGE UP (ref 27–34)
MCHC RBC-ENTMCNC: 32.2 GM/DL — SIGNIFICANT CHANGE UP (ref 32–36)
MCV RBC AUTO: 96.6 FL — SIGNIFICANT CHANGE UP (ref 80–100)
PLATELET # BLD AUTO: 358 K/UL — SIGNIFICANT CHANGE UP (ref 150–400)
POTASSIUM SERPL-MCNC: 4.3 MMOL/L — SIGNIFICANT CHANGE UP (ref 3.5–5.3)
POTASSIUM SERPL-SCNC: 4.3 MMOL/L — SIGNIFICANT CHANGE UP (ref 3.5–5.3)
RBC # BLD: 3.22 M/UL — LOW (ref 3.8–5.2)
RBC # FLD: 12.7 % — SIGNIFICANT CHANGE UP (ref 10.3–14.5)
SODIUM SERPL-SCNC: 141 MMOL/L — SIGNIFICANT CHANGE UP (ref 135–145)
WBC # BLD: 8.96 K/UL — SIGNIFICANT CHANGE UP (ref 3.8–10.5)
WBC # FLD AUTO: 8.96 K/UL — SIGNIFICANT CHANGE UP (ref 3.8–10.5)

## 2024-08-04 PROCEDURE — 99239 HOSP IP/OBS DSCHRG MGMT >30: CPT

## 2024-08-04 PROCEDURE — 99232 SBSQ HOSP IP/OBS MODERATE 35: CPT

## 2024-08-04 RX ORDER — LORATADINE 10 MG
17 TABLET,DISINTEGRATING ORAL
Qty: 0 | Refills: 0 | DISCHARGE
Start: 2024-08-04

## 2024-08-04 RX ORDER — LOSARTAN POTASSIUM 50 MG/1
1 TABLET, FILM COATED ORAL
Qty: 0 | Refills: 0 | DISCHARGE
Start: 2024-08-04

## 2024-08-04 RX ORDER — AMLODIPINE BESYLATE 2.5 MG/1
1 TABLET ORAL
Qty: 0 | Refills: 0 | DISCHARGE
Start: 2024-08-04

## 2024-08-04 RX ORDER — METOPROLOL TARTRATE 100 MG
1 TABLET ORAL
Refills: 0 | DISCHARGE

## 2024-08-04 RX ORDER — ASPIRIN 500 MG
0 TABLET ORAL
Refills: 0 | DISCHARGE

## 2024-08-04 RX ORDER — PANTOPRAZOLE SODIUM 20 MG/1
1 TABLET, DELAYED RELEASE ORAL
Qty: 0 | Refills: 0 | DISCHARGE
Start: 2024-08-04

## 2024-08-04 RX ORDER — AMLODIPINE BESYLATE/BENAZEPRIL 2.5MG-10MG
1 CAPSULE ORAL
Refills: 0 | DISCHARGE

## 2024-08-04 RX ORDER — ASPIRIN 500 MG
1 TABLET ORAL
Qty: 0 | Refills: 0 | DISCHARGE
Start: 2024-08-04

## 2024-08-04 RX ORDER — ATORVASTATIN CALCIUM 40 MG/1
1 TABLET, FILM COATED ORAL
Qty: 0 | Refills: 0 | DISCHARGE
Start: 2024-08-04

## 2024-08-04 RX ORDER — TAMSULOSIN HCL 0.4 MG
1 CAPSULE ORAL
Qty: 0 | Refills: 0 | DISCHARGE
Start: 2024-08-04

## 2024-08-04 RX ADMIN — ENOXAPARIN SODIUM 30 MILLIGRAM(S): 120 INJECTION SUBCUTANEOUS at 09:16

## 2024-08-04 RX ADMIN — PANTOPRAZOLE SODIUM 40 MILLIGRAM(S): 20 TABLET, DELAYED RELEASE ORAL at 05:31

## 2024-08-04 RX ADMIN — Medication 81 MILLIGRAM(S): at 09:17

## 2024-08-04 RX ADMIN — LOSARTAN POTASSIUM 50 MILLIGRAM(S): 50 TABLET, FILM COATED ORAL at 09:17

## 2024-08-04 RX ADMIN — AMLODIPINE BESYLATE 2.5 MILLIGRAM(S): 2.5 TABLET ORAL at 09:17

## 2024-08-04 NOTE — PROGRESS NOTE ADULT - PROBLEM SELECTOR PROBLEM 2
Severe aortic stenosis
Elevated troponin
Hypertensive urgency
Severe aortic stenosis
Hypertensive urgency
Elevated troponin

## 2024-08-04 NOTE — PROGRESS NOTE ADULT - SUBJECTIVE AND OBJECTIVE BOX
Interval history:  Reports feeling generally better  8/1/24 - seen ini bed, sleepy, with Family at bedside, Tele: NSR 80-90, occ. PVCs   8/3/24 asked to evaluate as patient passed out in rest room , while having BM , was straining at that time , did have loose BM after the straining then she became unresponsive , without events on monitor , patient blood pressure immediately done while she was put back on bed  low 100s , patient regained consciousness quickly , but confused , denies any chest pain or shortness of breath ,   8/4/24 Patient feeling fine , BP/HR  stable , occasional PVCS  no sob at rest       Home Medications:  amlodipine-benazepril 5 mg-20 mg oral capsule: 1 cap(s) orally once a day ***LAST FILLED 03/03/24 for a 90 day supply*** (28 Jul 2024 17:10)  aspirin:  (28 Jul 2024 17:12)  metoprolol succinate 50 mg oral tablet, extended release: 1 tab(s) orally once a day ***LAST FILLED 12/04/23 for a 90 day supply*** (28 Jul 2024 17:12)    MEDICATIONS  (STANDING):  amLODIPine   Tablet 2.5 milliGRAM(s) Oral daily  aspirin enteric coated 81 milliGRAM(s) Oral daily  atorvastatin 80 milliGRAM(s) Oral at bedtime  dextrose 5%. 1000 milliLiter(s) (50 mL/Hr) IV Continuous <Continuous>  dextrose 5%. 1000 milliLiter(s) (100 mL/Hr) IV Continuous <Continuous>  dextrose 50% Injectable 25 Gram(s) IV Push once  dextrose 50% Injectable 12.5 Gram(s) IV Push once  dextrose 50% Injectable 25 Gram(s) IV Push once  enoxaparin Injectable 30 milliGRAM(s) SubCutaneous every 24 hours  glucagon  Injectable 1 milliGRAM(s) IntraMuscular once  guaiFENesin ER 1200 milliGRAM(s) Oral every 12 hours  insulin lispro (ADMELOG) corrective regimen sliding scale   SubCutaneous three times a day before meals  insulin lispro (ADMELOG) corrective regimen sliding scale.   SubCutaneous at bedtime  losartan 50 milliGRAM(s) Oral daily  pantoprazole    Tablet 40 milliGRAM(s) Oral before breakfast  tamsulosin 0.4 milliGRAM(s) Oral at bedtime    MEDICATIONS  (PRN):  acetaminophen     Tablet .. 650 milliGRAM(s) Oral every 6 hours PRN Mild Pain (1 - 3)  dextrose Oral Gel 15 Gram(s) Oral once PRN Blood Glucose LESS THAN 70 milliGRAM(s)/deciliter  ondansetron Injectable 4 milliGRAM(s) IV Push every 8 hours PRN Nausea and/or Vomiting  polyethylene glycol 3350 17 Gram(s) Oral daily PRN Constipation  QUEtiapine 12.5 milliGRAM(s) Oral at bedtime PRN agitation    Vital Signs Last 24 Hrs  T(C): 36.4 (04 Aug 2024 08:00), Max: 36.7 (04 Aug 2024 05:32)  T(F): 97.6 (04 Aug 2024 08:00), Max: 98 (04 Aug 2024 05:32)  HR: 85 (04 Aug 2024 08:00) (80 - 85)  BP: 137/62 (04 Aug 2024 08:00) (129/50 - 149/68)  BP(mean): --  RR: 18 (04 Aug 2024 08:00) (17 - 18)  SpO2: 95% (04 Aug 2024 08:00) (94% - 95%)    Parameters below as of 04 Aug 2024 08:00  Patient On (Oxygen Delivery Method): room air        I&O's Summary    03 Aug 2024 07:01  -  04 Aug 2024 07:00  --------------------------------------------------------  IN: 0 mL / OUT: 450 mL / NET: -450 mL    04 Aug 2024 07:01  -  04 Aug 2024 10:39  --------------------------------------------------------  IN: 0 mL / OUT: 1000 mL / NET: -1000 mL    Orthostatic VS      PHYSICAL EXAM:  Appearance: No distress  HEENT:   Normal oral mucosa  Cardiovascular: Normal S1 S2, No JVD, +NAJMA, No carotid bruits, No peripheral edema  Respiratory: Lungs clear to auscultation	  Psychiatry: does not respond to questions, awake, confused  Gastrointestinal:  Soft, Non-tender, + BS, no bruits	  Skin: No rashes, No ecchymoses, No cyanosis  Neurologic: uncooperative with exam  Extremities: Normal range of motion, No clubbing, cyanosis or edema  Vascular: Peripheral pulses palpable 2+ bilaterally        Labs     Monitor sinus rhythm  PVCS                            10.0   8.96  )-----------( 358      ( 04 Aug 2024 06:56 )             31.1     08-04    141  |  112<H>  |  32<H>  ----------------------------<  98  4.3   |  22  |  1.37<H>    Ca    9.5      04 Aug 2024 06:56              Urinalysis Basic - ( 04 Aug 2024 06:56 )    Color: x / Appearance: x / SG: x / pH: x  Gluc: 98 mg/dL / Ketone: x  / Bili: x / Urobili: x   Blood: x / Protein: x / Nitrite: x   Leuk Esterase: x / RBC: x / WBC x   Sq Epi: x / Non Sq Epi: x / Bacteria: x      07-29 Chol 244<H> LDL -- HDL 72 Trig 84  Culture Results:   No growth at 72 Hours (08-01-24 @ 06:06)  Culture Results:   No growth at 72 Hours (08-01-24 @ 06:06)

## 2024-08-04 NOTE — PROGRESS NOTE ADULT - PROBLEM SELECTOR PLAN 3
·  Problem: AMS (altered mental status).   ·  Recommendation: R/o CVA.  Neurology following
improved blood pressure , low salt diet , continue losartan , norvasc ,   encourage po fluid intake
pt. taking norvasc/benazpril and home will change to losartan - started on 25 mg po daily - will increase to 50 mg po daily starting tomorrow 8/2 and give an exrta dose losartan 25 mg po x 1 now, will decrease  norvasc to 2.5 mg po daily - can titrate up if needed
·  Problem: AMS (altered mental status).   ·  Recommendation: R/o CVA.
[id number]   Secondary to HTN urgency, severe AS.   Poor candidate for invasive cardiac testing and therapies.
[id number]   Secondary to HTN urgency, severe AS.   Poor candidate for invasive cardiac testing and therapies.

## 2024-08-04 NOTE — PROGRESS NOTE ADULT - NUTRITIONAL ASSESSMENT
This patient has been assessed with a concern for Malnutrition and has been determined to have a diagnosis/diagnoses of Severe protein-calorie malnutrition.    This patient is being managed with:   Diet DASH/TLC-  Sodium & Cholesterol Restricted  Entered: Jul 29 2024  4:06PM  
This patient has been assessed with a concern for Malnutrition and has been determined to have a diagnosis/diagnoses of Severe protein-calorie malnutrition.    This patient is being managed with:   Diet NPO-  Entered: Jul 28 2024  9:04PM  
This patient has been assessed with a concern for Malnutrition and has been determined to have a diagnosis/diagnoses of Severe protein-calorie malnutrition.    This patient is being managed with:   Diet DASH/TLC-  Sodium & Cholesterol Restricted  Entered: Jul 29 2024  4:06PM  

## 2024-08-04 NOTE — PROGRESS NOTE ADULT - PROBLEM SELECTOR PLAN 4
Neurology following. Improved
[id number]   Secondary to HTN urgency, severe AS.   Poor candidate for invasive cardiac testing and therapies.
Neurology following. Improved
[id number]   Secondary to HTN urgency, severe AS.   Poor candidate for invasive cardiac testing and therapies.

## 2024-08-04 NOTE — PROGRESS NOTE ADULT - PROBLEM SELECTOR PLAN 1
while having BM ( was straining at that time )  likely vasovagal  however patient has severe AS , recovered very quickly ,   check orthostatic ,  CBC /BMP   decrease norvasc to 2.5 mg po daily     Monitor sinus rhythm
while having BM ( was straining at that time )  likely vasovagal  however patient has severe AS , recovered very quickly ,  stable over night   check orthostatic ,if normal  discharge   continue norvasc 2.5 mg po daily     Monitor sinus rhythm
·  Problem: Hypertensive urgency.   ·  Recommendation: Improved after treatment in ED.   Start amlodipine 5mg daily if able to take PO, hydralazine prn while npo
mean gradient 43mmHg  Poor candidate for invasive cardiac testing and therapies.
·  Problem: Hypertensive urgency.   ·  Recommendation: Improved after treatment in ED.   .  Start amlodipine 5mg daily when able to take PO, CW hydralazine prn while npo
mean gradient 43mmHg  Poor candidate for invasive cardiac testing and therapies. Family does not want any invasive procedures     pt. is stable from cardiac standpoint, will followup with Dr. Gould op, will sign off

## 2024-08-04 NOTE — DISCHARGE NOTE PROVIDER - HOSPITAL COURSE
89F with pmhx HTN (no longer on meds) came to the hospital today after she was found down by granddaughter this morning. Patient unable to provide history. History as per chart review. Patients last known well was on 7/26. She was reportedly noted to have dried coffee ground emesis on her face and hair and was lethargic.     Sub: Pt seen and evaluated. Feeling better. At baseline. Sitting in chair. Negative orthostatics     Vital Signs Last 24 Hrs  T(C): 36.4 (04 Aug 2024 08:00), Max: 36.7 (04 Aug 2024 05:32)  T(F): 97.6 (04 Aug 2024 08:00), Max: 98 (04 Aug 2024 05:32)  HR: 85 (04 Aug 2024 08:00) (80 - 85)  BP: 137/62 (04 Aug 2024 08:00) (129/50 - 149/68)  RR: 18 (04 Aug 2024 08:00) (17 - 18)  SpO2: 95% (04 Aug 2024 08:00) (94% - 95%)    GEN: NAD,   HEENT: EOMI, +unequal pupils but reactive   NECK : Soft and supple, no JVD  LUNG: CTABL, No wheezing, rales or rhonchi  CVS: S1S2+, RRR, +murmur   GI: BS+, soft, NT/ND, no guarding, no rebound, +delgado in place   EXTREMITIES: No peripheral edema  VASCULAR: 2+ peripheral pulses  NEURO: AAOx3, grossly non-focal   SKIN: +ecchymotic     #Acute Encephalopathy, likely metabolic in nature.   -EKG: reviewed   -EEG: negative for seizure   -Growth in aerobic bottle: Corynebacterium aurimucosum group  -Growth in anaerobic bottle: Gram positive cocci in pairs  -CTA head/neck: negative for acute ischemia   -MRI: negative for stroke   -TTE: EF: 57%, grade 1 diastolic dysfunction, severe aortic stenosis  -UA: reviewed   -TSH: noted   -CPK: noted   -A1c: 5.4  -LDL: 158  -UCx: NGTD  -s/p Vanc and Cefepime   -s/p Zosyn   -ASA/Atorvastatin   -aspiration precautions   -repeat blood culture: NGTD   -neurochecks   -seen and evaluated by neurology     #Syncope on 08/03/24, likely vasovagal in etiology, Hx of severe AS   -happened while defecating. RRT called. At baseline:: within 1-2 mins and transferred to bed   -no events on monitor   -reduce dose of Norvasc from 5mg-->2.5mg   -orthostatics: negative   -fall precautions       #HTN urgency   -BP improving   -Norvasc 2.5mg   -Losartan 50mg        #Elevated Troponin  -likely from demand ischemia   -TTE: EF: 57%, grade 1 diastolic dysfunction, severe aortic stenosis  -discussed with family: no further intervention      #Urinary Retention   -failed TOV   -delgado reinstated   -flomax   -f/u with urology as outpatient     #Fecal impaction  -bowel regimen   -monitor BMs     #Incidental Image finding   -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 Differential diagnosis includes orbital varix versus   orbital cavernous venous malformation. Consider dedicated MRI of the orbits with contrast as clinically warranted. The orbits and mastoid air cells are otherwise unremarkable.  -discussed with family: at this time they deferred further imaging +/-intervention       #Severe protein-calorie malnutrition.        #Advance care directives  -DNR/DNI with trial of NIV  -MOSLT in chart

## 2024-08-04 NOTE — DISCHARGE NOTE PROVIDER - NSDCMRMEDTOKEN_GEN_ALL_CORE_FT
amLODIPine 2.5 mg oral tablet: 1 tab(s) orally once a day  aspirin 81 mg oral delayed release tablet: 1 tab(s) orally once a day  atorvastatin 80 mg oral tablet: 1 tab(s) orally once a day (at bedtime)  losartan 50 mg oral tablet: 1 tab(s) orally once a day  pantoprazole 40 mg oral delayed release tablet: 1 tab(s) orally once a day (before a meal)  polyethylene glycol 3350 oral powder for reconstitution: 17 gram(s) orally once a day As needed Constipation  tamsulosin 0.4 mg oral capsule: 1 cap(s) orally once a day (at bedtime)

## 2024-08-04 NOTE — DISCHARGE NOTE PROVIDER - CARE PROVIDER_API CALL
ZAY JONES, DIANNA Sch[facility]re [facility] [place] [address]  Phone: [phone]  Fax: [phone]  Established Patient  Follow Up Time: 1 week

## 2024-08-04 NOTE — PROGRESS NOTE ADULT - PROBLEM SELECTOR PROBLEM 1
Hypertensive urgency
Severe aortic stenosis
Syncope
Hypertensive urgency
Severe aortic stenosis
Syncope

## 2024-08-04 NOTE — PROGRESS NOTE ADULT - PROVIDER SPECIALTY LIST ADULT
Cardiology
Hospitalist
Neurology
Neurology
Cardiology
Cardiology
Hospitalist
Neurology
Hospitalist
Hospitalist
Cardiology

## 2024-08-04 NOTE — PROGRESS NOTE ADULT - PROBLEM SELECTOR PROBLEM 3
AMS (altered mental status)
Elevated troponin
Elevated troponin
Hypertensive urgency
AMS (altered mental status)
Hypertensive urgency

## 2024-08-04 NOTE — DISCHARGE NOTE PROVIDER - NSDCCPCAREPLAN_GEN_ALL_CORE_FT
PRINCIPAL DISCHARGE DIAGNOSIS  Diagnosis: Acute metabolic encephalopathy  Assessment and Plan of Treatment: resolved      SECONDARY DISCHARGE DIAGNOSES  Diagnosis: Elevated troponin  Assessment and Plan of Treatment:     Diagnosis: Hypertensive urgency  Assessment and Plan of Treatment:     Diagnosis: Severe aortic stenosis  Assessment and Plan of Treatment: deferrred further management    Diagnosis: Near syncope  Assessment and Plan of Treatment:     Diagnosis: Urinary retention  Assessment and Plan of Treatment: Gallo in place. Outpatient trial of void and follow up with Urologist

## 2024-08-06 LAB
CULTURE RESULTS: SIGNIFICANT CHANGE UP
CULTURE RESULTS: SIGNIFICANT CHANGE UP
SPECIMEN SOURCE: SIGNIFICANT CHANGE UP
SPECIMEN SOURCE: SIGNIFICANT CHANGE UP

## 2024-08-14 DIAGNOSIS — I35.0 NONRHEUMATIC AORTIC (VALVE) STENOSIS: ICD-10-CM

## 2024-08-14 DIAGNOSIS — I16.0 HYPERTENSIVE URGENCY: ICD-10-CM

## 2024-08-14 DIAGNOSIS — G93.41 METABOLIC ENCEPHALOPATHY: ICD-10-CM

## 2024-08-14 DIAGNOSIS — R55 SYNCOPE AND COLLAPSE: ICD-10-CM

## 2024-08-14 DIAGNOSIS — R33.9 RETENTION OF URINE, UNSPECIFIED: ICD-10-CM

## 2024-08-14 DIAGNOSIS — I24.89 OTHER FORMS OF ACUTE ISCHEMIC HEART DISEASE: ICD-10-CM

## 2024-08-14 DIAGNOSIS — K56.41 FECAL IMPACTION: ICD-10-CM

## 2024-08-14 DIAGNOSIS — E43 UNSPECIFIED SEVERE PROTEIN-CALORIE MALNUTRITION: ICD-10-CM

## 2025-01-19 ENCOUNTER — INPATIENT (INPATIENT)
Facility: HOSPITAL | Age: 89
LOS: 3 days | Discharge: HOME HEALTH | DRG: 689 | End: 2025-01-23
Attending: HOSPITALIST | Admitting: HOSPITALIST
Payer: MEDICARE

## 2025-01-19 VITALS
DIASTOLIC BLOOD PRESSURE: 73 MMHG | TEMPERATURE: 98 F | OXYGEN SATURATION: 98 % | HEART RATE: 96 BPM | RESPIRATION RATE: 15 BRPM | SYSTOLIC BLOOD PRESSURE: 146 MMHG

## 2025-01-19 DIAGNOSIS — G93.41 METABOLIC ENCEPHALOPATHY: ICD-10-CM

## 2025-01-19 LAB
ALBUMIN SERPL ELPH-MCNC: 3.6 G/DL — SIGNIFICANT CHANGE UP (ref 3.3–5)
ALP SERPL-CCNC: 123 U/L — HIGH (ref 40–120)
ALT FLD-CCNC: 26 U/L — SIGNIFICANT CHANGE UP (ref 12–78)
ANION GAP SERPL CALC-SCNC: 6 MMOL/L — SIGNIFICANT CHANGE UP (ref 5–17)
APPEARANCE UR: ABNORMAL
APTT BLD: 27.9 SEC — SIGNIFICANT CHANGE UP (ref 24.5–35.6)
AST SERPL-CCNC: 29 U/L — SIGNIFICANT CHANGE UP (ref 15–37)
BACTERIA # UR AUTO: ABNORMAL /HPF
BASOPHILS # BLD AUTO: 0.05 K/UL — SIGNIFICANT CHANGE UP (ref 0–0.2)
BASOPHILS NFR BLD AUTO: 0.3 % — SIGNIFICANT CHANGE UP (ref 0–2)
BILIRUB SERPL-MCNC: 0.4 MG/DL — SIGNIFICANT CHANGE UP (ref 0.2–1.2)
BILIRUB UR-MCNC: NEGATIVE — SIGNIFICANT CHANGE UP
BLD GP AB SCN SERPL QL: SIGNIFICANT CHANGE UP
BUN SERPL-MCNC: 23 MG/DL — SIGNIFICANT CHANGE UP (ref 7–23)
CALCIUM SERPL-MCNC: 10.3 MG/DL — HIGH (ref 8.5–10.1)
CAST: 5 /LPF — HIGH (ref 0–4)
CHLORIDE SERPL-SCNC: 107 MMOL/L — SIGNIFICANT CHANGE UP (ref 96–108)
CO2 SERPL-SCNC: 26 MMOL/L — SIGNIFICANT CHANGE UP (ref 22–31)
COLOR SPEC: YELLOW — SIGNIFICANT CHANGE UP
CREAT SERPL-MCNC: 1.34 MG/DL — HIGH (ref 0.5–1.3)
DIFF PNL FLD: ABNORMAL
EGFR: 36 ML/MIN/1.73M2 — LOW
EOSINOPHIL # BLD AUTO: 0.02 K/UL — SIGNIFICANT CHANGE UP (ref 0–0.5)
EOSINOPHIL NFR BLD AUTO: 0.1 % — SIGNIFICANT CHANGE UP (ref 0–6)
GLUCOSE SERPL-MCNC: 148 MG/DL — HIGH (ref 70–99)
GLUCOSE UR QL: NEGATIVE MG/DL — SIGNIFICANT CHANGE UP
HCT VFR BLD CALC: 41.6 % — SIGNIFICANT CHANGE UP (ref 34.5–45)
HGB BLD-MCNC: 13.5 G/DL — SIGNIFICANT CHANGE UP (ref 11.5–15.5)
IMM GRANULOCYTES NFR BLD AUTO: 0.8 % — SIGNIFICANT CHANGE UP (ref 0–0.9)
INR BLD: 0.87 RATIO — SIGNIFICANT CHANGE UP (ref 0.85–1.16)
KETONES UR-MCNC: NEGATIVE MG/DL — SIGNIFICANT CHANGE UP
LEUKOCYTE ESTERASE UR-ACNC: ABNORMAL
LYMPHOCYTES # BLD AUTO: 1.33 K/UL — SIGNIFICANT CHANGE UP (ref 1–3.3)
LYMPHOCYTES # BLD AUTO: 7.9 % — LOW (ref 13–44)
MCHC RBC-ENTMCNC: 30.9 PG — SIGNIFICANT CHANGE UP (ref 27–34)
MCHC RBC-ENTMCNC: 32.5 G/DL — SIGNIFICANT CHANGE UP (ref 32–36)
MCV RBC AUTO: 95.2 FL — SIGNIFICANT CHANGE UP (ref 80–100)
MONOCYTES # BLD AUTO: 0.84 K/UL — SIGNIFICANT CHANGE UP (ref 0–0.9)
MONOCYTES NFR BLD AUTO: 5 % — SIGNIFICANT CHANGE UP (ref 2–14)
NEUTROPHILS # BLD AUTO: 14.39 K/UL — HIGH (ref 1.8–7.4)
NEUTROPHILS NFR BLD AUTO: 85.9 % — HIGH (ref 43–77)
NITRITE UR-MCNC: POSITIVE
PH UR: 5.5 — SIGNIFICANT CHANGE UP (ref 5–8)
PLATELET # BLD AUTO: 288 K/UL — SIGNIFICANT CHANGE UP (ref 150–400)
POTASSIUM SERPL-MCNC: 3.3 MMOL/L — LOW (ref 3.5–5.3)
POTASSIUM SERPL-SCNC: 3.3 MMOL/L — LOW (ref 3.5–5.3)
PROT SERPL-MCNC: 8 GM/DL — SIGNIFICANT CHANGE UP (ref 6–8.3)
PROT UR-MCNC: 300 MG/DL
PROTHROM AB SERPL-ACNC: 10.3 SEC — SIGNIFICANT CHANGE UP (ref 9.9–13.4)
RBC # BLD: 4.37 M/UL — SIGNIFICANT CHANGE UP (ref 3.8–5.2)
RBC # FLD: 12.8 % — SIGNIFICANT CHANGE UP (ref 10.3–14.5)
RBC CASTS # UR COMP ASSIST: 2 /HPF — SIGNIFICANT CHANGE UP (ref 0–4)
SODIUM SERPL-SCNC: 139 MMOL/L — SIGNIFICANT CHANGE UP (ref 135–145)
SP GR SPEC: 1.02 — SIGNIFICANT CHANGE UP (ref 1–1.03)
SQUAMOUS # UR AUTO: 2 /HPF — SIGNIFICANT CHANGE UP (ref 0–5)
UROBILINOGEN FLD QL: 0.2 MG/DL — SIGNIFICANT CHANGE UP (ref 0.2–1)
WBC # BLD: 16.77 K/UL — HIGH (ref 3.8–10.5)
WBC # FLD AUTO: 16.77 K/UL — HIGH (ref 3.8–10.5)
WBC UR QL: 6 /HPF — HIGH (ref 0–5)

## 2025-01-19 PROCEDURE — 99285 EMERGENCY DEPT VISIT HI MDM: CPT

## 2025-01-19 PROCEDURE — 99223 1ST HOSP IP/OBS HIGH 75: CPT

## 2025-01-19 PROCEDURE — 97530 THERAPEUTIC ACTIVITIES: CPT | Mod: GP

## 2025-01-19 PROCEDURE — 70450 CT HEAD/BRAIN W/O DYE: CPT | Mod: 26

## 2025-01-19 PROCEDURE — 87086 URINE CULTURE/COLONY COUNT: CPT

## 2025-01-19 PROCEDURE — 97110 THERAPEUTIC EXERCISES: CPT | Mod: GP

## 2025-01-19 PROCEDURE — 73110 X-RAY EXAM OF WRIST: CPT | Mod: 26,LT

## 2025-01-19 PROCEDURE — 72125 CT NECK SPINE W/O DYE: CPT | Mod: 26

## 2025-01-19 PROCEDURE — 93010 ELECTROCARDIOGRAM REPORT: CPT

## 2025-01-19 PROCEDURE — 85027 COMPLETE CBC AUTOMATED: CPT

## 2025-01-19 PROCEDURE — 84443 ASSAY THYROID STIM HORMONE: CPT

## 2025-01-19 PROCEDURE — 85025 COMPLETE CBC W/AUTO DIFF WBC: CPT

## 2025-01-19 PROCEDURE — 82607 VITAMIN B-12: CPT

## 2025-01-19 PROCEDURE — 86780 TREPONEMA PALLIDUM: CPT

## 2025-01-19 PROCEDURE — 97116 GAIT TRAINING THERAPY: CPT | Mod: GP

## 2025-01-19 PROCEDURE — 71260 CT THORAX DX C+: CPT | Mod: 26

## 2025-01-19 PROCEDURE — 93010 ELECTROCARDIOGRAM REPORT: CPT | Mod: 77

## 2025-01-19 PROCEDURE — 80048 BASIC METABOLIC PNL TOTAL CA: CPT

## 2025-01-19 PROCEDURE — 74177 CT ABD & PELVIS W/CONTRAST: CPT | Mod: 26

## 2025-01-19 PROCEDURE — 82140 ASSAY OF AMMONIA: CPT

## 2025-01-19 PROCEDURE — 73130 X-RAY EXAM OF HAND: CPT | Mod: 26,LT

## 2025-01-19 PROCEDURE — 36415 COLL VENOUS BLD VENIPUNCTURE: CPT

## 2025-01-19 PROCEDURE — 97162 PT EVAL MOD COMPLEX 30 MIN: CPT | Mod: GP

## 2025-01-19 PROCEDURE — 80307 DRUG TEST PRSMV CHEM ANLYZR: CPT

## 2025-01-19 RX ORDER — CEFTRIAXONE 250 MG/1
1000 INJECTION, POWDER, FOR SOLUTION INTRAMUSCULAR; INTRAVENOUS EVERY 24 HOURS
Refills: 0 | Status: DISCONTINUED | OUTPATIENT
Start: 2025-01-19 | End: 2025-01-22

## 2025-01-19 RX ORDER — ONDANSETRON 4 MG/1
4 TABLET, ORALLY DISINTEGRATING ORAL EVERY 8 HOURS
Refills: 0 | Status: DISCONTINUED | OUTPATIENT
Start: 2025-01-19 | End: 2025-01-23

## 2025-01-19 RX ORDER — POLYETHYLENE GLYCOL 3350 17 G/17G
17 POWDER, FOR SOLUTION ORAL AT BEDTIME
Refills: 0 | Status: DISCONTINUED | OUTPATIENT
Start: 2025-01-19 | End: 2025-01-23

## 2025-01-19 RX ORDER — CEFTRIAXONE 250 MG/1
1000 INJECTION, POWDER, FOR SOLUTION INTRAMUSCULAR; INTRAVENOUS ONCE
Refills: 0 | Status: COMPLETED | OUTPATIENT
Start: 2025-01-19 | End: 2025-01-19

## 2025-01-19 RX ORDER — SODIUM CHLORIDE 9 G/ML
1000 INJECTION, SOLUTION INTRAVENOUS
Refills: 0 | Status: DISCONTINUED | OUTPATIENT
Start: 2025-01-19 | End: 2025-01-23

## 2025-01-19 RX ORDER — CLOSTRIDIUM TETANI TOXOID ANTIGEN (FORMALDEHYDE INACTIVATED), CORYNEBACTERIUM DIPHTHERIAE TOXOID ANTIGEN (FORMALDEHYDE INACTIVATED), BORDETELLA PERTUSSIS TOXOID ANTIGEN (GLUTARALDEHYDE INACTIVATED), BORDETELLA PERTUSSIS FILAMENTOUS HEMAGGLUTININ ANTIGEN (FORMALDEHYDE INACTIVATED), BORDETELLA PERTUSSIS PERTACTIN ANTIGEN, AND BORDETELLA PERTUSSIS FIMBRIAE 2/3 ANTIGEN 5; 2; 2.5; 5; 3; 5 [LF]/.5ML; [LF]/.5ML; UG/.5ML; UG/.5ML; UG/.5ML; UG/.5ML
0.5 INJECTION, SUSPENSION INTRAMUSCULAR ONCE
Refills: 0 | Status: COMPLETED | OUTPATIENT
Start: 2025-01-19 | End: 2025-01-19

## 2025-01-19 RX ORDER — MAGNESIUM, ALUMINUM HYDROXIDE 200-225/5
30 SUSPENSION, ORAL (FINAL DOSE FORM) ORAL EVERY 4 HOURS
Refills: 0 | Status: DISCONTINUED | OUTPATIENT
Start: 2025-01-19 | End: 2025-01-23

## 2025-01-19 RX ORDER — SENNOSIDES 8.6 MG
2 TABLET ORAL AT BEDTIME
Refills: 0 | Status: DISCONTINUED | OUTPATIENT
Start: 2025-01-19 | End: 2025-01-23

## 2025-01-19 RX ORDER — BACTERIOSTATIC SODIUM CHLORIDE 0.9 %
1000 VIAL (ML) INJECTION ONCE
Refills: 0 | Status: COMPLETED | OUTPATIENT
Start: 2025-01-19 | End: 2025-01-19

## 2025-01-19 RX ORDER — ACETAMINOPHEN, DIPHENHYDRAMINE HCL, PHENYLEPHRINE HCL 325; 25; 5 MG/1; MG/1; MG/1
3 TABLET ORAL AT BEDTIME
Refills: 0 | Status: DISCONTINUED | OUTPATIENT
Start: 2025-01-19 | End: 2025-01-23

## 2025-01-19 RX ORDER — ACETAMINOPHEN 160 MG/5ML
650 SUSPENSION ORAL ONCE
Refills: 0 | Status: COMPLETED | OUTPATIENT
Start: 2025-01-19 | End: 2025-01-19

## 2025-01-19 RX ORDER — ACETAMINOPHEN 160 MG/5ML
2 SUSPENSION ORAL
Refills: 0 | DISCHARGE

## 2025-01-19 RX ORDER — ACETAMINOPHEN 160 MG/5ML
650 SUSPENSION ORAL EVERY 6 HOURS
Refills: 0 | Status: DISCONTINUED | OUTPATIENT
Start: 2025-01-19 | End: 2025-01-23

## 2025-01-19 RX ADMIN — POLYETHYLENE GLYCOL 3350 17 GRAM(S): 17 POWDER, FOR SOLUTION ORAL at 21:20

## 2025-01-19 RX ADMIN — SODIUM CHLORIDE 100 MILLILITER(S): 9 INJECTION, SOLUTION INTRAVENOUS at 18:16

## 2025-01-19 RX ADMIN — CLOSTRIDIUM TETANI TOXOID ANTIGEN (FORMALDEHYDE INACTIVATED), CORYNEBACTERIUM DIPHTHERIAE TOXOID ANTIGEN (FORMALDEHYDE INACTIVATED), BORDETELLA PERTUSSIS TOXOID ANTIGEN (GLUTARALDEHYDE INACTIVATED), BORDETELLA PERTUSSIS FILAMENTOUS HEMAGGLUTININ ANTIGEN (FORMALDEHYDE INACTIVATED), BORDETELLA PERTUSSIS PERTACTIN ANTIGEN, AND BORDETELLA PERTUSSIS FIMBRIAE 2/3 ANTIGEN 0.5 MILLILITER(S): 5; 2; 2.5; 5; 3; 5 INJECTION, SUSPENSION INTRAMUSCULAR at 09:19

## 2025-01-19 RX ADMIN — ACETAMINOPHEN, DIPHENHYDRAMINE HCL, PHENYLEPHRINE HCL 3 MILLIGRAM(S): 325; 25; 5 TABLET ORAL at 21:19

## 2025-01-19 RX ADMIN — ACETAMINOPHEN 260 MILLIGRAM(S): 160 SUSPENSION ORAL at 15:33

## 2025-01-19 RX ADMIN — Medication 1000 MILLILITER(S): at 14:41

## 2025-01-19 RX ADMIN — Medication 2 TABLET(S): at 21:19

## 2025-01-19 RX ADMIN — CEFTRIAXONE 1000 MILLIGRAM(S): 250 INJECTION, POWDER, FOR SOLUTION INTRAMUSCULAR; INTRAVENOUS at 14:41

## 2025-01-19 NOTE — H&P ADULT - NSHPLABSRESULTS_GEN_ALL_CORE
FINDINGS:  CHEST:  LUNGS AND LARGE AIRWAYS: Patent central airways. Scattered linear   subsegmental atelectasis involving the inferior right upper lobe,   lingula, and bilateral lung bases. No pneumothorax or focal   consolidations.  PLEURA: No pleural effusion.  VESSELS: No pulmonary embolism or aortic dissection. The sclerotic   disease of the thoracic aorta.  HEART: Cardiomegaly. Coronary artery calcifications. No pericardial   effusion.  MEDIASTINUM AND HERO: No lymphadenopathy.  CHEST WALL AND LOWER NECK: No chest wall hematoma.    ABDOMEN AND PELVIS:  LIVER: Within normal limits.  BILE DUCTS: Normal caliber.  GALLBLADDER: Mildly distended with unchanged mild wall thickening. No   radiopaque gallstones.  SPLEEN: Within normal limits.  PANCREAS: Within normal limits.  ADRENALS: Within normal limits.  KIDNEYS/URETERS: Symmetric enhancement. Bilateral cortical thinning.    BLADDER: Unable to be assessed secondary to significant streak artifact   from hip arthroplasties.  REPRODUCTIVE ORGANS: Calcified leiomyoma. Stable 2.0 cm left ovarian cyst.    BOWEL: Within evaluation secondary to streak artifact from hip   arthroplasties. Redemonstrated large stool ball within the rectum and   throughout the colon. No bowel obstruction. Appendix is not visualized.   No evidence of inflammation in the pericecal region.  PERITONEUM/RETROPERITONEUM: No pneumoperitoneum or retroperitoneal   hemorrhage.  VESSELS: Atherosclerotic disease of a tortuous nonaneurysmal abdominal   aorta.  LYMPH NODES: No lymphadenopathy.  ABDOMINAL WALL: Within normal limits.  BONES: Post bilateral hip arthroplasties with significant streak   artifact. Degenerative and osteopenic changes. Thoracolumbar   levoscoliosis. Chronic T12 and L1 compression fractures. Chronic   bilaterally appearance. Previously mentioned fractures.    IMPRESSION:  Limited evaluation due to breathing motion artifacts, streak artifacts   and lack of oral contrast, and the body habitus of the patient; within   the limitations, no acute pulmonary findings or abdominopelvic pathology.    Mildly distended gallbladder is visible without any CT evidence for   cholecystitis.    Large rectal stool ball is again seen.

## 2025-01-19 NOTE — ED PROVIDER NOTE - CARE PLAN
1 Principal Discharge DX:	Metabolic encephalopathy  Secondary Diagnosis:	UTI (urinary tract infection)

## 2025-01-19 NOTE — ED PROVIDER NOTE - NS_BEDUNITTYPES_ED_ALL_ED
Left femoral artery angiogram was performed  using a SHEATH GUIDE L10 CM .038 IN SNAP ON DIL LOCK KINK RST SMTH by hand injection. MED/SURG

## 2025-01-19 NOTE — ED PROVIDER NOTE - NS ED MD TWO NIGHTS YN
Attempted to transfer call to [facility].   Spouse  is requesting a return call at phone number listed. Spouse states she does not know how work electronics.      Yes

## 2025-01-19 NOTE — ED ADULT TRIAGE NOTE - CHIEF COMPLAINT QUOTE
Pt presents to ER from [facility] s/p being found on the floor next to her bed at 0730 this morning. EMS reports staff last saw the pt well at 0500 this morning. No blood thinners. Bruising/skin tear to left hand and bruising to b/l legs. Pt in c-collar from EMS

## 2025-01-19 NOTE — ED PROVIDER NOTE - PHYSICAL EXAMINATION
GENERAL: A&Ox2 (self, place), non-toxic appearing, no acute distress  HEENT: NC, EOMI, oral mucosa moist, normal conjunctiva  RESP: no respiratory distress, CTAB, no wheezes/rhonchi/rales, speaking in full sentences  CV: RRR, no murmurs/rubs/gallops  ABDOMEN: soft, non-tender, non-distended, no guarding, no rebound tenderness  MSK: no midline spinal tenderness, full range of motion all extremities  NEURO: no focal sensory or motor deficits, CN 2-12 grossly intact  SKIN: warm, normal color, well perfused, no rash, skin tear left palm with some overlying ecchymosis, skin tear right medial ankle  PSYCH: normal affect

## 2025-01-19 NOTE — H&P ADULT - HISTORY OF PRESENT ILLNESS
89 year old female w dementia (baseline A&O x 2–3), GERD, HLD, HTN BIBEMS from [facility] for injuries from a fall.      Staff states they found her on the ground at approximately 7:30 AM.  Patient denies any complaints but is noted to have skin tear to the left hand.  Called as a neuro alert on arrival.  EMS reports staff last saw the pt well at 0500 this morning. No blood thinners.   Bruising/skin tear to left hand and bruising to b/l legs. Pt in c-collar from EMS    In ED /73   HR 96   RR 15   T 97.6   98% sat RA  CTH no acute changes, Csp not good evaluation of upper C spine  CT C/A/P chronic T12-L1 compress fx, mildly distended GB w stones, large rectal stool ball  xray wrist Demineralized bone. Scapholunate dissociation of indeterminate age.  NS 1000 cc, CTX for abn UA        PAST MEDICAL HX  AS aortic stenosis : severe by TTE   Bacteremia   -Growth in aerobic bottle: Corynebacterium aurimucosum group  -Growth in anaerobic bottle: Gram positive cocci in pairs  UCx neg tx w cefepime+vanc  Dementia  GI bleed codfee ground emesis   HLD hyperlipidemia  HTN hypertension  Syncope thought vasovagal   Urinary retention    *Incidental finding   Left intraorbital extraconal lesion along superior aspect of  left orbit measures 6.7 x 16 mm on image 12 of series 7 minutes image 27 series 12. Differential diagnosis includes orbital varix versus   orbital cavernous venous malformation.  -discussed with family: at this time they deferred further imaging +/-intervention       PAST SURGICAL HX  Hip arthroplasty           89 year old female w severe AS, dementia? (baseline A&O x 2–3), GERD, HLD, HTN BIBEMS from [facility] for injuries from a fall.      Staff states they found her on the ground at approximately 7:30 AM.  Patient denies any complaints but is noted to have skin tear to the left hand.  Called as a neuro alert on arrival.  EMS reports staff last saw the pt well at 0500 this morning. No blood thinners.   Bruising/skin tear to left hand and bruising to b/l legs. Pt in c-collar from EMS    Son reported that his daughter saw pt yesterday and she was confused.  While at [facility] was on BP and other meds; All meds except those listed below were DC.  Son confirmed w sister by phone.    In ED /73   HR 96   RR 15   T 97.6   98% sat RA  CTH no acute changes, Csp not good evaluation of upper C spine  CT C/A/P chronic T12-L1 compress fx, mildly distended GB w stones, large rectal stool ball  xray wrist Demineralized bone. Scapholunate dissociation of indeterminate age.  NS 1000 cc, CTX for abn UA        PAST MEDICAL HX  Acute metabolic encephalopathy   AS aortic stenosis : severe by TTE   Bacteremia   -Growth in aerobic bottle: Corynebacterium aurimucosum group  -Growth in anaerobic bottle: Gram positive cocci in pairs  UCx neg tx w cefepime+vanc  GI bleed coffee ground emesis   HLD hyperlipidemia  HTN hypertension  Syncope thought vasovagal   Urinary retention    *Incidental finding   Left intraorbital extraconal lesion along superior aspect of  left orbit measures 6.7 x 16 mm on image 12 of series 7 minutes image 27 series 12. Differential diagnosis includes orbital varix versus   orbital cavernous venous malformation.  -discussed with family: at this time they deferred further imaging +/-intervention       PAST SURGICAL HX  Hip arthroplasty        SOCIAL HX  Before  living at home alone independent  after DC from hospital went to [facility] then to   [facility]  residents can have 1-2 glasses of wine w dinner  uses rolling walker for short distance and wheelchair for long distance

## 2025-01-19 NOTE — ED PROVIDER NOTE - CLINICAL SUMMARY MEDICAL DECISION MAKING FREE TEXT BOX
89-year-old female not on aspirin or anticoagulation presenting from nursing facility for injuries from a fall, found down during morning rounds.  Patient appears to be at baseline.  Only traumatic findings on physical exam are skin tear with ecchymosis to the left hand and small skin tear right ankle.  She is neurovascularly intact otherwise.  Called as a neuro alert on arrival, plan for pan CT, labs, UA, update tetanus, pain control, reassess for dispo.

## 2025-01-19 NOTE — H&P ADULT - NSHPPHYSICALEXAM_GEN_ALL_CORE
Vital Signs Last 24 Hrs  T(C): 36.6 (19 Jan 2025 16:00), Max: 36.8 (19 Jan 2025 11:54)  T(F): 97.8 (19 Jan 2025 16:00), Max: 98.2 (19 Jan 2025 11:54)  HR: 86 (19 Jan 2025 16:00) (86 - 96)  BP: 138/68 (19 Jan 2025 16:00) (138/68 - 146/73)  BP(mean): --  RR: 18 (19 Jan 2025 16:00) (15 - 18)  SpO2: 95% (19 Jan 2025 16:00) (95% - 98%)    Parameters below as of 19 Jan 2025 16:00  Patient On (Oxygen Delivery Method): room air

## 2025-01-19 NOTE — ED PROVIDER NOTE - OBJECTIVE STATEMENT
89-year-old female PMH HTN, HLD, GERD, dementia (baseline A&O x 2–3), presents to the emergency department for injuries from a fall.  Patient brought in by EMS from [facility], staff states they found her on the ground at approximately 7:30 AM.  Patient denies any complaints but is noted to have skin tear to the left hand.  Called as a neuro alert on arrival.

## 2025-01-19 NOTE — ED PROVIDER NOTE - PROGRESS NOTE DETAILS
Labs and imaging personally reviewed.  Patient has a notable leukocytosis, UA mildly positive but given metabolic encephalopathy will treat empirically with antibiotics.  CTs negative for any acute traumatic findings.  There is a distended gallbladder without signs of cholecystitis and a large rectal stool ball, would benefit from bowel regimen.  Hand x-ray with demineralized bone and scapholunate dissociation, no fractures.  I, Zeferino Ford, personally discussed this patient's care with attending hospitalist Dr. Mahoney who recommends that the medicine.  Discussed plan of care with son at bedside and all questions answered, he is in agreement.

## 2025-01-19 NOTE — PATIENT PROFILE ADULT - FALL HARM RISK - RISK INTERVENTIONS
Assistance OOB with selected safe patient handling equipment/Assistance with ambulation/Communicate Fall Risk and Risk Factors to all staff, patient, and family/Discuss with provider need for PT consult/Monitor gait and stability/Provide patient with walking aids - walker, cane, crutches/Reinforce activity limits and safety measures with patient and family/Use of alarms - bed, chair and/or voice tab/Visual Cue: Yellow wristband/Bed in lowest position, wheels locked, appropriate side rails in place/Call bell, personal items and telephone in reach/Instruct patient to call for assistance before getting out of bed or chair/Non-slip footwear when patient is out of bed/[place] to call system/Physically safe environment - no spills, clutter or unnecessary equipment/Purposeful Proactive Rounding/Room/bathroom lighting operational, light cord in reach

## 2025-01-19 NOTE — ED ADULT NURSE NOTE - NSFALLHARMRISKINTERV_ED_ALL_ED

## 2025-01-19 NOTE — H&P ADULT - ASSESSMENT
89 year old female w dementia (baseline A&O x 2–3), GERD, HLD, HTN BIBEMS from [facility] for injuries from a fall     89 year old female w severe AS, dementia ? (baseline A&O x 2–3), GERD, HLD, HTN BIBEMS from [facility] for injuries from a fall      #Fall  EKG NSR  #Skin tears hand and ankle  1. admit to med  2. fall precautions  3. check B12  4. PT consult  5. local care      #Acute metabolic encephalopathy  Son indicated started yesterday  #UTI  #Stool impaction  #Leukocytosis  Pt is nonverbal at present  1. Fall risk  2. continue ceftriaxone   3. follow up Cx  4. enema  5. senna  6. miralax  7. trend CBC      #AS: severe  1. continued  cc/hr        #Wrist : L dislocation  appears chronic  xray reflects widening at scaphenolunate undetermined age  on physical exam she has no STS and has painless passive ROM of the wrist      #GERD hx  no longer on PPI  1. mylanta prn  2. zofran prn      #HLD  1. no longer on statin        #HTN  1. no longer on BP meds  2. monitor        #VTE  bleeding risk        #GOC  MOLST from [facility] only reflects DNR. son confirmed  In EMR MOLST from  adm complete    DNR, trial of NIV DNI  printed MOLST updated and placed in chart and order placed      Daughter is HCP        #80 minutes

## 2025-01-19 NOTE — ED PROVIDER NOTE - NS ED MD DISPO ISOLATION TYPES
"Writer was bringing in the dinner cart. Patient was waiting by exit doors and stated \"don't you bring that fucking cart in here.\" Patient was asked to move away from the doors and slowly began walking in the opposite direction, speaking obscenities at writer the whole time while walking. Writer walked in dinner cart with staff present. Patient then waited at the doors to pod two and told writer that they couldn't get through. Patient again was asked to move and patient stated \"fucking bitch smiling at me. Keep smiling bitch, see what happens.\" Patient continued to swear for a period of time but eventually was able to calm down.   " None

## 2025-01-20 LAB
ANION GAP SERPL CALC-SCNC: 6 MMOL/L — SIGNIFICANT CHANGE UP (ref 5–17)
BASOPHILS # BLD AUTO: 0.03 K/UL — SIGNIFICANT CHANGE UP (ref 0–0.2)
BASOPHILS NFR BLD AUTO: 0.3 % — SIGNIFICANT CHANGE UP (ref 0–2)
BUN SERPL-MCNC: 16 MG/DL — SIGNIFICANT CHANGE UP (ref 7–23)
CALCIUM SERPL-MCNC: 9.7 MG/DL — SIGNIFICANT CHANGE UP (ref 8.5–10.1)
CHLORIDE SERPL-SCNC: 107 MMOL/L — SIGNIFICANT CHANGE UP (ref 96–108)
CO2 SERPL-SCNC: 26 MMOL/L — SIGNIFICANT CHANGE UP (ref 22–31)
CREAT SERPL-MCNC: 0.9 MG/DL — SIGNIFICANT CHANGE UP (ref 0.5–1.3)
EGFR: 58 ML/MIN/1.73M2 — LOW
EOSINOPHIL # BLD AUTO: 0.08 K/UL — SIGNIFICANT CHANGE UP (ref 0–0.5)
EOSINOPHIL NFR BLD AUTO: 0.8 % — SIGNIFICANT CHANGE UP (ref 0–6)
GLUCOSE SERPL-MCNC: 101 MG/DL — HIGH (ref 70–99)
HCT VFR BLD CALC: 36.5 % — SIGNIFICANT CHANGE UP (ref 34.5–45)
HGB BLD-MCNC: 12 G/DL — SIGNIFICANT CHANGE UP (ref 11.5–15.5)
IMM GRANULOCYTES NFR BLD AUTO: 0.3 % — SIGNIFICANT CHANGE UP (ref 0–0.9)
LYMPHOCYTES # BLD AUTO: 2.29 K/UL — SIGNIFICANT CHANGE UP (ref 1–3.3)
LYMPHOCYTES # BLD AUTO: 23.9 % — SIGNIFICANT CHANGE UP (ref 13–44)
MCHC RBC-ENTMCNC: 30.9 PG — SIGNIFICANT CHANGE UP (ref 27–34)
MCHC RBC-ENTMCNC: 32.9 G/DL — SIGNIFICANT CHANGE UP (ref 32–36)
MCV RBC AUTO: 94.1 FL — SIGNIFICANT CHANGE UP (ref 80–100)
MONOCYTES # BLD AUTO: 0.58 K/UL — SIGNIFICANT CHANGE UP (ref 0–0.9)
MONOCYTES NFR BLD AUTO: 6.1 % — SIGNIFICANT CHANGE UP (ref 2–14)
NEUTROPHILS # BLD AUTO: 6.56 K/UL — SIGNIFICANT CHANGE UP (ref 1.8–7.4)
NEUTROPHILS NFR BLD AUTO: 68.6 % — SIGNIFICANT CHANGE UP (ref 43–77)
PLATELET # BLD AUTO: 262 K/UL — SIGNIFICANT CHANGE UP (ref 150–400)
POTASSIUM SERPL-MCNC: 3.7 MMOL/L — SIGNIFICANT CHANGE UP (ref 3.5–5.3)
POTASSIUM SERPL-SCNC: 3.7 MMOL/L — SIGNIFICANT CHANGE UP (ref 3.5–5.3)
RBC # BLD: 3.88 M/UL — SIGNIFICANT CHANGE UP (ref 3.8–5.2)
RBC # FLD: 13 % — SIGNIFICANT CHANGE UP (ref 10.3–14.5)
SODIUM SERPL-SCNC: 139 MMOL/L — SIGNIFICANT CHANGE UP (ref 135–145)
WBC # BLD: 9.57 K/UL — SIGNIFICANT CHANGE UP (ref 3.8–10.5)
WBC # FLD AUTO: 9.57 K/UL — SIGNIFICANT CHANGE UP (ref 3.8–10.5)

## 2025-01-20 PROCEDURE — 99232 SBSQ HOSP IP/OBS MODERATE 35: CPT

## 2025-01-20 RX ORDER — BISACODYL 5 MG
10 TABLET, DELAYED RELEASE (ENTERIC COATED) ORAL DAILY
Refills: 0 | Status: DISCONTINUED | OUTPATIENT
Start: 2025-01-20 | End: 2025-01-23

## 2025-01-20 RX ADMIN — CEFTRIAXONE 1000 MILLIGRAM(S): 250 INJECTION, POWDER, FOR SOLUTION INTRAMUSCULAR; INTRAVENOUS at 15:38

## 2025-01-20 RX ADMIN — ACETAMINOPHEN 650 MILLIGRAM(S): 160 SUSPENSION ORAL at 02:45

## 2025-01-20 RX ADMIN — POLYETHYLENE GLYCOL 3350 17 GRAM(S): 17 POWDER, FOR SOLUTION ORAL at 21:08

## 2025-01-20 RX ADMIN — ACETAMINOPHEN, DIPHENHYDRAMINE HCL, PHENYLEPHRINE HCL 3 MILLIGRAM(S): 325; 25; 5 TABLET ORAL at 21:09

## 2025-01-20 RX ADMIN — ACETAMINOPHEN 650 MILLIGRAM(S): 160 SUSPENSION ORAL at 21:09

## 2025-01-20 RX ADMIN — Medication 2 TABLET(S): at 21:08

## 2025-01-20 RX ADMIN — ACETAMINOPHEN 650 MILLIGRAM(S): 160 SUSPENSION ORAL at 03:40

## 2025-01-20 NOTE — DIETITIAN INITIAL EVALUATION ADULT - PERTINENT MEDS FT
MEDICATIONS  (STANDING):  cefTRIAXone Injectable. 1000 milliGRAM(s) IV Push every 24 hours  lactated ringers. 1000 milliLiter(s) (100 mL/Hr) IV Continuous <Continuous>  polyethylene glycol 3350 17 Gram(s) Oral at bedtime  senna 2 Tablet(s) Oral at bedtime    MEDICATIONS  (PRN):  acetaminophen     Tablet .. 650 milliGRAM(s) Oral every 6 hours PRN Temp greater or equal to 38C (100.4F), Mild Pain (1 - 3)  aluminum hydroxide/magnesium hydroxide/simethicone Suspension 30 milliLiter(s) Oral every 4 hours PRN Dyspepsia  melatonin 3 milliGRAM(s) Oral at bedtime PRN Insomnia  ondansetron Injectable 4 milliGRAM(s) IV Push every 8 hours PRN Nausea and/or Vomiting    Home Medications:  acetaminophen 325 mg oral tablet: 2 tab(s) orally every 6 hours as needed for  pain max 3 g/24 hours (19 Jan 2025 18:38)  Colace 100 mg oral capsule: 2 cap(s) orally once a day (at bedtime) (19 Jan 2025 18:39)

## 2025-01-20 NOTE — DIETITIAN INITIAL EVALUATION ADULT - PERTINENT LABORATORY DATA
01-20    139  |  107  |  16  ----------------------------<  101[H]  3.7   |  26  |  0.90    Ca    9.7      20 Jan 2025 07:14    TPro  8.0  /  Alb  3.6  /  TBili  0.4  /  DBili  x   /  AST  29  /  ALT  26  /  AlkPhos  123[H]  01-19  A1C with Estimated Average Glucose Result: 5.4 % (07-29-24 @ 06:34)

## 2025-01-20 NOTE — DIETITIAN INITIAL EVALUATION ADULT - NAME AND PHONE
Taylor Rivers, MS, RDN, CDN, [facility] [phone]  Certified Nutrition

## 2025-01-20 NOTE — DIETITIAN INITIAL EVALUATION ADULT - ORAL INTAKE PTA/DIET HISTORY
Unable to obtain intake/ diet hx pta 2/2 pt's mental status. From [facility] AL -  no diet hx in shadow chart.

## 2025-01-20 NOTE — DIETITIAN INITIAL EVALUATION ADULT - ADD RECOMMEND
1) Liberalize diet to regular to maximize caloric and nutrient intake.  - Consider SLP eval to assess for safest/ least restrictive diet consistency/ texture  2) Add ensure plus high protein BID to optimize PO intake (provides 350 kcal, 20g protein/ shake)   3) Obtain vitamin D 25OH level to assess nutriture  4) Please obtain weekly weights  5) Consider adding thiamine 100 mg daily 2/2 poor PO intake/ malnutrition  6) MVI w/ minerals daily to ensure 100% RDA met  7) Encourage protein-rich foods, maximize food preferences  8) Monitor bowel movements, if no BM for >3 days, consider implementing bowel regimen.  9) Confirm goals of care regarding nutrition support - Nutrition support is not recommended due to overall declining medical status which evidenced based studies indicate EN is not effective in prolonging survival and improving quality of life. It can also increase risk of aspiration pneumonia as well as other related issues (infection, GI complications, and worsening/ non-healing PI's). However, will provide nutrition/ hydration within GOC.   Please see additional recommendations below.

## 2025-01-20 NOTE — DIETITIAN INITIAL EVALUATION ADULT - NSFNSPHYEXAMSKINFT_GEN_A_CORE
Pressure Injury 1: Right:, greater trochanter (hip), Stage I  Pressure Injury 2: Left:, iliac crest, Stage I  Pressure Injury 3: Left:, heel, Stage I  Pressure Injury 4: Right:, heel, Stage I    Callum Score 13.

## 2025-01-20 NOTE — DIETITIAN INITIAL EVALUATION ADULT - OTHER INFO
89 y/o F with a PMHx of severe AS, dementia? (baseline A&O x 2–3), GERD, HLD, HTN BIBEMS from [facility] for injuries from a fall. Staff states they found her on the ground at approximately 7:30 AM. EMS reports staff last saw the pt well at 0500 this morning. Bruising/skin tear to left hand and bruising to b/l legs. Pt in c-collar from EMS. Son reported that his daughter saw pt yesterday and she was confused. Admitted s/p fall for acute metabolic encephalopathy, UTI, and stool impaction. Pt is DNR/DNI, however TF IS NOT ADDRESSED IN MOLST.    Unable to obtain meaningful information 2/2 pt's mental status. RD observed breakfast tray at bedside untouched. RD obtained bedscale wt on 1/20 - 93#. No weight hx to review. Pt thin, frail, and shana appearing. NFPE reveals severe muscle/ fat wasting, pt meets criteria for PCM at this time. Recommend to liberalize diet to regular to maximize caloric and nutrient intake. Consider SLP eval to assess for safest/ least restrictive diet consistency/ texture. Will trial ensure plus high protein BID in effort to optimize PO intake. Please see additional recommendations below.

## 2025-01-20 NOTE — DIETITIAN INITIAL EVALUATION ADULT - NSFNSGIIOFT_GEN_A_CORE
I&O's Detail    19 Jan 2025 07:01  -  20 Jan 2025 07:00  --------------------------------------------------------  IN:    Lactated Ringers: 1000 mL  Total IN: 1000 mL    OUT:  Total OUT: 0 mL    Total NET: 1000 mL

## 2025-01-21 LAB
ANION GAP SERPL CALC-SCNC: 7 MMOL/L — SIGNIFICANT CHANGE UP (ref 5–17)
BUN SERPL-MCNC: 22 MG/DL — SIGNIFICANT CHANGE UP (ref 7–23)
CALCIUM SERPL-MCNC: 9.7 MG/DL — SIGNIFICANT CHANGE UP (ref 8.5–10.1)
CHLORIDE SERPL-SCNC: 106 MMOL/L — SIGNIFICANT CHANGE UP (ref 96–108)
CO2 SERPL-SCNC: 25 MMOL/L — SIGNIFICANT CHANGE UP (ref 22–31)
CREAT SERPL-MCNC: 1.01 MG/DL — SIGNIFICANT CHANGE UP (ref 0.5–1.3)
EGFR: 51 ML/MIN/1.73M2 — LOW
GLUCOSE SERPL-MCNC: 105 MG/DL — HIGH (ref 70–99)
HCT VFR BLD CALC: 41.4 % — SIGNIFICANT CHANGE UP (ref 34.5–45)
HGB BLD-MCNC: 13.4 G/DL — SIGNIFICANT CHANGE UP (ref 11.5–15.5)
MCHC RBC-ENTMCNC: 30.8 PG — SIGNIFICANT CHANGE UP (ref 27–34)
MCHC RBC-ENTMCNC: 32.4 G/DL — SIGNIFICANT CHANGE UP (ref 32–36)
MCV RBC AUTO: 95.2 FL — SIGNIFICANT CHANGE UP (ref 80–100)
PLATELET # BLD AUTO: 261 K/UL — SIGNIFICANT CHANGE UP (ref 150–400)
POTASSIUM SERPL-MCNC: 3.5 MMOL/L — SIGNIFICANT CHANGE UP (ref 3.5–5.3)
POTASSIUM SERPL-SCNC: 3.5 MMOL/L — SIGNIFICANT CHANGE UP (ref 3.5–5.3)
RBC # BLD: 4.35 M/UL — SIGNIFICANT CHANGE UP (ref 3.8–5.2)
RBC # FLD: 12.8 % — SIGNIFICANT CHANGE UP (ref 10.3–14.5)
SODIUM SERPL-SCNC: 138 MMOL/L — SIGNIFICANT CHANGE UP (ref 135–145)
WBC # BLD: 10.15 K/UL — SIGNIFICANT CHANGE UP (ref 3.8–10.5)
WBC # FLD AUTO: 10.15 K/UL — SIGNIFICANT CHANGE UP (ref 3.8–10.5)

## 2025-01-21 PROCEDURE — 99233 SBSQ HOSP IP/OBS HIGH 50: CPT

## 2025-01-21 RX ADMIN — CEFTRIAXONE 1000 MILLIGRAM(S): 250 INJECTION, POWDER, FOR SOLUTION INTRAMUSCULAR; INTRAVENOUS at 15:40

## 2025-01-21 RX ADMIN — POLYETHYLENE GLYCOL 3350 17 GRAM(S): 17 POWDER, FOR SOLUTION ORAL at 21:51

## 2025-01-21 RX ADMIN — Medication 2 TABLET(S): at 21:51

## 2025-01-21 NOTE — PHYSICAL THERAPY INITIAL EVALUATION ADULT - PERTINENT HX OF CURRENT PROBLEM, REHAB EVAL
89 year old female w severe AS, dementia? (baseline A&O x 2–3), GERD, HLD, HTN BIBEMS from [facility] for injuries from a fall.      Staff states they found her on the ground at approximately 7:30 AM.  Patient denies any complaints but is noted to have skin tear to the left hand.  Called as a neuro alert on arrival.  EMS reports staff last saw the pt well at 0500 this morning. No blood thinners.   Bruising/skin tear to left hand and bruising to b/l legs. Pt in c-collar from EMS    Son reported that his daughter saw pt yesterday and she was confused.  While at [facility] was on BP and other meds; All meds except those listed below were DC.  Son confirmed w sister by phone.

## 2025-01-21 NOTE — PHYSICAL THERAPY INITIAL EVALUATION ADULT - GAIT TRAINING, PT EVAL
Patient will be able to safely ambulate 150 feet with Rolling Walker by discharge from acute care setting.

## 2025-01-21 NOTE — PHYSICAL THERAPY INITIAL EVALUATION ADULT - GENERAL OBSERVATIONS, REHAB EVAL
Pt found supine in bed on 5S, in NAD, agreeable to participate in PT Evaluation. Pt is able to perform bed mobility and sit<>stand transfers with Katherin to RW. Pt refuses to attempt further ambulation at this time. Pt provided with education and encouragement regarding benefits of  ambulation, however pt continues to refuse.  Pt is able to perform able to ambulate  feet with RW and CGA.

## 2025-01-22 LAB
AMMONIA BLD-MCNC: <10 UMOL/L — LOW (ref 11–32)
ANION GAP SERPL CALC-SCNC: 6 MMOL/L — SIGNIFICANT CHANGE UP (ref 5–17)
APAP SERPL-MCNC: 4 UG/ML — LOW (ref 10–30)
BUN SERPL-MCNC: 23 MG/DL — SIGNIFICANT CHANGE UP (ref 7–23)
CALCIUM SERPL-MCNC: 9.2 MG/DL — SIGNIFICANT CHANGE UP (ref 8.5–10.1)
CHLORIDE SERPL-SCNC: 105 MMOL/L — SIGNIFICANT CHANGE UP (ref 96–108)
CO2 SERPL-SCNC: 27 MMOL/L — SIGNIFICANT CHANGE UP (ref 22–31)
CREAT SERPL-MCNC: 1.02 MG/DL — SIGNIFICANT CHANGE UP (ref 0.5–1.3)
EGFR: 50 ML/MIN/1.73M2 — LOW
GLUCOSE SERPL-MCNC: 108 MG/DL — HIGH (ref 70–99)
HCT VFR BLD CALC: 37.5 % — SIGNIFICANT CHANGE UP (ref 34.5–45)
HGB BLD-MCNC: 12.4 G/DL — SIGNIFICANT CHANGE UP (ref 11.5–15.5)
MCHC RBC-ENTMCNC: 31.1 PG — SIGNIFICANT CHANGE UP (ref 27–34)
MCHC RBC-ENTMCNC: 33.1 G/DL — SIGNIFICANT CHANGE UP (ref 32–36)
MCV RBC AUTO: 94 FL — SIGNIFICANT CHANGE UP (ref 80–100)
PLATELET # BLD AUTO: 245 K/UL — SIGNIFICANT CHANGE UP (ref 150–400)
POTASSIUM SERPL-MCNC: 4.1 MMOL/L — SIGNIFICANT CHANGE UP (ref 3.5–5.3)
POTASSIUM SERPL-SCNC: 4.1 MMOL/L — SIGNIFICANT CHANGE UP (ref 3.5–5.3)
RBC # BLD: 3.99 M/UL — SIGNIFICANT CHANGE UP (ref 3.8–5.2)
RBC # FLD: 13 % — SIGNIFICANT CHANGE UP (ref 10.3–14.5)
SALICYLATES SERPL-MCNC: <1.7 MG/DL — LOW (ref 2.8–20)
SODIUM SERPL-SCNC: 138 MMOL/L — SIGNIFICANT CHANGE UP (ref 135–145)
T PALLIDUM AB TITR SER: NEGATIVE — SIGNIFICANT CHANGE UP
TSH SERPL-MCNC: 2.19 UU/ML — SIGNIFICANT CHANGE UP (ref 0.34–4.82)
VIT B12 SERPL-MCNC: 340 PG/ML — SIGNIFICANT CHANGE UP (ref 232–1245)
WBC # BLD: 8.6 K/UL — SIGNIFICANT CHANGE UP (ref 3.8–10.5)
WBC # FLD AUTO: 8.6 K/UL — SIGNIFICANT CHANGE UP (ref 3.8–10.5)

## 2025-01-22 PROCEDURE — 99223 1ST HOSP IP/OBS HIGH 75: CPT

## 2025-01-22 PROCEDURE — 99232 SBSQ HOSP IP/OBS MODERATE 35: CPT

## 2025-01-22 RX ORDER — CEFTRIAXONE 250 MG/1
1000 INJECTION, POWDER, FOR SOLUTION INTRAMUSCULAR; INTRAVENOUS EVERY 24 HOURS
Refills: 0 | Status: COMPLETED | OUTPATIENT
Start: 2025-01-22 | End: 2025-01-23

## 2025-01-22 RX ADMIN — ACETAMINOPHEN 650 MILLIGRAM(S): 160 SUSPENSION ORAL at 02:02

## 2025-01-22 RX ADMIN — ACETAMINOPHEN 650 MILLIGRAM(S): 160 SUSPENSION ORAL at 17:28

## 2025-01-22 RX ADMIN — ACETAMINOPHEN 650 MILLIGRAM(S): 160 SUSPENSION ORAL at 01:18

## 2025-01-22 RX ADMIN — CEFTRIAXONE 1000 MILLIGRAM(S): 250 INJECTION, POWDER, FOR SOLUTION INTRAMUSCULAR; INTRAVENOUS at 14:45

## 2025-01-22 RX ADMIN — ACETAMINOPHEN, DIPHENHYDRAMINE HCL, PHENYLEPHRINE HCL 3 MILLIGRAM(S): 325; 25; 5 TABLET ORAL at 01:18

## 2025-01-22 RX ADMIN — ACETAMINOPHEN 650 MILLIGRAM(S): 160 SUSPENSION ORAL at 16:14

## 2025-01-22 RX ADMIN — Medication 2 TABLET(S): at 21:11

## 2025-01-22 RX ADMIN — POLYETHYLENE GLYCOL 3350 17 GRAM(S): 17 POWDER, FOR SOLUTION ORAL at 21:11

## 2025-01-22 NOTE — PROGRESS NOTE ADULT - ASSESSMENT
#YULI 2/2 UTI   #Fall  #stool impaction  #leukocytosis - resolved  CT Head chest abd pelvis noted   trauma w/u neg   c/w ceftriaxone for UTI   B12 pending   fall and YULI likely related to UTI   pt improved from admission - awake alert and conversive since starting antibiotics   f/u PT consult - [facility] vs home with home PT  s/p enema and dulcolax suppository with no bm   disimpacted at bedside today   small BM after disimpaction  per son pt does not have an official diagnosis of dementia but does have poor short term memory  pt is very hard of hearing and lost her hearing aids and has been declining for last 2 months - very much contributes to confusion   acetminophen level salycilyate level RPR screen ammonia level all negative   b12 level wnl  TSH wnl  blood culture ng x 48 hours   although ua abnormal did not reflex to culture - unclear why as there is positive leuk and nitrite and bacteria - spoke to lab  will order repeat Urine culture now - if negative pt is adequately treated  per son, pt is still having moments of AMS.   son does admit pt has some short term memory loss but normally "80% of the time she is coherent"  suspect pt does indeed have some underlying dementia   AMS likely exacerbated by UTI and deafness  Neuro consult: agree with acute toxic metabolic encephalopathy  awaiting repeat urine culture results    #Wrist : L dislocation  appears chronic  xray reflects widening at scaphenolunate undetermined age  on physical exam she has no STS and has painless passive ROM of the wrist  Reviewed with ortho - agree this is chronic - no intervention required    #HTN/HLD/GERD  no longer on statin or bp meds or ppi  monitor - no need for agressive bp control with 89 yo old with hx of falls  c/w mylanta and zofran prn    VTE: SCDs  Dispo: urine culture collected 1/21- await results as first culture was not plated by lab; likely dc tomorrow  Discussed with son Ovi at bedside
#YULI 2/2 UTI   #Fall  #stool impaction  #leukocytosis - resolved  CT Head chest abd pelvis noted   trauma w/u neg   c/w ceftriaxone for UTI   f/u cultures   B12 pending   fall and YULI likely related to UTI   pt improved from admission - awake alert and conversive since starting antibiotics   f/u PT consult   s/p enema with no bm   try dulcolax suppository    #Wrist : L dislocation  appears chronic  xray reflects widening at scaphenolunate undetermined age  on physical exam she has no STS and has painless passive ROM of the wrist  Reviewed with ortho - agree this is chronic - no intervention required    #HTN/HLD/GERD  no longer on statin or bp meds or ppi  monitor - no need for agressive bp control with 89 yo old with hx of falls  c/w mylanta and zofran prn    VTE: SCDs  Dispo: await urine cultures
#YULI 2/2 UTI   #Fall  #stool impaction  #leukocytosis - resolved  CT Head chest abd pelvis noted   trauma w/u neg   c/w ceftriaxone for UTI   B12 pending   fall and YULI likely related to UTI   pt improved from admission - awake alert and conversive since starting antibiotics   f/u PT consult - [facility] vs home with home PT  s/p enema and dulcolax suppository with no bm   disimpacted at bedside today   small BM after disimpaction  per son pt does not have an official diagnosis of dementia but does have poor short term memory  pt is very hard of hearing and lost her hearing aids and has been declining for last 2 months - very much contributes to confusion   blood culture ng x 24 hours   although ua abnormal did not reflex to culture - unclear why as there is positive leuk and nitrite and bacteria - spoke to lab  will order repeat Urine culture now - if negative pt is adequately treated  per son, pt is still having moments of AMS.   son does admit pt has some short term memory loss but normally "80% of the time she is coherent"  suspect pt does indeed have some underlying dementia   AMS likely exacerbated by UTI and deafness  will consult neuro     #Wrist : L dislocation  appears chronic  xray reflects widening at scaphenolunate undetermined age  on physical exam she has no STS and has painless passive ROM of the wrist  Reviewed with ortho - agree this is chronic - no intervention required    #HTN/HLD/GERD  no longer on statin or bp meds or ppi  monitor - no need for agressive bp control with 89 yo old with hx of falls  c/w mylanta and zofran prn    VTE: SCDs  Dispo: urine culture ordered again today - await results as first culture was not plated by lab

## 2025-01-22 NOTE — CONSULT NOTE ADULT - SUBJECTIVE AND OBJECTIVE BOX
Patient is a 89y old  Female who presents with a chief complaint of fall  UTI  fecal impaction (21 Jan 2025 19:45)      HPI:  Ms. Diaz is a 89 year old female w severe AS, dementia? (baseline A&O x 2–3), GERD, HLD, HTN BIBEMS from [facility] for injuries from a fall.      Staff states they found her on the ground at approximately 7:30 AM.  Patient denies any complaints but is noted to have skin tear to the left hand.  Called as a neuro alert on arrival.  EMS reports staff last saw the pt well at 0500 on the morning of admission.   Her son reported that his daughter saw the patient the day prior to admission and she was confused.    In ED /73   HR 96   RR 15   T 97.6   98% sat RA  CTH no acute changes, Csp not good evaluation of upper C spine  CT C/A/P chronic T12-L1 compress fx, mildly distended GB w stones, large rectal stool ball  xray wrist Demineralized bone. Scapholunate dissociation of indeterminate age.  NS 1000 cc, CTX for abn UA        PAST MEDICAL HX  Acute metabolic encephalopathy   AS aortic stenosis : severe by TTE   Bacteremia   -Growth in aerobic bottle: Corynebacterium aurimucosum group  -Growth in anaerobic bottle: Gram positive cocci in pairs  UCx neg tx w cefepime+vanc  GI bleed coffee ground emesis   HLD hyperlipidemia  HTN hypertension  Syncope thought vasovagal   Urinary retention      PAST SURGICAL HX  Hip arthroplasty        SOCIAL HX  Before  living at home alone independent  [facility]  residents can have 1-2 glasses of wine w dinner  uses rolling walker for short distance and wheelchair for long distance   (19 Jan 2025 17:56)      PAST MEDICAL & SURGICAL HISTORY:  HTN (hypertension)          FAMILY HISTORY:      Social Hx:  Nonsmoker, no drug or alcohol use    MEDICATIONS  (STANDING):  cefTRIAXone Injectable. 1000 milliGRAM(s) IV Push every 24 hours  lactated ringers. 1000 milliLiter(s) (100 mL/Hr) IV Continuous <Continuous>  polyethylene glycol 3350 17 Gram(s) Oral at bedtime  senna 2 Tablet(s) Oral at bedtime       Allergies    No Known Allergies    Intolerances        ROS: Pertinent positives in HPI, all other ROS were reviewed and are negative.      Vital Signs Last 24 Hrs  T(C): 36.3 (22 Jan 2025 07:35), Max: 36.6 (21 Jan 2025 22:00)  T(F): 97.3 (22 Jan 2025 07:35), Max: 97.9 (21 Jan 2025 22:00)  HR: 55 (22 Jan 2025 07:35) (55 - 91)  BP: 144/67 (22 Jan 2025 07:35) (143/72 - 144/67)  BP(mean): --  RR: 18 (22 Jan 2025 07:35) (18 - 18)  SpO2: 94% (22 Jan 2025 07:35) (94% - 95%)    Parameters below as of 22 Jan 2025 07:35  Patient On (Oxygen Delivery Method): nasal cannula  O2 Flow (L/min): 2        Seen earlier today  Constitutional: awake and alert.  HEENT: PERRLA, EOMI,   Neck: Supple.  Respiratory: Breath sounds are clear bilaterally  Cardiovascular: S1 and S2, regular / irregular rhythm  Gastrointestinal: soft, nontender  Extremities:  no edema  Skin: No rashes    Neurological exam:  HF: Awake and alert. Able to state her name. She was unable to tell me where we are and was unable to state the date.   Confused, difficulty answering questions appropriately and following complex commands.  CN: JUDE, EOMI, hard of hearing, facial sensation normal, no NLFD, tongue midline, Palate moves equally, SCM equal bilaterally  Motor: No pronator drift, Strength 5/5 in upper extremities. Lifts legs minimally off the bed  Sens: Intact to light touch  Reflexes: Symmetric. BJ trace, BR trace, KJ absent,  downgoing toes b/l  Coord: unable to test  Gait/Balance: unable to test          Labs:   01-22    138  |  105  |  23  ----------------------------<  108[H]  4.1   |  27  |  1.02    Ca    9.2      22 Jan 2025 07:26                                12.4   8.60  )-----------( 245      ( 22 Jan 2025 07:26 )             37.5       Radiology:    CT head and cervical spine 1/19/25:  CT HEAD:  Noacute intracranial hemorrhage, mass effect or calvarial fracture.  Moderate white matter small vessel ischemic disease.      CT CERVICAL SPINE:  Limited CT cervical spine secondary patient motion with suboptimal   evaluation of upper half of the cervical vertebrae.  No acute fracture of lower cervical vertebrae.  Consider follow-up repeat CT cervical spine when clinical condition allows.

## 2025-01-22 NOTE — PROGRESS NOTE ADULT - NUTRITIONAL ASSESSMENT
This patient has been assessed with a concern for Malnutrition and has been determined to have a diagnosis/diagnoses of Severe protein-calorie malnutrition.    This patient is being managed with:   Diet DASH/TLC-  Sodium & Cholesterol Restricted  Entered: Jan 19 2025  3:05PM  

## 2025-01-22 NOTE — CONSULT NOTE ADULT - ASSESSMENT
Ms. Diaz is a 89 year old female w severe AS, dementia? (baseline A&O x 2–3), GERD, HLD, HTN BIBEMS from [facility] for injuries from a fall.    She was found to have a UTI.    Altered mental status:  -She was seen the day prior to admission and was confused.  -Likely toxic/metabolic encephalopathy secondary to UTI, superimposed on some baseline cognitive impairment   -Hearing impairment also contributes to patient having difficulty communicating.  -Continue treatment of UTI  -supportive care  -PT appreciated.    Please call back if additional input is needed from neurology service.

## 2025-01-22 NOTE — PROGRESS NOTE ADULT - TIME BILLING
Time spent  coordinating the patient's care. This includes reviewing documentation pertinent to this admission, results and imaging. Further tests, medications, and procedures have been ordered as indicated. Laboratory results and the plan of care were communicated to the patient. Discussed care plan with consultants including ortho. Supporting documentation was completed and added to the patient's chart.
Time spent  coordinating the patient's care. This includes reviewing documentation pertinent to this admission, results and imaging. Further tests, medications, and procedures have been ordered as indicated. Laboratory results and the plan of care were communicated to the patient. Supporting documentation was completed and added to the patient's chart.
Time spent  coordinating the patient's care. This includes reviewing documentation pertinent to this admission, results and imaging. Further tests, medications, and procedures have been ordered as indicated. Laboratory results and the plan of care were communicated to the patient. Discussed care plan with consultants including Neuro. Supporting documentation was completed and added to the patient's chart.

## 2025-01-22 NOTE — PROGRESS NOTE ADULT - SUBJECTIVE AND OBJECTIVE BOX
HOSPITALIST ATTENDING PROGRESS NOTE    Chart and meds reviewed.  Patient seen and examined.    CC: YULI    Subjective: pt awake and alert. [place]. able to answer simple questions. denies urinary symptoms. abd pain fever or chills. denies wrist pain    All other systems reviewed and found to be negative with the exception of what has been described above.    MEDICATIONS  (STANDING):  cefTRIAXone Injectable. 1000 milliGRAM(s) IV Push every 24 hours  lactated ringers. 1000 milliLiter(s) (100 mL/Hr) IV Continuous <Continuous>  polyethylene glycol 3350 17 Gram(s) Oral at bedtime  senna 2 Tablet(s) Oral at bedtime    MEDICATIONS  (PRN):  acetaminophen     Tablet .. 650 milliGRAM(s) Oral every 6 hours PRN Temp greater or equal to 38C (100.4F), Mild Pain (1 - 3)  aluminum hydroxide/magnesium hydroxide/simethicone Suspension 30 milliLiter(s) Oral every 4 hours PRN Dyspepsia  bisacodyl Suppository 10 milliGRAM(s) Rectal daily PRN Constipation  melatonin 3 milliGRAM(s) Oral at bedtime PRN Insomnia  ondansetron Injectable 4 milliGRAM(s) IV Push every 8 hours PRN Nausea and/or Vomiting      PHYSICAL EXAM:  Gen: NAD  CV:  +S1, +S2, regular, no murmurs or rubs  RESP:   lungs clear to auscultation bilaterally, no wheezing, rales, rhonchi, good air entry bilaterally  GI:  abdomen soft, non-tender, non-distended, normal BS, no bruits, no abdominal masses, no palpable masses  :  not examined  MSK:   normal muscle tone, no atrophy, no rigidity, no contractions  EXT:  no edema, no calf pain, swelling or erythema  NEURO:  AAOX1, no focal neurological deficits, follows all commands, able to move extremities spontaneously    LABS:                            12.0   9.57  )-----------( 262      ( 20 Jan 2025 07:14 )             36.5     01-20    139  |  107  |  16  ----------------------------<  101[H]  3.7   |  26  |  0.90    Ca    9.7      20 Jan 2025 07:14    TPro  8.0  /  Alb  3.6  /  TBili  0.4  /  DBili  x   /  AST  29  /  ALT  26  /  AlkPhos  123[H]  01-19        LIVER FUNCTIONS - ( 19 Jan 2025 09:29 )  Alb: 3.6 g/dL / Pro: 8.0 gm/dL / ALK PHOS: 123 U/L / ALT: 26 U/L / AST: 29 U/L / GGT: x           PT/INR - ( 19 Jan 2025 09:29 )   PT: 10.3 sec;   INR: 0.87 ratio         PTT - ( 19 Jan 2025 09:29 )  PTT:27.9 sec  Urinalysis Basic - ( 20 Jan 2025 07:14 )    Color: x / Appearance: x / SG: x / pH: x  Gluc: 101 mg/dL / Ketone: x  / Bili: x / Urobili: x   Blood: x / Protein: x / Nitrite: x   Leuk Esterase: x / RBC: x / WBC x   Sq Epi: x / Non Sq Epi: x / Bacteria: x          
HOSPITALIST ATTENDING PROGRESS NOTE    Chart and meds reviewed.  Patient seen and examined.    CC: uti    Subjective: pt still with no bm. pt very hard of hearing and lost her hearing aids    All other systems reviewed and found to be negative with the exception of what has been described above.    MEDICATIONS  (STANDING):  cefTRIAXone Injectable. 1000 milliGRAM(s) IV Push every 24 hours  lactated ringers. 1000 milliLiter(s) (100 mL/Hr) IV Continuous <Continuous>  polyethylene glycol 3350 17 Gram(s) Oral at bedtime  senna 2 Tablet(s) Oral at bedtime    MEDICATIONS  (PRN):  acetaminophen     Tablet .. 650 milliGRAM(s) Oral every 6 hours PRN Temp greater or equal to 38C (100.4F), Mild Pain (1 - 3)  aluminum hydroxide/magnesium hydroxide/simethicone Suspension 30 milliLiter(s) Oral every 4 hours PRN Dyspepsia  bisacodyl Suppository 10 milliGRAM(s) Rectal daily PRN Constipation  melatonin 3 milliGRAM(s) Oral at bedtime PRN Insomnia  ondansetron Injectable 4 milliGRAM(s) IV Push every 8 hours PRN Nausea and/or Vomiting      PHYSICAL EXAM:  Gen: NAD  CV:  +S1, +S2, regular, no murmurs or rubs  RESP:   lungs clear to auscultation bilaterally, no wheezing, rales, rhonchi, good air entry bilaterally  GI:  abdomen soft, non-tender, non-distended, normal BS, no bruits, no abdominal masses, no palpable masses  :  not examined  MSK:   normal muscle tone, no atrophy, no rigidity, no contractions  EXT:  no edema, no calf pain, swelling or erythema  NEURO:  AAOX1, no focal neurological deficits, follows all commands, able to move extremities spontaneously    LABS:                            13.4   10.15 )-----------( 261      ( 21 Jan 2025 06:35 )             41.4     01-21    138  |  106  |  22  ----------------------------<  105[H]  3.5   |  25  |  1.01    Ca    9.7      21 Jan 2025 06:35              Urinalysis Basic - ( 21 Jan 2025 06:35 )    Color: x / Appearance: x / SG: x / pH: x  Gluc: 105 mg/dL / Ketone: x  / Bili: x / Urobili: x   Blood: x / Protein: x / Nitrite: x   Leuk Esterase: x / RBC: x / WBC x   Sq Epi: x / Non Sq Epi: x / Bacteria: x              CULTURES:  Culture Results:   No growth at 24 hours (01-19-25 @ 14:25)  Culture Results:   No growth at 24 hours (01-19-25 @ 14:18)      Additional results/Imaging, I have personally reviewed:    Telemetry, personally reviewed:
HOSPITALIST ATTENDING PROGRESS NOTE    Chart and meds reviewed.  Patient seen and examined.    CC: YULI 2/2 UTI    Subjective: no acute events afebrile    All other systems reviewed and found to be negative with the exception of what has been described above.    MEDICATIONS  (STANDING):  cefTRIAXone Injectable. 1000 milliGRAM(s) IV Push every 24 hours  lactated ringers. 1000 milliLiter(s) (100 mL/Hr) IV Continuous <Continuous>  polyethylene glycol 3350 17 Gram(s) Oral at bedtime  senna 2 Tablet(s) Oral at bedtime    MEDICATIONS  (PRN):  acetaminophen     Tablet .. 650 milliGRAM(s) Oral every 6 hours PRN Temp greater or equal to 38C (100.4F), Mild Pain (1 - 3)  aluminum hydroxide/magnesium hydroxide/simethicone Suspension 30 milliLiter(s) Oral every 4 hours PRN Dyspepsia  bisacodyl Suppository 10 milliGRAM(s) Rectal daily PRN Constipation  melatonin 3 milliGRAM(s) Oral at bedtime PRN Insomnia  ondansetron Injectable 4 milliGRAM(s) IV Push every 8 hours PRN Nausea and/or Vomiting      PHYSICAL EXAM:  Gen: NAD  CV:  +S1, +S2, regular, no murmurs or rubs  RESP:   lungs clear to auscultation bilaterally, no wheezing, rales, rhonchi, good air entry bilaterally  GI:  abdomen soft, non-tender, non-distended, normal BS, no bruits, no abdominal masses, no palpable masses  :  not examined  MSK:   normal muscle tone, no atrophy, no rigidity, no contractions  EXT:  no edema, no calf pain, swelling or erythema  NEURO:  AAOX1, no focal neurological deficits, follows all commands, able to move extremities spontaneously    LABS:                            12.4   8.60  )-----------( 245      ( 22 Jan 2025 07:26 )             37.5     01-22    138  |  105  |  23  ----------------------------<  108[H]  4.1   |  27  |  1.02    Ca    9.2      22 Jan 2025 07:26              Urinalysis Basic - ( 22 Jan 2025 07:26 )    Color: x / Appearance: x / SG: x / pH: x  Gluc: 108 mg/dL / Ketone: x  / Bili: x / Urobili: x   Blood: x / Protein: x / Nitrite: x   Leuk Esterase: x / RBC: x / WBC x   Sq Epi: x / Non Sq Epi: x / Bacteria: x              CULTURES:  Culture Results:   No growth at 48 Hours (01-19-25 @ 14:25)  Culture Results:   No growth at 48 Hours (01-19-25 @ 14:18)      Additional results/Imaging, I have personally reviewed:    Telemetry, personally reviewed:

## 2025-01-23 VITALS — OXYGEN SATURATION: 94 %

## 2025-01-23 LAB
CULTURE RESULTS: NO GROWTH — SIGNIFICANT CHANGE UP
SPECIMEN SOURCE: SIGNIFICANT CHANGE UP

## 2025-01-23 PROCEDURE — 99239 HOSP IP/OBS DSCHRG MGMT >30: CPT

## 2025-01-23 RX ORDER — POLYETHYLENE GLYCOL 3350 17 G/17G
17 POWDER, FOR SOLUTION ORAL
Qty: 0 | Refills: 0 | DISCHARGE
Start: 2025-01-23

## 2025-01-23 RX ORDER — SENNOSIDES 8.6 MG
2 TABLET ORAL
Qty: 14 | Refills: 0
Start: 2025-01-23 | End: 2025-01-29

## 2025-01-23 RX ORDER — QUETIAPINE FUMARATE 300 MG/1
0.5 TABLET ORAL
Qty: 15 | Refills: 0
Start: 2025-01-23 | End: 2025-02-21

## 2025-01-23 RX ORDER — POLYETHYLENE GLYCOL 3350 17 G/17G
17 POWDER, FOR SOLUTION ORAL
Qty: 510 | Refills: 0
Start: 2025-01-23 | End: 2025-02-21

## 2025-01-23 RX ORDER — DOCUSATE SODIUM 100 MG
2 CAPSULE ORAL
Refills: 0 | DISCHARGE

## 2025-01-23 RX ADMIN — CEFTRIAXONE 1000 MILLIGRAM(S): 250 INJECTION, POWDER, FOR SOLUTION INTRAMUSCULAR; INTRAVENOUS at 14:51

## 2025-01-23 RX ADMIN — ACETAMINOPHEN 650 MILLIGRAM(S): 160 SUSPENSION ORAL at 02:20

## 2025-01-23 RX ADMIN — ACETAMINOPHEN 650 MILLIGRAM(S): 160 SUSPENSION ORAL at 01:20

## 2025-01-23 NOTE — DISCHARGE NOTE PROVIDER - CARE PROVIDERS DIRECT ADDRESSES
[email or web address]

## 2025-01-23 NOTE — DISCHARGE NOTE PROVIDER - NSDCPNSUBOBJ_GEN_ALL_CORE
HOSPITALIST ATTENDING PROGRESS NOTE    Chart and meds reviewed.  Patient seen and examined.    CC: uti    Subjective: afebrile. no acute events    All other systems reviewed and found to be negative with the exception of what has been described above.    MEDICATIONS  (STANDING):  cefTRIAXone Injectable. 1000 milliGRAM(s) IV Push every 24 hours  lactated ringers. 1000 milliLiter(s) (100 mL/Hr) IV Continuous <Continuous>  polyethylene glycol 3350 17 Gram(s) Oral at bedtime  senna 2 Tablet(s) Oral at bedtime    MEDICATIONS  (PRN):  acetaminophen     Tablet .. 650 milliGRAM(s) Oral every 6 hours PRN Temp greater or equal to 38C (100.4F), Mild Pain (1 - 3)  aluminum hydroxide/magnesium hydroxide/simethicone Suspension 30 milliLiter(s) Oral every 4 hours PRN Dyspepsia  bisacodyl Suppository 10 milliGRAM(s) Rectal daily PRN Constipation  melatonin 3 milliGRAM(s) Oral at bedtime PRN Insomnia  ondansetron Injectable 4 milliGRAM(s) IV Push every 8 hours PRN Nausea and/or Vomiting      PHYSICAL EXAM:  Gen: NAD  CV:  +S1, +S2, regular, no murmurs or rubs  RESP:   lungs clear to auscultation bilaterally, no wheezing, rales, rhonchi, good air entry bilaterally  GI:  abdomen soft, non-tender, non-distended, normal BS, no bruits, no abdominal masses, no palpable masses  :  not examined  MSK:   normal muscle tone, no atrophy, no rigidity, no contractions  EXT:  no edema, no calf pain, swelling or erythema  NEURO:  AAOX2, no focal neurological deficits, follows all commands, able to move extremities spontaneously    LABS:                            12.4   8.60  )-----------( 245      ( 22 Jan 2025 07:26 )             37.5     01-22    138  |  105  |  23  ----------------------------<  108[H]  4.1   |  27  |  1.02    Ca    9.2      22 Jan 2025 07:26              Urinalysis Basic - ( 22 Jan 2025 07:26 )    Color: x / Appearance: x / SG: x / pH: x  Gluc: 108 mg/dL / Ketone: x  / Bili: x / Urobili: x   Blood: x / Protein: x / Nitrite: x   Leuk Esterase: x / RBC: x / WBC x   Sq Epi: x / Non Sq Epi: x / Bacteria: x              CULTURES:  Culture Results:   No growth (01-21-25 @ 20:43)  Culture Results:   No growth at 72 Hours (01-19-25 @ 14:25)  Culture Results:   No growth at 72 Hours (01-19-25 @ 14:18)

## 2025-01-23 NOTE — DISCHARGE NOTE NURSING/CASE MANAGEMENT/SOCIAL WORK - PATIENT PORTAL LINK FT
You can access the FollowMyHealth Patient Portal offered by [facility] by registering at the following website: [email or web address]. By joining [email or web address] FollowMyHealth portal, you will also be able to view your health information using other applications (apps) compatible with our system.

## 2025-01-23 NOTE — DISCHARGE NOTE PROVIDER - NSDCHHCONTACT_GEN_ALL_CORE_FT
As certified below, I, or a nurse practitioner or physician assistant working with me, had a face-to-face encounter that meets the physician face-to-face encounter requirements. Walking/Toileting/Standing

## 2025-01-23 NOTE — DISCHARGE NOTE PROVIDER - PROVIDER TOKENS
PROVIDER:[TOKEN:[[id number]],FOLLOWUP:[2 weeks]],PROVIDER:[TOKEN:[[id number]],FOLLOWUP:[1 week]]

## 2025-01-23 NOTE — DISCHARGE NOTE NURSING/CASE MANAGEMENT/SOCIAL WORK - NSDCPEFALRISK_GEN_ALL_CORE
For information on Fall & Injury Prevention, visit: [email or web address] OR  [email or web address] OR  https://www.cdc.gov/steadi/patient.html

## 2025-01-23 NOTE — DISCHARGE NOTE PROVIDER - DETAILS OF MALNUTRITION DIAGNOSIS/DIAGNOSES
This patient has been assessed with a concern for Malnutrition and was treated during this hospitalization for the following Nutrition diagnosis/diagnoses:     -  01/20/2025: Severe protein-calorie malnutrition

## 2025-01-23 NOTE — DISCHARGE NOTE PROVIDER - NSDCCPCAREPLAN_GEN_ALL_CORE_FT
PRINCIPAL DISCHARGE DIAGNOSIS  Diagnosis: Metabolic encephalopathy  Assessment and Plan of Treatment: this was likely due to a UTI which was treated with IV antibiotics while admitted. Likely pt has underlying dementia - would recommend outpatient follow up with neurologist. It is not unusual for behavioral changes with dementia and per neurologist, pt can use as needed seroquel at night if there is agitation.      SECONDARY DISCHARGE DIAGNOSES  Diagnosis: Constipation  Assessment and Plan of Treatment: pt was disimpacted at bedside. please maintain aggresive bowel regimen

## 2025-01-23 NOTE — DISCHARGE NOTE NURSING/CASE MANAGEMENT/SOCIAL WORK - FINANCIAL ASSISTANCE
[facility] provides services at a reduced cost to those who are determined to be eligible through [facility]’s financial assistance program. Information regarding [facility]’s financial assistance program can be found by going to [email or web address] or by calling [phone].

## 2025-01-23 NOTE — DISCHARGE NOTE PROVIDER - CARE PROVIDER_API CALL
Maggie Douglas  Neurology  [address], [address]  [place] [address]  Phone: [phone]  Fax: [phone]  Follow Up Time: 2 weeks    Armando Caruso  [facility]  [address] [place] [address]  Phone: [phone]  Fax: [phone]  Follow Up Time: 1 week

## 2025-01-23 NOTE — DISCHARGE NOTE NURSING/CASE MANAGEMENT/SOCIAL WORK - NSDCVIVACCINE_GEN_ALL_CORE_FT
Td (adult) preservative free; 28-Sep-2019 16:56; Klever Davis (RN); [employer]; [id number] (Exp. Date: 04-Jul-2021); IntraMuscular; Deltoid Left.; 0.5 milliLiter(s); VIS (VIS Published: 28-Sep-2019, VIS Presented: 28-Sep-2019);   Tdap; 19-Jan-2025 09:19; Steffanie Campos (TIFFANIE); [employer]; [id number] (Exp. Date: 01-Aug-2026); IntraMuscular; Deltoid Left.; 0.5 milliLiter(s); VIS (VIS Published: 09-May-2013, VIS Presented: 19-Jan-2025);

## 2025-01-23 NOTE — DISCHARGE NOTE PROVIDER - HOSPITAL COURSE
"89 year old female w severe AS, dementia? (baseline A&O x 2–3), GERD, HLD, HTN BIBEMS from [facility] for injuries from a fall. Staff states they found her on the ground at approximately 7:30 AM.  Patient denies any complaints but is noted to have skin tear to the left hand.  Called as a neuro alert on arrival. EMS reports staff last saw the pt well at 0500 this morning. No blood thinners. Bruising/skin tear to left hand and bruising to b/l legs. Pt in c-collar from EMS. Son reported that his daughter saw pt yesterday and she was confused. While at [facility] was on BP and other meds; All meds except those listed below were DC.  Son confirmed w sister by phone."  Hospital Course: pt treated for UTI. U culture was not reflexed due to lab error. pt received 4 doses of IV ceftriaxone. Repeat Urine culture shows sterile urine. Pt also with fecal impaction - disimpacted at bedside.   #YULI 2/2 UTI   #Fall  #stool impaction  #leukocytosis - resolved  CT Head chest abd pelvis noted   trauma w/u neg   c/w ceftriaxone for UTI   B12 pending   fall and YULI likely related to UTI   pt improved from admission - awake alert and conversive since starting antibiotics   f/u PT consult - [facility] vs home with home PT  s/p enema and dulcolax suppository with no bm   disimpacted at bedside today   small BM after disimpaction  per son pt does not have an official diagnosis of dementia but does have poor short term memory  pt is very hard of hearing and lost her hearing aids and has been declining for last 2 months - very much contributes to confusion   acetminophen level salycilyate level RPR screen ammonia level all negative   b12 level wnl  TSH wnl  blood culture ng x 72 hours   although ua abnormal did not reflex to culture - unclear why as there is positive leuk and nitrite and bacteria - spoke to lab  will order repeat Urine culture now - if negative pt is adequately treated  per son - AMS now resolved  son does admit pt has some short term memory loss but normally "80% of the time she is coherent"  suspect pt does indeed have some underlying dementia   AMS likely exacerbated by UTI and deafness  Neuro consult: agree with acute toxic metabolic encephalopathy  Urine culture - sterile    #Wrist : L dislocation  appears chronic  xray reflects widening at scaphenolunate undetermined age  on physical exam she has no STS and has painless passive ROM of the wrist  Reviewed with ortho - agree this is chronic - no intervention required    #HTN/HLD/GERD  no longer on statin or bp meds or ppi  monitor - no need for agressive bp control with 89 yo old with hx of falls  c/w mylanta and zofran prn    VTE: SCDs  Discussed with diego Dior at bedside   A-T Advancement Flap Text: The defect edges were debeveled with a #15 scalpel blade.  Given the location of the defect, shape of the defect and the proximity to free margins an A-T advancement flap was deemed most appropriate.  Using a sterile surgical marker, an appropriate advancement flap was drawn incorporating the defect and placing the expected incisions within the relaxed skin tension lines where possible.    The area thus outlined was incised deep to adipose tissue with a #15 scalpel blade.  The skin margins were undermined to an appropriate distance in all directions utilizing iris scissors.

## 2025-01-23 NOTE — DISCHARGE NOTE PROVIDER - NSDCMRMEDTOKEN_GEN_ALL_CORE_FT
acetaminophen 325 mg oral tablet: 2 tab(s) orally every 6 hours as needed for  pain max 3 g/24 hours  polyethylene glycol 3350 oral powder for reconstitution: 17 gram(s) orally once a day (at bedtime)  polyethylene glycol 3350 oral powder for reconstitution: 17 gram(s) orally once a day (at bedtime)  senna leaf extract oral tablet: 2 tab(s) orally once a day (at bedtime)  Seroquel 25 mg oral tablet: 0.5 tab(s) orally once a day (at bedtime) as needed for  agitation

## 2025-01-29 DIAGNOSIS — N39.0 URINARY TRACT INFECTION, SITE NOT SPECIFIED: ICD-10-CM

## 2025-01-29 DIAGNOSIS — Z79.82 LONG TERM (CURRENT) USE OF ASPIRIN: ICD-10-CM

## 2025-01-29 DIAGNOSIS — I10 ESSENTIAL (PRIMARY) HYPERTENSION: ICD-10-CM

## 2025-01-29 DIAGNOSIS — E43 UNSPECIFIED SEVERE PROTEIN-CALORIE MALNUTRITION: ICD-10-CM
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
## 2025-01-29 DIAGNOSIS — E78.5 HYPERLIPIDEMIA, UNSPECIFIED: ICD-10-CM

## 2025-01-29 DIAGNOSIS — H91.93 UNSPECIFIED HEARING LOSS, BILATERAL: ICD-10-CM

## 2025-01-29 DIAGNOSIS — S80.11XA CONTUSION OF RIGHT LOWER LEG, INITIAL ENCOUNTER: ICD-10-CM

## 2025-01-29 DIAGNOSIS — S60.222A CONTUSION OF LEFT HAND, INITIAL ENCOUNTER: ICD-10-CM

## 2025-01-29 DIAGNOSIS — S80.12XA CONTUSION OF LEFT LOWER LEG, INITIAL ENCOUNTER: ICD-10-CM
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
## 2025-01-29 DIAGNOSIS — M24.432: ICD-10-CM

## 2025-01-29 DIAGNOSIS — Z66 DO NOT RESUSCITATE: ICD-10-CM

## 2025-01-29 DIAGNOSIS — G93.41 METABOLIC ENCEPHALOPATHY: ICD-10-CM

## 2025-01-29 DIAGNOSIS — I35.0 NONRHEUMATIC AORTIC (VALVE) STENOSIS: ICD-10-CM

## 2025-01-29 DIAGNOSIS — K21.9 GASTRO-ESOPHAGEAL REFLUX DISEASE WITHOUT ESOPHAGITIS: ICD-10-CM

## 2025-01-29 DIAGNOSIS — K56.41 FECAL IMPACTION: ICD-10-CM

## 2025-02-18 ENCOUNTER — INPATIENT (INPATIENT)
Facility: HOSPITAL | Age: 89
LOS: 9 days | Discharge: NURSING, REHAB OR LONG-TERM CARE | DRG: 101 | End: 2025-02-28
Attending: INTERNAL MEDICINE | Admitting: STUDENT IN AN ORGANIZED HEALTH CARE EDUCATION/TRAINING PROGRAM
Payer: MEDICARE

## 2025-02-18 VITALS
HEIGHT: 62 IN | SYSTOLIC BLOOD PRESSURE: 170 MMHG | OXYGEN SATURATION: 90 % | HEART RATE: 88 BPM | RESPIRATION RATE: 16 BRPM | DIASTOLIC BLOOD PRESSURE: 100 MMHG | WEIGHT: 106.48 LBS | TEMPERATURE: 100 F

## 2025-02-18 DIAGNOSIS — G40.901 EPILEPSY, UNSPECIFIED, NOT INTRACTABLE, WITH STATUS EPILEPTICUS: ICD-10-CM

## 2025-02-18 LAB
ADD ON TEST-SPECIMEN IN LAB: SIGNIFICANT CHANGE UP
ALBUMIN SERPL ELPH-MCNC: 3.7 G/DL — SIGNIFICANT CHANGE UP (ref 3.3–5)
ALP SERPL-CCNC: 103 U/L — SIGNIFICANT CHANGE UP (ref 40–120)
ALT FLD-CCNC: 25 U/L — SIGNIFICANT CHANGE UP (ref 12–78)
ANION GAP SERPL CALC-SCNC: 11 MMOL/L — SIGNIFICANT CHANGE UP (ref 5–17)
ANION GAP SERPL CALC-SCNC: 9 MMOL/L — SIGNIFICANT CHANGE UP (ref 5–17)
APPEARANCE UR: CLEAR — SIGNIFICANT CHANGE UP
APTT BLD: 30.4 SEC — SIGNIFICANT CHANGE UP (ref 24.5–35.6)
AST SERPL-CCNC: 72 U/L — HIGH (ref 15–37)
BACTERIA # UR AUTO: NEGATIVE /HPF — SIGNIFICANT CHANGE UP
BASE EXCESS BLDA CALC-SCNC: -1.2 MMOL/L — SIGNIFICANT CHANGE UP (ref -2–3)
BASE EXCESS BLDV CALC-SCNC: -2.9 MMOL/L — LOW (ref -2–3)
BASOPHILS # BLD AUTO: 0.01 K/UL — SIGNIFICANT CHANGE UP (ref 0–0.2)
BASOPHILS NFR BLD AUTO: 0.1 % — SIGNIFICANT CHANGE UP (ref 0–2)
BILIRUB SERPL-MCNC: 0.5 MG/DL — SIGNIFICANT CHANGE UP (ref 0.2–1.2)
BILIRUB UR-MCNC: NEGATIVE — SIGNIFICANT CHANGE UP
BLD GP AB SCN SERPL QL: SIGNIFICANT CHANGE UP
BLOOD GAS COMMENTS ARTERIAL: SIGNIFICANT CHANGE UP
BUN SERPL-MCNC: 24 MG/DL — HIGH (ref 7–23)
BUN SERPL-MCNC: 25 MG/DL — HIGH (ref 7–23)
CALCIUM SERPL-MCNC: 9.6 MG/DL — SIGNIFICANT CHANGE UP (ref 8.5–10.1)
CALCIUM SERPL-MCNC: 9.7 MG/DL — SIGNIFICANT CHANGE UP (ref 8.5–10.1)
CAST: 4 /LPF — SIGNIFICANT CHANGE UP (ref 0–4)
CHLORIDE SERPL-SCNC: 106 MMOL/L — SIGNIFICANT CHANGE UP (ref 96–108)
CHLORIDE SERPL-SCNC: 109 MMOL/L — HIGH (ref 96–108)
CO2 SERPL-SCNC: 19 MMOL/L — LOW (ref 22–31)
CO2 SERPL-SCNC: 21 MMOL/L — LOW (ref 22–31)
COLOR SPEC: YELLOW — SIGNIFICANT CHANGE UP
CREAT SERPL-MCNC: 1.29 MG/DL — SIGNIFICANT CHANGE UP (ref 0.5–1.3)
CREAT SERPL-MCNC: 1.47 MG/DL — HIGH (ref 0.5–1.3)
DIFF PNL FLD: ABNORMAL
EGFR: 32 ML/MIN/1.73M2 — LOW
EGFR: 38 ML/MIN/1.73M2 — LOW
EOSINOPHIL # BLD AUTO: 0.01 K/UL — SIGNIFICANT CHANGE UP (ref 0–0.5)
EOSINOPHIL NFR BLD AUTO: 0.1 % — SIGNIFICANT CHANGE UP (ref 0–6)
ETHANOL SERPL-MCNC: <10 MG/DL — SIGNIFICANT CHANGE UP (ref 0–10)
FLUAV AG NPH QL: SIGNIFICANT CHANGE UP
FLUBV AG NPH QL: SIGNIFICANT CHANGE UP
GAS PNL BLDA: SIGNIFICANT CHANGE UP
GAS PNL BLDV: SIGNIFICANT CHANGE UP
GLUCOSE SERPL-MCNC: 211 MG/DL — HIGH (ref 70–99)
GLUCOSE SERPL-MCNC: 214 MG/DL — HIGH (ref 70–99)
GLUCOSE UR QL: NEGATIVE MG/DL — SIGNIFICANT CHANGE UP
HCO3 BLDA-SCNC: 24 MMOL/L — SIGNIFICANT CHANGE UP (ref 21–28)
HCO3 BLDV-SCNC: 22 MMOL/L — SIGNIFICANT CHANGE UP (ref 22–29)
HCT VFR BLD CALC: 41.1 % — SIGNIFICANT CHANGE UP (ref 34.5–45)
HGB BLD-MCNC: 13.2 G/DL — SIGNIFICANT CHANGE UP (ref 11.5–15.5)
IMM GRANULOCYTES # BLD AUTO: 0.05 K/UL — SIGNIFICANT CHANGE UP (ref 0–0.07)
IMM GRANULOCYTES NFR BLD AUTO: 0.4 % — SIGNIFICANT CHANGE UP (ref 0–0.9)
INR BLD: 0.9 RATIO — SIGNIFICANT CHANGE UP (ref 0.85–1.16)
KETONES UR-MCNC: ABNORMAL MG/DL
LACTATE SERPL-SCNC: 3.6 MMOL/L — HIGH (ref 0.7–2)
LACTATE SERPL-SCNC: 4.2 MMOL/L — CRITICAL HIGH (ref 0.7–2)
LEUKOCYTE ESTERASE UR-ACNC: NEGATIVE — SIGNIFICANT CHANGE UP
LYMPHOCYTES # BLD AUTO: 1.02 K/UL — SIGNIFICANT CHANGE UP (ref 1–3.3)
LYMPHOCYTES NFR BLD AUTO: 9.2 % — LOW (ref 13–44)
MCHC RBC-ENTMCNC: 30.4 PG — SIGNIFICANT CHANGE UP (ref 27–34)
MCHC RBC-ENTMCNC: 32.1 G/DL — SIGNIFICANT CHANGE UP (ref 32–36)
MCV RBC AUTO: 94.7 FL — SIGNIFICANT CHANGE UP (ref 80–100)
MONOCYTES # BLD AUTO: 0.31 K/UL — SIGNIFICANT CHANGE UP (ref 0–0.9)
MONOCYTES NFR BLD AUTO: 2.8 % — SIGNIFICANT CHANGE UP (ref 2–14)
NEUTROPHILS # BLD AUTO: 9.73 K/UL — HIGH (ref 1.8–7.4)
NEUTROPHILS NFR BLD AUTO: 87.4 % — HIGH (ref 43–77)
NITRITE UR-MCNC: NEGATIVE — SIGNIFICANT CHANGE UP
NRBC # BLD AUTO: 0 K/UL — SIGNIFICANT CHANGE UP (ref 0–0)
NRBC # FLD: 0 K/UL — SIGNIFICANT CHANGE UP (ref 0–0)
NRBC BLD AUTO-RTO: 0 /100 WBCS — SIGNIFICANT CHANGE UP (ref 0–0)
NT-PROBNP SERPL-SCNC: HIGH PG/ML (ref 0–450)
PCO2 BLDA: 39 MMHG — SIGNIFICANT CHANGE UP (ref 32–45)
PCO2 BLDV: 38 MMHG — LOW (ref 39–42)
PH BLDA: 7.39 — SIGNIFICANT CHANGE UP (ref 7.35–7.45)
PH BLDV: 7.37 — SIGNIFICANT CHANGE UP (ref 7.32–7.43)
PH UR: 5 — SIGNIFICANT CHANGE UP (ref 5–8)
PLATELET # BLD AUTO: 295 K/UL — SIGNIFICANT CHANGE UP (ref 150–400)
PMV BLD: 9.4 FL — SIGNIFICANT CHANGE UP (ref 7–13)
PO2 BLDA: 142 MMHG — HIGH (ref 83–108)
PO2 BLDV: 72 MMHG — HIGH (ref 25–45)
POTASSIUM SERPL-MCNC: 4 MMOL/L — SIGNIFICANT CHANGE UP (ref 3.5–5.3)
POTASSIUM SERPL-MCNC: 4.3 MMOL/L — SIGNIFICANT CHANGE UP (ref 3.5–5.3)
POTASSIUM SERPL-SCNC: 4 MMOL/L — SIGNIFICANT CHANGE UP (ref 3.5–5.3)
POTASSIUM SERPL-SCNC: 4.3 MMOL/L — SIGNIFICANT CHANGE UP (ref 3.5–5.3)
PROT SERPL-MCNC: 8 GM/DL — SIGNIFICANT CHANGE UP (ref 6–8.3)
PROT UR-MCNC: 300 MG/DL
PROTHROM AB SERPL-ACNC: 10.6 SEC — SIGNIFICANT CHANGE UP (ref 9.9–13.4)
RBC # BLD: 4.34 M/UL — SIGNIFICANT CHANGE UP (ref 3.8–5.2)
RBC # FLD: 14 % — SIGNIFICANT CHANGE UP (ref 10.3–14.5)
RBC CASTS # UR COMP ASSIST: 2 /HPF — SIGNIFICANT CHANGE UP (ref 0–4)
RSV RNA NPH QL NAA+NON-PROBE: SIGNIFICANT CHANGE UP
SAO2 % BLDA: 99 % — HIGH (ref 94–98)
SAO2 % BLDV: 97 % — HIGH (ref 67–88)
SARS-COV-2 RNA SPEC QL NAA+PROBE: SIGNIFICANT CHANGE UP
SODIUM SERPL-SCNC: 136 MMOL/L — SIGNIFICANT CHANGE UP (ref 135–145)
SODIUM SERPL-SCNC: 139 MMOL/L — SIGNIFICANT CHANGE UP (ref 135–145)
SP GR SPEC: >1.03 — HIGH (ref 1–1.03)
SQUAMOUS # UR AUTO: 1 /HPF — SIGNIFICANT CHANGE UP (ref 0–5)
TROPONIN I, HIGH SENSITIVITY RESULT: HIGH NG/L
UROBILINOGEN FLD QL: 0.2 MG/DL — SIGNIFICANT CHANGE UP (ref 0.2–1)
WBC # BLD: 11.13 K/UL — HIGH (ref 3.8–10.5)
WBC # FLD AUTO: 11.13 K/UL — HIGH (ref 3.8–10.5)
WBC UR QL: 1 /HPF — SIGNIFICANT CHANGE UP (ref 0–5)

## 2025-02-18 PROCEDURE — 97162 PT EVAL MOD COMPLEX 30 MIN: CPT | Mod: GP

## 2025-02-18 PROCEDURE — 80202 ASSAY OF VANCOMYCIN: CPT

## 2025-02-18 PROCEDURE — 85027 COMPLETE CBC AUTOMATED: CPT

## 2025-02-18 PROCEDURE — 94640 AIRWAY INHALATION TREATMENT: CPT

## 2025-02-18 PROCEDURE — 71045 X-RAY EXAM CHEST 1 VIEW: CPT | Mod: 26,76

## 2025-02-18 PROCEDURE — C1751: CPT

## 2025-02-18 PROCEDURE — 83880 ASSAY OF NATRIURETIC PEPTIDE: CPT

## 2025-02-18 PROCEDURE — 70498 CT ANGIOGRAPHY NECK: CPT | Mod: 26

## 2025-02-18 PROCEDURE — 95714 VEEG EA 12-26 HR UNMNTR: CPT

## 2025-02-18 PROCEDURE — 93010 ELECTROCARDIOGRAM REPORT: CPT

## 2025-02-18 PROCEDURE — 95700 EEG CONT REC W/VID EEG TECH: CPT

## 2025-02-18 PROCEDURE — 36415 COLL VENOUS BLD VENIPUNCTURE: CPT

## 2025-02-18 PROCEDURE — 97530 THERAPEUTIC ACTIVITIES: CPT | Mod: GP

## 2025-02-18 PROCEDURE — 82803 BLOOD GASES ANY COMBINATION: CPT

## 2025-02-18 PROCEDURE — 95718 EEG PHYS/QHP 2-12 HR W/VEEG: CPT

## 2025-02-18 PROCEDURE — 84145 PROCALCITONIN (PCT): CPT

## 2025-02-18 PROCEDURE — 80048 BASIC METABOLIC PNL TOTAL CA: CPT

## 2025-02-18 PROCEDURE — 70450 CT HEAD/BRAIN W/O DYE: CPT | Mod: 26,59

## 2025-02-18 PROCEDURE — 84100 ASSAY OF PHOSPHORUS: CPT

## 2025-02-18 PROCEDURE — 84484 ASSAY OF TROPONIN QUANT: CPT

## 2025-02-18 PROCEDURE — 70553 MRI BRAIN STEM W/O & W/DYE: CPT | Mod: MC

## 2025-02-18 PROCEDURE — 0225U NFCT DS DNA&RNA 21 SARSCOV2: CPT

## 2025-02-18 PROCEDURE — 83605 ASSAY OF LACTIC ACID: CPT

## 2025-02-18 PROCEDURE — 95720 EEG PHY/QHP EA INCR W/VEEG: CPT

## 2025-02-18 PROCEDURE — 87641 MR-STAPH DNA AMP PROBE: CPT

## 2025-02-18 PROCEDURE — 99291 CRITICAL CARE FIRST HOUR: CPT

## 2025-02-18 PROCEDURE — 94003 VENT MGMT INPAT SUBQ DAY: CPT

## 2025-02-18 PROCEDURE — 87449 NOS EACH ORGANISM AG IA: CPT

## 2025-02-18 PROCEDURE — 92610 EVALUATE SWALLOWING FUNCTION: CPT | Mod: GN

## 2025-02-18 PROCEDURE — 99292 CRITICAL CARE ADDL 30 MIN: CPT

## 2025-02-18 PROCEDURE — 74177 CT ABD & PELVIS W/CONTRAST: CPT | Mod: 26

## 2025-02-18 PROCEDURE — 71260 CT THORAX DX C+: CPT | Mod: 26

## 2025-02-18 PROCEDURE — 97116 GAIT TRAINING THERAPY: CPT | Mod: GP

## 2025-02-18 PROCEDURE — 85730 THROMBOPLASTIN TIME PARTIAL: CPT

## 2025-02-18 PROCEDURE — 87640 STAPH A DNA AMP PROBE: CPT

## 2025-02-18 PROCEDURE — 36600 WITHDRAWAL OF ARTERIAL BLOOD: CPT

## 2025-02-18 PROCEDURE — 0042T: CPT

## 2025-02-18 PROCEDURE — 87070 CULTURE OTHR SPECIMN AEROBIC: CPT

## 2025-02-18 PROCEDURE — 80061 LIPID PANEL: CPT

## 2025-02-18 PROCEDURE — 76376 3D RENDER W/INTRP POSTPROCES: CPT

## 2025-02-18 PROCEDURE — 71045 X-RAY EXAM CHEST 1 VIEW: CPT

## 2025-02-18 PROCEDURE — 95812 EEG 41-60 MINUTES: CPT

## 2025-02-18 PROCEDURE — C9254: CPT

## 2025-02-18 PROCEDURE — 83735 ASSAY OF MAGNESIUM: CPT

## 2025-02-18 PROCEDURE — 85025 COMPLETE CBC W/AUTO DIFF WBC: CPT

## 2025-02-18 PROCEDURE — 93306 TTE W/DOPPLER COMPLETE: CPT

## 2025-02-18 PROCEDURE — A9579: CPT

## 2025-02-18 PROCEDURE — 80053 COMPREHEN METABOLIC PANEL: CPT

## 2025-02-18 PROCEDURE — 70496 CT ANGIOGRAPHY HEAD: CPT | Mod: 26

## 2025-02-18 PROCEDURE — 82652 VIT D 1 25-DIHYDROXY: CPT

## 2025-02-18 PROCEDURE — 95711 VEEG 2-12 HR UNMONITORED: CPT

## 2025-02-18 RX ORDER — LEVETIRACETAM 10 MG/ML
1000 INJECTION, SOLUTION INTRAVENOUS
Refills: 0 | Status: DISCONTINUED | OUTPATIENT
Start: 2025-02-18 | End: 2025-02-19

## 2025-02-18 RX ORDER — NOREPINEPHRINE BITARTRATE 8 MG
0.05 SOLUTION INTRAVENOUS
Qty: 8 | Refills: 0 | Status: DISCONTINUED | OUTPATIENT
Start: 2025-02-18 | End: 2025-02-20

## 2025-02-18 RX ORDER — PIPERACILLIN-TAZO-DEXTROSE,ISO 3.375G/5
3.38 IV SOLUTION, PIGGYBACK PREMIX FROZEN(ML) INTRAVENOUS ONCE
Refills: 0 | Status: COMPLETED | OUTPATIENT
Start: 2025-02-18 | End: 2025-02-18

## 2025-02-18 RX ORDER — AMPICILLIN SODIUM 1 G/1
2 INJECTION, POWDER, FOR SOLUTION INTRAMUSCULAR; INTRAVENOUS EVERY 4 HOURS
Refills: 0 | Status: DISCONTINUED | OUTPATIENT
Start: 2025-02-18 | End: 2025-02-19

## 2025-02-18 RX ORDER — SUCCINYLCHOLINE CHLORIDE 20 MG/ML
100 VIAL (ML) INJECTION ONCE
Refills: 0 | Status: COMPLETED | OUTPATIENT
Start: 2025-02-18 | End: 2025-02-18

## 2025-02-18 RX ORDER — CEFTRIAXONE 500 MG/1
2000 INJECTION, POWDER, FOR SOLUTION INTRAMUSCULAR; INTRAVENOUS EVERY 12 HOURS
Refills: 0 | Status: DISCONTINUED | OUTPATIENT
Start: 2025-02-18 | End: 2025-02-28

## 2025-02-18 RX ORDER — PROPOFOL 10 MG/ML
10 INJECTION, EMULSION INTRAVENOUS
Qty: 1000 | Refills: 0 | Status: DISCONTINUED | OUTPATIENT
Start: 2025-02-18 | End: 2025-02-20

## 2025-02-18 RX ORDER — ACETAMINOPHEN 500 MG/5ML
725 LIQUID (ML) ORAL ONCE
Refills: 0 | Status: COMPLETED | OUTPATIENT
Start: 2025-02-18 | End: 2025-02-18

## 2025-02-18 RX ORDER — ETOMIDATE 2 MG/ML
10 SYRINGE (ML) INTRAVENOUS ONCE
Refills: 0 | Status: COMPLETED | OUTPATIENT
Start: 2025-02-18 | End: 2025-02-18

## 2025-02-18 RX ORDER — LEVETIRACETAM 10 MG/ML
2000 INJECTION, SOLUTION INTRAVENOUS ONCE
Refills: 0 | Status: DISCONTINUED | OUTPATIENT
Start: 2025-02-18 | End: 2025-02-18

## 2025-02-18 RX ORDER — PHENYTOIN 100 MG/4ML
950 SUSPENSION, ORAL (FINAL DOSE FORM) ORAL ONCE
Refills: 0 | Status: DISCONTINUED | OUTPATIENT
Start: 2025-02-18 | End: 2025-02-18

## 2025-02-18 RX ORDER — LORAZEPAM 4 MG/ML
1 VIAL (ML) INJECTION ONCE
Refills: 0 | Status: DISCONTINUED | OUTPATIENT
Start: 2025-02-18 | End: 2025-02-18

## 2025-02-18 RX ORDER — PIPERACILLIN-TAZO-DEXTROSE,ISO 3.375G/5
3.38 IV SOLUTION, PIGGYBACK PREMIX FROZEN(ML) INTRAVENOUS EVERY 12 HOURS
Refills: 0 | Status: DISCONTINUED | OUTPATIENT
Start: 2025-02-18 | End: 2025-02-18

## 2025-02-18 RX ORDER — LEVETIRACETAM 10 MG/ML
1000 INJECTION, SOLUTION INTRAVENOUS ONCE
Refills: 0 | Status: DISCONTINUED | OUTPATIENT
Start: 2025-02-18 | End: 2025-02-18

## 2025-02-18 RX ORDER — LORAZEPAM 4 MG/ML
1 VIAL (ML) INJECTION
Refills: 0 | Status: DISCONTINUED | OUTPATIENT
Start: 2025-02-18 | End: 2025-02-24

## 2025-02-18 RX ORDER — FUROSEMIDE 10 MG/ML
40 INJECTION INTRAMUSCULAR; INTRAVENOUS ONCE
Refills: 0 | Status: COMPLETED | OUTPATIENT
Start: 2025-02-18 | End: 2025-02-18

## 2025-02-18 RX ORDER — HEPARIN SODIUM 1000 [USP'U]/ML
2900 INJECTION INTRAVENOUS; SUBCUTANEOUS EVERY 6 HOURS
Refills: 0 | Status: DISCONTINUED | OUTPATIENT
Start: 2025-02-18 | End: 2025-02-18

## 2025-02-18 RX ORDER — CEFTRIAXONE 500 MG/1
2000 INJECTION, POWDER, FOR SOLUTION INTRAMUSCULAR; INTRAVENOUS EVERY 12 HOURS
Refills: 0 | Status: DISCONTINUED | OUTPATIENT
Start: 2025-02-18 | End: 2025-02-18

## 2025-02-18 RX ORDER — FOSPHENYTOIN SODIUM 50 MG/ML
950 INJECTION INTRAMUSCULAR; INTRAVENOUS ONCE
Refills: 0 | Status: COMPLETED | OUTPATIENT
Start: 2025-02-18 | End: 2025-02-18

## 2025-02-18 RX ORDER — HEPARIN SODIUM 1000 [USP'U]/ML
300 INJECTION INTRAVENOUS; SUBCUTANEOUS
Qty: 25000 | Refills: 0 | Status: DISCONTINUED | OUTPATIENT
Start: 2025-02-18 | End: 2025-02-18

## 2025-02-18 RX ORDER — VANCOMYCIN HCL IN 5 % DEXTROSE 1.5G/250ML
750 PLASTIC BAG, INJECTION (ML) INTRAVENOUS EVERY 12 HOURS
Refills: 0 | Status: DISCONTINUED | OUTPATIENT
Start: 2025-02-18 | End: 2025-02-19

## 2025-02-18 RX ADMIN — FOSPHENYTOIN SODIUM 38 MILLIGRAM(S) PE: 50 INJECTION INTRAMUSCULAR; INTRAVENOUS at 11:58

## 2025-02-18 RX ADMIN — LEVETIRACETAM 2000 MILLIGRAM(S): 10 INJECTION, SOLUTION INTRAVENOUS at 08:38

## 2025-02-18 RX ADMIN — Medication 1000 MILLILITER(S): at 12:40

## 2025-02-18 RX ADMIN — Medication 100 MILLIGRAM(S): at 12:42

## 2025-02-18 RX ADMIN — Medication 1 MILLIGRAM(S): at 13:12

## 2025-02-18 RX ADMIN — Medication 290 MILLIGRAM(S): at 12:40

## 2025-02-18 RX ADMIN — Medication 290 MILLIGRAM(S): at 19:31

## 2025-02-18 RX ADMIN — AMPICILLIN SODIUM 216 GRAM(S): 1 INJECTION, POWDER, FOR SOLUTION INTRAMUSCULAR; INTRAVENOUS at 21:30

## 2025-02-18 RX ADMIN — Medication 200 GRAM(S): at 08:45

## 2025-02-18 RX ADMIN — Medication 10 MILLIGRAM(S): at 12:42

## 2025-02-18 RX ADMIN — LEVETIRACETAM 1000 MILLIGRAM(S): 10 INJECTION, SOLUTION INTRAVENOUS at 15:14

## 2025-02-18 RX ADMIN — Medication 250 MILLIGRAM(S): at 21:41

## 2025-02-18 RX ADMIN — NOREPINEPHRINE BITARTRATE 4.53 MICROGRAM(S)/KG/MIN: 8 SOLUTION at 12:28

## 2025-02-18 RX ADMIN — CEFTRIAXONE 2000 MILLIGRAM(S): 500 INJECTION, POWDER, FOR SOLUTION INTRAMUSCULAR; INTRAVENOUS at 21:44

## 2025-02-18 RX ADMIN — Medication 1000 MILLILITER(S): at 08:45

## 2025-02-18 RX ADMIN — LEVETIRACETAM 1000 MILLIGRAM(S): 10 INJECTION, SOLUTION INTRAVENOUS at 22:38

## 2025-02-18 RX ADMIN — FUROSEMIDE 40 MILLIGRAM(S): 10 INJECTION INTRAMUSCULAR; INTRAVENOUS at 10:16

## 2025-02-18 RX ADMIN — Medication 259.8 MILLIGRAM(S): at 21:43

## 2025-02-18 RX ADMIN — Medication 15 MILLILITER(S): at 21:44

## 2025-02-18 RX ADMIN — PROPOFOL 2.9 MICROGRAM(S)/KG/MIN: 10 INJECTION, EMULSION INTRAVENOUS at 12:55

## 2025-02-18 NOTE — H&P ADULT - NSHPLABSRESULTS_GEN_ALL_CORE
LABS:    CBC Full  -  ( 18 Feb 2025 08:05 )  WBC Count : 11.13 K/uL  RBC Count : 4.34 M/uL  Hemoglobin : 13.2 g/dL  Hematocrit : 41.1 %  Platelet Count - Automated : 295 K/uL  Mean Cell Volume : 94.7 fl  Mean Cell Hemoglobin : 30.4 pg  Mean Cell Hemoglobin Concentration : 32.1 g/dL  Auto Neutrophil # : 9.73 K/uL  Auto Lymphocyte # : 1.02 K/uL  Auto Monocyte # : 0.31 K/uL  Auto Eosinophil # : 0.01 K/uL  Auto Basophil # : 0.01 K/uL  Auto Neutrophil % : 87.4 %  Auto Lymphocyte % : 9.2 %  Auto Monocyte % : 2.8 %  Auto Eosinophil % : 0.1 %  Auto Basophil % : 0.1 %    02-18    139  |  109[H]  |  25[H]  ----------------------------<  211[H]  4.0   |  19[L]  |  1.47[H]    Ca    9.7      18 Feb 2025 08:05    TPro  8.0  /  Alb  3.7  /  TBili  0.5  /  DBili  x   /  AST  72[H]  /  ALT  25  /  AlkPhos  103  02-18    PT/INR - ( 18 Feb 2025 08:05 )   PT: 10.6 sec;   INR: 0.90 ratio         PTT - ( 18 Feb 2025 08:05 )  PTT:30.4 sec  Urinalysis Basic - ( 18 Feb 2025 08:25 )    Color: Yellow / Appearance: Clear / SG: >1.030 / pH: x  Gluc: x / Ketone: Trace mg/dL  / Bili: Negative / Urobili: 0.2 mg/dL   Blood: x / Protein: 300 mg/dL / Nitrite: Negative   Leuk Esterase: Negative / RBC: 2 /HPF / WBC 1 /HPF   Sq Epi: x / Non Sq Epi: 1 /HPF / Bacteria: Negative /HPF      CAPILLARY BLOOD GLUCOSE            LIVER FUNCTIONS - ( 18 Feb 2025 08:05 )  Alb: 3.7 g/dL / Pro: 8.0 gm/dL / ALK PHOS: 103 U/L / ALT: 25 U/L / AST: 72 U/L / GGT: x

## 2025-02-18 NOTE — ED ADULT NURSE NOTE - CHIEF COMPLAINT QUOTE
BIB EMS FROM [facility] FOUND THIS AM WITH BLEEDING FROM MOUTH. AS PER EMS " UNKNOWN  LAST KNOWN WELL TIME,WITH MULTIPLE NURSING HOME SHIFT CHANGES LAST SEEN 10PM " . PT UNRESPONSIVE TO TOUCH OR TALK.  CODE STROKE CALLED  . MD BARONE AT TRIAGE. [id number]. PMH: BRONCHIECTASIS, CKD, ANEMIA. MOLST IN CHART PT dnr

## 2025-02-18 NOTE — ED ADULT TRIAGE NOTE - CHIEF COMPLAINT QUOTE
BIB EMS FROM [facility] FOUND THIS AM WITH BLEEDING FROM MOUTH. AS PER EMS " UNKNOWN  LAST KNOWN WELL TIME,WITH MULTIPLE NURSING HOME SHIFT CHANGES LAST SEEN 10PM " . PT UNRESPONSIVE TO TOUCH OR TALK.  CODE STROKE CALLED  . MD BARONE AT TRIAGE. [id number]. PMH: BRONCHIECTASIS, CKD, ANEMIA. MOLST IN CHART PT dnr

## 2025-02-18 NOTE — PATIENT PROFILE ADULT - FUNCTIONAL SCREEN CURRENT LEVEL: COMMUNICATION, MLM
Patient intubated and sedated at this time./3 = unable to understand or speak (not related to language barrier)

## 2025-02-18 NOTE — H&P ADULT - ASSESSMENT
A:    89yFemale    Here for:    1. Status epilepticus  2. Shock, suspect septic  3. Respiratory failure, acute   4. JACKY 2/2 ATN  5. MSOF  6. PNA, multifocal, suspect aspiration    This patient requires critical care for support of one or more vital organ systems with a high probability of imminent or life threatening deterioration in his/her condition    P:    Status epilepticus, no Hx seizure disorder  Unclear why seizing now    Sz broke after dilantin load s/p keppra load  Once broke hypotensive, apneic, remained unresponsive    Long GOC at bedside with son Ovi (in person) and Mohini (dtr, via phone). Mohini is DNR, MOL in chart. Long discussion RE: status epilepticus treatment; after much discussion elected to DNR/I, treat as best we can we AEDs, BZD, no intubation/propofol; this discussed in presence of RN and then Neurology by myself.  I saw and admitted Pt to ICU for eEEG, AEDs, aggressive Tx of such with limitations as discussed  Jorge broke in ER after phenytoin load on EEG in progress  Once Jorge broke remains unresponsive, apneic and hypotensive  Ovi informed of this by RN while he remained at bedside  Ovi then requested she be placed on ventilator in attempt to save her life    I was called and came immediately to ER  I found Mohini unresponsive and being ventilated via BVM  Ovi at bedside  I discussed her condition, that she appears to be dying, and limitations we just discussed; Ovi verbalized understanding, but "wants to give her a chance;"he wishes to keep DNR in place, trial of intubation    Pt intubated emergently with RSI; see procedure note, no complications    Levophed started for hypotension, suspect 2/2 shock from aspiration  Propofol, cont AEDs  Cont cEEG, Neuro involved  Obtain TTE, interval study from prior; AS, EF ~50% with mild diastolic dysfxn  NPO, NGT placed  JACKY 2/2 ATN from sepsis and seizure  Abx, pip/tazo, dosed renally  Trend labs  Troponin > 10k; no DC, will consult Cardiology, not a candidate for invasive therapy  No s/s active bleeding  Imaging reviewed  May require central vascular access, minimze as much as able    Dispo: Admit to critical care. Cont care and workup. Continue GOC discussions.     Date of entry of this note is equal to the date of services rendered    TOTAL CRITICAL CARE TIME:  140 minutes (EXCLUSIVE of any non bundled procedures)    Note: This is a critically ill patient. Time spent has been in salvage of life, limb and vital organ systems. This time INCLUDES time spent directly as this patient's bedside with evaluation and management, review of chart including review of laboratory and imaging studies, interpretation of vital signs and cardiac output measurements, any necessary ventilator management, and time spent discussing plan of care with patient and family, including goals of care discussion. This also includes time spent in multidisciplinary discussion with care team and various consultants to optimize treatment plan. This time may NOT include various procedures, which will be noted seperately.

## 2025-02-18 NOTE — ED ADULT TRIAGE NOTE - BP NONINVASIVE DIASTOLIC (MM HG)
Call Dr. Riggs for appointment next week.  The collar may provide comfort and support so feel free to wear such when you wish.  Muscle relaxants will help with posterior cervical musculature pain 100

## 2025-02-18 NOTE — ED ADULT NURSE NOTE - OBJECTIVE STATEMENT
BIB EMS FROM [facility] FOUND THIS AM WITH BLEEDING FROM MOUTH. AS PER EMS " UNKNOWN  LAST KNOWN WELL TIME,WITH MULTIPLE NURSING HOME SHIFT CHANGES LAST SEEN 10PM " . PT UNRESPONSIVE TO TOUCH OR TALK.  CODE STROKE CALLED  . MD BARONE AT TRIAGE. [id number]. PMH: BRONCHIECTASIS, CKD, ANEMIA. MOLST IN CHART PT dnr

## 2025-02-18 NOTE — EEG REPORT - NS EEG TEXT BOX
[facility]PI[facility]PSY [facility]   REPORT OF CONTINUOUS VIDEO EEG       Patient Name: JON RASHID  Age and : 89y ([birth year])  MRN #: [id number]  Location: [place] [address]  Referring Physician: Stephon Servin    Start Time/Date: 09:46 on 25  End Time/Date: 12:47 on 25  Duration: 03 hours 01 min    _____________________________________________________________  STUDY INFORMATION    EEG Recording Technique:  The patient underwent continuous Video-EEG monitoring, using Telemetry System hardware on the XLTek Digital System. EEG and video data were stored on a computer hard drive with important events saved in digital archive files. The material was reviewed by a physician (electroencephalographer / epileptologist) on a daily basis. Jan and seizure detection algorithms were utilized and reviewed. An EEG Technician attended to the patient, and was available throughout daytime work hours.  The epilepsy center neurologist was available in person or on call 24-hours per day.    EEG Placement and Labeling of Electrodes:  The EEG was performed utilizing 20 channel referential EEG connections (coronal over temporal over parasagittal montage) using all standard 10-20 electrode placements with EKG, with additional electrodes placed in the inferior temporal region using the modified 10-10 montage electrode placements for elective admissions, or if deemed necessary. Recording was at a sampling rate of 256 samples per second per channel. Time synchronized digital video recording was done simultaneously with EEG recording. A low light infrared camera was used for low light recording.     _____________________________________________________________  HISTORY    Patient is a 89y old  Female who presents with a chief complaint of status ep, shock, resp failure (2025 13:03), seizures      PERTINENT MEDICATION:  2g IV keppra x 1    _____________________________________________________________  STUDY INTERPRETATION    Findings: The background was continuous, disorganized but reactive. No posterior dominant rhythm seen.    Background Slowing:  Background predominantly consisted of theta, delta and faster activities.    Focal Slowing:   Near continuous polymorphic delta theta activity over right hemisphere.    Sleep Background:  Drowsiness and stage II sleep transients were not recorded.    Other Non-Epileptiform Findings:  None were present.    Interictal:  Near-continuous 2 Hz LPD's over the right frontotemporal region    Electrographic and Electroclinical Seizures:   Numerous/cycling focal subclinical seizures recorded likely onset from right hemisphere possibly right frontotemporal (based on DSA)  coming from accelerating 2 to 2.5 Hz LPD's– with rapid generalization associatedw/ 2.5Hz Hz GPD's.   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      Activation Procedures:   Hyperventilation was not performed.    Photic stimulation was performed and did not elicit any abnormality.     Artifacts:  Intermittent myogenic and movement artifacts were noted.    ECG:  The heart rate on single channel ECG was predominantly between 70-90 BPM.    _____________________________________________________________  EEG SUMMARY/CLASSIFICATION    Abnormal EEG in an encephalopathic patient.  - Cycling non-convulsive seizures detected possibly from right frontotemporal region with rapid generalization associated with repetitive eye blinking, left gaze deviation and encephalopathy.  - Near-continuous 2 Hz LPD's over the right frontotemporal region  - Near continuous polymorphic delta theta activity over right hemisphere.  - Moderate to severe background slowing, generalized.    _____________________________________________________________  EEG IMPRESSION/CLINICAL CORRELATE    Abnormal CEEG study.  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  - Interictal findings are aggressive, indicate high risk of recurrent seizures from the right hemisphere.   - Concern for a structural/functional abnormality in right hemisphere, possibly frontotemporal region.   - Moderate to severe nonspecific diffuse or multifocal cerebral dysfunction.     Differential diagnosis would include seizures from neurodegenerative changes (chronic), acute vascular/ischemic insult or HSV encephalitis should be considered in the right clinical context among other various etiologies.     Above critical report was discussed in real-time at the time of immediate review with consultant neurologist Dr. Tobias Yi  around 10 AM.  (This report reflects a delayed entry into the chart of events transpired in the morning).    Patient was disconnected  at this current study ended, to be transported to a different unit where she was reconnected to continuous EEG monitoring.    _____________________________________________________________    Adolfo Valencia MD, CONCHITA  Attending Physician, [facility]  ------------------------------------  Also reachable via TEAMS       [facility]PILEPSY [facility]   REPORT OF CONTINUOUS VIDEO EEG       Patient Name: JON RASHID  Age and : 89y ([birth year])  MRN #: [id number]  Location: [place] [address]  Referring Physician: Stephon Servin    Start Time/Date: 09:46 on 25  End Time/Date: 12:47 on 25  Duration: 03 hours 01 min    _____________________________________________________________  STUDY INFORMATION    EEG Recording Technique:  The patient underwent continuous Video-EEG monitoring, using Telemetry System hardware on the XLTek Digital System. EEG and video data were stored on a computer hard drive with important events saved in digital archive files. The material was reviewed by a physician (electroencephalographer / epileptologist) on a daily basis. Jan and seizure detection algorithms were utilized and reviewed. An EEG Technician attended to the patient, and was available throughout daytime work hours.  The epilepsy center neurologist was available in person or on call 24-hours per day.    EEG Placement and Labeling of Electrodes:  The EEG was performed utilizing 20 channel referential EEG connections (coronal over temporal over parasagittal montage) using all standard 10-20 electrode placements with EKG, with additional electrodes placed in the inferior temporal region using the modified 10-10 montage electrode placements for elective admissions, or if deemed necessary. Recording was at a sampling rate of 256 samples per second per channel. Time synchronized digital video recording was done simultaneously with EEG recording. A low light infrared camera was used for low light recording.     _____________________________________________________________  HISTORY    Patient is a 89y old  Female who presents with a chief complaint of status ep, shock, resp failure (2025 13:03), seizures      PERTINENT MEDICATION:  2g IV keppra x 1    _____________________________________________________________  STUDY INTERPRETATION    Findings: The background was continuous, disorganized but reactive. No posterior dominant rhythm seen.    Background Slowing:  Background predominantly consisted of theta, delta and faster activities.    Focal Slowing:   Near continuous polymorphic delta theta activity over right hemisphere.    Sleep Background:  Drowsiness and stage II sleep transients were not recorded.    Other Non-Epileptiform Findings:  None were present.    Interictal:  Near-continuous 2 Hz LPD's over the right frontotemporal region    Electrographic and Electroclinical Seizures:   Numerous/cycling focal subclinical seizures recorded likely onset from right hemisphere possibly right frontotemporal (based on DSA)  coming from accelerating 2 to 2.5 Hz LPD's– with rapid generalization associatedw/ 2.5Hz Hz GPD's.   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      Activation Procedures:   Hyperventilation was not performed.    Photic stimulation was performed and did not elicit any abnormality.     Artifacts:  Intermittent myogenic and movement artifacts were noted.    ECG:  The heart rate on single channel ECG was predominantly between 70-90 BPM.    _____________________________________________________________  EEG SUMMARY/CLASSIFICATION    Abnormal EEG in an encephalopathic patient.  - Cycling non-convulsive seizures detected possibly from right frontotemporal region with rapid generalization associated with repetitive eye blinking, left gaze deviation and encephalopathy.  - Near-continuous 2 Hz LPD's over the right frontotemporal region  - Near continuous polymorphic delta theta activity over right hemisphere.  - Moderate to severe background slowing, generalized.    _____________________________________________________________  EEG IMPRESSION/CLINICAL CORRELATE    Abnormal CEEG study.  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  - Interictal findings are aggressive, indicate high risk of recurrent seizures from the right hemisphere.   - Concern for a structural/functional abnormality in right hemisphere, possibly frontotemporal region.   - Moderate to severe nonspecific diffuse or multifocal cerebral dysfunction.     Differential diagnosis would include seizures from neurodegenerative changes (chronic), acute vascular/ischemic insult or HSV encephalitis should be considered in the right clinical context among other various etiologies.     Above critical report was discussed in real-time at the time of immediate review with consultant neurologist Dr. Tobias Yi  around 10 AM.  (This report reflects a delayed entry into the chart of events transpired in the morning).    Patient was disconnected as this study ended, to be transported to a different unit where she was reconnected to continuous EEG monitoring.    _____________________________________________________________    Adolfo Valencia MD, CONCHITA  Attending Physician, [facility]  ------------------------------------  Also reachable via TEAMS

## 2025-02-18 NOTE — PATIENT PROFILE ADULT - STATED REASON FOR ADMISSION
Found unresponsive on the floor at the facility with her mouth with blood, last known well last night.

## 2025-02-18 NOTE — PHARMACOTHERAPY INTERVENTION NOTE - COMMENTS
Medication history complete, patient is from [facility], reviewed and confirmed medication with list provided by facility.

## 2025-02-18 NOTE — PATIENT PROFILE ADULT - FALL HARM RISK - HARM RISK INTERVENTIONS

## 2025-02-18 NOTE — STROKE CODE NOTE - NSMDCONSULT QTN_Y FT
89 year old woman     from nursing home    In ED:   /92  GEN: lying in bed, NAD  CV: 89 year old woman     from nursing home    In ED:   /92  GEN: lying in bed, NAD  CV:    NIHSS =15    CTH images reviewed: No hemorrhage.  No large territorial infarct.  Diffuse cortical atrophy.   CTA H&N images reviewed: 89 year old woman     from nursing home    In ED:   /92  GEN: lying in bed, NAD  CV:    NIHSS =15    CTH images reviewed: No hemorrhage.  No large territorial infarct.  Diffuse cortical atrophy.   CTA H&N images reviewed: mild calcified atherosclerotic plaques bilateral carotid at bifurcation, non flow limiting, no intracranial LVO. 89 year old woman with HTN, HLD, aortic stenosis, ?dementia, presenting to  Ed this AM as a stroke code for unresponsiveness.      She comes from [facility].  I spoke with her nurse there who reported that the patient went to bed at 2030, and was at her usual state of health.  She was checked in on briefly throughout the night, and did not have any signs of distress.  They tried to wake her this AM at 0630 and she was unresponsive.  EMS activated.  She came to the ED.      Patient unable to give any history.  She is non verbal, eyes open, with head and eyes deviated to the L.  After undergoing CTH/CTA, she had a seizure.    I spoke with the patient's daughter, Carol by phone, discussing the results of her CTH and CTA and the decision making around tenecteplase.  We discussed the time course of her symptoms, and time since last known well, and how that excluded her as a candidate for thrombolytics.  Of note, the daughter mentioned that this presentation is very similar to one from July 2024.  She was also unresponsive that admission.  She was treated for UTI, and did regain her faculties.      PMhx:   Aortic stenosis  HTN: amlodipine and losartan  HLD  Takes daily ASA    On exam:   /92  GEN: lying in bed, NAD  CV: RRR, S1, S2  PULM: CTAB  GI: soft, non tender  EXTREM: no CCE  HEENT; no nuchal rigidity  NEURO:   Eyes open, not responding to voice, does localize noxious stimulus with the R arm.  No command following, no acknowledgement of orientation questions  Pupils 3-2mm, symmetric, L gaze deviation, L head deviation, no blink to threat, grimace with L sided facial weakness.   MOTOR: R arm is spontaneously antigravity, and withdrawing briskly.  L arm flaccid.  No withdraw from stimulus.  R leg is vigorously withdrawing from stimulus.  L leg is faintly withdrawing.   SENSORY: grimaces to noxious stimulus to both upper extremities.     NIHSS =15    CTH images reviewed: No hemorrhage.  No large territorial infarct.  Diffuse cortical atrophy.   CTA H&N images reviewed: mild calcified atherosclerotic plaques bilateral carotid at bifurcation, non flow limiting, no intracranial LVO.    AP 89 year old woman with HTN, HLD, aortic stenosis, ?dementia, nursing home resident, presenting with unresponsiveness, having a witnessed seizure while in the ED.  On exam, NIHSS is 15, with L head and eye deviation, L facial weakness, and L arm weakness, although entire exam is limited by level of consciousness.  No acute findings on her CTH/CTA.    Not a candidate for thrombolytics due to time since last known well being >4.5h.    No LVO to target with thrombectomy.   -load with keppra, 2000mg   -will obtain EEG  -seizure precautions  -neurology to follow.  Please page or call with any acute neuro changes.     Discussed with Dr. Yoder in ED.     Critical care time 40 minutes.

## 2025-02-18 NOTE — ED PROVIDER NOTE - PROGRESS NOTE DETAILS
Pt had witnessed seizure by nursing staff lasting approximately 30s, Dr. Yi neuro attending recommends Keppra load, EEG

## 2025-02-18 NOTE — STROKE CODE NOTE - MRS SCORE
[FreeTextEntry1] : \par 16 mo female with hx of anxiety, depressed mood, and asthma here for follow up. Positive PHQ2, patient already being followed by psych with next appointment May 6 2021 at 2:15pm \par \par Plan:\par - Mild Persistent Asthma, Flovent sent, Pulm referral,  No smoke exposure\par - f/u with psych as scheduled\par - f/u with derm for acne \par - f/u ortho\par - f/u with cardiology in 1 year or PRN \par - re sent Flovent to pharmacy, counselled on importance of using Flovent daily as a controller and not using albuterol \par - Melatonin to pharmacy can use PRN\par - Encouraged routine sleeping schedule\par - RTC in August for f/u\par Caregiver verbalized understanding and agrees to aforementioned plan above. All questions answered.\par \par 
(4) Moderately severe disabilty.  Unable to attend to own bodily needs without assistance, and unable to walk unassisted.

## 2025-02-18 NOTE — H&P ADULT - NSHPPHYSICALEXAM_GEN_ALL_CORE
PE:    Adult lying in bed  + emaciated, chronically ill appearing  No JVD trachea midline  Normocephalic, atraumatic  S1S2+  CTA B/L  Abd soft NTND  No leg swelling/edema noted  Unresponsive  Skin pink, warm

## 2025-02-18 NOTE — ED PROVIDER NOTE - PHYSICAL EXAMINATION
Constitutional: ill appearing, GCS 9 (E4V1 M4)  Eyes: EOMI, PERRL  Head: Normocephalic atraumatic  Mouth: +dark dried emesis around mouth, no airway obstruction, posterior oropharynx clear without erythema or exudate  Neck: supple  Cardiac: regular rate and rhythm, no MRG  Resp: Lungs CTAB  GI: Abd s/nt/nd  Neuro: GCS 9 (E4V1 M4)  Skin: No rashes

## 2025-02-18 NOTE — ED ADULT NURSE NOTE - PAIN: PRESENCE, MLM
Relevant Problems   No relevant active problems       Anesthetic History               Review of Systems / Medical History      Pulmonary    COPD: moderate               Neuro/Psych              Cardiovascular    Hypertension        Dysrhythmias : atrial fibrillation  Pacemaker and CAD    Exercise tolerance: >4 METS  Comments: EF 40-45%   GI/Hepatic/Renal                Endo/Other    Diabetes: type 2         Other Findings            Physical Exam    Airway  Mallampati: II  TM Distance: > 6 cm  Neck ROM: normal range of motion   Mouth opening: Normal     Cardiovascular    Rhythm: irregular  Rate: normal         Dental    Dentition: Edentulous     Pulmonary  Breath sounds clear to auscultation               Abdominal         Other Findings            Anesthetic Plan    ASA: 3  Anesthesia type: total IV anesthesia            Anesthetic plan and risks discussed with: Patient non-verbal indicators absent (Rating = 0)

## 2025-02-18 NOTE — ED PROVIDER NOTE - OBJECTIVE STATEMENT
89yoF PMH AS, dementia (baseline A&O x 2–3), GERD, HLD, HTN BIBEMS from [facility] for AMS, unknown LSW. Per EMS pt was found this AM unresponsive, aphasic with dark emesis in mouth- no robel blood or witnessed hematemesis. HPI limited 2/2 medical condition. Per Daughter Tammy pt is DNR DNI

## 2025-02-18 NOTE — ED ADULT TRIAGE NOTE - BEFAST FACE
[facility] hospitalist daily note  Seen on 1/7/21     S; Pt seen and examined intubated and sedated.    No F/C.        Medications in Epic     /49   Pulse 99   Temp 98.1 °F (36.7 °C) (Temporal)   Resp 19   Ht 5' 8\" (1.727 m)   Wt 132 lb 11.5 oz (60.2 kg) Eleanor Slater Hospitalist  [phone] No

## 2025-02-18 NOTE — ED ADULT NURSE REASSESSMENT NOTE - NS ED NURSE REASSESS COMMENT FT1
this RN at bedside.  EEG tech confirmed seizure activity had stopped post medications.  pt had 3 period of apnea in which needed ventilations via BVM.  son, Ovi requesting intubation.  SLIM Lopez aware and onhis way.

## 2025-02-18 NOTE — ED ADULT NURSE NOTE - NSFALLHARMRISKINTERV_ED_ALL_ED
Assistance OOB with selected safe patient handling equipment if applicable/Assistance with ambulation/Communicate risk of Fall with Harm to all staff, patient, and family/Monitor gait and stability/Monitor for mental status changes and reorient to person, place, and time, as needed/Move patient closer to nursing station/within visual sight of ED staff/Provide visual cue: red socks, yellow wristband, yellow gown, etc/Reinforce activity limits and safety measures with patient and family/Toileting schedule using arm’s reach rule for commode and bathroom/Use of alarms - bed, stretcher, chair and/or video monitoring/Bed in lowest position, wheels locked, appropriate side rails in place/Call bell, personal items and telephone in reach/Instruct patient to call for assistance before getting out of bed/chair/stretcher/Non-slip footwear applied when patient is off stretcher/[place] to call system/Physically safe environment - no spills, clutter or unnecessary equipment/Purposeful Proactive Rounding/Room/bathroom lighting operational, light cord in reach

## 2025-02-18 NOTE — H&P ADULT - HISTORY OF PRESENT ILLNESS
ICU admit    S:    Pt seen and examined  89yoF PMH AS, dementia (baseline A&O x 2–3), GERD, HLD, HTN BIBEMS from [facility] for AMS, unknown LSW. Per EMS pt was found this AM unresponsive, aphasic with dark emesis in mouth- no robel blood or witnessed hematemesis. HPI limited 2/2 medical condition. Per Daughter Tammy pt is DNR DNI    status epi, broke with keppra and phenytoin loads  after breaking remains unresponsive apneic hypotensive  Son at bedside elected to intubate    2/18: now intubated on propfol, cEEG remains active

## 2025-02-19 ENCOUNTER — RESULT REVIEW (OUTPATIENT)
Age: 89
End: 2025-02-19

## 2025-02-19 DIAGNOSIS — I35.0 NONRHEUMATIC AORTIC (VALVE) STENOSIS: ICD-10-CM

## 2025-02-19 DIAGNOSIS — J96.01 ACUTE RESPIRATORY FAILURE WITH HYPOXIA: ICD-10-CM

## 2025-02-19 DIAGNOSIS — I21.4 NON-ST ELEVATION (NSTEMI) MYOCARDIAL INFARCTION: ICD-10-CM

## 2025-02-19 LAB
ADD ON TEST-SPECIMEN IN LAB: SIGNIFICANT CHANGE UP
ANION GAP SERPL CALC-SCNC: 9 MMOL/L — SIGNIFICANT CHANGE UP (ref 5–17)
ANION GAP SERPL CALC-SCNC: 9 MMOL/L — SIGNIFICANT CHANGE UP (ref 5–17)
APTT BLD: 25.4 SEC — SIGNIFICANT CHANGE UP (ref 24.5–35.6)
BUN SERPL-MCNC: 31 MG/DL — HIGH (ref 7–23)
BUN SERPL-MCNC: 32 MG/DL — HIGH (ref 7–23)
CALCIUM SERPL-MCNC: 8.9 MG/DL — SIGNIFICANT CHANGE UP (ref 8.5–10.1)
CALCIUM SERPL-MCNC: 9.1 MG/DL — SIGNIFICANT CHANGE UP (ref 8.5–10.1)
CHLORIDE SERPL-SCNC: 105 MMOL/L — SIGNIFICANT CHANGE UP (ref 96–108)
CHLORIDE SERPL-SCNC: 106 MMOL/L — SIGNIFICANT CHANGE UP (ref 96–108)
CO2 SERPL-SCNC: 22 MMOL/L — SIGNIFICANT CHANGE UP (ref 22–31)
CO2 SERPL-SCNC: 23 MMOL/L — SIGNIFICANT CHANGE UP (ref 22–31)
CREAT SERPL-MCNC: 1.61 MG/DL — HIGH (ref 0.5–1.3)
CREAT SERPL-MCNC: 1.7 MG/DL — HIGH (ref 0.5–1.3)
EGFR: 27 ML/MIN/1.73M2 — LOW
EGFR: 29 ML/MIN/1.73M2 — LOW
GLUCOSE SERPL-MCNC: 136 MG/DL — HIGH (ref 70–99)
GLUCOSE SERPL-MCNC: 149 MG/DL — HIGH (ref 70–99)
HCT VFR BLD CALC: 36 % — SIGNIFICANT CHANGE UP (ref 34.5–45)
HCT VFR BLD CALC: 38.2 % — SIGNIFICANT CHANGE UP (ref 34.5–45)
HGB BLD-MCNC: 11.4 G/DL — LOW (ref 11.5–15.5)
HGB BLD-MCNC: 12.3 G/DL — SIGNIFICANT CHANGE UP (ref 11.5–15.5)
LACTATE SERPL-SCNC: 1.9 MMOL/L — SIGNIFICANT CHANGE UP (ref 0.7–2)
MAGNESIUM SERPL-MCNC: 2 MG/DL — SIGNIFICANT CHANGE UP (ref 1.6–2.6)
MCHC RBC-ENTMCNC: 31.3 PG — SIGNIFICANT CHANGE UP (ref 27–34)
MCHC RBC-ENTMCNC: 31.5 PG — SIGNIFICANT CHANGE UP (ref 27–34)
MCHC RBC-ENTMCNC: 31.7 G/DL — LOW (ref 32–36)
MCHC RBC-ENTMCNC: 32.2 G/DL — SIGNIFICANT CHANGE UP (ref 32–36)
MCV RBC AUTO: 97.7 FL — SIGNIFICANT CHANGE UP (ref 80–100)
MCV RBC AUTO: 98.9 FL — SIGNIFICANT CHANGE UP (ref 80–100)
MRSA PCR RESULT.: SIGNIFICANT CHANGE UP
NRBC # BLD AUTO: 0 K/UL — SIGNIFICANT CHANGE UP (ref 0–0)
NRBC # BLD AUTO: 0 K/UL — SIGNIFICANT CHANGE UP (ref 0–0)
NRBC # FLD: 0 K/UL — SIGNIFICANT CHANGE UP (ref 0–0)
NRBC # FLD: 0 K/UL — SIGNIFICANT CHANGE UP (ref 0–0)
NRBC BLD AUTO-RTO: 0 /100 WBCS — SIGNIFICANT CHANGE UP (ref 0–0)
NRBC BLD AUTO-RTO: 0 /100 WBCS — SIGNIFICANT CHANGE UP (ref 0–0)
NT-PROBNP SERPL-SCNC: HIGH PG/ML (ref 0–450)
PHOSPHATE SERPL-MCNC: 4.9 MG/DL — HIGH (ref 2.5–4.5)
PLATELET # BLD AUTO: 221 K/UL — SIGNIFICANT CHANGE UP (ref 150–400)
PLATELET # BLD AUTO: 249 K/UL — SIGNIFICANT CHANGE UP (ref 150–400)
PMV BLD: 9.6 FL — SIGNIFICANT CHANGE UP (ref 7–13)
PMV BLD: 9.8 FL — SIGNIFICANT CHANGE UP (ref 7–13)
POTASSIUM SERPL-MCNC: 3.6 MMOL/L — SIGNIFICANT CHANGE UP (ref 3.5–5.3)
POTASSIUM SERPL-MCNC: 3.8 MMOL/L — SIGNIFICANT CHANGE UP (ref 3.5–5.3)
POTASSIUM SERPL-SCNC: 3.6 MMOL/L — SIGNIFICANT CHANGE UP (ref 3.5–5.3)
POTASSIUM SERPL-SCNC: 3.8 MMOL/L — SIGNIFICANT CHANGE UP (ref 3.5–5.3)
PROCALCITONIN SERPL-MCNC: 0.63 NG/ML — HIGH (ref 0.02–0.1)
RAPID RVP RESULT: SIGNIFICANT CHANGE UP
RBC # BLD: 3.64 M/UL — LOW (ref 3.8–5.2)
RBC # BLD: 3.91 M/UL — SIGNIFICANT CHANGE UP (ref 3.8–5.2)
RBC # FLD: 14.6 % — HIGH (ref 10.3–14.5)
RBC # FLD: 14.6 % — HIGH (ref 10.3–14.5)
S AUREUS DNA NOSE QL NAA+PROBE: SIGNIFICANT CHANGE UP
SARS-COV-2 RNA SPEC QL NAA+PROBE: SIGNIFICANT CHANGE UP
SODIUM SERPL-SCNC: 136 MMOL/L — SIGNIFICANT CHANGE UP (ref 135–145)
SODIUM SERPL-SCNC: 138 MMOL/L — SIGNIFICANT CHANGE UP (ref 135–145)
TROPONIN I, HIGH SENSITIVITY RESULT: HIGH NG/L
WBC # BLD: 14.09 K/UL — HIGH (ref 3.8–10.5)
WBC # BLD: 16.82 K/UL — HIGH (ref 3.8–10.5)
WBC # FLD AUTO: 14.09 K/UL — HIGH (ref 3.8–10.5)
WBC # FLD AUTO: 16.82 K/UL — HIGH (ref 3.8–10.5)

## 2025-02-19 PROCEDURE — 76376 3D RENDER W/INTRP POSTPROCES: CPT | Mod: 26

## 2025-02-19 PROCEDURE — 99223 1ST HOSP IP/OBS HIGH 75: CPT | Mod: FS

## 2025-02-19 PROCEDURE — 93306 TTE W/DOPPLER COMPLETE: CPT | Mod: 26

## 2025-02-19 PROCEDURE — 99291 CRITICAL CARE FIRST HOUR: CPT

## 2025-02-19 PROCEDURE — 95720 EEG PHY/QHP EA INCR W/VEEG: CPT

## 2025-02-19 PROCEDURE — 99233 SBSQ HOSP IP/OBS HIGH 50: CPT

## 2025-02-19 RX ORDER — SENNA 187 MG
2 TABLET ORAL AT BEDTIME
Refills: 0 | Status: DISCONTINUED | OUTPATIENT
Start: 2025-02-19 | End: 2025-02-22

## 2025-02-19 RX ORDER — LEVETIRACETAM 10 MG/ML
500 INJECTION, SOLUTION INTRAVENOUS EVERY 12 HOURS
Refills: 0 | Status: DISCONTINUED | OUTPATIENT
Start: 2025-02-19 | End: 2025-02-28

## 2025-02-19 RX ORDER — HYDROCORTISONE 20 MG
100 TABLET ORAL ONCE
Refills: 0 | Status: COMPLETED | OUTPATIENT
Start: 2025-02-19 | End: 2025-02-19

## 2025-02-19 RX ORDER — HEPARIN SODIUM 1000 [USP'U]/ML
5000 INJECTION INTRAVENOUS; SUBCUTANEOUS EVERY 8 HOURS
Refills: 0 | Status: DISCONTINUED | OUTPATIENT
Start: 2025-02-19 | End: 2025-02-28

## 2025-02-19 RX ORDER — AMPICILLIN SODIUM 1 G/1
2 INJECTION, POWDER, FOR SOLUTION INTRAMUSCULAR; INTRAVENOUS EVERY 12 HOURS
Refills: 0 | Status: DISCONTINUED | OUTPATIENT
Start: 2025-02-19 | End: 2025-02-28

## 2025-02-19 RX ORDER — HYDROCORTISONE 20 MG
50 TABLET ORAL EVERY 6 HOURS
Refills: 0 | Status: DISCONTINUED | OUTPATIENT
Start: 2025-02-19 | End: 2025-02-21

## 2025-02-19 RX ORDER — ASPIRIN 325 MG
81 TABLET ORAL DAILY
Refills: 0 | Status: DISCONTINUED | OUTPATIENT
Start: 2025-02-19 | End: 2025-02-28

## 2025-02-19 RX ORDER — AMPICILLIN SODIUM 1 G/1
2 INJECTION, POWDER, FOR SOLUTION INTRAMUSCULAR; INTRAVENOUS EVERY 8 HOURS
Refills: 0 | Status: DISCONTINUED | OUTPATIENT
Start: 2025-02-19 | End: 2025-02-19

## 2025-02-19 RX ORDER — POLYETHYLENE GLYCOL 3350 17 G/17G
17 POWDER, FOR SOLUTION ORAL DAILY
Refills: 0 | Status: DISCONTINUED | OUTPATIENT
Start: 2025-02-19 | End: 2025-02-22

## 2025-02-19 RX ORDER — LACOSAMIDE 150 MG/1
100 TABLET, FILM COATED ORAL
Refills: 0 | Status: DISCONTINUED | OUTPATIENT
Start: 2025-02-19 | End: 2025-02-26

## 2025-02-19 RX ADMIN — PROPOFOL 2.9 MICROGRAM(S)/KG/MIN: 10 INJECTION, EMULSION INTRAVENOUS at 05:22

## 2025-02-19 RX ADMIN — Medication 15 MILLILITER(S): at 11:16

## 2025-02-19 RX ADMIN — AMPICILLIN SODIUM 216 GRAM(S): 1 INJECTION, POWDER, FOR SOLUTION INTRAMUSCULAR; INTRAVENOUS at 05:22

## 2025-02-19 RX ADMIN — Medication 40 MILLIGRAM(S): at 11:15

## 2025-02-19 RX ADMIN — LACOSAMIDE 100 MILLIGRAM(S): 150 TABLET, FILM COATED ORAL at 18:03

## 2025-02-19 RX ADMIN — Medication 259.8 MILLIGRAM(S): at 05:22

## 2025-02-19 RX ADMIN — LEVETIRACETAM 500 MILLIGRAM(S): 10 INJECTION, SOLUTION INTRAVENOUS at 20:03

## 2025-02-19 RX ADMIN — HEPARIN SODIUM 5000 UNIT(S): 1000 INJECTION INTRAVENOUS; SUBCUTANEOUS at 22:26

## 2025-02-19 RX ADMIN — Medication 2 TABLET(S): at 22:27

## 2025-02-19 RX ADMIN — Medication 725 MILLIGRAM(S): at 01:56

## 2025-02-19 RX ADMIN — Medication 50 MILLIGRAM(S): at 16:38

## 2025-02-19 RX ADMIN — CEFTRIAXONE 2000 MILLIGRAM(S): 500 INJECTION, POWDER, FOR SOLUTION INTRAMUSCULAR; INTRAVENOUS at 18:02

## 2025-02-19 RX ADMIN — Medication 100 MILLIGRAM(S): at 11:14

## 2025-02-19 RX ADMIN — CEFTRIAXONE 2000 MILLIGRAM(S): 500 INJECTION, POWDER, FOR SOLUTION INTRAMUSCULAR; INTRAVENOUS at 05:22

## 2025-02-19 RX ADMIN — LACOSAMIDE 100 MILLIGRAM(S): 150 TABLET, FILM COATED ORAL at 11:16

## 2025-02-19 RX ADMIN — Medication 50 MILLIGRAM(S): at 22:27

## 2025-02-19 RX ADMIN — HEPARIN SODIUM 5000 UNIT(S): 1000 INJECTION INTRAVENOUS; SUBCUTANEOUS at 14:19

## 2025-02-19 RX ADMIN — Medication 81 MILLIGRAM(S): at 14:19

## 2025-02-19 RX ADMIN — AMPICILLIN SODIUM 216 GRAM(S): 1 INJECTION, POWDER, FOR SOLUTION INTRAMUSCULAR; INTRAVENOUS at 01:46

## 2025-02-19 RX ADMIN — AMPICILLIN SODIUM 216 GRAM(S): 1 INJECTION, POWDER, FOR SOLUTION INTRAMUSCULAR; INTRAVENOUS at 18:03

## 2025-02-19 RX ADMIN — Medication 15 MILLILITER(S): at 22:30

## 2025-02-19 RX ADMIN — LEVETIRACETAM 1000 MILLIGRAM(S): 10 INJECTION, SOLUTION INTRAVENOUS at 08:26

## 2025-02-19 NOTE — CONSULT NOTE ADULT - ASSESSMENT
Assessment:  89F with AS, Dementia, GERD, HLD, HTN, presents 2/18 from [facility] with altered mental status, found to be in Status epilepticus   Febrile 101F on admission, on vent  WBC 16  Cr 1.70  Lactate 4.2 > 1.9  UA negative  CTH No hemorrhage.  No large territorial infarct.  Diffuse cortical atrophy.   CTA H&N mild calcified atherosclerotic plaques bilateral carotid at bifurcation, non flow limiting, no intracranial LVO.  CTAP negative  CT Chest with Dependent posterior RUL, superior RLL and to lesser extent apicoposterior SAMEER groundglass opacities and diffuse interlobular septal thickening. Pulmonary edema with or without associated pneumonia suspected  RVP negative, MRSA negative    Antimicrobials:  Acyclovir (2/18 --- )  ampicillin  IVPB 2 every 8 hours (2/18 --- )  cefTRIAXone Injectable. 2000 every 12 hours (2/18 --- )  Vanco (2/18 --- )    Impression:   #Status epilepticus, Metabolic Encephalopathy, Fever, r/o Meningitis  #Acute hypoxic respiratory failure, Pulmonary edema with possible Pneumonia  #Leukocytosis  #JACKY  #Lactic Acidosis   #Hx of Dementia    Recommendations:  - continue empiric Meningitis coverage  --- Vancomycin by level (renally dose) (Day #2), check level tomorrow AM  --- CTX 2G q12 (Day #2)  --- Ampicillin 2G q12 (renally dosed) (Day #2)  --- Acyclovir 490mg q24 (renally dosed) (Day #2)  - monitor temperature curve  - trend WBC  - reason for abx use reviewed with patient  - side effects of antibiotic discussed, tolerating abx well so far  - Prior cultures reviewed. An epidemiologic assessment was performed. There is a significant risk for resistant microorganisms to spread to family members, and/or healthcare staff. The patient will be placed on isolation according to infection control policy. Will reconsider isolation measures based on new culture results and other clinical data as appropriate Appropriate cultures collected and an appropriate broad spectrum antibiotic therapy will be considered  - follow Vanco level  - follow up BCx x2  - follow up Neurology  - would perform LP, obtain cell count, glucose, protein, routine gram stain and culture, CSF PCR panel  - rest per primary team    Clinical team may change from intravenous to oral antibiotics when the following criteria are met:   1. Patient is clinically improving/stable       a)	Improved signs and symptoms of infection from initial presentation       b)	Afebrile for 24 hours       c)	Leukocytosis trending towards normal range   2. Patient is tolerating oral intake   3. Initial/repeat blood cultures are negative OR do not need to wait for preliminary blood cultures to result    Cannot advise changing to oral antibiotic therapy until culture sensitivity is available.   Assessment:  89F with AS, Dementia, GERD, HLD, HTN, presents 2/18 from [facility] with altered mental status, found to be in Status epilepticus   Febrile 101F on admission, on vent  WBC 16  Cr 1.70  Lactate 4.2 > 1.9  UA negative  CTH No hemorrhage.  No large territorial infarct.  Diffuse cortical atrophy.   CTA H&N mild calcified atherosclerotic plaques bilateral carotid at bifurcation, non flow limiting, no intracranial LVO.  CTAP negative  CT Chest with Dependent posterior RUL, superior RLL and to lesser extent apicoposterior SAMEER groundglass opacities and diffuse interlobular septal thickening. Pulmonary edema with or without associated pneumonia suspected  RVP negative, MRSA negative    Antimicrobials:  Acyclovir (2/18 --- )  ampicillin  IVPB 2 every 8 hours (2/18 --- )  cefTRIAXone Injectable. 2000 every 12 hours (2/18 --- )  Vanco (2/18 --- )    Impression:   #Status epilepticus, Metabolic Encephalopathy, Fever, r/o Meningitis  #Acute hypoxic respiratory failure, Pulmonary edema with possible Pneumonia  #Leukocytosis  #AJCKY  #Lactic Acidosis   #Hx of Dementia    Recommendations:  - continue empiric Meningitis coverage  --- Vancomycin by level (renally dose) (Day #2), check level tomorrow AM  --- CTX 2G q12 (Day #2)  --- Ampicillin 2G q12 (renally dosed) (Day #2)  --- Acyclovir 490mg q24 (renally dosed) (Day #2)  - monitor temperature curve  - trend WBC  - reason for abx use reviewed with patient  - side effects of antibiotic discussed, tolerating abx well so far  - Prior cultures reviewed. An epidemiologic assessment was performed. There is a significant risk for resistant microorganisms to spread to family members, and/or healthcare staff. The patient will be placed on isolation according to infection control policy. Will reconsider isolation measures based on new culture results and other clinical data as appropriate Appropriate cultures collected and an appropriate broad spectrum antibiotic therapy will be considered  - check Full Respiratory Viral Panel  - follow Vanco level  - follow up BCx x2  - follow up Neurology  - would perform LP, obtain cell count, glucose, protein, routine gram stain and culture, CSF PCR panel  - rest per primary team    Clinical team may change from intravenous to oral antibiotics when the following criteria are met:   1. Patient is clinically improving/stable       a)	Improved signs and symptoms of infection from initial presentation       b)	Afebrile for 24 hours       c)	Leukocytosis trending towards normal range   2. Patient is tolerating oral intake   3. Initial/repeat blood cultures are negative OR do not need to wait for preliminary blood cultures to result    Cannot advise changing to oral antibiotic therapy until culture sensitivity is available.

## 2025-02-19 NOTE — DIETITIAN INITIAL EVALUATION ADULT - ADD RECOMMEND
1) Initiate TF rx to above, 2) Monitor tolerance; maintain aspiration precautions, back of bed >45 degrees, 3) daily wts to track/trend changes, 4) monitor lytes/ min and replete prn - monitor for RFS; K, Phos, Mg and replete low lytes PRN, 5) Monitor bowel movements, if no BM for >3 days, consider implementing bowel regimen, 6) Obtain vitamin D 25OH level to assess nutriture, 7) Confirm goals of care regarding LONG-TERM nutrition support. RD will continue to monitor TF tolerance, labs, hydration, and wt prn.

## 2025-02-19 NOTE — DIETITIAN INITIAL EVALUATION ADULT - PERTINENT MEDS FT
MEDICATIONS  (STANDING):  ampicillin  IVPB 2 Gram(s) IV Intermittent every 8 hours  cefTRIAXone Injectable. 2000 milliGRAM(s) IV Push every 12 hours  chlorhexidine 0.12% Liquid 15 milliLiter(s) Oral Mucosa every 12 hours  lacosamide IVPB 100 milliGRAM(s) IV Intermittent <User Schedule>  levETIRAcetam   Injectable 500 milliGRAM(s) IV Push every 12 hours  norepinephrine Infusion 0.05 MICROgram(s)/kG/Min (4.53 mL/Hr) IV Continuous <Continuous>  pantoprazole  Injectable 40 milliGRAM(s) IV Push daily  propofol Infusion 10 MICROgram(s)/kG/Min (2.9 mL/Hr) IV Continuous <Continuous>    MEDICATIONS  (PRN):  LORazepam   Injectable 1 milliGRAM(s) IV Push every 30 minutes PRN seizures

## 2025-02-19 NOTE — CONSULT NOTE ADULT - SUBJECTIVE AND OBJECTIVE BOX
CHIEF COMPLAINT:    HPI:  89yoF PMH  severe AS ( refused any intervention , dementia (baseline A&O x 2–3), GERD, HLD, HTN BIBEMS from [facility] for AMS, unknown LSW. Per EMS pt was found this AM unresponsive, aphasic with dark emesis in mouth- no robel blood or witnessed hematemesis. HPI limited 2/2 medical condition. Per Daughter Tammy pt is DNR DNI, patient  blood work showed markedly elevated troponin ,  in setting of status epilepticus , patient had lactic acidosis  , became hypotensive , was intubated  ,family at bedside      patient blood work increased troponin , ekg showed ischemic ST  T changes   ,   patient had severe AS ,  HTN  patient and family did not want any intervention , and now also             PAST MEDICAL & SURGICAL HISTORY:  HTN (hypertension)          Allergies    No Known Allergies    Intolerances        SOCIAL HISTORY:    non smoker       FAMILY HISTORY:  non contributory at this age     MEDICATIONS:Home Medications:  acetaminophen 325 mg oral tablet: 2 tab(s) orally every 6 hours as needed for  pain max 3 g/24 hours (18 Feb 2025 11:04)  diclofenac 1% topical gel: Apply 2 grams topically to affected area 2 times daily to B/L knees for pain (18 Feb 2025 12:25)  docusate sodium 100 mg oral capsule: 2 cap(s) orally once a day (18 Feb 2025 12:25)  senna (sennosides) 8.6 mg oral tablet: 2 tab(s) orally once a day (at bedtime) (18 Feb 2025 12:25)    MEDICATIONS  (STANDING):  ampicillin  IVPB 2 Gram(s) IV Intermittent every 8 hours  cefTRIAXone Injectable. 2000 milliGRAM(s) IV Push every 12 hours  chlorhexidine 0.12% Liquid 15 milliLiter(s) Oral Mucosa every 12 hours  heparin   Injectable 5000 Unit(s) SubCutaneous every 8 hours  hydrocortisone sodium succinate Injectable 50 milliGRAM(s) IV Push every 6 hours  lacosamide IVPB 100 milliGRAM(s) IV Intermittent <User Schedule>  levETIRAcetam   Injectable 500 milliGRAM(s) IV Push every 12 hours  norepinephrine Infusion 0.05 MICROgram(s)/kG/Min (4.53 mL/Hr) IV Continuous <Continuous>  pantoprazole  Injectable 40 milliGRAM(s) IV Push daily  propofol Infusion 10 MICROgram(s)/kG/Min (2.9 mL/Hr) IV Continuous <Continuous>  senna 2 Tablet(s) Oral at bedtime    MEDICATIONS  (PRN):  LORazepam   Injectable 1 milliGRAM(s) IV Push every 30 minutes PRN seizures  polyethylene glycol 3350 17 Gram(s) Oral daily PRN Constipation      REVIEW OF SYSTEMS:  intubated   sedated can not obtain     Vital Signs Last 24 Hrs  T(C): 37.2 (19 Feb 2025 10:00), Max: 38.6 (18 Feb 2025 15:00)  T(F): 99 (19 Feb 2025 10:00), Max: 101.5 (18 Feb 2025 15:00)  HR: 74 (19 Feb 2025 10:00) (67 - 97)  BP: 120/56 (19 Feb 2025 10:00) (62/36 - 159/83)  BP(mean): 75 (19 Feb 2025 10:00) (46 - 108)  RR: 16 (19 Feb 2025 10:00) (14 - 25)  SpO2: 100% (19 Feb 2025 10:00) (95% - 100%)    Parameters below as of 18 Feb 2025 18:00    O2 Flow (L/min): 40      I&O's Summary    18 Feb 2025 07:01  -  19 Feb 2025 07:00  --------------------------------------------------------  IN: 1346 mL / OUT: 1700 mL / NET: -354 mL        PHYSICAL EXAM:    Constitutional: intubated sedated   HEENT: PERR, EOMI,  No oral cyananosis.  Neck:  supple,  No JVD  Respiratory: Breath sounds are clear bilaterally, No wheezing, rales or rhonchi  Cardiovascular: S1 and S2, regular rate ESM   Gastrointestinal: Bowel Sounds present, soft, nontender.   Extremities: No peripheral edema. No clubbing or cyanosis.  Vascular: 2+ peripheral pulses  Neurological:   intubated Musculoskeletal: no calf tenderness.  Skin: No rashes.      LABS: All Labs Reviewed:                        12.3   16.82 )-----------( 249      ( 19 Feb 2025 06:05 )             38.2                         13.2   11.13 )-----------( 295      ( 18 Feb 2025 08:05 )             41.1     19 Feb 2025 06:05    138    |  106    |  31     ----------------------------<  149    3.8     |  23     |  1.70   18 Feb 2025 12:59    136    |  106    |  24     ----------------------------<  214    4.3     |  21     |  1.29   18 Feb 2025 08:05    139    |  109    |  25     ----------------------------<  211    4.0     |  19     |  1.47     Ca    8.9        19 Feb 2025 06:05  Ca    9.6        18 Feb 2025 12:59  Ca    9.7        18 Feb 2025 08:05  Phos  4.9       19 Feb 2025 06:05  Mg     2.0       19 Feb 2025 06:05    TPro  8.0    /  Alb  3.7    /  TBili  0.5    /  DBili  x      /  AST  72     /  ALT  25     /  AlkPhos  103    18 Feb 2025 08:05    PT/INR - ( 18 Feb 2025 08:05 )   PT: 10.6 sec;   INR: 0.90 ratio         PTT - ( 18 Feb 2025 08:05 )  PTT:30.4 sec        Blood Culture:     - TroponinI hsT: <-17820.29, <-91607.09, <-38203.94, <-36112.62    RADIOLOGY/EKG:    < from: 12 Lead ECG (02.18.25 @ 08:37) >    Diagnosis Line Sinus tachycardia  Left atrial enlargement  Left axis deviation  Left ventricular hypertrophy ( Guy product )  ST & T wave abnormality, consider lateral ischemia  Abnormal ECG  When compared with ECG of 19-JAN-2025 14:32,  ST now depressed in Lateral leads  Nonspecific T wave abnormality, improved in Inferior leads  Confirmed by Madeline Fernandez ([id number]) on 2/18/2025 8:47:40 PM    < end of copied text >  < from: CT Chest w/ IV Cont (02.18.25 @ 09:15) >    IMPRESSION:  1.  Dependent posterior RUL, superior RLL and to lesser extent   apicoposterior SAMEER groundglass opacities and diffuse interlobular septal   thickening. Pulmonary edema with or without associated pneumonia   suspected.  2.  No acute abdominal/pelvic pathology.  3.  Etiology of hematemesis not determined.    < end of copied text >  
Patient is a 89y old  Female who presents with a chief complaint of Nonintractable epilepsy with status epilepticus     (19 Feb 2025 10:45)    HPI:  89yoF PMH AS, dementia (baseline A&O x 2–3), GERD, HLD, HTN BIBEMS from [facility] for AMS, unknown LSW. Per EMS pt was found this AM unresponsive, aphasic with dark emesis in mouth- no robel blood or witnessed hematemesis. HPI limited 2/2 medical condition. Per Daughter Tammy pt is DNR DNI(18 Feb 2025 13:03)  Above HPI reviewed     89F with AS, Dementia, GERD, HLD, HTN, presents 2/18 from [facility] with altered mental status, found to be in Status epilepticus   Febrile 101F on admission, on vent  WBC 16  Cr 1.70  Lactate 4.2 > 1.9  UA negative  CTH No hemorrhage.  No large territorial infarct.  Diffuse cortical atrophy.   CTA H&N mild calcified atherosclerotic plaques bilateral carotid at bifurcation, non flow limiting, no intracranial LVO.  CTAP negative  CT Chest with Dependent posterior RUL, superior RLL and to lesser extent apicoposterior SAMEER groundglass opacities and diffuse interlobular septal thickening. Pulmonary edema with or without associated pneumonia suspected  RVP negative, MRSA negative    PAST MEDICAL & SURGICAL HISTORY:  HTN (hypertension)    FAMILY HISTORY:    Social Hx: unable to obtain due to mental status    Allergies  No Known Allergies    ANTIMICROBIALS (past 90 days)  MEDICATIONS  (STANDING):    acyclovir IVPB   259.8 mL/Hr IV Intermittent (02-18-25 @ 21:43)    acyclovir IVPB   259.8 mL/Hr IV Intermittent (02-19-25 @ 05:22)    ampicillin  IVPB   216 mL/Hr IV Intermittent (02-19-25 @ 05:22)   216 mL/Hr IV Intermittent (02-19-25 @ 01:46)   216 mL/Hr IV Intermittent (02-18-25 @ 21:30)    cefTRIAXone Injectable.   2000 milliGRAM(s) IV Push (02-19-25 @ 05:22)   2000 milliGRAM(s) IV Push (02-18-25 @ 21:44)    piperacillin/tazobactam IVPB...   200 mL/Hr IV Intermittent (02-18-25 @ 08:45)    vancomycin  IVPB   250 mL/Hr IV Intermittent (02-18-25 @ 21:41)        ACTIVE ANTIMICROBIALS  Acyclovir (2/18 --- )  ampicillin  IVPB 2 every 8 hours (2/18 --- )  cefTRIAXone Injectable. 2000 every 12 hours (2/18 --- )    MEDICATIONS  (STANDING):  heparin   Injectable 5000 every 8 hours  hydrocortisone sodium succinate Injectable 50 every 6 hours  lacosamide IVPB 100 <User Schedule>  levETIRAcetam   Injectable 500 every 12 hours  LORazepam   Injectable 1 every 30 minutes PRN  norepinephrine Infusion 0.05 <Continuous>  pantoprazole  Injectable 40 daily  polyethylene glycol 3350 17 daily PRN  propofol Infusion 10 <Continuous>  senna 2 at bedtime      REVIEW OF SYSTEMS  [x  ] ROS unobtainable because:  mental status  [  ] All other systems negative except as noted below:	    Constitutional:  [ ] fever [ ] chills  [ ] weight loss  [ ] weakness  Skin:  [ ] rash [ ] phlebitis	  Eyes: [ ] icterus [ ] pain  [ ] discharge	  ENMT: [ ] sore throat  [ ] thrush [ ] ulcers [ ] exudates  Respiratory: [ ] dyspnea [ ] hemoptysis [ ] cough [ ] sputum	  Cardiovascular:  [ ] chest pain [ ] palpitations [ ] edema	  Gastrointestinal:  [ ] nausea [ ] vomiting [ ] diarrhea [ ] constipation [ ] pain	  Genitourinary:  [ ] dysuria [ ] frequency [ ] hematuria [ ] discharge [ ] flank pain  [ ] incontinence  Musculoskeletal:  [ ] myalgias [ ] arthralgias [ ] arthritis  [ ] back pain  Neurological:  [ ] headache [ ] seizures  [ ] confusion/altered mental status  Psychiatric:  [ ] anxiety [ ] depression	  Hematology/Lymphatics:  [ ] lymphadenopathy  Endocrine:  [ ] adrenal [ ] thyroid  Allergic/Immunologic:	 [ ] transplant [ ] seasonal    Vital Signs Last 24 Hrs  T(C): 37.2 (19 Feb 2025 10:00), Max: 38.6 (18 Feb 2025 15:00)  T(F): 99 (19 Feb 2025 10:00), Max: 101.5 (18 Feb 2025 15:00)  HR: 74 (19 Feb 2025 10:00) (67 - 97)  BP: 120/56 (19 Feb 2025 10:00) (62/36 - 159/83)  BP(mean): 75 (19 Feb 2025 10:00) (46 - 108)  RR: 16 (19 Feb 2025 10:00) (14 - 25)  SpO2: 100% (19 Feb 2025 10:00) (95% - 100%)    Parameters below as of 18 Feb 2025 18:00    O2 Flow (L/min): 40      Physical Exam:  Constitutional:  intubated, sedated  Head/Eyes: no icterus  LUNGS:  Course  CVS:  regular rhythm  Abd:  soft, non-tender; non-distended  Ext:  no edema  Vascular:  IV site no erythema tenderness or discharge  Neuro: AAO X 0    Labs: all available labs reviewed                        12.3   16.82 )-----------( 249      ( 19 Feb 2025 06:05 )             38.2     02-19    138  |  106  |  31[H]  ----------------------------<  149[H]  3.8   |  23  |  1.70[H]    Ca    8.9      19 Feb 2025 06:05  Phos  4.9     02-19  Mg     2.0     02-19    TPro  8.0  /  Alb  3.7  /  TBili  0.5  /  DBili  x   /  AST  72[H]  /  ALT  25  /  AlkPhos  103  02-18     LIVER FUNCTIONS - ( 18 Feb 2025 08:05 )  Alb: 3.7 g/dL / Pro: 8.0 gm/dL / ALK PHOS: 103 U/L / ALT: 25 U/L / AST: 72 U/L / GGT: x           Urinalysis Basic - ( 19 Feb 2025 06:05 )    Color: x / Appearance: x / SG: x / pH: x  Gluc: 149 mg/dL / Ketone: x  / Bili: x / Urobili: x   Blood: x / Protein: x / Nitrite: x   Leuk Esterase: x / RBC: x / WBC x   Sq Epi: x / Non Sq Epi: x / Bacteria: x          Radiology: all available radiological tests reviewed    Advanced directives addressed: full resuscitation
Yes

## 2025-02-19 NOTE — PROGRESS NOTE ADULT - SUBJECTIVE AND OBJECTIVE BOX
Date of service is equal to the date of entry    HPI: 89 y/o F w/ pmh of AS, dementia, gerd, hld, htn, presented for AMS/unresponsivness 2/2 to status epilepticus, pt was intubated started on prop gtt, loaded w/ keprra and phentoin and admitted to the ICU for status epilepticus, shock possible septic?, acute hypoxic resp failure,  JACKY  and Pna.    24 hour events: pt was started on IV zosyn, tonight contacted by neurology dr. francois who suggested to start meningitis and encphalitis coverage given new onset of seizures in the setting of fevers w/out any other information at this time.    Review of Systems: Intubated and sedated    T(F): 100.8 (02-18-25 @ 20:00), Max: 101.5 (02-18-25 @ 15:00)  HR: 76 (02-18-25 @ 20:00) (71 - 110)  BP: 89/51 (02-18-25 @ 20:00) (62/36 - 177/93)  RR: 16 (02-18-25 @ 20:00) (14 - 25)  SpO2: 100% (02-18-25 @ 20:00) (88% - 100%)  Wt(kg): --    Mode: AC/ CMV (Assist Control/ Continuous Mandatory Ventilation), RR (machine): 16, TV (machine): 400, FiO2: 40, PEEP: 6  02-18-25 @ 07:01  -  02-18-25 @ 21:49  --------------------------------------------------------  IN: 0 mL / OUT: 1200 mL / NET: -1200 mL        CAPILLARY BLOOD GLUCOSE          I&O's Summary    18 Feb 2025 07:01  -  18 Feb 2025 21:49  --------------------------------------------------------  IN: 0 mL / OUT: 1200 mL / NET: -1200 mL        Physical Exam:   Gen: Comfortable in bed in NAD  Neuro: intubated and sedated  HEENT: NC/AT  Resp: good air entry b/l  CVS: +RRR  Abd: BSx4, soft, nt  Ext: no edema   Skin: warm/dry    Meds:  acyclovir IVPB   ampicillin  IVPB IV Intermittent  cefTRIAXone Injectable. IV Push  vancomycin  IVPB IV Intermittent    norepinephrine Infusion IV Continuous        levETIRAcetam   Injectable IV Push  LORazepam   Injectable IV Push PRN  propofol Infusion IV Continuous      chlorhexidine 0.12% Liquid Oral Mucosa                              13.2   11.13 )-----------( 295      ( 18 Feb 2025 08:05 )             41.1       02-18    136  |  106  |  24[H]  ----------------------------<  214[H]  4.3   |  21[L]  |  1.29    Ca    9.6      18 Feb 2025 12:59    TPro  8.0  /  Alb  3.7  /  TBili  0.5  /  DBili  x   /  AST  72[H]  /  ALT  25  /  AlkPhos  103  02-18    Lactate 4.2           02-18 @ 11:30    Lactate 3.6           02-18 @ 08:27      PT/INR - ( 18 Feb 2025 08:05 )   PT: 10.6 sec;   INR: 0.90 ratio         PTT - ( 18 Feb 2025 08:05 )  PTT:30.4 sec  Urinalysis Basic - ( 18 Feb 2025 12:59 )    Color: x / Appearance: x / SG: x / pH: x  Gluc: 214 mg/dL / Ketone: x  / Bili: x / Urobili: x   Blood: x / Protein: x / Nitrite: x   Leuk Esterase: x / RBC: x / WBC x   Sq Epi: x / Non Sq Epi: x / Bacteria: x          Radiology:     < from: Xray Chest 1 View-PORTABLE IMMEDIATE (Xray Chest 1 View-PORTABLE IMMEDIATE .) (02.18.25 @ 13:04) >  ACC: [id number] EXAM:  XR CHEST PORTABLE IMMED 1V   ORDERED BY: GINNY FREEMAN     ACC: [id number] EXAM:  XR CHEST PORTABLE URGENT 1V   ORDERED BY: PETER BARONE     PROCEDURE DATE:  02/18/2025          INTERPRETATION:  EXAM: XR CHEST URGENT, XR CHESTIMMEDIATE    INDICATION: Stroke Code JXR    COMPARISON: See below    IMPRESSION:    Chest February 18 at 8:18 AM: Infiltrative changes in the right apical   suprahilar region and right infrahilar region. There may be some   infiltrative changes in the left retrocardiac region. Inflammatory   infectious process should be considered. Some generalized prominence to   the interstitial markings noted.    Chest February 18 at 12:47 PM: Endotracheal tube has appeared the tip   above the rose. NG tube has also appeared the tip overlying the   stomach. Slight improvement in aeration noted but there is still is   residual infiltrative changes in the right supra and infrahilar regions.   Borderline cardiomegaly. Scoliosis noted. Osteoarthritic changesboth   shoulders. Findings should also be correlated with CT scan performed   today. Results which are available on this appropriate color    --- End of Report ---    JONI DIXON MD; Attending Radiologist  This document has been electronically signed. Feb 18 2025  1:16PM    < end of copied text >    < from: CT Abdomen and Pelvis w/ IV Cont (02.18.25 @ 09:15) >    ACC: [id number] EXAM:  CT ABDOMEN AND PELVIS IC   ORDERED BY: PETER BARONE     ACC: [id number] EXAM:  CT CHEST IC   ORDERED BY: PETER BARONE     PROCEDURE DATE:  02/18/2025          INTERPRETATION:  CLINICAL INFORMATION: Altered mental status, hypoxia,   hematemesis    COMPARISON: 1/19/2025    CONTRAST/COMPLICATIONS:  IV Contrast: Omnipaque 350 90 cc administered   0 cc discarded  Oral Contrast: NONE    PROCEDURE:  CT of the Chest, Abdomen and Pelvis was performed.  Sagittal and coronal reformats were performed.    FINDINGS:  CHEST:  LUNGS AND LARGE AIRWAYS: Patent central airways. Dependent posterior RUL,   superior RLL and to lesser extent apicoposterior SAMEER groundglass   opacities.  Diffuse interlobular septal thickening. Subsegmental   RML/lingular atelectasis, not significantly changed.. Mild dependent   pleuroparenchymal reactive/atelectatic change .  PLEURA: No pleural effusion.  VESSELS: No large/central pulmonary thromboembolism. No aortic   dilatation/dissection. Aortic /coronary artery calcification..  HEART: Cardiomegaly. No pericardial effusion. Aortic valve, mitral   annulus calcification.  MEDIASTINUM AND HERO: No lymphadenopathy.  CHEST WALL AND LOWER NECK: Unremarkable .    ABDOMEN AND PELVIS:  LIVER: Within normal limits.  BILE DUCTS: Normal caliber.  GALLBLADDER: Distended gallbladder without calcified gallstones, not   significantly changed.  SPLEEN: Within normal limits.  PANCREAS: Within normal limits.  ADRENALS: Within normal limits.  KIDNEYS/URETERS: Symmetric renal enhancement without calculi/ obstructive   uropathy. Too small to characterize renal hypodensities.    BLADDER: Gallo catheter within collapsed/nonevaluable urinary bladder.  REPRODUCTIVE ORGANS: Atrophic uterus with centimeter sized calcified   myoma. No adnexal mass. Stable 17 mm LEFT ovarian cyst    BOWEL: Largely collapsed stomach. No bowel obstruction. . Nonvisualized   appendix. No appendicitis, diverticulitis.  Rectosigmoid fecal/air   distention.  PERITONEUM/RETROPERITONEUM: No ascites or pneumoperitoneum.  VESSELS: Atherosclerotic changes.  LYMPH NODES: No lymphadenopathy.  ABDOMINAL WALL: Within normal limits.  BONES: Osteopenia. Severe glenohumeral joint osteoarthritis. Bilateral   hip arthroplasties. Multiple thoracic/upper lumbar compression   deformities, greatest at T12, unchanged. Grade 1 L4-5   spondylolisthesis/facet arthrosis. Healed RIGHT pubic and bilateral rib   fractures. No displaced fracture of included axial skeleton.    IMPRESSION:  1.  Dependent posterior RUL, superior RLL and to lesser extent   apicoposterior SAMEER groundglass opacities and diffuse interlobular septal   thickening. Pulmonary edema with or without associated pneumonia   suspected.  2.  No acute abdominal/pelvic pathology.  3.  Etiology of hematemesis not determined.        --- End of Report ---      GARRETT WHITT MD; Attending Radiologist  This document has been electronically signed. Feb 18 2025  9:50AM    < end of copied text >      < from: CT Brain Perfusion Maps Stroke (02.18.25 @ 08:16) >  ACC: [id number] EXAM:  CT BRAIN PERFUSION MAPS STROKE   ORDERED BY:   PETER BARONE     ACC: [id number] EXAM:  CT ANGIO BRAIN STROKE PROTC IC   ORDERED BY:   PETER BARONE     ACC: [id number] EXAM:  CT BRAIN STROKE PROTOCOL   ORDERED BY: PETER BARONE     ACC: [id number] EXAM:  CT ANGIO NECK STROKE PROTCL IC   ORDERED BY:   PETER BARONE     PROCEDURE DATE:  02/18/2025          INTERPRETATION:  EXAM: CT OF THE HEAD WITHOUT CONTRAST, CTA HEAD AND   NECK, CT PERFUSION    HISTORY:Stroke Code    TECHNIQUE: CT of the head was obtained from the skull base to the skull   vertex. CTA of the head and neck was acquired from the lung apices to the   skull vertex. Sagittal and coronal reconstructions were subsequently   conducted. Omnipaque 350 Intravenous contrast was administered. 2-D MIP   images were provided. CT perfusion was subsequently conducted.    CONTRAST: NONE (accession [id number]), NONE (accession [id number]), IV   contrast documented in unlinked concurrent exam (accession [id number]),   NONE (accession [id number]): 70 cc administered (accession [id number]), 50 cc   administered (accession [id number]), 0 cc discarded.    COMPARISON: CT of the head January 19, 2025    FINDINGS:    CT HEAD:  No acute intracranial hemorrhage. Areas of decreased attenuation in the   deep and periventricular white matter, compatible with chronic small   vessel disease. Parenchymal volume loss resulting in a mild ex vacuo   dilatation appearance of the bilateral ventricles. The extra-axial spaces   and basal cisterns are within normal limits. No midline shift or mass   effect present.    The cranial cervical junction is within normal limits. The sella is not   expanded. No depressed calvarial fracture. Scattered mucosal thickening   in the paranasal sinuses. Frothy secretions in the bilateral sphenoid   sinuses. The visualized mastoid air cells are well aerated. The   visualized orbits are within normal limits.    CTA BRAIN:  The intracranial segments of the bilateral ICAs opacify with intraluminal   contrast. Atherosclerotic calcification plaque in the intracranial   segments of the ICAs, resulting in mild to moderate luminal narrowing.    Mild to moderate luminal narrowing in the left ICA terminus/proximal M1   segment. The left A1 segment is hypoplastic when compared to the right.   The bilateral MCAs and ACAs are otherwise within normal limits. The   anterior communicating artery is unremarkable. Fairly symmetric   arborization of the bilateral MCA vascular distributions    The right vertebral artery is dominant and opacifies with intraluminal   contrast. Moderate to severe focal narrowing in the proximal intradural   left vertebral artery, image 279 of series 3. The left vertebral artery   fails to opacify in its distal most portion, just proximal to the   vertebrobasilar junction. Mild focal narrowing in the basilar artery.   Moderate to severe focal narrowing in the left P2 segment, image 348 of   series 3. The proximal SCAs and right PCA are within normal limits.    The dural-based sinuses opacify with contrast to the extent visualized.    CTA NECK:  There is a two-vessel arch. The common carotid arteries opacify normally   with intraluminal contrast. Atherosclerotic calcification plaque in the   bilateral carotid bulbs/proximal ICAs, resulting in mild to moderate   luminal narrowing on the right and moderate to severe luminal narrowing   on the left (image 217 of series 3). The bilateral ICAs opacify normally   with intraluminal contrast to the skull base.    The vertebral arteries have normal origins. The right vertebral artery is   dominant. The right vertebral artery opacifies normally with intraluminal   contrast to the skull base. Multifocal luminal narrowing along the course   of the left vertebral artery. Slightly lobular appearance to the proximal   V2 and V3 segments of the left vertebral artery, similar prior imaging.    The thyroid is unremarkable.    Mild degenerative changes in the cervical spine.    Atelectatic change in the bilateral lung apices,right greater than left.    CT PERFUSION:  No significant motion artifact present within the CT perfusion   acquisition. Adequate arterial inflow and venous outflow contrast bolus   identified.    No focal deficits are identified in the cerebral blood flow or cerebral   blood volume data sets. Focus of prolonged Tmax (Tmax>6.0s: 15 mL) in the   posterior left temporal and left occipital lobes. There is no convincing   evidence of core infarct.        IMPRESSION:    CT HEAD:  1.  No evidence of acute intracranial hemorrhage or midline shift.  2.  Chronic small vessel disease. If there is continued concern for acute   neurologic compromise, recommend MRI of the brain for further evaluation.    This report was discussed with Dr. Paresh MD at 2/18/2025 8:17 AM.    CTA BRAIN:  1.  Moderate to severe focal narrowing in the proximal intradural left   vertebral artery with failure of opacification in the distal most aspect,   just prior to the vertebrobasilar junction, likely occluded.  2.  Moderate to severe focal narrowing in the left P2 segment.    CTA NECK:  1.  Atherosclerotic calcification and plaque in the bilateral carotid   bulbs/proximal ICAs, resulting in moderate to severe luminal narrowing on   the left. Recommend Doppler ultrasound for further evaluation of flow   dynamics.  2.  Multifocal luminal narrowing along the course of the left vertebral   artery with a slightly lobular appearance as discussed above. This could   represent atherosclerotic disease versus a fibromuscular dysplasia. Other   etiologies aren't totally excluded.    CT PERFUSION:  1.  Focus of prolonged Tmax in the posterior left temporal and left   occipital lobes. This could be artifactual in nature, however ischemia   could present similarly. If there is continued concern for acute   neurologic compromise, recommend MRI of the brain for further evaluation.    --- End of Report ---      ANTWAN KINGSLEY MD; Attending Radiologist  This document has been electronically signed. Feb 18 2025 8:42AM    < end of copied text >    CODE STATUS: FULL CODE    CRITICAL CARE TIME SPENT: 55 mins  (Assessing presenting problems of acute illness, which pose high probability of life threatening deterioration or end organ damage/dysfunction, as well as medical decision making including initiating plan of care, reviewing data, reviewing radiologic exams, discussing with multidisciplinary team,  discussing goals of care with patient/family, and writing this note.  Non-inclusive of procedures performed)

## 2025-02-19 NOTE — PROGRESS NOTE ADULT - ASSESSMENT
89 y/o F PMH severe AS, dementia (baseline A&O x 2–3), GERD, HLD, HTN BIBEMS from [facility] for AMS, unknown LSW. Per EMS pt was found in AM unresponsive, aphasic with dark emesis in mouth- no robel blood or witnessed hematemesis, minimally responsive on ED arrival with L eye deviation, after undergoing CTH imaging pt then had witnessed seizure and was loaded with keppra, EEG then showed continuous non-convulsive status and pt was loaded with phenytoin, after seizure broke pt was less responsive, apneic and hypotensive and son at bedside elected for patient to be intubated. Per ER chart review patient's daughter Tammy reported pt had been DNR/DNI.    Problems:  #Status epilepticus  #Septic shock due to multifocal pneumonia versus meningitis  #Acute hypoxic respiratory failure  #JACKY  #Elevated troponin  #Acute systolic heart failure  #Severe AS    Plan:  - On low dose prop this AM, will titrate off for SAT and eval for ongoing Sz on vEEG, neuro following recs appreciated, c/w keppra 500 BID, vimpat 100 TID, c/w empiric abx for meningitis, no acute findings on CTH/CTA  - On levo 0.08 this AM, will start stress dose steroid (also for severe CAP v meningitis), TTE w/ new decreased EF 40-45%, severe AS, mod pulm HTN, trops peaked at 22k downtrending to 19k, cards consulted possible underlying CAD w/ demand related ischemia in setting of sepsis/status epilepticus, c/w conservative management, ASA  - On vent, will not wean today, c/w empiric pna tx  - NPO, will start TF today, bowel regimen  - Cr uptrend to 1.7 today, given very elevated BNP and ?pulm edema v infection on CT will not give additional IVF today, will maintenance with TF and free water, cont to trend  - C/w empiric acyclovir, ampicillin, CTX, vanc by level, f/u blood cultures, UA negative, d/w family will hold off invasive procedure like LP at this time and cover empirically pending clinical course, ID consulted recs appreciated  - DVT ppx start hep subq    Dispo: ICU, on vent, DNR/intubate, d/w patient's children at bedside given her similar presentation in the past with unresponsiveness due to infection they would like to see if she may recover from this infection as well, will continue GOC conversations, prognosis guarded.

## 2025-02-19 NOTE — PROGRESS NOTE ADULT - SUBJECTIVE AND OBJECTIVE BOX
OVERNIGHT EVENTS / SUBJECTIVE: Patient seen and examined at bedside.     OBJECTIVE:    VITAL SIGNS:  ICU Vital Signs Last 24 Hrs  T(C): 37.3 (19 Feb 2025 06:00), Max: 38.6 (18 Feb 2025 15:00)  T(F): 99.1 (19 Feb 2025 06:00), Max: 101.5 (18 Feb 2025 15:00)  HR: 80 (19 Feb 2025 06:00) (67 - 109)  BP: 121/60 (19 Feb 2025 06:00) (62/36 - 164/80)  BP(mean): 78 (19 Feb 2025 06:00) (46 - 108)  ABP: --  ABP(mean): --  RR: 16 (19 Feb 2025 06:00) (14 - 25)  SpO2: 100% (19 Feb 2025 06:00) (88% - 100%)    O2 Parameters below as of 18 Feb 2025 18:00    O2 Flow (L/min): 40        Mode: AC/ CMV (Assist Control/ Continuous Mandatory Ventilation), RR (machine): 16, TV (machine): 400, FiO2: 40, PEEP: 6, ITime: 1, MAP: 9, PIP: 16    02-18 @ 07:01  -  02-19 @ 07:00  --------------------------------------------------------  IN: 1346 mL / OUT: 1700 mL / NET: -354 mL      CAPILLARY BLOOD GLUCOSE          PHYSICAL EXAM:    General: NAD  HEENT: NC/AT; PERRL, clear conjunctiva  Neck: supple  Respiratory: CTA b/l  Cardiovascular: +S1/S2; RRR  Abdomen: soft, NT/ND; +BS x4  Extremities: WWP, 2+ peripheral pulses b/l; no LE edema  Skin: normal color and turgor; no rash  Neurological:    MEDICATIONS:  MEDICATIONS  (STANDING):  acyclovir IVPB      acyclovir IVPB 490 milliGRAM(s) IV Intermittent every 8 hours  ampicillin  IVPB 2 Gram(s) IV Intermittent every 4 hours  cefTRIAXone Injectable. 2000 milliGRAM(s) IV Push every 12 hours  chlorhexidine 0.12% Liquid 15 milliLiter(s) Oral Mucosa every 12 hours  levETIRAcetam   Injectable 1000 milliGRAM(s) IV Push <User Schedule>  norepinephrine Infusion 0.05 MICROgram(s)/kG/Min (4.53 mL/Hr) IV Continuous <Continuous>  pantoprazole  Injectable 40 milliGRAM(s) IV Push daily  propofol Infusion 10 MICROgram(s)/kG/Min (2.9 mL/Hr) IV Continuous <Continuous>  vancomycin  IVPB 750 milliGRAM(s) IV Intermittent every 12 hours    MEDICATIONS  (PRN):  LORazepam   Injectable 1 milliGRAM(s) IV Push every 30 minutes PRN seizures      ALLERGIES:  Allergies    No Known Allergies    Intolerances        LABS:                        12.3   16.82 )-----------( 249      ( 19 Feb 2025 06:05 )             38.2     Hemoglobin: 12.3 g/dL (02-19 @ 06:05)  Hemoglobin: 13.2 g/dL (02-18 @ 08:05)    CBC Full  -  ( 19 Feb 2025 06:05 )  WBC Count : 16.82 K/uL  RBC Count : 3.91 M/uL  Hemoglobin : 12.3 g/dL  Hematocrit : 38.2 %  Platelet Count - Automated : 249 K/uL  Mean Cell Volume : 97.7 fl  Mean Cell Hemoglobin : 31.5 pg  Mean Cell Hemoglobin Concentration : 32.2 g/dL  Auto Neutrophil # : x  Auto Lymphocyte # : x  Auto Monocyte # : x  Auto Eosinophil # : x  Auto Basophil # : x  Auto Neutrophil % : x  Auto Lymphocyte % : x  Auto Monocyte % : x  Auto Eosinophil % : x  Auto Basophil % : x    02-19    138  |  106  |  31[H]  ----------------------------<  149[H]  3.8   |  23  |  1.70[H]    Ca    8.9      19 Feb 2025 06:05  Phos  4.9     02-19  Mg     2.0     02-19    TPro  8.0  /  Alb  3.7  /  TBili  0.5  /  DBili  x   /  AST  72[H]  /  ALT  25  /  AlkPhos  103  02-18    Creatinine Trend: 1.70<--, 1.29<--, 1.47<--, 1.02<--, 1.01<--  LIVER FUNCTIONS - ( 18 Feb 2025 08:05 )  Alb: 3.7 g/dL / Pro: 8.0 gm/dL / ALK PHOS: 103 U/L / ALT: 25 U/L / AST: 72 U/L / GGT: x           PT/INR - ( 18 Feb 2025 08:05 )   PT: 10.6 sec;   INR: 0.90 ratio         PTT - ( 18 Feb 2025 08:05 )  PTT:30.4 sec    hs Troponin:    ABG - ( 18 Feb 2025 17:20 )  pH, Arterial: 7.39  pH, Blood: x     /  pCO2: 39    /  pO2: 142   / HCO3: 24    / Base Excess: -1.2  /  SaO2: 99                  17:20 - ABG - pH: 7.39  |  pCO2: 39    |  pO2: 142   | Lactate:       | BE: -1.2       Urinalysis Basic - ( 19 Feb 2025 06:05 )    Color: x / Appearance: x / SG: x / pH: x  Gluc: 149 mg/dL / Ketone: x  / Bili: x / Urobili: x   Blood: x / Protein: x / Nitrite: x   Leuk Esterase: x / RBC: x / WBC x   Sq Epi: x / Non Sq Epi: x / Bacteria: x      CSF:                      EKG:   MICROBIOLOGY:    Urinalysis with Rflx Culture (collected 18 Feb 2025 08:25)      IMAGING:      Labs, imaging, EKG personally reviewed    RADIOLOGY & ADDITIONAL TESTS: Reviewed. OVERNIGHT EVENTS / SUBJECTIVE: Patient seen and examined at bedside.     Patient febrile overnight abx broadened to cover meningitis. EEG this AM with no Sz, risk of focal onset seizures from R frontal and temporal regions. Pt on prop 10 this AM, on vent. Trop downtrending. Cr uptrending.     OBJECTIVE:    VITAL SIGNS:  ICU Vital Signs Last 24 Hrs  T(C): 37.3 (19 Feb 2025 06:00), Max: 38.6 (18 Feb 2025 15:00)  T(F): 99.1 (19 Feb 2025 06:00), Max: 101.5 (18 Feb 2025 15:00)  HR: 80 (19 Feb 2025 06:00) (67 - 109)  BP: 121/60 (19 Feb 2025 06:00) (62/36 - 164/80)  BP(mean): 78 (19 Feb 2025 06:00) (46 - 108)  ABP: --  ABP(mean): --  RR: 16 (19 Feb 2025 06:00) (14 - 25)  SpO2: 100% (19 Feb 2025 06:00) (88% - 100%)    O2 Parameters below as of 18 Feb 2025 18:00    O2 Flow (L/min): 40        Mode: AC/ CMV (Assist Control/ Continuous Mandatory Ventilation), RR (machine): 16, TV (machine): 400, FiO2: 40, PEEP: 6, ITime: 1, MAP: 9, PIP: 16    02-18 @ 07:01  -  02-19 @ 07:00  --------------------------------------------------------  IN: 1346 mL / OUT: 1700 mL / NET: -354 mL      CAPILLARY BLOOD GLUCOSE    PHYSICAL EXAM:    General: Sedated, on vent, no Sz  HEENT: NC/AT; clear conjunctiva, pupils pinpoint  Neck: supple  Respiratory: B/l crackles  Cardiovascular: +S1/S2; RRR  Abdomen: soft, NT/ND; +BS x4  Extremities: Warm, no LE edema  Skin: normal color and turgor; no rash  Neurological: Sedated on vent, minimally responsive    MEDICATIONS:  MEDICATIONS  (STANDING):  acyclovir IVPB      acyclovir IVPB 490 milliGRAM(s) IV Intermittent every 8 hours  ampicillin  IVPB 2 Gram(s) IV Intermittent every 4 hours  cefTRIAXone Injectable. 2000 milliGRAM(s) IV Push every 12 hours  chlorhexidine 0.12% Liquid 15 milliLiter(s) Oral Mucosa every 12 hours  levETIRAcetam   Injectable 1000 milliGRAM(s) IV Push <User Schedule>  norepinephrine Infusion 0.05 MICROgram(s)/kG/Min (4.53 mL/Hr) IV Continuous <Continuous>  pantoprazole  Injectable 40 milliGRAM(s) IV Push daily  propofol Infusion 10 MICROgram(s)/kG/Min (2.9 mL/Hr) IV Continuous <Continuous>  vancomycin  IVPB 750 milliGRAM(s) IV Intermittent every 12 hours    MEDICATIONS  (PRN):  LORazepam   Injectable 1 milliGRAM(s) IV Push every 30 minutes PRN seizures      ALLERGIES:  Allergies    No Known Allergies    Intolerances        LABS:                        12.3   16.82 )-----------( 249      ( 19 Feb 2025 06:05 )             38.2     Hemoglobin: 12.3 g/dL (02-19 @ 06:05)  Hemoglobin: 13.2 g/dL (02-18 @ 08:05)    CBC Full  -  ( 19 Feb 2025 06:05 )  WBC Count : 16.82 K/uL  RBC Count : 3.91 M/uL  Hemoglobin : 12.3 g/dL  Hematocrit : 38.2 %  Platelet Count - Automated : 249 K/uL  Mean Cell Volume : 97.7 fl  Mean Cell Hemoglobin : 31.5 pg  Mean Cell Hemoglobin Concentration : 32.2 g/dL  Auto Neutrophil # : x  Auto Lymphocyte # : x  Auto Monocyte # : x  Auto Eosinophil # : x  Auto Basophil # : x  Auto Neutrophil % : x  Auto Lymphocyte % : x  Auto Monocyte % : x  Auto Eosinophil % : x  Auto Basophil % : x    02-19    138  |  106  |  31[H]  ----------------------------<  149[H]  3.8   |  23  |  1.70[H]    Ca    8.9      19 Feb 2025 06:05  Phos  4.9     02-19  Mg     2.0     02-19    TPro  8.0  /  Alb  3.7  /  TBili  0.5  /  DBili  x   /  AST  72[H]  /  ALT  25  /  AlkPhos  103  02-18    Creatinine Trend: 1.70<--, 1.29<--, 1.47<--, 1.02<--, 1.01<--  LIVER FUNCTIONS - ( 18 Feb 2025 08:05 )  Alb: 3.7 g/dL / Pro: 8.0 gm/dL / ALK PHOS: 103 U/L / ALT: 25 U/L / AST: 72 U/L / GGT: x           PT/INR - ( 18 Feb 2025 08:05 )   PT: 10.6 sec;   INR: 0.90 ratio         PTT - ( 18 Feb 2025 08:05 )  PTT:30.4 sec    hs Troponin:    ABG - ( 18 Feb 2025 17:20 )  pH, Arterial: 7.39  pH, Blood: x     /  pCO2: 39    /  pO2: 142   / HCO3: 24    / Base Excess: -1.2  /  SaO2: 99        17:20 - ABG - pH: 7.39  |  pCO2: 39    |  pO2: 142   | Lactate:       | BE: -1.2       Urinalysis Basic - ( 19 Feb 2025 06:05 )    Color: x / Appearance: x / SG: x / pH: x  Gluc: 149 mg/dL / Ketone: x  / Bili: x / Urobili: x   Blood: x / Protein: x / Nitrite: x   Leuk Esterase: x / RBC: x / WBC x   Sq Epi: x / Non Sq Epi: x / Bacteria: x    CSF:    EKG:   MICROBIOLOGY:    Urinalysis with Rflx Culture (collected 18 Feb 2025 08:25)      IMAGING:      Labs, imaging, EKG personally reviewed    RADIOLOGY & ADDITIONAL TESTS: Reviewed.

## 2025-02-19 NOTE — DIETITIAN INITIAL EVALUATION ADULT - OTHER INFO
89yoF PMH AS, dementia (baseline A&O x 2–3), GERD, HLD, HTN BIBEMS from [facility] for AMS, unknown LSW. Per EMS pt was found this AM unresponsive, aphasic with dark emesis in mouth- no robel blood or witnessed hematemesis. HPI limited 2/2 medical condition. Per Daughter Tammy pt is DNR DNI  Admitted for Status epilepticus, suspected septic shock, acute resp failure, JACKY, PNA. Intubated, was on low dose propofol, weaned off    Appears thin, frail, shana with NFPE revealing severe muscle/ fat wasting. Seen previously by RD service Jan 2025 - met criteria for PCM and continues to at present. Wt at that time 93# (via RD bed scale 1/20/25), new wt obtained by RD 2/19/25 at 97#.  NGT in place, plan to initiate TF, see recs below. Of note, with constipation. Plan to add bowel regimen. Suggest to Confirm goals of care regarding long-term nutrition support. See additional recs below.

## 2025-02-19 NOTE — CONSULT NOTE ADULT - PROBLEM SELECTOR RECOMMENDATION 9
Patient with above hx advanced dementia , HTN HLD severe AS who was noted to have elevated troponin , ischemia EKG changes in setting of status epilepticus ,possibly associated underlying CAD , possibly due to demand related ischemia , conservative management  patient agreed for it ,   will add ecotrin

## 2025-02-19 NOTE — DIETITIAN INITIAL EVALUATION ADULT - [id number]_GEN_A_CORE
I&O's Detail    18 Feb 2025 07:01  -  19 Feb 2025 07:00  --------------------------------------------------------  IN:    IV PiggyBack: 950 mL    Norepinephrine: 330 mL    Propofol: 66 mL  Total IN: 1346 mL    OUT:    Indwelling Catheter - Urethral (mL): 1700 mL  Total OUT: 1700 mL    Total NET: -354 mL

## 2025-02-19 NOTE — DIETITIAN INITIAL EVALUATION ADULT - PERTINENT LABORATORY DATA
02-19    138  |  106  |  31[H]  ----------------------------<  149[H]  3.8   |  23  |  1.70[H]    Ca    8.9      19 Feb 2025 06:05  Phos  4.9     02-19  Mg     2.0     02-19    TPro  8.0  /  Alb  3.7  /  TBili  0.5  /  DBili  x   /  AST  72[H]  /  ALT  25  /  AlkPhos  103  02-18  A1C with Estimated Average Glucose Result: 5.4 % (07-29-24 @ 06:34)

## 2025-02-19 NOTE — DIETITIAN INITIAL EVALUATION ADULT - ENTERAL
Initiate Jevity 1.5 @ 20 cc/hr, increase by 10 cc/hr q6 hours until goal rate of 55 cc/hr (total volume 1100 mL) met. Will provide ~ 1650 kcal, 70 g protein, and   836 mL free water. Free water flushes of 20 cc/hr per MD; adjust prn to maintain hydration

## 2025-02-19 NOTE — PROGRESS NOTE ADULT - ASSESSMENT
89 y/o F w/ pmh of AS, dementia, gerd, hld, htn, presented for AMS/unresponsivness 2/2 to status epilepticus, pt was intubated started on prop gtt, loaded w/ keprra and phentoin and admitted to the ICU for status epilepticus, shock possible septic?, acute hypoxic resp failure,  JACKY  and Pna.    -Neuro: Status epilepticus now on prop gtt and keppra unclear etiology of seizures now given fevers neurology recommending to cover for meningitis/encephalitis will start pt on vanco, amp, rocephin and acylovir will need to consider LP in the AM, cont EEG, neurology following pt  -Cardiac: Septic/distirbutive shock on levo to maintain MAP >65, +trops w/out any reported EKG changes will hold off on AC given questionable reports of vomiting blood and trend CBC if stable can conisider starting AC and also holding AC for possible LP  -Resp: Acute Hypoxic resp failure now intubated, cont current vent setting, will adjust vent setting as needed to maintain o2 >90%  -GI: maintain NPO status  -Renal: JACKY improving cont to monitor along w/ renal function  -ID: Start ppx coverage for meningitis/enphalitis w/ vanco/amp/rocehpin and acyclovir, may need LP in the AM and ID consult  -Endo: No acute issues  -Heme: DVT ppx w/ SCD, may need heparin gtt if CBC stable and after LP performed  -Dispo: Pt remains in the MICU, currently full code, GOC ongoign w/ family, dispo pending GOC conversations

## 2025-02-19 NOTE — PROGRESS NOTE ADULT - SUBJECTIVE AND OBJECTIVE BOX
Patient seen and examined.      Yesterday was intubated.  Continued to have seizures early on, but in evening, had only R LPD's.     Now intubated, on propofol, pressors, and IV antibiotics, including meningoencephalitis coverage.      On exam:   GEN: critically ill appearing  CV: RRR, S1, S2  PULM; cTAB  HEENT: no nuchal rigidity  NEURO:   Intubated on propofol.  Not answering to voice.  Does move with noxious stimulus.   Pupils 2-1mm, symmetric, VOR intact, no blink to threat, no overt facial weakness.   MOTOR: trace movement bilateral upper extremities to noxious stimulus.      CTH: CTH images reviewed: No hemorrhage.  No large territorial infarct.  Diffuse cortical atrophy.   CTA H&N images reviewed: mild calcified atherosclerotic plaques bilateral carotid at bifurcation, non flow limiting, no intracranial LVO.

## 2025-02-19 NOTE — CONSULT NOTE ADULT - PROBLEM SELECTOR RECOMMENDATION 2
status epilepticus possible aspiration pneumonia ? pulmonary edema on CT chest , currently intubated , IV antibiotic     patient hypotensive with lactic acidosis , likely from sepsis , on pressors ,

## 2025-02-19 NOTE — EEG REPORT - NS EEG TEXT BOX
JON RASHID [id number]     Study Date: 2/18/25 15:08 - 2/19/25 08:00  Duration: 16 hr 32 min  --------------------------------------------------------------------------------------------------  History:  CC/ HPI Patient is a 89y old  Female who presents with a chief complaint of status ep, shock, resp failure (19 Feb 2025 08:28)    MEDICATIONS  (STANDING):  acyclovir IVPB      acyclovir IVPB 490 milliGRAM(s) IV Intermittent every 8 hours  ampicillin  IVPB 2 Gram(s) IV Intermittent every 4 hours  cefTRIAXone Injectable. 2000 milliGRAM(s) IV Push every 12 hours  chlorhexidine 0.12% Liquid 15 milliLiter(s) Oral Mucosa every 12 hours  levETIRAcetam   Injectable 1000 milliGRAM(s) IV Push <User Schedule>  norepinephrine Infusion 0.05 MICROgram(s)/kG/Min (4.53 mL/Hr) IV Continuous <Continuous>  pantoprazole  Injectable 40 milliGRAM(s) IV Push daily  propofol Infusion 10 MICROgram(s)/kG/Min (2.9 mL/Hr) IV Continuous <Continuous>  vancomycin  IVPB 750 milliGRAM(s) IV Intermittent every 12 hours    --------------------------------------------------------------------------------------------------  Study Interpretation:    [[[Abbreviation Key:  PDR=alpha rhythm/posterior dominant rhythm. A-P=anterior posterior.  Amplitude: ‘very low’:<20; ‘low’:20-49; ‘medium’:; ‘high’:>150uV.  Persistence for periodic/rhythmic patterns (% of epoch) ‘rare’:<1%; ‘occasional’:1-10%; ‘frequent’:10-50%; ‘abundant’:50-90%; ‘continuous’:>90%.  Persistence for sporadic discharges: ‘rare’:<1/hr; ‘occasional’:1/min-1/hr; ‘frequent’:>1/min; ‘abundant’:>1/10 sec.  RPP=rhythmic and periodic patterns; GRDA=generalized rhythmic delta activity; FIRDA=frontal intermittent GRDA; LRDA=lateralized rhythmic delta activity; TIRDA=temporal intermittent rhythmic delta activity;  LPD=PLED=lateralized periodic discharges; GPD=generalized periodic discharges; BIPDs =bilateral independent periodic discharges; Mf=multifocal; SIRPDs=stimulus induced rhythmic, periodic, or ictal appearing discharges; BIRDs=brief potentially ictal rhythmic discharges >4 Hz, lasting .5-10s; PFA (paroxysmal bursts >13 Hz or =8 Hz <10s).  Modifiers: +F=with fast component; +S=with spike component; +R=with rhythmic component.  S-B=burst suppression pattern.  Max=maximal. N1-drowsy; N2-stage II sleep; N3-slow wave sleep. SSS/BETS=small sharp spikes/benign epileptiform transients of sleep. HV=hyperventilation; PS=photic stimulation]]]    Daily EEG Visual Analysis    FINDINGS:      Background:  The background is asymmetric and consists of polymorphic delta slowing, often <20 uV, with intermittent polymorphic theta, alpha, and beta activity.  In the right hemisphere, there are additional continuous LPDs and intermittent fast activity as below.    Background Slowing:  Generalized slowing: As above  Focal slowing: Right hemispheric, as below    State Changes:   A less wakeful state is characterized by decrease in prevalence of faster activity and appearance of intermittent spindle-like waveforms in the left hemisphere, with occasional 2-5-second periods of right hemispheric suppression <10 uV.    Interictal Findings:  Continuous right frontotemporal (F8) and right frontal (Fp2, with broad field; or Fp2/F4) sharp waves, periodic at 0.3-1 Hz, rarely up to 2 Hz, with intermixed and overriding fast activity (LPDs +F). No clinical correlate seen on video.  Frequent right frontocentral (F4/C4) sharp waves.  Occasional right paracentral (C4) and right temporal (T8) sharp waves.    Electrographic and Electroclinical seizures:  None    Other Clinical Events:  None    Activation Procedures:   Hyperventilation is not performed.    Photic stimulation is not performed.    Artifacts:  Intermittent myogenic and movement artifacts are present.    Single-lead EKG: Regular rhythm    EEG Classification / Summary:  Abnormal EEG in a lethargic patient.  -Continuous right frontotemporal and right frontal LPDs at 0.3-1 Hz, rarely up to 2 Hz, with intermixed and overriding fast activity  -Frequent right frontocentral sharp waves  -Occasional right paracentral and right temporal sharp waves  -Occasional periods of right hemispheric focal suppression  -Moderate-severe diffuse slowing  -No seizures    Clinical Impression:  -Risk of focal-onset seizures, most significantly from the right frontotemporal and right frontal regions  -Additional risk of focal-onset seizures from the right paracentral and right temporal regions  -Right hemispheric focal cerebral dysfunction can be structural or functional in etiology.  -Moderate-severe diffuse cerebral dysfunction is nonspecific in etiology. Sedating medication may be contributing.  -No seizures.        -------------------------------------------------------------------------------------------------------    Nabila Torres MD  Attending Physician, [facility]    -------------------------------------------------------------------------------------------------------    To reach EEG technologist:  Please use the pager number for the appropriate hospital or contact the .  At [facility] - Pager #: [phone]    To reach EEG-reading physician:  EEG Reading Room Phone #: [phone]  Epilepsy Answering Service after 5PM and before 8:30AM: Phone #: [phone]     JON RASHID [id number]     Study Date: 2/18/25 15:08 - 2/19/25 13:47  Duration: 22 hr 17 min  --------------------------------------------------------------------------------------------------  History:  CC/ HPI Patient is a 89y old  Female who presents with a chief complaint of status ep, shock, resp failure (19 Feb 2025 08:28)    MEDICATIONS  (STANDING):  acyclovir IVPB      acyclovir IVPB 490 milliGRAM(s) IV Intermittent every 8 hours  ampicillin  IVPB 2 Gram(s) IV Intermittent every 4 hours  cefTRIAXone Injectable. 2000 milliGRAM(s) IV Push every 12 hours  chlorhexidine 0.12% Liquid 15 milliLiter(s) Oral Mucosa every 12 hours  levETIRAcetam   Injectable 1000 milliGRAM(s) IV Push <User Schedule>  norepinephrine Infusion 0.05 MICROgram(s)/kG/Min (4.53 mL/Hr) IV Continuous <Continuous>  pantoprazole  Injectable 40 milliGRAM(s) IV Push daily  propofol Infusion 10 MICROgram(s)/kG/Min (2.9 mL/Hr) IV Continuous <Continuous>  vancomycin  IVPB 750 milliGRAM(s) IV Intermittent every 12 hours    --------------------------------------------------------------------------------------------------  Study Interpretation:    [[[Abbreviation Key:  PDR=alpha rhythm/posterior dominant rhythm. A-P=anterior posterior.  Amplitude: ‘very low’:<20; ‘low’:20-49; ‘medium’:; ‘high’:>150uV.  Persistence for periodic/rhythmic patterns (% of epoch) ‘rare’:<1%; ‘occasional’:1-10%; ‘frequent’:10-50%; ‘abundant’:50-90%; ‘continuous’:>90%.  Persistence for sporadic discharges: ‘rare’:<1/hr; ‘occasional’:1/min-1/hr; ‘frequent’:>1/min; ‘abundant’:>1/10 sec.  RPP=rhythmic and periodic patterns; GRDA=generalized rhythmic delta activity; FIRDA=frontal intermittent GRDA; LRDA=lateralized rhythmic delta activity; TIRDA=temporal intermittent rhythmic delta activity;  LPD=PLED=lateralized periodic discharges; GPD=generalized periodic discharges; BIPDs =bilateral independent periodic discharges; Mf=multifocal; SIRPDs=stimulus induced rhythmic, periodic, or ictal appearing discharges; BIRDs=brief potentially ictal rhythmic discharges >4 Hz, lasting .5-10s; PFA (paroxysmal bursts >13 Hz or =8 Hz <10s).  Modifiers: +F=with fast component; +S=with spike component; +R=with rhythmic component.  S-B=burst suppression pattern.  Max=maximal. N1-drowsy; N2-stage II sleep; N3-slow wave sleep. SSS/BETS=small sharp spikes/benign epileptiform transients of sleep. HV=hyperventilation; PS=photic stimulation]]]    Daily EEG Visual Analysis    FINDINGS:      Background:  The background is asymmetric and consists of polymorphic delta slowing, often <20 uV, with intermittent polymorphic theta, alpha, and beta activity.  In the right hemisphere, there are additional continuous LPDs and intermittent fast activity as below.    Later in recording, there is increase in prevalence of theta activity in wakefulness.    Background Slowing:  Generalized slowing: As above  Focal slowing: Right hemispheric, as below    State Changes:   A less wakeful state is characterized by decrease in prevalence of faster activity and appearance of intermittent spindle-like waveforms in the left hemisphere, with occasional 2-5-second periods of right hemispheric suppression <10 uV.    Interictal Findings:  Continuous right frontotemporal (F8) and right frontal (Fp2, with broad field; or Fp2/F4) sharp waves, periodic at 0.3-1 Hz, rarely up to 2 Hz, with intermixed and overriding fast activity (LPDs +F). No clinical correlate seen on video.  Frequent right frontocentral (F4/C4) sharp waves.  Occasional right paracentral (C4) and right temporal (T8) sharp waves.    Electrographic and Electroclinical seizures:  None    Other Clinical Events:  None    Activation Procedures:   Hyperventilation is not performed.    Photic stimulation is not performed.    Artifacts:  Intermittent myogenic and movement artifacts are present.    Single-lead EKG: Regular rhythm    EEG Classification / Summary:  Abnormal EEG in a lethargic patient.  -Continuous right frontotemporal and right frontal LPDs at 0.3-1 Hz, rarely up to 2 Hz, with intermixed and overriding fast activity  -Frequent right frontocentral sharp waves  -Occasional right paracentral and right temporal sharp waves  -Occasional periods of right hemispheric focal suppression  -Moderate-severe improving to moderate diffuse slowing  -No seizures    Clinical Impression:  -Risk of focal-onset seizures, most significantly from the right frontotemporal and right frontal regions  -Additional risk of focal-onset seizures from the right paracentral and right temporal regions  -Right hemispheric focal cerebral dysfunction can be structural or functional in etiology.  -Moderate-severe improving to moderate diffuse cerebral dysfunction is nonspecific in etiology. Sedating medication with change in dose may be contributing.  -No seizures.        -------------------------------------------------------------------------------------------------------    Nabila Torres MD  Attending Physician, [facility]    -------------------------------------------------------------------------------------------------------    To reach EEG technologist:  Please use the pager number for the appropriate hospital or contact the .  At [facility] - Pager #: [phone]    To reach EEG-reading physician:  EEG Reading Room Phone #: [phone]  Epilepsy Answering Service after 5PM and before 8:30AM: Phone #: [phone]

## 2025-02-20 LAB
ADD ON TEST-SPECIMEN IN LAB: SIGNIFICANT CHANGE UP
ALBUMIN SERPL ELPH-MCNC: 2.7 G/DL — LOW (ref 3.3–5)
ALP SERPL-CCNC: 73 U/L — SIGNIFICANT CHANGE UP (ref 40–120)
ALT FLD-CCNC: 42 U/L — SIGNIFICANT CHANGE UP (ref 12–78)
ANION GAP SERPL CALC-SCNC: 9 MMOL/L — SIGNIFICANT CHANGE UP (ref 5–17)
AST SERPL-CCNC: 66 U/L — HIGH (ref 15–37)
BASOPHILS # BLD AUTO: 0.01 K/UL — SIGNIFICANT CHANGE UP (ref 0–0.2)
BASOPHILS NFR BLD AUTO: 0.1 % — SIGNIFICANT CHANGE UP (ref 0–2)
BILIRUB SERPL-MCNC: 0.3 MG/DL — SIGNIFICANT CHANGE UP (ref 0.2–1.2)
BUN SERPL-MCNC: 38 MG/DL — HIGH (ref 7–23)
CALCIUM SERPL-MCNC: 8.8 MG/DL — SIGNIFICANT CHANGE UP (ref 8.5–10.1)
CHLORIDE SERPL-SCNC: 106 MMOL/L — SIGNIFICANT CHANGE UP (ref 96–108)
CHOLEST SERPL-MCNC: 231 MG/DL — HIGH
CO2 SERPL-SCNC: 25 MMOL/L — SIGNIFICANT CHANGE UP (ref 22–31)
CREAT SERPL-MCNC: 1.64 MG/DL — HIGH (ref 0.5–1.3)
EGFR: 28 ML/MIN/1.73M2 — LOW
EOSINOPHIL # BLD AUTO: 0.01 K/UL — SIGNIFICANT CHANGE UP (ref 0–0.5)
EOSINOPHIL NFR BLD AUTO: 0.1 % — SIGNIFICANT CHANGE UP (ref 0–6)
GLUCOSE SERPL-MCNC: 152 MG/DL — HIGH (ref 70–99)
HCT VFR BLD CALC: 34.4 % — LOW (ref 34.5–45)
HDLC SERPL-MCNC: 59 MG/DL — SIGNIFICANT CHANGE UP
HGB BLD-MCNC: 11 G/DL — LOW (ref 11.5–15.5)
IMM GRANULOCYTES # BLD AUTO: 0.05 K/UL — SIGNIFICANT CHANGE UP (ref 0–0.07)
IMM GRANULOCYTES NFR BLD AUTO: 0.5 % — SIGNIFICANT CHANGE UP (ref 0–0.9)
LEGIONELLA AG UR QL: NEGATIVE — SIGNIFICANT CHANGE UP
LIPID PNL WITH DIRECT LDL SERPL: 153 MG/DL — HIGH
LYMPHOCYTES # BLD AUTO: 1.1 K/UL — SIGNIFICANT CHANGE UP (ref 1–3.3)
LYMPHOCYTES NFR BLD AUTO: 10.7 % — LOW (ref 13–44)
MAGNESIUM SERPL-MCNC: 2.3 MG/DL — SIGNIFICANT CHANGE UP (ref 1.6–2.6)
MCHC RBC-ENTMCNC: 31 PG — SIGNIFICANT CHANGE UP (ref 27–34)
MCHC RBC-ENTMCNC: 32 G/DL — SIGNIFICANT CHANGE UP (ref 32–36)
MCV RBC AUTO: 96.9 FL — SIGNIFICANT CHANGE UP (ref 80–100)
MONOCYTES # BLD AUTO: 1.08 K/UL — HIGH (ref 0–0.9)
MONOCYTES NFR BLD AUTO: 10.5 % — SIGNIFICANT CHANGE UP (ref 2–14)
NEUTROPHILS # BLD AUTO: 8.05 K/UL — HIGH (ref 1.8–7.4)
NEUTROPHILS NFR BLD AUTO: 78.1 % — HIGH (ref 43–77)
NON HDL CHOLESTEROL: 173 MG/DL — HIGH
NRBC # BLD AUTO: 0 K/UL — SIGNIFICANT CHANGE UP (ref 0–0)
NRBC # FLD: 0 K/UL — SIGNIFICANT CHANGE UP (ref 0–0)
NRBC BLD AUTO-RTO: 0 /100 WBCS — SIGNIFICANT CHANGE UP (ref 0–0)
PHOSPHATE SERPL-MCNC: 3.8 MG/DL — SIGNIFICANT CHANGE UP (ref 2.5–4.5)
PLATELET # BLD AUTO: 191 K/UL — SIGNIFICANT CHANGE UP (ref 150–400)
PMV BLD: 10.4 FL — SIGNIFICANT CHANGE UP (ref 7–13)
POTASSIUM SERPL-MCNC: 3.3 MMOL/L — LOW (ref 3.5–5.3)
POTASSIUM SERPL-SCNC: 3.3 MMOL/L — LOW (ref 3.5–5.3)
PROT SERPL-MCNC: 6.4 GM/DL — SIGNIFICANT CHANGE UP (ref 6–8.3)
RBC # BLD: 3.55 M/UL — LOW (ref 3.8–5.2)
RBC # FLD: 14.6 % — HIGH (ref 10.3–14.5)
SODIUM SERPL-SCNC: 140 MMOL/L — SIGNIFICANT CHANGE UP (ref 135–145)
TRIGL SERPL-MCNC: 109 MG/DL — SIGNIFICANT CHANGE UP
TROPONIN I, HIGH SENSITIVITY RESULT: 7857.91 NG/L — HIGH
VANCOMYCIN FLD-MCNC: 6.8 UG/ML — LOW (ref 10–20)
VIT D25+D1,25 OH+D1,25 PNL SERPL-MCNC: 42.2 PG/ML — SIGNIFICANT CHANGE UP (ref 19.9–79.3)
WBC # BLD: 10.3 K/UL — SIGNIFICANT CHANGE UP (ref 3.8–10.5)
WBC # FLD AUTO: 10.3 K/UL — SIGNIFICANT CHANGE UP (ref 3.8–10.5)

## 2025-02-20 PROCEDURE — 99233 SBSQ HOSP IP/OBS HIGH 50: CPT

## 2025-02-20 PROCEDURE — 99233 SBSQ HOSP IP/OBS HIGH 50: CPT | Mod: FS

## 2025-02-20 PROCEDURE — 95718 EEG PHYS/QHP 2-12 HR W/VEEG: CPT

## 2025-02-20 PROCEDURE — 99291 CRITICAL CARE FIRST HOUR: CPT

## 2025-02-20 PROCEDURE — 70553 MRI BRAIN STEM W/O & W/DYE: CPT | Mod: 26

## 2025-02-20 RX ORDER — VANCOMYCIN HCL IN 5 % DEXTROSE 1.5G/250ML
750 PLASTIC BAG, INJECTION (ML) INTRAVENOUS ONCE
Refills: 0 | Status: COMPLETED | OUTPATIENT
Start: 2025-02-20 | End: 2025-02-20

## 2025-02-20 RX ORDER — METOPROLOL SUCCINATE 50 MG/1
12.5 TABLET, EXTENDED RELEASE ORAL
Refills: 0 | Status: DISCONTINUED | OUTPATIENT
Start: 2025-02-20 | End: 2025-02-23

## 2025-02-20 RX ORDER — PROPOFOL 10 MG/ML
10 INJECTION, EMULSION INTRAVENOUS
Qty: 1000 | Refills: 0 | Status: DISCONTINUED | OUTPATIENT
Start: 2025-02-20 | End: 2025-02-22

## 2025-02-20 RX ADMIN — Medication 50 MILLIGRAM(S): at 05:05

## 2025-02-20 RX ADMIN — Medication 50 MILLIGRAM(S): at 22:02

## 2025-02-20 RX ADMIN — AMPICILLIN SODIUM 216 GRAM(S): 1 INJECTION, POWDER, FOR SOLUTION INTRAMUSCULAR; INTRAVENOUS at 05:06

## 2025-02-20 RX ADMIN — LEVETIRACETAM 500 MILLIGRAM(S): 10 INJECTION, SOLUTION INTRAVENOUS at 19:57

## 2025-02-20 RX ADMIN — LACOSAMIDE 100 MILLIGRAM(S): 150 TABLET, FILM COATED ORAL at 18:27

## 2025-02-20 RX ADMIN — Medication 50 MILLIGRAM(S): at 10:54

## 2025-02-20 RX ADMIN — Medication 40 MILLIEQUIVALENT(S): at 08:42

## 2025-02-20 RX ADMIN — CEFTRIAXONE 2000 MILLIGRAM(S): 500 INJECTION, POWDER, FOR SOLUTION INTRAMUSCULAR; INTRAVENOUS at 05:06

## 2025-02-20 RX ADMIN — Medication 50 MILLIGRAM(S): at 16:17

## 2025-02-20 RX ADMIN — METOPROLOL SUCCINATE 12.5 MILLIGRAM(S): 50 TABLET, EXTENDED RELEASE ORAL at 22:05

## 2025-02-20 RX ADMIN — CEFTRIAXONE 2000 MILLIGRAM(S): 500 INJECTION, POWDER, FOR SOLUTION INTRAMUSCULAR; INTRAVENOUS at 18:55

## 2025-02-20 RX ADMIN — AMPICILLIN SODIUM 216 GRAM(S): 1 INJECTION, POWDER, FOR SOLUTION INTRAMUSCULAR; INTRAVENOUS at 18:59

## 2025-02-20 RX ADMIN — HEPARIN SODIUM 5000 UNIT(S): 1000 INJECTION INTRAVENOUS; SUBCUTANEOUS at 22:02

## 2025-02-20 RX ADMIN — HEPARIN SODIUM 5000 UNIT(S): 1000 INJECTION INTRAVENOUS; SUBCUTANEOUS at 05:04

## 2025-02-20 RX ADMIN — Medication 40 MILLIGRAM(S): at 10:55

## 2025-02-20 RX ADMIN — LEVETIRACETAM 500 MILLIGRAM(S): 10 INJECTION, SOLUTION INTRAVENOUS at 08:45

## 2025-02-20 RX ADMIN — LACOSAMIDE 100 MILLIGRAM(S): 150 TABLET, FILM COATED ORAL at 10:55

## 2025-02-20 RX ADMIN — Medication 81 MILLIGRAM(S): at 11:12

## 2025-02-20 RX ADMIN — Medication 109.8 MILLIGRAM(S): at 06:22

## 2025-02-20 RX ADMIN — Medication 100 MILLIGRAM(S): at 10:56

## 2025-02-20 RX ADMIN — HEPARIN SODIUM 5000 UNIT(S): 1000 INJECTION INTRAVENOUS; SUBCUTANEOUS at 14:12

## 2025-02-20 RX ADMIN — Medication 15 MILLILITER(S): at 22:05

## 2025-02-20 RX ADMIN — Medication 2 TABLET(S): at 22:05

## 2025-02-20 RX ADMIN — Medication 250 MILLIGRAM(S): at 08:42

## 2025-02-20 RX ADMIN — Medication 15 MILLILITER(S): at 11:14

## 2025-02-20 RX ADMIN — PROPOFOL 3.06 MICROGRAM(S)/KG/MIN: 10 INJECTION, EMULSION INTRAVENOUS at 14:29

## 2025-02-20 RX ADMIN — LACOSAMIDE 100 MILLIGRAM(S): 150 TABLET, FILM COATED ORAL at 02:47

## 2025-02-20 RX ADMIN — POLYETHYLENE GLYCOL 3350 17 GRAM(S): 17 POWDER, FOR SOLUTION ORAL at 11:12

## 2025-02-20 NOTE — EEG REPORT - NS EEG TEXT BOX
JON RASHID [id number]     Study Date: 2/19/25 13:49 - 2/20/25 08:00  Duration: 18 hr 9 min  --------------------------------------------------------------------------------------------------  History:  CC/ HPI Patient is a 89y old  Female who presents with a chief complaint of status ep, shock, resp failure (20 Feb 2025 08:33)    MEDICATIONS  (STANDING):  acyclovir IVPB 490 milliGRAM(s) IV Intermittent every 24 hours  ampicillin  IVPB 2 Gram(s) IV Intermittent every 12 hours  aspirin  chewable 81 milliGRAM(s) Oral daily  cefTRIAXone Injectable. 2000 milliGRAM(s) IV Push every 12 hours  chlorhexidine 0.12% Liquid 15 milliLiter(s) Oral Mucosa every 12 hours  heparin   Injectable 5000 Unit(s) SubCutaneous every 8 hours  hydrocortisone sodium succinate Injectable 50 milliGRAM(s) IV Push every 6 hours  lacosamide IVPB 100 milliGRAM(s) IV Intermittent <User Schedule>  levETIRAcetam   Injectable 500 milliGRAM(s) IV Push every 12 hours  norepinephrine Infusion 0.05 MICROgram(s)/kG/Min (4.53 mL/Hr) IV Continuous <Continuous>  pantoprazole  Injectable 40 milliGRAM(s) IV Push daily  senna 2 Tablet(s) Oral at bedtime  thiamine 100 milliGRAM(s) Oral daily    --------------------------------------------------------------------------------------------------  Study Interpretation:    [[[Abbreviation Key:  PDR=alpha rhythm/posterior dominant rhythm. A-P=anterior posterior.  Amplitude: ‘very low’:<20; ‘low’:20-49; ‘medium’:; ‘high’:>150uV.  Persistence for periodic/rhythmic patterns (% of epoch) ‘rare’:<1%; ‘occasional’:1-10%; ‘frequent’:10-50%; ‘abundant’:50-90%; ‘continuous’:>90%.  Persistence for sporadic discharges: ‘rare’:<1/hr; ‘occasional’:1/min-1/hr; ‘frequent’:>1/min; ‘abundant’:>1/10 sec.  RPP=rhythmic and periodic patterns; GRDA=generalized rhythmic delta activity; FIRDA=frontal intermittent GRDA; LRDA=lateralized rhythmic delta activity; TIRDA=temporal intermittent rhythmic delta activity;  LPD=PLED=lateralized periodic discharges; GPD=generalized periodic discharges; BIPDs =bilateral independent periodic discharges; Mf=multifocal; SIRPDs=stimulus induced rhythmic, periodic, or ictal appearing discharges; BIRDs=brief potentially ictal rhythmic discharges >4 Hz, lasting .5-10s; PFA (paroxysmal bursts >13 Hz or =8 Hz <10s).  Modifiers: +F=with fast component; +S=with spike component; +R=with rhythmic component.  S-B=burst suppression pattern.  Max=maximal. N1-drowsy; N2-stage II sleep; N3-slow wave sleep. SSS/BETS=small sharp spikes/benign epileptiform transients of sleep. HV=hyperventilation; PS=photic stimulation]]]    Daily EEG Visual Analysis    FINDINGS:      Background:  The background is asymmetric and continuous. In the left hemisphere, the awake background consists of polymorphic theta and delta activity with no clear posterior dominant rhythm. In the right hemisphere, the awake background consists of polymorphic delta > theta slowing with intermittent 1-2-second periods of relative attenuation.    Background Slowing:  Generalized slowing: As above  Focal slowing: Right hemispheric, as above    State Changes:   A less wakeful state is characterized by slowing of the background activity.  No normal stage 2 sleep.    Interictal Findings:  Continuous right frontocentral sharp waves, shifting between F4/C4, Fp2 with broad field or Fp2/F4, and F8 at times with field to C4, periodic at 0.3-1.5 Hz, at times up to 2 Hz, with intermixed and overriding fast activity (LPDs +F), often in bursts.  Abundant left posterior temporal (P7) sharp waves, periodic at 1-2.5 Hz, fluctuating without evolution.    Electrographic and Electroclinical seizures:  None    Other Clinical Events:  None    Activation Procedures:   Hyperventilation is not performed.    Photic stimulation is not performed.    Artifacts:  Intermittent myogenic and movement artifacts are present.    Single-lead EKG: Regular rhythm    EEG Classification / Summary:  Abnormal EEG in a lethargic patient.  -Continuous right frontocentral shifting LPDs at 0.3-1.5 Hz, at times up to 2 Hz, with intermixed and overriding fast activity (LPDs +F)  -Abundant left posterior temporal LPDs at 1-2.5 Hz, fluctuating without evolution  -Right hemispheric focal slowing  -Moderate diffuse slowing  -No electrographic seizures    Clinical Impression:  -Risk of focal-onset seizures from the right frontocentral and left posterior temporal regions  -Right hemispheric focal cerebral dysfunction can be structural or functional in etiology.  -Moderate diffuse and/or multifocal cerebral dysfunction is nonspecific in etiology.  -No seizures.          -------------------------------------------------------------------------------------------------------    Nabila Torres MD  Attending Physician, [facility]    -------------------------------------------------------------------------------------------------------    To reach EEG technologist:  Please use the pager number for the appropriate hospital or contact the .  At [facility] - Pager #: [phone]    To reach EEG-reading physician:  EEG Reading Room Phone #: [phone]  Epilepsy Answering Service after 5PM and before 8:30AM: Phone #: [phone]     JON RASHID [id number]     Study Date: 2/19/25 13:49 - 2/20/25 08:00  Duration: 18 hr 9 min  --------------------------------------------------------------------------------------------------  History:  CC/ HPI Patient is a 89y old  Female who presents with a chief complaint of status ep, shock, resp failure (20 Feb 2025 08:33)    MEDICATIONS  (STANDING):  acyclovir IVPB 490 milliGRAM(s) IV Intermittent every 24 hours  ampicillin  IVPB 2 Gram(s) IV Intermittent every 12 hours  aspirin  chewable 81 milliGRAM(s) Oral daily  cefTRIAXone Injectable. 2000 milliGRAM(s) IV Push every 12 hours  chlorhexidine 0.12% Liquid 15 milliLiter(s) Oral Mucosa every 12 hours  heparin   Injectable 5000 Unit(s) SubCutaneous every 8 hours  hydrocortisone sodium succinate Injectable 50 milliGRAM(s) IV Push every 6 hours  lacosamide IVPB 100 milliGRAM(s) IV Intermittent <User Schedule>  levETIRAcetam   Injectable 500 milliGRAM(s) IV Push every 12 hours  norepinephrine Infusion 0.05 MICROgram(s)/kG/Min (4.53 mL/Hr) IV Continuous <Continuous>  pantoprazole  Injectable 40 milliGRAM(s) IV Push daily  senna 2 Tablet(s) Oral at bedtime  thiamine 100 milliGRAM(s) Oral daily    --------------------------------------------------------------------------------------------------  Study Interpretation:    [[[Abbreviation Key:  PDR=alpha rhythm/posterior dominant rhythm. A-P=anterior posterior.  Amplitude: ‘very low’:<20; ‘low’:20-49; ‘medium’:; ‘high’:>150uV.  Persistence for periodic/rhythmic patterns (% of epoch) ‘rare’:<1%; ‘occasional’:1-10%; ‘frequent’:10-50%; ‘abundant’:50-90%; ‘continuous’:>90%.  Persistence for sporadic discharges: ‘rare’:<1/hr; ‘occasional’:1/min-1/hr; ‘frequent’:>1/min; ‘abundant’:>1/10 sec.  RPP=rhythmic and periodic patterns; GRDA=generalized rhythmic delta activity; FIRDA=frontal intermittent GRDA; LRDA=lateralized rhythmic delta activity; TIRDA=temporal intermittent rhythmic delta activity;  LPD=PLED=lateralized periodic discharges; GPD=generalized periodic discharges; BIPDs =bilateral independent periodic discharges; Mf=multifocal; SIRPDs=stimulus induced rhythmic, periodic, or ictal appearing discharges; BIRDs=brief potentially ictal rhythmic discharges >4 Hz, lasting .5-10s; PFA (paroxysmal bursts >13 Hz or =8 Hz <10s).  Modifiers: +F=with fast component; +S=with spike component; +R=with rhythmic component.  S-B=burst suppression pattern.  Max=maximal. N1-drowsy; N2-stage II sleep; N3-slow wave sleep. SSS/BETS=small sharp spikes/benign epileptiform transients of sleep. HV=hyperventilation; PS=photic stimulation]]]    Daily EEG Visual Analysis    FINDINGS:      Background:  The background is asymmetric and continuous. In the left hemisphere, the awake background consists of polymorphic theta and delta activity with no clear posterior dominant rhythm. In the right hemisphere, the awake background consists of polymorphic delta > theta slowing with intermittent 1-2-second periods of relative attenuation.    Background Slowing:  Generalized slowing: As above  Focal slowing: Right hemispheric, as above    State Changes:   A less wakeful state is characterized by slowing of the background activity.  No normal stage 2 sleep.    Interictal Findings:  Continuous right frontocentral sharp waves, shifting between F4/C4, Fp2 with broad field or Fp2/F4, and F8 at times with field to C4, periodic at 0.3-1.5 Hz, at times up to 2 Hz, with intermixed and overriding fast activity (LPDs +F), often in bursts.  Abundant left posterior temporal (P7) sharp waves, periodic at 1-2.5 Hz, fluctuating without evolution.    Electrographic and Electroclinical seizures:  None    Other Clinical Events:  None    Activation Procedures:   Hyperventilation is not performed.    Photic stimulation is not performed.    Artifacts:  Intermittent myogenic and movement artifacts are present.    Single-lead EKG: Regular rhythm    EEG Classification / Summary:  Abnormal EEG in the awake, drowsy states.  -Continuous right frontocentral shifting LPDs at 0.3-1.5 Hz, at times up to 2 Hz, with intermixed and overriding fast activity (LPDs +F)  -Abundant left posterior temporal LPDs at 1-2.5 Hz, fluctuating without evolution  -Right hemispheric focal slowing  -Moderate diffuse slowing  -No electrographic seizures    Clinical Impression:  -Risk of focal-onset seizures from the right frontocentral and left posterior temporal regions  -Right hemispheric focal cerebral dysfunction can be structural or functional in etiology.  -Moderate diffuse and/or multifocal cerebral dysfunction is nonspecific in etiology.  -No seizures.          -------------------------------------------------------------------------------------------------------    Nabila Torres MD  Attending Physician, [facility]    -------------------------------------------------------------------------------------------------------    To reach EEG technologist:  Please use the pager number for the appropriate hospital or contact the .  At [facility] - Pager #: [phone]    To reach EEG-reading physician:  EEG Reading Room Phone #: [phone]  Epilepsy Answering Service after 5PM and before 8:30AM: Phone #: [phone]

## 2025-02-20 NOTE — PROGRESS NOTE ADULT - SUBJECTIVE AND OBJECTIVE BOX
CHIEF COMPLAINT:    HPI:  89yoF PMH  severe AS ( refused any intervention , dementia (baseline A&O x 2–3), GERD, HLD, HTN BIBEMS from [facility] for AMS, unknown LSW. Per EMS pt was found this AM unresponsive, aphasic with dark emesis in mouth- no robel blood or witnessed hematemesis. HPI limited 2/2 medical condition. Per Daughter Tammy pt is DNR DNI, patient  blood work showed markedly elevated troponin ,  in setting of status epilepticus , patient had lactic acidosis  , became hypotensive , was intubated  ,family at bedside      patient blood work increased troponin , ekg showed ischemic ST  T changes   ,   patient had severe AS ,  HTN  patient and family did not want any intervention , and now also   2/20/25 Patient opens eyes ,  intubated   improved blood pressure              PAST MEDICAL & SURGICAL HISTORY:  HTN (hypertension)          Allergies    No Known Allergies    Intolerances        SOCIAL HISTORY:    non smoker       FAMILY HISTORY:  non contributory at this age     MEDICATIONS:Home Medications:  acetaminophen 325 mg oral tablet: 2 tab(s) orally every 6 hours as needed for  pain max 3 g/24 hours (18 Feb 2025 11:04)  diclofenac 1% topical gel: Apply 2 grams topically to affected area 2 times daily to B/L knees for pain (18 Feb 2025 12:25)  docusate sodium 100 mg oral capsule: 2 cap(s) orally once a day (18 Feb 2025 12:25)  senna (sennosides) 8.6 mg oral tablet: 2 tab(s) orally once a day (at bedtime) (18 Feb 2025 12:25)    MEDICATIONS  (STANDING):  acyclovir IVPB 490 milliGRAM(s) IV Intermittent every 24 hours  ampicillin  IVPB 2 Gram(s) IV Intermittent every 12 hours  aspirin  chewable 81 milliGRAM(s) Oral daily  cefTRIAXone Injectable. 2000 milliGRAM(s) IV Push every 12 hours  chlorhexidine 0.12% Liquid 15 milliLiter(s) Oral Mucosa every 12 hours  heparin   Injectable 5000 Unit(s) SubCutaneous every 8 hours  hydrocortisone sodium succinate Injectable 50 milliGRAM(s) IV Push every 6 hours  lacosamide IVPB 100 milliGRAM(s) IV Intermittent <User Schedule>  levETIRAcetam   Injectable 500 milliGRAM(s) IV Push every 12 hours  norepinephrine Infusion 0.05 MICROgram(s)/kG/Min (4.53 mL/Hr) IV Continuous <Continuous>  pantoprazole  Injectable 40 milliGRAM(s) IV Push daily  senna 2 Tablet(s) Oral at bedtime  thiamine 100 milliGRAM(s) Oral daily  vancomycin  IVPB 750 milliGRAM(s) IV Intermittent once    MEDICATIONS  (PRN):  LORazepam   Injectable 1 milliGRAM(s) IV Push every 30 minutes PRN seizures  polyethylene glycol 3350 17 Gram(s) Oral daily PRN Constipation      Vital Signs Last 24 Hrs  T(C): 37.5 (20 Feb 2025 07:00), Max: 37.8 (19 Feb 2025 18:00)  T(F): 99.5 (20 Feb 2025 07:00), Max: 100 (19 Feb 2025 18:00)  HR: 80 (20 Feb 2025 07:00) (70 - 106)  BP: 130/57 (20 Feb 2025 07:00) (112/56 - 143/927)  BP(mean): 79 (20 Feb 2025 07:00) (73 - 108)  RR: 18 (20 Feb 2025 07:00) (15 - 26)  SpO2: 100% (20 Feb 2025 07:00) (95% - 100%)    Parameters below as of 20 Feb 2025 07:00  Patient On (Oxygen Delivery Method): ventilator    O2 Concentration (%): 40    I&O's Summary    19 Feb 2025 07:01  -  20 Feb 2025 07:00  --------------------------------------------------------  IN: 1051 mL / OUT: 450 mL / NET: 601 mL        PHYSICAL EXAM:    Constitutional: intubated sedated  frail looking   HEENT: PERR, EOMI,  No oral cyananosis.  Neck:  supple,  No JVD  Respiratory: Breath sounds are clear bilaterally, No wheezing, rales or rhonchi  Cardiovascular: S1 and S2, regular rate ESM   Gastrointestinal: Bowel Sounds present, soft, nontender.   Extremities: No peripheral edema. No clubbing or cyanosis.  Vascular: 2+ peripheral pulses  Neurological: opens eyes   intubated Musculoskeletal: no calf tenderness.  Skin: No rashes.      LABS: All Labs Reviewed:                          11.0   10.30 )-----------( 191      ( 20 Feb 2025 06:00 )             34.4     02-20    140  |  106  |  38[H]  ----------------------------<  152[H]  3.3[L]   |  25  |  1.64[H]    Ca    8.8      20 Feb 2025 06:00  Phos  3.8     02-20  Mg     2.3     02-20    TPro  6.4  /  Alb  2.7[L]  /  TBili  0.3  /  DBili  x   /  AST  66[H]  /  ALT  42  /  AlkPhos  73  02-20        LIVER FUNCTIONS - ( 20 Feb 2025 06:00 )  Alb: 2.7 g/dL / Pro: 6.4 gm/dL / ALK PHOS: 73 U/L / ALT: 42 U/L / AST: 66 U/L / GGT: x           PTT - ( 19 Feb 2025 14:02 )  PTT:25.4 sec  Urinalysis Basic - ( 20 Feb 2025 06:00 )    Color: x / Appearance: x / SG: x / pH: x  Gluc: 152 mg/dL / Ketone: x  / Bili: x / Urobili: x   Blood: x / Protein: x / Nitrite: x   Leuk Esterase: x / RBC: x / WBC x   Sq Epi: x / Non Sq Epi: x / Bacteria: x        Culture Results:   No growth at 24 hours (02-18-25 @ 08:05)  Culture Results:   No growth at 24 hours (02-18-25 @ 08:05)        - TroponinI hsT: <-93875.29, <-74052.09, <-24517.94, <-89751.62    RADIOLOGY/EKG:    < from: 12 Lead ECG (02.18.25 @ 08:37) >    Diagnosis Line Sinus tachycardia  Left atrial enlargement  Left axis deviation  Left ventricular hypertrophy ( Guy product )  ST & T wave abnormality, consider lateral ischemia  Abnormal ECG  When compared with ECG of 19-JAN-2025 14:32,  ST now depressed in Lateral leads  Nonspecific T wave abnormality, improved in Inferior leads  Confirmed by Madeline Fernandez ([id number]) on 2/18/2025 8:47:40 PM    < end of copied text >  < from: CT Chest w/ IV Cont (02.18.25 @ 09:15) >    IMPRESSION:  1.  Dependent posterior RUL, superior RLL and to lesser extent   apicoposterior SAMEER groundglass opacities and diffuse interlobular septal   thickening. Pulmonary edema with or without associated pneumonia   suspected.  2.  No acute abdominal/pelvic pathology.  3.  Etiology of hematemesis not determined.    < end of copied text >      < from: TTE W or WO Ultrasound Enhancing Agent (02.19.25 @ 13:11) >  CONCLUSIONS:      1. Left ventricular systolic function is mildly decreased with an ejection fraction visually estimated at 40 to 45 %.   2. Left atrium is mildly dilated.   3. Mild to moderate mitral regurgitation.   4. Moderate tricuspid regurgitation.   5. Estimated pulmonary artery systolic pressure is 49 mmHg, consistent with moderate pulmonary hypertension.   6. Mild left ventricular hypertrophy.   7. Mild mitral valve stenosis.   8. Mild to moderate aortic regurgitation.   9. Severe aortic stenosis.  10. The peak transaortic velocity is 3.96 m/s, peak transaortic gradient is 62.7 mmHg and mean transaortic gradient is 48.0 mmHg with an LVOT/aortic valve VTI ratio of 0.19. The effective orifice area is estimated at 0.60 cm² by the continuity equation.  11. There is moderate calcification of the mitral valve annulus.    < end of copied text >      monitor sinus rhythm

## 2025-02-20 NOTE — PROGRESS NOTE ADULT - ASSESSMENT
Impression:  1. acute hypoxemic respiratory failure  2. shock  3. status epilepticus  4. AMS  5. JACKY  6. PNA  7. severe protein malnutrition    Plan:  Neuro - off sedation now               EEG yesterday no seizures               cont current AET regimen- Vimpat/keppra               PRN IV benzo for witnessed/EEG seizure activity               on empiric IV abx/antivirals for potential meningitis/encephalitis               cont cEEG               CTA/CTH no acute pathology               TSH and ammonia nml               EtOH/salicylate/tylenol levels negative               f/u neuro    CV -  currently weaned off pressors, low threshold for reinstitution to keep MAP>65          stress steroids for CIRCI          + trop with severe AS and ischemic changes on EKG- demand vs CAD, family requesting only conservative therapy          APT with ASA          trop downtrending          Echo EF 40%, severe AS ALLIE 0.6, mild-mod MR, mod pHTN          f/u cardio    Pulm -  full vent support with LTV 6-8cc/kg IDW, keeping plts<30             actively titrating FiO2 to keep sats>90%, currently low level O2 needs              ABG showed appropriate ventilation on current settings             utilization of PEEP for alveolar recruitment             CT bilateral GGO -APE vs atypical PNA             check SCx             RVP negative             empiric BS IV abx as per ID             full vent bundle in place and reviewed             MS precludes movement toward liberation               GI -  PPI for GIU ppx          enteral TF as tolerated to goal as per RD recs          add thiamine          check Vit D level    Renal - Cr elevated, nonoliguric              avoid MAP<65              renally adjust all meds              avoid nephrotoxins              strict I/Os              avoiding volume loading, keep even to slightly negative              BMP in am    Heme -  chem DVT ppx with HSQ               no signs of active bleeding               SCDS    ID - afebrile overnight, slight downtrend of WBC         BCx  NGTD         full RVP negative         MRSA negative         check legionella Ag         u/a negative         empirically treating for possible meningitis/encephalitis in addition to PNA with amp/acyclovir/Ctx/Vanco by level         no hx MDRO         plan for possible LP pending ongoing GOC by family         trend WBC and temp curve          Endo - BS stable             keep euglycemic 120-180mg/dl             TSH nml       Impression:  1. acute hypoxemic respiratory failure  2. shock  3. status epilepticus  4. AMS  5. JACKY  6. PNA  7. severe protein malnutrition    Plan:  Neuro - off sedation now               EEG yesterday no seizures               cont current AET regimen- Vimpat/keppra               PRN IV benzo for witnessed/EEG seizure activity               on empiric IV abx/antivirals for potential meningitis/encephalitis               cont cEEG               CTA/CTH no acute pathology               TSH and ammonia nml               EtOH/salicylate/tylenol levels negative               f/u neuro    CV -  currently weaned off pressors, low threshold for reinstitution to keep MAP>65          stress steroids for CIRCI          + trop with severe AS and ischemic changes on EKG- demand vs CAD, family requesting only conservative therapy          APT with ASA          if remains off pressors with stable hemodynamics add low dose BB          check lipid panel          trop downtrending          Echo EF 40%, severe AS ALLIE 0.6, mild-mod MR, mod pHTN          f/u cardio    Pulm -  full vent support with LTV 6-8cc/kg IDW, keeping plts<30             actively titrating FiO2 to keep sats>90%, currently low level O2 needs              ABG showed appropriate ventilation on current settings             utilization of PEEP for alveolar recruitment             CT bilateral GGO -APE vs atypical PNA             check SCx             RVP negative             empiric BS IV abx as per ID             full vent bundle in place and reviewed             MS precludes movement toward liberation               GI -  PPI for GIU ppx          enteral TF as tolerated to goal as per RD recs          add thiamine          check Vit D level    Renal - Cr elevated, nonoliguric              avoid MAP<65              renally adjust all meds              avoid nephrotoxins              strict I/Os              avoiding volume loading, keep even to slightly negative              BMP in am    Heme -  chem DVT ppx with HSQ               no signs of active bleeding               SCDS    ID - afebrile overnight, slight downtrend of WBC         BCx  NGTD         full RVP negative         MRSA negative         check legionella Ag         u/a negative         empirically treating for possible meningitis/encephalitis in addition to PNA with amp/acyclovir/Ctx/Vanco by level         no hx MDRO         plan for possible LP pending ongoing GOC by family         trend WBC and temp curve          Endo - BS stable             keep euglycemic 120-180mg/dl             TSH nml       Impression:  1. acute hypoxemic respiratory failure  2. shock  3. status epilepticus  4. AMS  5. JACKY  6. PNA  7. severe protein malnutrition    Plan:  Neuro - off sedation now               EEG yesterday no seizures               cont current AET regimen- Vimpat/keppra               PRN IV benzo for witnessed/EEG seizure activity               on empiric IV abx/antivirals for potential meningitis/encephalitis               cont cEEG               CTA/CTH no acute pathology               TSH and ammonia nml               EtOH/salicylate/tylenol levels negative               f/u neuro    CV -  currently weaned off pressors, low threshold for reinstitution to keep MAP>65          stress steroids for CIRCI          + trop with severe AS and ischemic changes on EKG- demand vs CAD, family requesting only conservative therapy          APT with ASA          if remains off pressors with stable hemodynamics add low dose BB          check lipid panel          trop downtrending          Echo EF 40%, severe AS ALLIE 0.6, mild-mod MR, mod pHTN          f/u cardio    Pulm -  full vent support with LTV 6-8cc/kg IDW, keeping plts<30             actively titrating FiO2 to keep sats>90%, currently low level O2 needs              ABG showed appropriate ventilation on current settings             utilization of PEEP for alveolar recruitment             CT bilateral GGO -APE vs atypical PNA             check SCx             RVP negative             empiric BS IV abx as per ID             full vent bundle in place and reviewed             MS precludes movement toward liberation               GI -  PPI for GIU ppx          enteral TF as tolerated to goal as per RD recs          add thiamine          check Vit D level    Renal - Cr elevated, nonoliguric              avoid MAP<65              renally adjust all meds              avoid nephrotoxins              strict I/Os              avoiding volume loading, keep even to slightly negative              BMP in am    Heme -  chem DVT ppx with HSQ               no signs of active bleeding               SCDS    ID - afebrile overnight, slight downtrend of WBC         BCx  NGTD         full RVP negative         MRSA negative         check legionella Ag         u/a negative         empirically treating for possible meningitis/encephalitis in addition to PNA with amp/acyclovir/Ctx/Vanco by level         no hx MDRO         plan for possible LP pending ongoing GOC by family         trend WBC and temp curve          Endo - BS stable             keep euglycemic 120-180mg/dl             TSH nml    GOC: DNR/ not DNI, ongoing GOC with family       Impression:  1. acute hypoxemic respiratory failure  2. shock, not otherwise specified  3. status epilepticus  4. AMS  5. JACKY  6. PNA  7. severe protein malnutrition  8. acute systolic HF    Plan:  Neuro - off sedation now               EEG yesterday no seizures               cont current AET regimen- Vimpat/keppra               PRN IV benzo for witnessed/EEG seizure activity               on empiric IV abx/antivirals for potential meningitis/encephalitis               cont cEEG               CTA/CTH no acute pathology               TSH and ammonia nml               EtOH/salicylate/tylenol levels negative               f/u neuro    CV -  currently weaned off pressors, low threshold for reinstitution to keep MAP>65          stress steroids for CIRCI          + trop with severe AS and ischemic changes on EKG- demand vs CAD, family requesting only conservative therapy          APT with ASA          if remains off pressors with stable hemodynamics add low dose BB          check lipid panel          trop downtrending          Echo EF 40%, severe AS ALLIE 0.6, mild-mod MR, mod pHTN          f/u cardio    Pulm -  full vent support with LTV 6-8cc/kg IDW, keeping plts<30             actively titrating FiO2 to keep sats>90%, currently low level O2 needs              ABG showed appropriate ventilation on current settings             utilization of PEEP for alveolar recruitment             CT bilateral GGO -APE vs atypical PNA             check SCx             RVP negative             empiric BS IV abx as per ID             full vent bundle in place and reviewed             MS precludes movement toward liberation               GI -  PPI for GIU ppx          enteral TF as tolerated to goal as per RD recs          add thiamine          check Vit D level    Renal - Cr elevated, nonoliguric              avoid MAP<65              renally adjust all meds              avoid nephrotoxins              strict I/Os              avoiding volume loading, keep even to slightly negative              BMP in am    Heme -  chem DVT ppx with HSQ               no signs of active bleeding               SCDS    ID - afebrile overnight, slight downtrend of WBC         BCx  NGTD         full RVP negative         MRSA negative         check legionella Ag         u/a negative         empirically treating for possible meningitis/encephalitis in addition to PNA with amp/acyclovir/Ctx/Vanco by level         no hx MDRO         plan for possible LP pending ongoing GOC by family         trend WBC and temp curve          Endo - BS stable             keep euglycemic 120-180mg/dl             TSH nml    GOC: DNR/ not DNI, ongoing GOC with family

## 2025-02-20 NOTE — PROGRESS NOTE ADULT - SUBJECTIVE AND OBJECTIVE BOX
OVERNIGHT EVENTS / SUBJECTIVE: Patient seen and examined at bedside.     OBJECTIVE:    VITAL SIGNS:  ICU Vital Signs Last 24 Hrs  T(C): 37.3 (20 Feb 2025 08:00), Max: 37.8 (19 Feb 2025 18:00)  T(F): 99.1 (20 Feb 2025 08:00), Max: 100 (19 Feb 2025 18:00)  HR: 85 (20 Feb 2025 08:00) (70 - 106)  BP: 137/62 (20 Feb 2025 08:00) (112/56 - 143/927)  BP(mean): 84 (20 Feb 2025 08:00) (73 - 108)  ABP: --  ABP(mean): --  RR: 17 (20 Feb 2025 08:00) (15 - 26)  SpO2: 100% (20 Feb 2025 08:00) (95% - 100%)    O2 Parameters below as of 20 Feb 2025 08:00  Patient On (Oxygen Delivery Method): ventilator    O2 Concentration (%): 40      Mode: AC/ CMV (Assist Control/ Continuous Mandatory Ventilation), RR (machine): 16, TV (machine): 400, FiO2: 40, PEEP: 6, ITime: 1, MAP: 8, PIP: 15    02-19 @ 07:01  -  02-20 @ 07:00  --------------------------------------------------------  IN: 1051 mL / OUT: 450 mL / NET: 601 mL      CAPILLARY BLOOD GLUCOSE          PHYSICAL EXAM:    General: NAD  HEENT: NC/AT; PERRL, clear conjunctiva  Neck: supple  Respiratory: CTA b/l  Cardiovascular: +S1/S2; RRR  Abdomen: soft, NT/ND; +BS x4  Extremities: WWP, 2+ peripheral pulses b/l; no LE edema  Skin: normal color and turgor; no rash  Neurological:    MEDICATIONS:  MEDICATIONS  (STANDING):  acyclovir IVPB 490 milliGRAM(s) IV Intermittent every 24 hours  ampicillin  IVPB 2 Gram(s) IV Intermittent every 12 hours  aspirin  chewable 81 milliGRAM(s) Oral daily  cefTRIAXone Injectable. 2000 milliGRAM(s) IV Push every 12 hours  chlorhexidine 0.12% Liquid 15 milliLiter(s) Oral Mucosa every 12 hours  heparin   Injectable 5000 Unit(s) SubCutaneous every 8 hours  hydrocortisone sodium succinate Injectable 50 milliGRAM(s) IV Push every 6 hours  lacosamide IVPB 100 milliGRAM(s) IV Intermittent <User Schedule>  levETIRAcetam   Injectable 500 milliGRAM(s) IV Push every 12 hours  norepinephrine Infusion 0.05 MICROgram(s)/kG/Min (4.53 mL/Hr) IV Continuous <Continuous>  pantoprazole  Injectable 40 milliGRAM(s) IV Push daily  potassium chloride   Powder 40 milliEquivalent(s) Oral once  senna 2 Tablet(s) Oral at bedtime  thiamine 100 milliGRAM(s) Oral daily  vancomycin  IVPB 750 milliGRAM(s) IV Intermittent once    MEDICATIONS  (PRN):  LORazepam   Injectable 1 milliGRAM(s) IV Push every 30 minutes PRN seizures  polyethylene glycol 3350 17 Gram(s) Oral daily PRN Constipation      ALLERGIES:  Allergies    No Known Allergies    Intolerances        LABS:                        11.0   10.30 )-----------( 191      ( 20 Feb 2025 06:00 )             34.4     Hemoglobin: 11.0 g/dL (02-20 @ 06:00)  Hemoglobin: 11.4 g/dL (02-19 @ 14:02)  Hemoglobin: 12.3 g/dL (02-19 @ 06:05)  Hemoglobin: 13.2 g/dL (02-18 @ 08:05)    CBC Full  -  ( 20 Feb 2025 06:00 )  WBC Count : 10.30 K/uL  RBC Count : 3.55 M/uL  Hemoglobin : 11.0 g/dL  Hematocrit : 34.4 %  Platelet Count - Automated : 191 K/uL  Mean Cell Volume : 96.9 fl  Mean Cell Hemoglobin : 31.0 pg  Mean Cell Hemoglobin Concentration : 32.0 g/dL  Auto Neutrophil # : 8.05 K/uL  Auto Lymphocyte # : 1.10 K/uL  Auto Monocyte # : 1.08 K/uL  Auto Eosinophil # : 0.01 K/uL  Auto Basophil # : 0.01 K/uL  Auto Neutrophil % : 78.1 %  Auto Lymphocyte % : 10.7 %  Auto Monocyte % : 10.5 %  Auto Eosinophil % : 0.1 %  Auto Basophil % : 0.1 %    02-20    140  |  106  |  38[H]  ----------------------------<  152[H]  3.3[L]   |  25  |  1.64[H]    Ca    8.8      20 Feb 2025 06:00  Phos  3.8     02-20  Mg     2.3     02-20    TPro  6.4  /  Alb  2.7[L]  /  TBili  0.3  /  DBili  x   /  AST  66[H]  /  ALT  42  /  AlkPhos  73  02-20    Creatinine Trend: 1.64<--, 1.61<--, 1.70<--, 1.29<--, 1.47<--, 1.02<--  LIVER FUNCTIONS - ( 20 Feb 2025 06:00 )  Alb: 2.7 g/dL / Pro: 6.4 gm/dL / ALK PHOS: 73 U/L / ALT: 42 U/L / AST: 66 U/L / GGT: x           PTT - ( 19 Feb 2025 14:02 )  PTT:25.4 sec    hs Troponin:    ABG - ( 18 Feb 2025 17:20 )  pH, Arterial: 7.39  pH, Blood: x     /  pCO2: 39    /  pO2: 142   / HCO3: 24    / Base Excess: -1.2  /  SaO2: 99                      Urinalysis Basic - ( 20 Feb 2025 06:00 )    Color: x / Appearance: x / SG: x / pH: x  Gluc: 152 mg/dL / Ketone: x  / Bili: x / Urobili: x   Blood: x / Protein: x / Nitrite: x   Leuk Esterase: x / RBC: x / WBC x   Sq Epi: x / Non Sq Epi: x / Bacteria: x      CSF:                      EKG:   MICROBIOLOGY:    Urinalysis with Rflx Culture (collected 18 Feb 2025 08:25)    Culture - Blood (collected 18 Feb 2025 08:05)  Source: .Blood BLOOD  Preliminary Report (19 Feb 2025 13:02):    No growth at 24 hours    Culture - Blood (collected 18 Feb 2025 08:05)  Source: .Blood BLOOD  Preliminary Report (19 Feb 2025 13:02):    No growth at 24 hours      IMAGING:      Labs, imaging, EKG personally reviewed    RADIOLOGY & ADDITIONAL TESTS: Reviewed. OVERNIGHT EVENTS / SUBJECTIVE: Patient seen and examined at bedside.     Pt waking up more this AM, not following commands but opening eye and reaching up with arms intermittently. Fevers and leukocytosis downtrending. Remains on EEG, report this AM without Sz. Off pressor.    OBJECTIVE:    VITAL SIGNS:  ICU Vital Signs Last 24 Hrs  T(C): 37.3 (20 Feb 2025 08:00), Max: 37.8 (19 Feb 2025 18:00)  T(F): 99.1 (20 Feb 2025 08:00), Max: 100 (19 Feb 2025 18:00)  HR: 85 (20 Feb 2025 08:00) (70 - 106)  BP: 137/62 (20 Feb 2025 08:00) (112/56 - 143/927)  BP(mean): 84 (20 Feb 2025 08:00) (73 - 108)  ABP: --  ABP(mean): --  RR: 17 (20 Feb 2025 08:00) (15 - 26)  SpO2: 100% (20 Feb 2025 08:00) (95% - 100%)    O2 Parameters below as of 20 Feb 2025 08:00  Patient On (Oxygen Delivery Method): ventilator    O2 Concentration (%): 40      Mode: AC/ CMV (Assist Control/ Continuous Mandatory Ventilation), RR (machine): 16, TV (machine): 400, FiO2: 40, PEEP: 6, ITime: 1, MAP: 8, PIP: 15    02-19 @ 07:01  -  02-20 @ 07:00  --------------------------------------------------------  IN: 1051 mL / OUT: 450 mL / NET: 601 mL      CAPILLARY BLOOD GLUCOSE          PHYSICAL EXAM:    General: NAD, opens eyes slightly to voice, elderly and frail appearing  HEENT: NC/AT; clear conjunctiva  Neck: supple  Respiratory: CTA b/l  Cardiovascular: +S1/S2; RRR  Abdomen: soft, NT/ND; +BS x4  Extremities: Warm, no LE edema  Skin: normal color and turgor; no rash  Neurological: Opens eyes slightly to voice, not following commands, occasionally raising upper extremities slightly    MEDICATIONS:  MEDICATIONS  (STANDING):  acyclovir IVPB 490 milliGRAM(s) IV Intermittent every 24 hours  ampicillin  IVPB 2 Gram(s) IV Intermittent every 12 hours  aspirin  chewable 81 milliGRAM(s) Oral daily  cefTRIAXone Injectable. 2000 milliGRAM(s) IV Push every 12 hours  chlorhexidine 0.12% Liquid 15 milliLiter(s) Oral Mucosa every 12 hours  heparin   Injectable 5000 Unit(s) SubCutaneous every 8 hours  hydrocortisone sodium succinate Injectable 50 milliGRAM(s) IV Push every 6 hours  lacosamide IVPB 100 milliGRAM(s) IV Intermittent <User Schedule>  levETIRAcetam   Injectable 500 milliGRAM(s) IV Push every 12 hours  norepinephrine Infusion 0.05 MICROgram(s)/kG/Min (4.53 mL/Hr) IV Continuous <Continuous>  pantoprazole  Injectable 40 milliGRAM(s) IV Push daily  potassium chloride   Powder 40 milliEquivalent(s) Oral once  senna 2 Tablet(s) Oral at bedtime  thiamine 100 milliGRAM(s) Oral daily  vancomycin  IVPB 750 milliGRAM(s) IV Intermittent once    MEDICATIONS  (PRN):  LORazepam   Injectable 1 milliGRAM(s) IV Push every 30 minutes PRN seizures  polyethylene glycol 3350 17 Gram(s) Oral daily PRN Constipation      ALLERGIES:  Allergies    No Known Allergies    Intolerances        LABS:                        11.0   10.30 )-----------( 191      ( 20 Feb 2025 06:00 )             34.4     Hemoglobin: 11.0 g/dL (02-20 @ 06:00)  Hemoglobin: 11.4 g/dL (02-19 @ 14:02)  Hemoglobin: 12.3 g/dL (02-19 @ 06:05)  Hemoglobin: 13.2 g/dL (02-18 @ 08:05)    CBC Full  -  ( 20 Feb 2025 06:00 )  WBC Count : 10.30 K/uL  RBC Count : 3.55 M/uL  Hemoglobin : 11.0 g/dL  Hematocrit : 34.4 %  Platelet Count - Automated : 191 K/uL  Mean Cell Volume : 96.9 fl  Mean Cell Hemoglobin : 31.0 pg  Mean Cell Hemoglobin Concentration : 32.0 g/dL  Auto Neutrophil # : 8.05 K/uL  Auto Lymphocyte # : 1.10 K/uL  Auto Monocyte # : 1.08 K/uL  Auto Eosinophil # : 0.01 K/uL  Auto Basophil # : 0.01 K/uL  Auto Neutrophil % : 78.1 %  Auto Lymphocyte % : 10.7 %  Auto Monocyte % : 10.5 %  Auto Eosinophil % : 0.1 %  Auto Basophil % : 0.1 %    02-20    140  |  106  |  38[H]  ----------------------------<  152[H]  3.3[L]   |  25  |  1.64[H]    Ca    8.8      20 Feb 2025 06:00  Phos  3.8     02-20  Mg     2.3     02-20    TPro  6.4  /  Alb  2.7[L]  /  TBili  0.3  /  DBili  x   /  AST  66[H]  /  ALT  42  /  AlkPhos  73  02-20    Creatinine Trend: 1.64<--, 1.61<--, 1.70<--, 1.29<--, 1.47<--, 1.02<--  LIVER FUNCTIONS - ( 20 Feb 2025 06:00 )  Alb: 2.7 g/dL / Pro: 6.4 gm/dL / ALK PHOS: 73 U/L / ALT: 42 U/L / AST: 66 U/L / GGT: x           PTT - ( 19 Feb 2025 14:02 )  PTT:25.4 sec    hs Troponin:    ABG - ( 18 Feb 2025 17:20 )  pH, Arterial: 7.39  pH, Blood: x     /  pCO2: 39    /  pO2: 142   / HCO3: 24    / Base Excess: -1.2  /  SaO2: 99                      Urinalysis Basic - ( 20 Feb 2025 06:00 )    Color: x / Appearance: x / SG: x / pH: x  Gluc: 152 mg/dL / Ketone: x  / Bili: x / Urobili: x   Blood: x / Protein: x / Nitrite: x   Leuk Esterase: x / RBC: x / WBC x   Sq Epi: x / Non Sq Epi: x / Bacteria: x      CSF:                      EKG:   MICROBIOLOGY:    Urinalysis with Rflx Culture (collected 18 Feb 2025 08:25)    Culture - Blood (collected 18 Feb 2025 08:05)  Source: .Blood BLOOD  Preliminary Report (19 Feb 2025 13:02):    No growth at 24 hours    Culture - Blood (collected 18 Feb 2025 08:05)  Source: .Blood BLOOD  Preliminary Report (19 Feb 2025 13:02):    No growth at 24 hours      IMAGING:      Labs, imaging, EKG personally reviewed    RADIOLOGY & ADDITIONAL TESTS: Reviewed.

## 2025-02-20 NOTE — PROGRESS NOTE ADULT - SUBJECTIVE AND OBJECTIVE BOX
Patient seen and examined at bedside this AM.     No acute events overnight.  Now still off propofol.  Remains intubated.   According to bedside staff, has been reaching for the ET tube.  Now rstrained.     On exam:   GEN: critically ill appearing  CV: RRR, s1, S2  PULM CTAB  NEURO:   Eyes faintly opening, no eye contact, no command following, no acknowledging any orientation questions.   Pupils 3-2mm, symmetric, blinking to threat bilaterally, VOR intact, no overt facial weakness.   MOTOR: withdrawing bilateral upper extremities, and lower extremities.  Some purposeful movements.     CTH: CTH images reviewed: No hemorrhage.  No large territorial infarct.  Diffuse cortical atrophy.   CTA H&N images reviewed: mild calcified atherosclerotic plaques bilateral carotid at bifurcation, non flow limiting, no intracranial LVO.

## 2025-02-20 NOTE — PROGRESS NOTE ADULT - SUBJECTIVE AND OBJECTIVE BOX
Patient is a 89y old  Female who presents with a chief complaint of status ep, shock, resp failure (19 Feb 2025 11:24)      BRIEF HOSPITAL COURSE: ***    Events last 24 hours: ***    PAST MEDICAL & SURGICAL HISTORY:  HTN (hypertension)          Review of Systems:  CONSTITUTIONAL: No fever, chills, or fatigue  EYES: No eye pain, visual disturbances, or discharge  ENMT:  No difficulty hearing, tinnitus, vertigo; No sinus or throat pain  NECK: No pain or stiffness  RESPIRATORY: No cough, wheezing, chills or hemoptysis; No shortness of breath  CARDIOVASCULAR: No chest pain, palpitations, dizziness, or leg swelling  GASTROINTESTINAL: No abdominal or epigastric pain. No nausea, vomiting, or hematemesis; No diarrhea or constipation. No melena or hematochezia.  GENITOURINARY: No dysuria, frequency, hematuria, or incontinence  NEUROLOGICAL: No headaches, memory loss, loss of strength, numbness, or tremors  SKIN: No itching, burning, rashes, or lesions   MUSCULOSKELETAL: No joint pain or swelling; No muscle, back, or extremity pain  PSYCHIATRIC: No depression, anxiety, mood swings, or difficulty sleeping      Medications:  acyclovir IVPB 490 milliGRAM(s) IV Intermittent every 24 hours  ampicillin  IVPB 2 Gram(s) IV Intermittent every 12 hours  cefTRIAXone Injectable. 2000 milliGRAM(s) IV Push every 12 hours    norepinephrine Infusion 0.05 MICROgram(s)/kG/Min IV Continuous <Continuous>      lacosamide IVPB 100 milliGRAM(s) IV Intermittent <User Schedule>  levETIRAcetam   Injectable 500 milliGRAM(s) IV Push every 12 hours  LORazepam   Injectable 1 milliGRAM(s) IV Push every 30 minutes PRN      aspirin  chewable 81 milliGRAM(s) Oral daily  heparin   Injectable 5000 Unit(s) SubCutaneous every 8 hours    pantoprazole  Injectable 40 milliGRAM(s) IV Push daily  polyethylene glycol 3350 17 Gram(s) Oral daily PRN  senna 2 Tablet(s) Oral at bedtime      hydrocortisone sodium succinate Injectable 50 milliGRAM(s) IV Push every 6 hours        chlorhexidine 0.12% Liquid 15 milliLiter(s) Oral Mucosa every 12 hours        Mode: AC/ CMV (Assist Control/ Continuous Mandatory Ventilation)  RR (machine): 16  TV (machine): 400  FiO2: 40  PEEP: 6  ITime: 1  MAP: 8  PIP: 14      ICU Vital Signs Last 24 Hrs  T(C): 37.6 (20 Feb 2025 02:00), Max: 37.8 (19 Feb 2025 18:00)  T(F): 99.7 (20 Feb 2025 02:00), Max: 100 (19 Feb 2025 18:00)  HR: 83 (20 Feb 2025 02:00) (70 - 93)  BP: 135/72 (20 Feb 2025 02:00) (103/52 - 138/55)  BP(mean): 91 (20 Feb 2025 02:00) (68 - 108)  ABP: --  ABP(mean): --  RR: 18 (20 Feb 2025 02:00) (15 - 19)  SpO2: 99% (20 Feb 2025 02:00) (95% - 100%)    O2 Parameters below as of 20 Feb 2025 02:00  Patient On (Oxygen Delivery Method): ventilator    O2 Concentration (%): 40        ABG - ( 18 Feb 2025 17:20 )  pH, Arterial: 7.39  pH, Blood: x     /  pCO2: 39    /  pO2: 142   / HCO3: 24    / Base Excess: -1.2  /  SaO2: 99                        LABS:                        11.4   14.09 )-----------( 221      ( 19 Feb 2025 14:02 )             36.0     02-19    136  |  105  |  32[H]  ----------------------------<  136[H]  3.6   |  22  |  1.61[H]    Ca    9.1      19 Feb 2025 14:02  Phos  4.9     02-19  Mg     2.0     02-19    TPro  8.0  /  Alb  3.7  /  TBili  0.5  /  DBili  x   /  AST  72[H]  /  ALT  25  /  AlkPhos  103  02-18          CAPILLARY BLOOD GLUCOSE        PT/INR - ( 18 Feb 2025 08:05 )   PT: 10.6 sec;   INR: 0.90 ratio         PTT - ( 19 Feb 2025 14:02 )  PTT:25.4 sec  Urinalysis Basic - ( 19 Feb 2025 14:02 )    Color: x / Appearance: x / SG: x / pH: x  Gluc: 136 mg/dL / Ketone: x  / Bili: x / Urobili: x   Blood: x / Protein: x / Nitrite: x   Leuk Esterase: x / RBC: x / WBC x   Sq Epi: x / Non Sq Epi: x / Bacteria: x      CULTURES:  Rapid RVP Result: NotDetec (02-19 @ 11:37)  Culture Results:   No growth at 24 hours (02-18 @ 08:05)  Culture Results:   No growth at 24 hours (02-18 @ 08:05)      Physical Examination:    General: No acute distress.  Alert, oriented, interactive, nonfocal    HEENT: Pupils equal, reactive to light.  Symmetric.    PULM: Clear to auscultation bilaterally, no significant sputum production    CVS: Regular rate and rhythm, no murmurs, rubs, or gallops    ABD: Soft, nondistended, nontender, normoactive bowel sounds, no masses    EXT: No edema, nontender    SKIN: Warm and well perfused, no rashes noted.    RADIOLOGY: ***    CRITICAL CARE TIME SPENT: ***   Patient is a 89y old  Female who presents with a chief complaint of status ep, shock, resp failure (19 Feb 2025 11:24)      BRIEF HOSPITAL COURSE:   89F NH resident with PMHx severe AS, dementia, GERD, HLD, HTN who presented found unresponsive at NH. Upon arrival pt noted to be aphasic. CTH  Pt developed progressive hypoxia requiring intubation, shock requiring pressors, NSTEMI, metabolic acidosis, JACKY, status epilepticus on EEG, PNA. Admitted to ICU.       Events last 24 hours: afebrile, recently weaned off pressors, no sedation, full vent support, opens eyes to stimulation now minimally, tolerating tube feeds, repeat EEG yesterday no seizures.     PAST MEDICAL & SURGICAL HISTORY:  HTN (hypertension)          Review of Systems:  unable to perform at this time, pt with AMS      Medications:  acyclovir IVPB 490 milliGRAM(s) IV Intermittent every 24 hours  ampicillin  IVPB 2 Gram(s) IV Intermittent every 12 hours  cefTRIAXone Injectable. 2000 milliGRAM(s) IV Push every 12 hours    norepinephrine Infusion 0.05 MICROgram(s)/kG/Min IV Continuous <Continuous>      lacosamide IVPB 100 milliGRAM(s) IV Intermittent <User Schedule>  levETIRAcetam   Injectable 500 milliGRAM(s) IV Push every 12 hours  LORazepam   Injectable 1 milliGRAM(s) IV Push every 30 minutes PRN      aspirin  chewable 81 milliGRAM(s) Oral daily  heparin   Injectable 5000 Unit(s) SubCutaneous every 8 hours    pantoprazole  Injectable 40 milliGRAM(s) IV Push daily  polyethylene glycol 3350 17 Gram(s) Oral daily PRN  senna 2 Tablet(s) Oral at bedtime      hydrocortisone sodium succinate Injectable 50 milliGRAM(s) IV Push every 6 hours        chlorhexidine 0.12% Liquid 15 milliLiter(s) Oral Mucosa every 12 hours        Mode: AC/ CMV (Assist Control/ Continuous Mandatory Ventilation)  RR (machine): 16  TV (machine): 400  FiO2: 40  PEEP: 6  ITime: 1  MAP: 8  PIP: 14      ICU Vital Signs Last 24 Hrs  T(C): 37.6 (20 Feb 2025 02:00), Max: 37.8 (19 Feb 2025 18:00)  T(F): 99.7 (20 Feb 2025 02:00), Max: 100 (19 Feb 2025 18:00)  HR: 83 (20 Feb 2025 02:00) (70 - 93)  BP: 135/72 (20 Feb 2025 02:00) (103/52 - 138/55)  BP(mean): 91 (20 Feb 2025 02:00) (68 - 108)  ABP: --  ABP(mean): --  RR: 18 (20 Feb 2025 02:00) (15 - 19)  SpO2: 99% (20 Feb 2025 02:00) (95% - 100%)    O2 Parameters below as of 20 Feb 2025 02:00  Patient On (Oxygen Delivery Method): ventilator    O2 Concentration (%): 40        ABG - ( 18 Feb 2025 17:20 )  pH, Arterial: 7.39  pH, Blood: x     /  pCO2: 39    /  pO2: 142   / HCO3: 24    / Base Excess: -1.2  /  SaO2: 99            I&O's Summary    18 Feb 2025 07:01  -  19 Feb 2025 07:00  --------------------------------------------------------  IN: 1346 mL / OUT: 1700 mL / NET: -354 mL    19 Feb 2025 07:01  -  20 Feb 2025 03:29  --------------------------------------------------------  IN: 521 mL / OUT: 300 mL / NET: 221 mL                LABS:                        11.4   14.09 )-----------( 221      ( 19 Feb 2025 14:02 )             36.0     02-19    136  |  105  |  32[H]  ----------------------------<  136[H]  3.6   |  22  |  1.61[H]    Ca    9.1      19 Feb 2025 14:02  Phos  4.9     02-19  Mg     2.0     02-19    TPro  8.0  /  Alb  3.7  /  TBili  0.5  /  DBili  x   /  AST  72[H]  /  ALT  25  /  AlkPhos  103  02-18          CAPILLARY BLOOD GLUCOSE        PT/INR - ( 18 Feb 2025 08:05 )   PT: 10.6 sec;   INR: 0.90 ratio         PTT - ( 19 Feb 2025 14:02 )  PTT:25.4 sec  Urinalysis Basic - ( 19 Feb 2025 14:02 )    Color: x / Appearance: x / SG: x / pH: x  Gluc: 136 mg/dL / Ketone: x  / Bili: x / Urobili: x   Blood: x / Protein: x / Nitrite: x   Leuk Esterase: x / RBC: x / WBC x   Sq Epi: x / Non Sq Epi: x / Bacteria: x      CULTURES:  Rapid RVP Result: NotDetec (02-19 @ 11:37)  Culture Results:   No growth at 24 hours (02-18 @ 08:05)  Culture Results:   No growth at 24 hours (02-18 @ 08:05)      Physical Examination:    General: intubated, no sedation    HEENT: Pupils equal, reactive to light.  Symmetric.    PULM: Course rhonchi bilaterally    CVS: Regular rate and rhythm    ABD: Soft, nondistended,  normoactive bowel sounds    EXT: warm, SCDS      RADIOLOGY:   TRANSTHORACIC ECHOCARDIOGRAM REPORT  ________________________________________________________________________________                                      _______    Portions of this table do not appear on this page.     Pt. Name:       JON RASHID Study Date:    2/19/2025  MRN:            [id number]        YOB: 1926  Accession #:    [id number]       Age:           89 years  Account#:       [id number]    Gender:        F  Heart Rate:                     Height:        62.00 in (157.48 cm)  Rhythm:                         Weight:        110.00 lb (49.90 kg)  Blood Pressure: 122/62 mmHg     BSA/BMI:       1.48 m² / 20.12 kg/m²  ________________________________________________________________________________________  Referring Physician:    [id number] Stephon Servin  Interpreting Physician: Ryan Cintron  Primary Sonographer:    Li Brown RDCS    CPT:               3D RECONST W/O WKSTATION - 98009.m;ECHO TTE WO CON COMP W                     DOPP - 44688.m  Indication(s):     Abnormal electrocardiogram ECG EKG - R94.31  Procedure:         Transthoracic echocardiogram with 2-D, M-mode and complete                     spectral and color flow Doppler. Real time and full volume                     3-dimensional imaging performed at the echo machine.  Ordering Location:   Admission Status:  Inpatient    _______________________________________________________________________________________     CONCLUSIONS:      1. Left ventricular systolic function is mildly decreased with an ejection fraction visually estimated at 40 to 45 %.   2. Left atrium is mildly dilated.   3. Mild to moderate mitral regurgitation.   4. Moderate tricuspid regurgitation.   5. Estimated pulmonary artery systolic pressure is 49 mmHg, consistent with moderate pulmonary hypertension.   6. Mild left ventricular hypertrophy.   7. Mild mitral valve stenosis.   8. Mild to moderate aortic regurgitation.   9. Severe aortic stenosis.  10. The peak transaortic velocity is 3.96 m/s, peak transaortic gradient is 62.7 mmHg and mean transaortic gradient is 48.0 mmHg with an LVOT/aortic valve VTI ratio of 0.19. The effective orifice area is estimated at 0.60 cm² by the continuity equation.  11. There is moderate calcification of the mitral valve annulus.  ACC: [id number] EXAM:  CT BRAIN PERFUSION MAPS STROKE   ORDERED BY:   PETER BARONE     ACC: [id number] EXAM:  CT ANGIO BRAIN STROKE PROTC IC   ORDERED BY:   PETER BARONE     ACC: [id number] EXAM:  CT BRAIN STROKE PROTOCOL   ORDERED BY: PETER BARONE     ACC: [id number] EXAM:  CT ANGIO NECK STROKE PROTCL IC   ORDERED BY:   PETER BARONE     PROCEDURE DATE:  02/18/2025          INTERPRETATION:  EXAM: CT OF THE HEAD WITHOUT CONTRAST, CTA HEAD AND   NECK, CT PERFUSION    HISTORY:Stroke Code    TECHNIQUE: CT of the head was obtained from the skull base to the skull   vertex. CTA of the head and neck was acquired from the lung apices to the   skull vertex. Sagittal and coronal reconstructions were subsequently   conducted. Omnipaque 350 Intravenous contrast was administered. 2-D MIP   images were provided. CT perfusion was subsequently conducted.    CONTRAST: NONE (accession [id number]), NONE (accession [id number]), IV   contrast documented in unlinked concurrent exam (accession [id number]),   NONE (accession [id number]): 70 cc administered (accession [id number]), 50 cc   administered (accession [id number]), 0 cc discarded.    COMPARISON: CT of the head January 19, 2025    FINDINGS:    CT HEAD:  No acute intracranial hemorrhage. Areas of decreased attenuation in the   deep and periventricular white matter, compatible with chronic small   vessel disease. Parenchymal volume loss resulting in a mild ex vacuo   dilatation appearance of the bilateral ventricles. The extra-axial spaces   and basal cisterns are within normal limits. No midline shift or mass   effect present.    The cranial cervical junction is within normal limits. The sella is not   expanded. No depressed calvarial fracture. Scattered mucosal thickening   in the paranasal sinuses. Frothy secretions in the bilateral sphenoid   sinuses. The visualized mastoid air cells are well aerated. The   visualized orbits are within normal limits.    CTA BRAIN:  The intracranial segments of the bilateral ICAs opacify with intraluminal   contrast. Atherosclerotic calcification plaque in the intracranial   segments of the ICAs, resulting in mild to moderate luminal narrowing.    Mild to moderate luminal narrowing in the left ICA terminus/proximal M1   segment. The left A1 segment is hypoplastic when compared to the right.   The bilateral MCAs and ACAs are otherwise within normal limits. The   anterior communicating artery is unremarkable. Fairly symmetric   arborization of the bilateral MCA vascular distributions    The right vertebral artery is dominant and opacifies with intraluminal   contrast. Moderate to severe focal narrowing in the proximal intradural   left vertebral artery, image 279 of series 3. The left vertebral artery   fails to opacify in its distal most portion, just proximal to the   vertebrobasilar junction. Mild focal narrowing in the basilar artery.   Moderate to severe focal narrowing in the left P2 segment, image 348 of   series 3. The proximal SCAs and right PCA are within normal limits.    The dural-based sinuses opacify with contrast to the extent visualized.    CTA NECK:  There is a two-vessel arch. The common carotid arteries opacify normally   with intraluminal contrast. Atherosclerotic calcification plaque in the   bilateral carotid bulbs/proximal ICAs, resulting in mild to moderate   luminal narrowing on the right and moderate to severe luminal narrowing   on the left (image 217 of series 3). The bilateral ICAs opacify normally   with intraluminal contrast to the skull base.    The vertebral arteries have normal origins. The right vertebral artery is   dominant. The right vertebral artery opacifies normally with intraluminal   contrast to the skull base. Multifocal luminal narrowing along the course   of the left vertebral artery. Slightly lobular appearance to the proximal   V2 and V3 segments of the left vertebral artery, similar prior imaging.    The thyroid is unremarkable.    Mild degenerative changes in the cervical spine.    Atelectatic change in the bilateral lung apices, right greater than left.    CT PERFUSION:  No significant motion artifact present within the CT perfusion   acquisition. Adequate arterial inflow and venous outflow contrast bolus   identified.    No focal deficits are identified in the cerebral blood flow or cerebral   blood volume data sets. Focus of prolonged Tmax (Tmax>6.0s: 15 mL) in the   posterior left temporal and left occipital lobes. There is no convincing   evidence of core infarct.        IMPRESSION:    CT HEAD:  1.  No evidence of acute intracranial hemorrhage or midline shift.  2.  Chronic small vessel disease. If there is continued concern for acute   neurologic compromise, recommend MRI of the brain for further evaluation.    This report was discussed with Dr. Paresh MD at 2/18/2025 8:17 AM.    CTA BRAIN:  1.  Moderate to severe focal narrowing in the proximal intradural left   vertebral artery with failure of opacification in the distal most aspect,   just prior to the vertebrobasilar junction, likely occluded.  2.  Moderate to severe focal narrowing in the left P2 segment.    CTA NECK:  1.  Atherosclerotic calcification and plaque in the bilateral carotid   bulbs/proximal ICAs, resulting in moderate to severe luminal narrowing on   the left. Recommend Doppler ultrasound for further evaluation of flow   dynamics.  2.  Multifocal luminal narrowing along the course of the left vertebral   artery with a slightly lobular appearance as discussed above. This could   represent atherosclerotic disease versus a fibromuscular dysplasia. Other   etiologies aren't totally excluded.    CT PERFUSION:  1.  Focus of prolonged Tmax in the posterior left temporal and left   occipital lobes. This could be artifactual in nature, however ischemia   could present similarly. If there is continued concern for acute   neurologic compromise, recommend MRI of the brain for further evaluation.    --- End of Report ---            ANTWAN KINGSLEY MD; Attending Radiologist  This document has been electronically signed. Feb 18 2025  8:42AM    ACC: [id number] EXAM:  CT ABDOMEN AND PELVIS IC   ORDERED BY: PETER BARONE     ACC: [id number] EXAM:  CT CHEST IC   ORDERED BY: PETER BARONE     PROCEDURE DATE:  02/18/2025          INTERPRETATION:  CLINICAL INFORMATION: Altered mental status, hypoxia,   hematemesis    COMPARISON: 1/19/2025    CONTRAST/COMPLICATIONS:  IV Contrast: Omnipaque 350 90 cc administered   0 cc discarded  Oral Contrast: NONE    PROCEDURE:  CT of the Chest, Abdomen and Pelvis was performed.  Sagittal and coronal reformats were performed.    FINDINGS:  CHEST:  LUNGS AND LARGE AIRWAYS: Patent central airways. Dependent posterior RUL,   superior RLL and to lesser extent apicoposterior SAMEER groundglass   opacities.  Diffuse interlobular septal thickening. Subsegmental   RML/lingular atelectasis, not significantly changed.. Mild dependent   pleuroparenchymal reactive/atelectatic change .  PLEURA: No pleural effusion.  VESSELS: No large/central pulmonary thromboembolism. No aortic   dilatation/dissection. Aortic /coronary artery calcification..  HEART: Cardiomegaly. No pericardial effusion. Aortic valve, mitral   annulus calcification.  MEDIASTINUM AND HERO: No lymphadenopathy.  CHEST WALL AND LOWER NECK: Unremarkable .    ABDOMEN AND PELVIS:  LIVER: Within normal limits.  BILE DUCTS: Normal caliber.  GALLBLADDER: Distended gallbladder without calcified gallstones, not   significantly changed.  SPLEEN: Within normal limits.  PANCREAS: Within normal limits.  ADRENALS: Within normal limits.  KIDNEYS/URETERS: Symmetric renal enhancement without calculi/ obstructive   uropathy. Too small to characterize renal hypodensities.    BLADDER: Gallo catheter within collapsed/nonevaluable urinary bladder.  REPRODUCTIVE ORGANS: Atrophic uterus with centimeter sized calcified   myoma. No adnexal mass. Stable 17 mm LEFT ovarian cyst    BOWEL: Largely collapsed stomach. No bowel obstruction. . Nonvisualized   appendix. No appendicitis, diverticulitis.  Rectosigmoid fecal/air   distention.  PERITONEUM/RETROPERITONEUM: No ascites or pneumoperitoneum.  VESSELS: Atherosclerotic changes.  LYMPH NODES: No lymphadenopathy.  ABDOMINAL WALL: Within normal limits.  BONES: Osteopenia. Severe glenohumeral joint osteoarthritis. Bilateral   hip arthroplasties. Multiple thoracic/upper lumbar compression   deformities, greatest at T12, unchanged. Grade 1 L4-5   spondylolisthesis/facet arthrosis. Healed RIGHT pubic and bilateral rib   fractures. No displaced fracture of included axial skeleton.    IMPRESSION:  1.  Dependent posterior RUL, superior RLL and to lesser extent   apicoposterior SAMEER groundglass opacities and diffuse interlobular septal   thickening. Pulmonary edema with or without associated pneumonia   suspected.  2.  No acute abdominal/pelvic pathology.  3.  Etiology of hematemesis not determined.        --- End of Report ---            GARRETT WHITT MD; Attending Radiologist  This document has been electronically signed. Feb 18 2025  9:50AM    CRITICAL CARE TIME SPENT:  35 mins assessing presenting problems of acute illness that poses high probability of life threatening deterioration or end organ damage/dysfunction.  Medical decision making including Initiating plan of care, reviewing data, reviewing radiology, direct patient bedside evaluation and interpretation of vital signs, any necessary ventilator management , discussion with multidisciplinary team, all non inclusive of procedures. Date of entry of note is equal to date of services rendered.

## 2025-02-20 NOTE — PROGRESS NOTE ADULT - ASSESSMENT
89 year old woman with HTN, HLD, aortic stenosis, ?dementia, nursing home resident, presenting with unresponsiveness, having a witnessed seizure while in the ED on 2/18.  Now intubated, on propofol infusion, and 2 AED's.      On exam, more eye opening, not attending, no clear motor focal deficits.      No acute findings on her CTH/CTA.  EEG initially with focal seizures, now only LPD's on the R.  Has not had seizure over last 24h. Labs with uptrending creatinine, troponin, leukocytosis improving.      Suspicion for meningoencephalitis as possible cause of new onset seizures in patient with fever, and LPD's.  Family refusing LP for CSF verification of the diagnosis at this time.     -continue keppra, renally dosed, 500mg BID  -continue on vimpat 100mg TID  -continuous EEG  -started on empiric meningoencephalitis anti-infective medications yesterday, renally dosed: vancomycin, ampicillin, and acyclovir.  -seizure precautions  -MRI brain with and without contrast.    -goals of care discussion on going with family.    -neurology to follow.  Please page or call with any acute neuro changes.

## 2025-02-20 NOTE — PROGRESS NOTE ADULT - ASSESSMENT
89 y/o F PMH severe AS, dementia (baseline A&O x 2–3), GERD, HLD, HTN BIBEMS from [facility] for AMS, unknown LSW. Per EMS pt was found in AM unresponsive, aphasic with dark emesis in mouth- no robel blood or witnessed hematemesis, minimally responsive on ED arrival with L eye deviation, after undergoing CTH imaging pt then had witnessed seizure and was loaded with keppra, EEG then showed continuous non-convulsive status and pt was loaded with phenytoin, after seizure broke pt was less responsive, apneic and hypotensive and son at bedside elected for patient to be intubated. Per ER chart review patient's daughter Tammy reported pt had been DNR/DNI.    Problems:  #Status epilepticus  #Septic shock due to multifocal pneumonia versus meningitis  #Acute hypoxic respiratory failure  #JACKY  #Elevated troponin  #Acute systolic heart failure  #Severe AS    Plan:  - Off prop, waking up a little but still poor mental status, no Sz on EEG this AM continuing vEEG, neuro following recs appreciated, c/w keppra 500 BID, vimpat 100 TID, c/w empiric abx for meningitis, no acute findings on CTH/CTA, obtain MRI w/wo contrast today further eval  - Off pressor c/w stress dose steroid (also for severe CAP v meningitis), TTE w/ new decreased EF 40-45%, severe AS, mod pulm HTN, trops peaked at 22k downtrending to 7800, cards consulted possible underlying CAD w/ demand related ischemia in setting of sepsis/status epilepticus, c/w conservative management, ASA, add low dose metop  - On vent, tolerated SBT today ~2 hours stopped due to discomfort and tachypnea, c/w empiric pna tx  - NPO, c/w TF today, bowel regimen  - Cr downtrending slightly, given very elevated BNP and ?pulm edema v infection on CT will not give additional IVF, will maintenance with TF and free water, cont to trend  - C/w empiric acyclovir, ampicillin, CTX, vanc by level, f/u blood cultures, UA negative, d/w family will hold off invasive procedure like LP at this time and cover empirically pending clinical course, ID consulted recs appreciated  - DVT ppx hep subq    Dispo: ICU, on vent, DNR/intubate, d/w patient's children at bedside given her similar presentation in the past with unresponsiveness due to infection they would like to see if she may recover from this infection as well, will continue GOC conversations, prognosis guarded.

## 2025-02-21 LAB
ALBUMIN SERPL ELPH-MCNC: 2.6 G/DL — LOW (ref 3.3–5)
ALP SERPL-CCNC: 78 U/L — SIGNIFICANT CHANGE UP (ref 40–120)
ALT FLD-CCNC: 42 U/L — SIGNIFICANT CHANGE UP (ref 12–78)
ANION GAP SERPL CALC-SCNC: 9 MMOL/L — SIGNIFICANT CHANGE UP (ref 5–17)
AST SERPL-CCNC: 37 U/L — SIGNIFICANT CHANGE UP (ref 15–37)
BASOPHILS # BLD AUTO: 0.01 K/UL — SIGNIFICANT CHANGE UP (ref 0–0.2)
BASOPHILS NFR BLD AUTO: 0.1 % — SIGNIFICANT CHANGE UP (ref 0–2)
BILIRUB SERPL-MCNC: 0.2 MG/DL — SIGNIFICANT CHANGE UP (ref 0.2–1.2)
BUN SERPL-MCNC: 44 MG/DL — HIGH (ref 7–23)
CALCIUM SERPL-MCNC: 8.7 MG/DL — SIGNIFICANT CHANGE UP (ref 8.5–10.1)
CHLORIDE SERPL-SCNC: 106 MMOL/L — SIGNIFICANT CHANGE UP (ref 96–108)
CO2 SERPL-SCNC: 26 MMOL/L — SIGNIFICANT CHANGE UP (ref 22–31)
CREAT SERPL-MCNC: 1.41 MG/DL — HIGH (ref 0.5–1.3)
EGFR: 34 ML/MIN/1.73M2 — LOW
EOSINOPHIL # BLD AUTO: 0 K/UL — SIGNIFICANT CHANGE UP (ref 0–0.5)
EOSINOPHIL NFR BLD AUTO: 0 % — SIGNIFICANT CHANGE UP (ref 0–6)
GLUCOSE SERPL-MCNC: 163 MG/DL — HIGH (ref 70–99)
GRAM STN FLD: SIGNIFICANT CHANGE UP
HCT VFR BLD CALC: 31.4 % — LOW (ref 34.5–45)
HGB BLD-MCNC: 10.1 G/DL — LOW (ref 11.5–15.5)
IMM GRANULOCYTES # BLD AUTO: 0.05 K/UL — SIGNIFICANT CHANGE UP (ref 0–0.07)
IMM GRANULOCYTES NFR BLD AUTO: 0.6 % — SIGNIFICANT CHANGE UP (ref 0–0.9)
LYMPHOCYTES # BLD AUTO: 0.96 K/UL — LOW (ref 1–3.3)
LYMPHOCYTES NFR BLD AUTO: 10.8 % — LOW (ref 13–44)
MAGNESIUM SERPL-MCNC: 2.5 MG/DL — SIGNIFICANT CHANGE UP (ref 1.6–2.6)
MCHC RBC-ENTMCNC: 30.9 PG — SIGNIFICANT CHANGE UP (ref 27–34)
MCHC RBC-ENTMCNC: 32.2 G/DL — SIGNIFICANT CHANGE UP (ref 32–36)
MCV RBC AUTO: 96 FL — SIGNIFICANT CHANGE UP (ref 80–100)
MONOCYTES # BLD AUTO: 0.81 K/UL — SIGNIFICANT CHANGE UP (ref 0–0.9)
MONOCYTES NFR BLD AUTO: 9.1 % — SIGNIFICANT CHANGE UP (ref 2–14)
NEUTROPHILS # BLD AUTO: 7.08 K/UL — SIGNIFICANT CHANGE UP (ref 1.8–7.4)
NEUTROPHILS NFR BLD AUTO: 79.4 % — HIGH (ref 43–77)
NRBC # BLD AUTO: 0 K/UL — SIGNIFICANT CHANGE UP (ref 0–0)
NRBC # FLD: 0 K/UL — SIGNIFICANT CHANGE UP (ref 0–0)
NRBC BLD AUTO-RTO: 0 /100 WBCS — SIGNIFICANT CHANGE UP (ref 0–0)
NT-PROBNP SERPL-SCNC: HIGH PG/ML (ref 0–450)
PHOSPHATE SERPL-MCNC: 2.7 MG/DL — SIGNIFICANT CHANGE UP (ref 2.5–4.5)
PLATELET # BLD AUTO: 196 K/UL — SIGNIFICANT CHANGE UP (ref 150–400)
PMV BLD: 10.4 FL — SIGNIFICANT CHANGE UP (ref 7–13)
POTASSIUM SERPL-MCNC: 4.1 MMOL/L — SIGNIFICANT CHANGE UP (ref 3.5–5.3)
POTASSIUM SERPL-SCNC: 4.1 MMOL/L — SIGNIFICANT CHANGE UP (ref 3.5–5.3)
PROT SERPL-MCNC: 6.3 GM/DL — SIGNIFICANT CHANGE UP (ref 6–8.3)
RBC # BLD: 3.27 M/UL — LOW (ref 3.8–5.2)
RBC # FLD: 14.6 % — HIGH (ref 10.3–14.5)
SODIUM SERPL-SCNC: 141 MMOL/L — SIGNIFICANT CHANGE UP (ref 135–145)
SPECIMEN SOURCE: SIGNIFICANT CHANGE UP
TROPONIN I, HIGH SENSITIVITY RESULT: 4069.64 NG/L — HIGH
VANCOMYCIN FLD-MCNC: 11.5 UG/ML — SIGNIFICANT CHANGE UP (ref 10–20)
WBC # BLD: 8.91 K/UL — SIGNIFICANT CHANGE UP (ref 3.8–10.5)
WBC # FLD AUTO: 8.91 K/UL — SIGNIFICANT CHANGE UP (ref 3.8–10.5)

## 2025-02-21 PROCEDURE — 99233 SBSQ HOSP IP/OBS HIGH 50: CPT

## 2025-02-21 PROCEDURE — 99233 SBSQ HOSP IP/OBS HIGH 50: CPT | Mod: FS

## 2025-02-21 PROCEDURE — 99291 CRITICAL CARE FIRST HOUR: CPT

## 2025-02-21 RX ORDER — SODIUM CHLORIDE 9 G/1000ML
500 INJECTION, SOLUTION INTRAVENOUS ONCE
Refills: 0 | Status: COMPLETED | OUTPATIENT
Start: 2025-02-21 | End: 2025-02-21

## 2025-02-21 RX ORDER — VANCOMYCIN HCL IN 5 % DEXTROSE 1.5G/250ML
750 PLASTIC BAG, INJECTION (ML) INTRAVENOUS ONCE
Refills: 0 | Status: COMPLETED | OUTPATIENT
Start: 2025-02-21 | End: 2025-02-21

## 2025-02-21 RX ORDER — DEXMEDETOMIDINE HYDROCHLORIDE IN SODIUM CHLORIDE 4 UG/ML
0.2 INJECTION INTRAVENOUS
Qty: 400 | Refills: 0 | Status: DISCONTINUED | OUTPATIENT
Start: 2025-02-21 | End: 2025-02-22

## 2025-02-21 RX ORDER — ATORVASTATIN CALCIUM 80 MG/1
40 TABLET, FILM COATED ORAL AT BEDTIME
Refills: 0 | Status: DISCONTINUED | OUTPATIENT
Start: 2025-02-21 | End: 2025-02-28

## 2025-02-21 RX ORDER — HYDROCORTISONE 20 MG
50 TABLET ORAL EVERY 8 HOURS
Refills: 0 | Status: DISCONTINUED | OUTPATIENT
Start: 2025-02-21 | End: 2025-02-22

## 2025-02-21 RX ADMIN — PROPOFOL 3.06 MICROGRAM(S)/KG/MIN: 10 INJECTION, EMULSION INTRAVENOUS at 01:11

## 2025-02-21 RX ADMIN — Medication 2 TABLET(S): at 21:53

## 2025-02-21 RX ADMIN — Medication 100 MILLIGRAM(S): at 10:05

## 2025-02-21 RX ADMIN — LACOSAMIDE 100 MILLIGRAM(S): 150 TABLET, FILM COATED ORAL at 01:11

## 2025-02-21 RX ADMIN — LACOSAMIDE 100 MILLIGRAM(S): 150 TABLET, FILM COATED ORAL at 10:04

## 2025-02-21 RX ADMIN — Medication 50 MILLIGRAM(S): at 21:53

## 2025-02-21 RX ADMIN — Medication 15 MILLILITER(S): at 10:04

## 2025-02-21 RX ADMIN — LEVETIRACETAM 500 MILLIGRAM(S): 10 INJECTION, SOLUTION INTRAVENOUS at 08:02

## 2025-02-21 RX ADMIN — METOPROLOL SUCCINATE 12.5 MILLIGRAM(S): 50 TABLET, EXTENDED RELEASE ORAL at 21:52

## 2025-02-21 RX ADMIN — Medication 40 MILLIGRAM(S): at 10:05

## 2025-02-21 RX ADMIN — Medication 15 MILLILITER(S): at 21:52

## 2025-02-21 RX ADMIN — Medication 81 MILLIGRAM(S): at 10:06

## 2025-02-21 RX ADMIN — Medication 109.8 MILLIGRAM(S): at 08:02

## 2025-02-21 RX ADMIN — CEFTRIAXONE 2000 MILLIGRAM(S): 500 INJECTION, POWDER, FOR SOLUTION INTRAMUSCULAR; INTRAVENOUS at 05:07

## 2025-02-21 RX ADMIN — DEXMEDETOMIDINE HYDROCHLORIDE IN SODIUM CHLORIDE 2.55 MICROGRAM(S)/KG/HR: 4 INJECTION INTRAVENOUS at 10:28

## 2025-02-21 RX ADMIN — HEPARIN SODIUM 5000 UNIT(S): 1000 INJECTION INTRAVENOUS; SUBCUTANEOUS at 14:13

## 2025-02-21 RX ADMIN — CEFTRIAXONE 2000 MILLIGRAM(S): 500 INJECTION, POWDER, FOR SOLUTION INTRAMUSCULAR; INTRAVENOUS at 17:12

## 2025-02-21 RX ADMIN — SODIUM CHLORIDE 1000 MILLILITER(S): 9 INJECTION, SOLUTION INTRAVENOUS at 11:45

## 2025-02-21 RX ADMIN — LACOSAMIDE 100 MILLIGRAM(S): 150 TABLET, FILM COATED ORAL at 17:12

## 2025-02-21 RX ADMIN — LEVETIRACETAM 500 MILLIGRAM(S): 10 INJECTION, SOLUTION INTRAVENOUS at 19:51

## 2025-02-21 RX ADMIN — HEPARIN SODIUM 5000 UNIT(S): 1000 INJECTION INTRAVENOUS; SUBCUTANEOUS at 05:06

## 2025-02-21 RX ADMIN — METOPROLOL SUCCINATE 12.5 MILLIGRAM(S): 50 TABLET, EXTENDED RELEASE ORAL at 10:05

## 2025-02-21 RX ADMIN — Medication 50 MILLIGRAM(S): at 15:44

## 2025-02-21 RX ADMIN — AMPICILLIN SODIUM 216 GRAM(S): 1 INJECTION, POWDER, FOR SOLUTION INTRAMUSCULAR; INTRAVENOUS at 17:12

## 2025-02-21 RX ADMIN — Medication 250 MILLIGRAM(S): at 11:43

## 2025-02-21 RX ADMIN — Medication 50 MILLIGRAM(S): at 10:06

## 2025-02-21 RX ADMIN — POLYETHYLENE GLYCOL 3350 17 GRAM(S): 17 POWDER, FOR SOLUTION ORAL at 10:05

## 2025-02-21 RX ADMIN — Medication 50 MILLIGRAM(S): at 05:06

## 2025-02-21 RX ADMIN — ATORVASTATIN CALCIUM 40 MILLIGRAM(S): 80 TABLET, FILM COATED ORAL at 21:53

## 2025-02-21 RX ADMIN — HEPARIN SODIUM 5000 UNIT(S): 1000 INJECTION INTRAVENOUS; SUBCUTANEOUS at 21:53

## 2025-02-21 RX ADMIN — AMPICILLIN SODIUM 216 GRAM(S): 1 INJECTION, POWDER, FOR SOLUTION INTRAMUSCULAR; INTRAVENOUS at 05:07

## 2025-02-21 RX ADMIN — DEXMEDETOMIDINE HYDROCHLORIDE IN SODIUM CHLORIDE 2.55 MICROGRAM(S)/KG/HR: 4 INJECTION INTRAVENOUS at 20:13

## 2025-02-21 NOTE — PROGRESS NOTE ADULT - ASSESSMENT
Assessment:  89F with AS, Dementia, GERD, HLD, HTN, presents 2/18 from [facility] with altered mental status, found to be in Status epilepticus   Febrile 101F on admission, on vent  WBC 16  Cr 1.70  Lactate 4.2 > 1.9  UA negative  CTH No hemorrhage.  No large territorial infarct.  Diffuse cortical atrophy.   CTA H&N mild calcified atherosclerotic plaques bilateral carotid at bifurcation, non flow limiting, no intracranial LVO.  CTAP negative  CT Chest with Dependent posterior RUL, superior RLL and to lesser extent apicoposterior SAMEER groundglass opacities and diffuse interlobular septal thickening. Pulmonary edema with or without associated pneumonia suspected  Full RVP negative, MRSA negative  BCx 2/18 negative  MRI with small emboli bilateral occipital lobes     Antimicrobials:  Acyclovir (2/18 --- )  ampicillin  IVPB 2 every 8 hours (2/18 --- )  cefTRIAXone Injectable. 2000 every 12 hours (2/18 --- )  Vanco (2/18 --- )    Impression:   #Status epilepticus, Metabolic Encephalopathy, Fever, r/o Meningitis  #Acute hypoxic respiratory failure, Pulmonary edema with possible Pneumonia  #Abnormal MRI Brain concerning for bilateral small emboli  #Leukocytosis  #JACKY  #Lactic Acidosis   #Hx of Dementia    Recommendations:  - continue empiric Meningitis coverage  --- Vancomycin by level (renally dose) (Day #4)  --- CTX 2G q12 (Day #4)  --- Ampicillin 2G q12 (renally dosed) (Day #4)  --- Acyclovir 490mg q24 (renally dosed) (Day #4)  - monitor temperature curve  - trend WBC  - reason for abx use reviewed with patient  - side effects of antibiotic discussed, tolerating abx well so far  - Prior cultures reviewed. An epidemiologic assessment was performed. There is a significant risk for resistant microorganisms to spread to family members, and/or healthcare staff. The patient will be placed on isolation according to infection control policy. Will reconsider isolation measures based on new culture results and other clinical data as appropriate Appropriate cultures collected and an appropriate broad spectrum antibiotic therapy will be considered  - family refusing LP at this time, unable to rule out meningitis, will have to continue empiric treatment for at least 14-day course  - follow up Neurology  - rest per primary team    [facility] IV to PO Token not applicable; Patient to continue with IV Therapy

## 2025-02-21 NOTE — PROGRESS NOTE ADULT - SUBJECTIVE AND OBJECTIVE BOX
ICU  Note    HPI:    S:    Pt seen and examined  89yoF PMH AS, dementia (baseline A&O x 2–3), GERD, HLD, HTN BIBEMS from [facility] for AMS, unknown LSW. Per EMS pt was found this AM unresponsive, aphasic with dark emesis in mouth- no robel blood or witnessed hematemesis. HPI limited 2/2 medical condition. Per Daughter Tammy pt is DNR DNI    status epi, broke with keppra and phenytoin loads  after breaking remains unresponsive apneic hypotensive  Son at bedside elected to intubate    2/18: now intubated on propfol, cEEG remains active  2/21: Remains intubated. Seizures seems to have abated. Tolerated 2 hours PSV yesterday. On abx empiric meningitis given seizures, fevers.    ROS: + UTO        Allergies    No Known Allergies    Intolerances        MEDICATIONS  (STANDING):  acyclovir IVPB 490 milliGRAM(s) IV Intermittent every 24 hours  ampicillin  IVPB 2 Gram(s) IV Intermittent every 12 hours  aspirin  chewable 81 milliGRAM(s) Oral daily  cefTRIAXone Injectable. 2000 milliGRAM(s) IV Push every 12 hours  chlorhexidine 0.12% Liquid 15 milliLiter(s) Oral Mucosa every 12 hours  heparin   Injectable 5000 Unit(s) SubCutaneous every 8 hours  hydrocortisone sodium succinate Injectable 50 milliGRAM(s) IV Push every 6 hours  lacosamide IVPB 100 milliGRAM(s) IV Intermittent <User Schedule>  levETIRAcetam   Injectable 500 milliGRAM(s) IV Push every 12 hours  metoprolol tartrate 12.5 milliGRAM(s) Oral two times a day  pantoprazole  Injectable 40 milliGRAM(s) IV Push daily  polyethylene glycol 3350 17 Gram(s) Oral daily  propofol Infusion 10 MICROgram(s)/kG/Min (3.06 mL/Hr) IV Continuous <Continuous>  senna 2 Tablet(s) Oral at bedtime  thiamine 100 milliGRAM(s) Oral daily    MEDICATIONS  (PRN):  LORazepam   Injectable 1 milliGRAM(s) IV Push every 30 minutes PRN seizures      Drug Dosing Weight  Height (cm): 157.5 (18 Feb 2025 08:06)  Weight (kg): 51 (20 Feb 2025 04:00)  BMI (kg/m2): 20.6 (20 Feb 2025 04:00)  BSA (m2): 1.5 (20 Feb 2025 04:00)      PAST MEDICAL & SURGICAL HISTORY:  HTN (hypertension)          FAMILY HISTORY:        REVIEW OF SYSTEMS    UNLESS OTHERWISE NOTED IN HPI above:    Constitutional:  No Weight Change, No Fever, No Chills, No Night Sweats, No Fatigue, No Malaise  ENT/Mouth:  No Hearing Changes, No Ear Pain, No Nasal Congestion, No  Sinus Pain, No Hoarseness, No sore throat, No Rhinorrhea, No Swallowing  Difficulty  Eyes:  No Eye Pain, No Swelling, No Redness, No Foreign Body, No Discharge, No Vision Changes  Cardiovascular:  No Chest Pain, No SOB, No PND, No Dyspnea on Exertion,  No Orthopnea, No Claudication, No Edema, No Palpitations  Respiratory:  No Cough, No Sputum, No Wheezing, No Smoke Exposure, No Dyspnea  Gastrointestinal:  No Nausea, No Vomiting, No Diarrhea, No  Constipation, No Pain, No Heartburn, No Anorexia, No Dysphagia, No  Hematochezia, No Melena, No Flatulence, No Jaundice  Genitourinary:  No Dysmenorrhea, No DUB, No Dyspareunia, No Dysuria, No  Urinary Frequency, No Hematuria, No Urinary Incontinence, No Urgency,  No Flank Pain, No Urinary Flow Changes, No Hesitancy  Musculoskeletal:  No Arthralgias, No Myalgias, No Joint Swelling, No  Joint Stiffness, No Back Pain, No Neck Pain, No Injury History  Skin:  No Skin Lesions, No Pruritis, No Hair Changes, No Breast/Skin Changes, No Nipple Discharge  Neuro:  No Weakness, No Numbness, No Paresthesias, No Loss of  Consciousness, No Syncope, No Dizziness, No Headache, No Coordination  Changes, No Recent Falls  Psych:  No Anxiety/Panic, No Depression, No Insomnia, No Personality  Changes, No Delusions, No Rumination, No SI/HI/AH/VH, No Social Issues,  No Memory Changes, No Violence/Abuse Hx., No Eating Concerns  Heme/Lymph:  No Bruising, No Bleeding, No Transfusions History, No Lymphadenopathy  Endocrine:  No Polyuria, No Polydipsia, No Temperature Intolerance    O:    ICU Vital Signs Last 24 Hrs  T(C): 37.4 (21 Feb 2025 00:00), Max: 37.5 (20 Feb 2025 07:00)  T(F): 99.3 (21 Feb 2025 00:00), Max: 99.5 (20 Feb 2025 07:00)  HR: 79 (21 Feb 2025 05:00) (70 - 98)  BP: 127/60 (21 Feb 2025 05:00) (106/54 - 165/75)  BP(mean): 79 (21 Feb 2025 05:00) (69 - 102)  ABP: --  ABP(mean): --  RR: 20 (21 Feb 2025 05:00) (16 - 22)  SpO2: 98% (21 Feb 2025 05:00) (97% - 100%)    O2 Parameters below as of 21 Feb 2025 02:00  Patient On (Oxygen Delivery Method): ventilator    O2 Concentration (%): 40            I&O's Detail    19 Feb 2025 07:01  -  20 Feb 2025 07:00  --------------------------------------------------------  IN:    Enteral Tube Flush: 263 mL    IV PiggyBack: 300 mL    Jevity 1.5: 401 mL    Norepinephrine: 73 mL    Propofol: 14 mL  Total IN: 1051 mL    OUT:    Indwelling Catheter - Urethral (mL): 450 mL  Total OUT: 450 mL    Total NET: 601 mL      20 Feb 2025 07:01  -  21 Feb 2025 06:12  --------------------------------------------------------  IN:    Enteral Tube Flush: 302 mL    IV PiggyBack: 550 mL    Jevity 1.5: 535 mL    Propofol: 19.1 mL  Total IN: 1406.1 mL    OUT:    Indwelling Catheter - Urethral (mL): 305 mL  Total OUT: 305 mL    Total NET: 1101.1 mL          Mode: AC/ CMV (Assist Control/ Continuous Mandatory Ventilation)  RR (machine): 16  TV (machine): 400  FiO2: 40  PEEP: 6  PS: 10  ITime: 1.5  PIP: 13      PE:    Adult lying in bed  + emaciated, chronically ill appearing  + intubated on MV  No JVD trachea midline  Normocephalic, atraumatic  S1S2+  CTA B/L  Abd soft NTND  No leg swelling/edema noted  Eyes opening, not tracking, not following commands  Skin pink, warm    LABS:    CBC Full  -  ( 20 Feb 2025 06:00 )  WBC Count : 10.30 K/uL  RBC Count : 3.55 M/uL  Hemoglobin : 11.0 g/dL  Hematocrit : 34.4 %  Platelet Count - Automated : 191 K/uL  Mean Cell Volume : 96.9 fl  Mean Cell Hemoglobin : 31.0 pg  Mean Cell Hemoglobin Concentration : 32.0 g/dL  Auto Neutrophil # : 8.05 K/uL  Auto Lymphocyte # : 1.10 K/uL  Auto Monocyte # : 1.08 K/uL  Auto Eosinophil # : 0.01 K/uL  Auto Basophil # : 0.01 K/uL  Auto Neutrophil % : 78.1 %  Auto Lymphocyte % : 10.7 %  Auto Monocyte % : 10.5 %  Auto Eosinophil % : 0.1 %  Auto Basophil % : 0.1 %    02-20    140  |  106  |  38[H]  ----------------------------<  152[H]  3.3[L]   |  25  |  1.64[H]    Ca    8.8      20 Feb 2025 06:00  Phos  3.8     02-20  Mg     2.3     02-20    TPro  6.4  /  Alb  2.7[L]  /  TBili  0.3  /  DBili  x   /  AST  66[H]  /  ALT  42  /  AlkPhos  73  02-20    PTT - ( 19 Feb 2025 14:02 )  PTT:25.4 sec  Urinalysis Basic - ( 20 Feb 2025 06:00 )    Color: x / Appearance: x / SG: x / pH: x  Gluc: 152 mg/dL / Ketone: x  / Bili: x / Urobili: x   Blood: x / Protein: x / Nitrite: x   Leuk Esterase: x / RBC: x / WBC x   Sq Epi: x / Non Sq Epi: x / Bacteria: x      CAPILLARY BLOOD GLUCOSE            LIVER FUNCTIONS - ( 20 Feb 2025 06:00 )  Alb: 2.7 g/dL / Pro: 6.4 gm/dL / ALK PHOS: 73 U/L / ALT: 42 U/L / AST: 66 U/L / GGT: x

## 2025-02-21 NOTE — PROGRESS NOTE ADULT - SUBJECTIVE AND OBJECTIVE BOX
CHIEF COMPLAINT:    HPI:  89yoF PMH  severe AS ( refused any intervention , dementia (baseline A&O x 2–3), GERD, HLD, HTN BIBEMS from [facility] for AMS, unknown LSW. Per EMS pt was found this AM unresponsive, aphasic with dark emesis in mouth- no robel blood or witnessed hematemesis. HPI limited 2/2 medical condition. Per Daughter Tammy pt is DNR DNI, patient  blood work showed markedly elevated troponin ,  in setting of status epilepticus , patient had lactic acidosis  , became hypotensive , was intubated  ,family at bedside      patient blood work increased troponin , ekg showed ischemic ST  T changes   ,   patient had severe AS ,  HTN  patient and family did not want any intervention , and now also   2/20/25 Patient opens eyes ,  intubated   improved blood pressure    2/21/25 awake , intubated     MEDICATIONS:Home Medications:  acetaminophen 325 mg oral tablet: 2 tab(s) orally every 6 hours as needed for  pain max 3 g/24 hours (18 Feb 2025 11:04)  diclofenac 1% topical gel: Apply 2 grams topically to affected area 2 times daily to B/L knees for pain (18 Feb 2025 12:25)  docusate sodium 100 mg oral capsule: 2 cap(s) orally once a day (18 Feb 2025 12:25)  senna (sennosides) 8.6 mg oral tablet: 2 tab(s) orally once a day (at bedtime) (18 Feb 2025 12:25)    MEDICATIONS  (STANDING):  acyclovir IVPB 490 milliGRAM(s) IV Intermittent every 24 hours  ampicillin  IVPB 2 Gram(s) IV Intermittent every 12 hours  aspirin  chewable 81 milliGRAM(s) Oral daily  cefTRIAXone Injectable. 2000 milliGRAM(s) IV Push every 12 hours  chlorhexidine 0.12% Liquid 15 milliLiter(s) Oral Mucosa every 12 hours  dexMEDEtomidine Infusion 0.2 MICROgram(s)/kG/Hr (2.55 mL/Hr) IV Continuous <Continuous>  heparin   Injectable 5000 Unit(s) SubCutaneous every 8 hours  hydrocortisone sodium succinate Injectable 50 milliGRAM(s) IV Push every 6 hours  lacosamide IVPB 100 milliGRAM(s) IV Intermittent <User Schedule>  levETIRAcetam   Injectable 500 milliGRAM(s) IV Push every 12 hours  metoprolol tartrate 12.5 milliGRAM(s) Oral two times a day  pantoprazole  Injectable 40 milliGRAM(s) IV Push daily  polyethylene glycol 3350 17 Gram(s) Oral daily  propofol Infusion 10 MICROgram(s)/kG/Min (3.06 mL/Hr) IV Continuous <Continuous>  senna 2 Tablet(s) Oral at bedtime  thiamine 100 milliGRAM(s) Oral daily    MEDICATIONS  (PRN):  LORazepam   Injectable 1 milliGRAM(s) IV Push every 30 minutes PRN seizures    Vital Signs Last 24 Hrs  T(C): 37.5 (21 Feb 2025 08:00), Max: 37.5 (21 Feb 2025 08:00)  T(F): 99.5 (21 Feb 2025 08:00), Max: 99.5 (21 Feb 2025 08:00)  HR: 73 (21 Feb 2025 11:00) (70 - 95)  BP: 122/60 (21 Feb 2025 11:00) (106/54 - 141/66)  BP(mean): 78 (21 Feb 2025 11:00) (69 - 92)  RR: 21 (21 Feb 2025 11:00) (16 - 24)  SpO2: 99% (21 Feb 2025 11:00) (95% - 100%)    Parameters below as of 21 Feb 2025 11:00  Patient On (Oxygen Delivery Method): ventilator    O2 Concentration (%): 30    I&O's Summary    20 Feb 2025 07:01  -  21 Feb 2025 07:00  --------------------------------------------------------  IN: 2269.1 mL / OUT: 555 mL / NET: 1714.1 mL    PHYSICAL EXAM:    Constitutional: intubated sedated  frail looking   HEENT: PERR, EOMI,  No oral cyananosis.  Neck:  supple,  No JVD  Respiratory: Breath sounds are clear bilaterally, No wheezing, rales or rhonchi  Cardiovascular: S1 and S2, regular rate ESM   Gastrointestinal: Bowel Sounds present, soft, nontender.   Extremities: No peripheral edema. No clubbing or cyanosis.  Vascular: 2+ peripheral pulses  Neurological: opens eyes   intubated Musculoskeletal: no calf tenderness.  Skin: No rashes.      LABS: All Labs Reviewed:                                       10.1   8.91  )-----------( 196      ( 21 Feb 2025 05:47 )             31.4     02-21    141  |  106  |  44[H]  ----------------------------<  163[H]  4.1   |  26  |  1.41[H]    Ca    8.7      21 Feb 2025 05:47  Phos  2.7     02-21  Mg     2.5     02-21    TPro  6.3  /  Alb  2.6[L]  /  TBili  0.2  /  DBili  x   /  AST  37  /  ALT  42  /  AlkPhos  78  02-21        LIVER FUNCTIONS - ( 21 Feb 2025 05:47 )  Alb: 2.6 g/dL / Pro: 6.3 gm/dL / ALK PHOS: 78 U/L / ALT: 42 U/L / AST: 37 U/L / GGT: x           PTT - ( 19 Feb 2025 14:02 )  PTT:25.4 sec  Urinalysis Basic - ( 21 Feb 2025 05:47 )    Color: x / Appearance: x / SG: x / pH: x  Gluc: 163 mg/dL / Ketone: x  / Bili: x / Urobili: x   Blood: x / Protein: x / Nitrite: x   Leuk Esterase: x / RBC: x / WBC x   Sq Epi: x / Non Sq Epi: x / Bacteria: x      02-20 Chol 231[H] LDL -- HDL 59 Trig 109  Culture Results:   No growth at 48 Hours (02-18-25 @ 08:05)  Culture Results:   No growth at 48 Hours (02-18-25 @ 08:05)        - TroponinI hsT: <-4069.64, <-7857.91, <-69507.29, <-80365.09, <-07807.94    RADIOLOGY/EKG:    < from: 12 Lead ECG (02.18.25 @ 08:37) >    Diagnosis Line Sinus tachycardia  Left atrial enlargement  Left axis deviation  Left ventricular hypertrophy ( Guy product )  ST & T wave abnormality, consider lateral ischemia  Abnormal ECG  When compared with ECG of 19-JAN-2025 14:32,  ST now depressed in Lateral leads  Nonspecific T wave abnormality, improved in Inferior leads  Confirmed by Madeline Fernandez ([id number]) on 2/18/2025 8:47:40 PM    < end of copied text >  < from: CT Chest w/ IV Cont (02.18.25 @ 09:15) >    IMPRESSION:  1.  Dependent posterior RUL, superior RLL and to lesser extent   apicoposterior SAMEER groundglass opacities and diffuse interlobular septal   thickening. Pulmonary edema with or without associated pneumonia   suspected.  2.  No acute abdominal/pelvic pathology.  3.  Etiology of hematemesis not determined.    < end of copied text >      < from: TTE W or WO Ultrasound Enhancing Agent (02.19.25 @ 13:11) >  CONCLUSIONS:      1. Left ventricular systolic function is mildly decreased with an ejection fraction visually estimated at 40 to 45 %.   2. Left atrium is mildly dilated.   3. Mild to moderate mitral regurgitation.   4. Moderate tricuspid regurgitation.   5. Estimated pulmonary artery systolic pressure is 49 mmHg, consistent with moderate pulmonary hypertension.   6. Mild left ventricular hypertrophy.   7. Mild mitral valve stenosis.   8. Mild to moderate aortic regurgitation.   9. Severe aortic stenosis.  10. The peak transaortic velocity is 3.96 m/s, peak transaortic gradient is 62.7 mmHg and mean transaortic gradient is 48.0 mmHg with an LVOT/aortic valve VTI ratio of 0.19. The effective orifice area is estimated at 0.60 cm² by the continuity equation.  11. There is moderate calcification of the mitral valve annulus.    < end of copied text >      monitor sinus rhythm

## 2025-02-21 NOTE — EEG REPORT - NS EEG TEXT BOX
JON RASHID [id number]     Study Date: 2/20/25 08:00-15:38  Duration: 7 hr 34 min  --------------------------------------------------------------------------------------------------  History:  CC/ HPI Patient is a 89y old  Female who presents with a chief complaint of status ep, shock, resp failure (21 Feb 2025 09:31)    MEDICATIONS  (STANDING):  acyclovir IVPB 490 milliGRAM(s) IV Intermittent every 24 hours  ampicillin  IVPB 2 Gram(s) IV Intermittent every 12 hours  aspirin  chewable 81 milliGRAM(s) Oral daily  cefTRIAXone Injectable. 2000 milliGRAM(s) IV Push every 12 hours  chlorhexidine 0.12% Liquid 15 milliLiter(s) Oral Mucosa every 12 hours  dexMEDEtomidine Infusion 0.2 MICROgram(s)/kG/Hr (2.55 mL/Hr) IV Continuous <Continuous>  heparin   Injectable 5000 Unit(s) SubCutaneous every 8 hours  hydrocortisone sodium succinate Injectable 50 milliGRAM(s) IV Push every 6 hours  lacosamide IVPB 100 milliGRAM(s) IV Intermittent <User Schedule>  lactated ringers Bolus 500 milliLiter(s) IV Bolus once  levETIRAcetam   Injectable 500 milliGRAM(s) IV Push every 12 hours  metoprolol tartrate 12.5 milliGRAM(s) Oral two times a day  pantoprazole  Injectable 40 milliGRAM(s) IV Push daily  polyethylene glycol 3350 17 Gram(s) Oral daily  propofol Infusion 10 MICROgram(s)/kG/Min (3.06 mL/Hr) IV Continuous <Continuous>  senna 2 Tablet(s) Oral at bedtime  thiamine 100 milliGRAM(s) Oral daily  vancomycin  IVPB 750 milliGRAM(s) IV Intermittent once    --------------------------------------------------------------------------------------------------  Study Interpretation:    [[[Abbreviation Key:  PDR=alpha rhythm/posterior dominant rhythm. A-P=anterior posterior.  Amplitude: ‘very low’:<20; ‘low’:20-49; ‘medium’:; ‘high’:>150uV.  Persistence for periodic/rhythmic patterns (% of epoch) ‘rare’:<1%; ‘occasional’:1-10%; ‘frequent’:10-50%; ‘abundant’:50-90%; ‘continuous’:>90%.  Persistence for sporadic discharges: ‘rare’:<1/hr; ‘occasional’:1/min-1/hr; ‘frequent’:>1/min; ‘abundant’:>1/10 sec.  RPP=rhythmic and periodic patterns; GRDA=generalized rhythmic delta activity; FIRDA=frontal intermittent GRDA; LRDA=lateralized rhythmic delta activity; TIRDA=temporal intermittent rhythmic delta activity;  LPD=PLED=lateralized periodic discharges; GPD=generalized periodic discharges; BIPDs =bilateral independent periodic discharges; Mf=multifocal; SIRPDs=stimulus induced rhythmic, periodic, or ictal appearing discharges; BIRDs=brief potentially ictal rhythmic discharges >4 Hz, lasting .5-10s; PFA (paroxysmal bursts >13 Hz or =8 Hz <10s).  Modifiers: +F=with fast component; +S=with spike component; +R=with rhythmic component.  S-B=burst suppression pattern.  Max=maximal. N1-drowsy; N2-stage II sleep; N3-slow wave sleep. SSS/BETS=small sharp spikes/benign epileptiform transients of sleep. HV=hyperventilation; PS=photic stimulation]]]    Daily EEG Visual Analysis    FINDINGS:      Background:  The background is asymmetric and continuous. In the left hemisphere, the awake background consists of polymorphic theta and delta activity with fragments of a poorly formed 6-Hz posterior dominant rhythm. In the right hemisphere, the awake background consists of polymorphic delta > theta slowing.    Background Slowing:  Generalized slowing: As above  Focal slowing: Right hemispheric, as above    State Changes:   A less wakeful state is characterized by slowing of the background activity.  No clear normal stage 2 sleep.    Interictal Findings:  Continuous right frontocentral sharp waves, shifting between F4/C4/F8, or Fp2, with broad field, periodic at 0.3-1 Hz, at times up to 1.5 Hz, with overriding overriding fast activity (LPDs +F).  Frequent left posterior temporal (P7) sharp waves, occasionally periodic at 1-1.5 Hz, fluctuating without evolution.  Rare left occipito-temporal (O1/P7) sharp wave.    Electrographic and Electroclinical seizures:  12:51 2/20/25: Event of extending L 2nd finger and wrist followed by a slow LUE jerky movement (elbow flexion), occurring repeatedly, possibly time-locked to ongoing right frontocentral sharp waves at 0.25-0.5 Hz.    Other Clinical Events:  None    Activation Procedures:   Hyperventilation is not performed.    Photic stimulation is not performed.    Artifacts:  Intermittent myogenic and movement artifacts are present.    Single-lead EKG: Regular rhythm    EEG Classification / Summary:  Abnormal EEG in the awake, drowsy states.  -Possible subtle right frontocentral focal seizure with LUE slow jerky movements (extension of L 2nd finger and wrist followed by L elbow flexion) possibly time-locked to ongoing LPDs: 12:51 2/20/25  -Continuous right frontocentral shifting LPDs at 0.3-1 Hz, at times up to 1.5 Hz, with overriding fast activity (LPDs +F)  -Frequent left posterior temporal sharp waves and occasional LPDs at 1-1.5 Hz, fluctuating without evolution  -Rare left occipito-temporal sharp wave  -Right hemispheric focal slowing  -Mild-moderate diffuse slowing  -No electrographic seizures    Clinical Impression:  -Possible subtle right frontocentral focal seizure with LUE slow jerky movements (extension of L 2nd finger and wrist followed by L elbow flexion) possibly time-locked to ongoing LPDs: 12:51 2/20/25  -Risk of focal-onset seizures from the right frontocentral and left posterior temporal regions  -Right hemispheric focal cerebral dysfunction can be structural or functional in etiology.  -Mild-moderate diffuse and/or multifocal cerebral dysfunction is nonspecific in etiology.  -No seizures.    Possible seizure discussed with neurology on 2/20/25.          -------------------------------------------------------------------------------------------------------    Nabila Torres MD  Attending Physician, [facility] Epilepsy [place]    -------------------------------------------------------------------------------------------------------    To reach EEG technologist:  Please use the pager number for the appropriate hospital or contact the .  At [facility] - Pager #: [phone]    To reach EEG-reading physician:  EEG Reading Room Phone #: [phone]  Epilepsy Answering Service after 5PM and before 8:30AM: Phone #: [phone]     JON RASHID [id number]     Study Date: 2/20/25 08:00-15:38  Duration: 7 hr 34 min  --------------------------------------------------------------------------------------------------  History:  CC/ HPI Patient is a 89y old  Female who presents with a chief complaint of status ep, shock, resp failure (21 Feb 2025 09:31)    MEDICATIONS  (STANDING):  acyclovir IVPB 490 milliGRAM(s) IV Intermittent every 24 hours  ampicillin  IVPB 2 Gram(s) IV Intermittent every 12 hours  aspirin  chewable 81 milliGRAM(s) Oral daily  cefTRIAXone Injectable. 2000 milliGRAM(s) IV Push every 12 hours  chlorhexidine 0.12% Liquid 15 milliLiter(s) Oral Mucosa every 12 hours  dexMEDEtomidine Infusion 0.2 MICROgram(s)/kG/Hr (2.55 mL/Hr) IV Continuous <Continuous>  heparin   Injectable 5000 Unit(s) SubCutaneous every 8 hours  hydrocortisone sodium succinate Injectable 50 milliGRAM(s) IV Push every 6 hours  lacosamide IVPB 100 milliGRAM(s) IV Intermittent <User Schedule>  lactated ringers Bolus 500 milliLiter(s) IV Bolus once  levETIRAcetam   Injectable 500 milliGRAM(s) IV Push every 12 hours  metoprolol tartrate 12.5 milliGRAM(s) Oral two times a day  pantoprazole  Injectable 40 milliGRAM(s) IV Push daily  polyethylene glycol 3350 17 Gram(s) Oral daily  propofol Infusion 10 MICROgram(s)/kG/Min (3.06 mL/Hr) IV Continuous <Continuous>  senna 2 Tablet(s) Oral at bedtime  thiamine 100 milliGRAM(s) Oral daily  vancomycin  IVPB 750 milliGRAM(s) IV Intermittent once    --------------------------------------------------------------------------------------------------  Study Interpretation:    [[[Abbreviation Key:  PDR=alpha rhythm/posterior dominant rhythm. A-P=anterior posterior.  Amplitude: ‘very low’:<20; ‘low’:20-49; ‘medium’:; ‘high’:>150uV.  Persistence for periodic/rhythmic patterns (% of epoch) ‘rare’:<1%; ‘occasional’:1-10%; ‘frequent’:10-50%; ‘abundant’:50-90%; ‘continuous’:>90%.  Persistence for sporadic discharges: ‘rare’:<1/hr; ‘occasional’:1/min-1/hr; ‘frequent’:>1/min; ‘abundant’:>1/10 sec.  RPP=rhythmic and periodic patterns; GRDA=generalized rhythmic delta activity; FIRDA=frontal intermittent GRDA; LRDA=lateralized rhythmic delta activity; TIRDA=temporal intermittent rhythmic delta activity;  LPD=PLED=lateralized periodic discharges; GPD=generalized periodic discharges; BIPDs =bilateral independent periodic discharges; Mf=multifocal; SIRPDs=stimulus induced rhythmic, periodic, or ictal appearing discharges; BIRDs=brief potentially ictal rhythmic discharges >4 Hz, lasting .5-10s; PFA (paroxysmal bursts >13 Hz or =8 Hz <10s).  Modifiers: +F=with fast component; +S=with spike component; +R=with rhythmic component.  S-B=burst suppression pattern.  Max=maximal. N1-drowsy; N2-stage II sleep; N3-slow wave sleep. SSS/BETS=small sharp spikes/benign epileptiform transients of sleep. HV=hyperventilation; PS=photic stimulation]]]    Daily EEG Visual Analysis    FINDINGS:      Background:  The background is asymmetric and continuous. In the left hemisphere, the awake background consists of polymorphic theta and delta activity with fragments of a poorly formed 6-Hz posterior dominant rhythm. In the right hemisphere, the awake background consists of polymorphic delta > theta slowing.    Background Slowing:  Generalized slowing: As above  Focal slowing: Right hemispheric, as above    State Changes:   A less wakeful state is characterized by slowing of the background activity.  No clear normal stage 2 sleep.    Interictal Findings:  Continuous right frontocentral sharp waves, shifting between F4/C4/F8, or Fp2, with broad field, periodic at 0.3-1 Hz, at times up to 1.5 Hz, with overriding overriding fast activity (LPDs +F).  Frequent left posterior temporal (P7) sharp waves, occasionally periodic at 1-1.5 Hz, fluctuating without evolution.  Rare left occipito-temporal (O1/P7) sharp wave.    Electrographic and Electroclinical seizures:  12:51 2/20/25: Event of extending L 2nd finger and wrist followed by a slow LUE jerky movement (elbow flexion), occurring repeatedly, possibly time-locked to ongoing right frontocentral sharp waves at 0.25-0.5 Hz.    Other Clinical Events:  None    Activation Procedures:   Hyperventilation is not performed.    Photic stimulation is not performed.    Artifacts:  Intermittent myogenic and movement artifacts are present.    Single-lead EKG: Regular rhythm    EEG Classification / Summary:  Abnormal EEG in the awake, drowsy states.  -Possible subtle right frontocentral focal seizure with LUE slow jerky movements (extension of L 2nd finger and wrist followed by L elbow flexion) possibly time-locked to ongoing LPDs: 12:51 2/20/25  -Continuous right frontocentral shifting LPDs at 0.3-1 Hz, at times up to 1.5 Hz, with overriding fast activity (LPDs +F)  -Frequent left posterior temporal sharp waves and occasional LPDs at 1-1.5 Hz, fluctuating without evolution  -Rare left occipito-temporal sharp wave  -Right hemispheric focal slowing  -Mild-moderate diffuse slowing  -No electrographic seizures    Clinical Impression:  -Possible subtle right frontocentral focal seizure with LUE slow jerky movements (extension of L 2nd finger and wrist followed by L elbow flexion) possibly time-locked to ongoing LPDs: 12:51 2/20/25  -Risk of focal-onset seizures from the right frontocentral, left posterior temporal, and left occipito-temporal regions  -Right hemispheric focal cerebral dysfunction can be structural or functional in etiology.  -Mild-moderate diffuse and/or multifocal cerebral dysfunction is nonspecific in etiology.  -No seizures.    Possible seizure discussed with neurology on 2/20/25.          -------------------------------------------------------------------------------------------------------    Nabila Torres MD  Attending Physician, [facility] Epilepsy [place]    -------------------------------------------------------------------------------------------------------    To reach EEG technologist:  Please use the pager number for the appropriate hospital or contact the .  At [facility] - Pager #: [phone]    To reach EEG-reading physician:  EEG Reading Room Phone #: [phone]  Epilepsy Answering Service after 5PM and before 8:30AM: Phone #: [phone]

## 2025-02-21 NOTE — PROGRESS NOTE ADULT - ASSESSMENT
A:    89yFemale    Here for:    1. Status epilepticus  2. Shock, suspect septic  3. Respiratory failure, acute   4. JACKY 2/2 ATN  5. MSOF  6. PNA, multifocal, suspect aspiration    This patient requires critical care for support of one or more vital organ systems with a high probability of imminent or life threatening deterioration in his/her condition    P:    Status epilepticus, no Hx seizure disorder    Sz broke after dilantin load s/p keppra load  Once broke hypotensive, apneic, remained unresponsive  Intubated in ER per family wishes, son at bedside    Remains intubated    Levophed started for hypotension, suspect 2/2 shock from aspiration, actively titrated to off  s/p emergent intubaion, remains on MV, actively titrating to optimize oxygenation and ventilation, SBT trials daily  Propofol, cont AEDs, neuro appreciative  Cont empiric abx, ? meningitis, refused LP, f/u Cx's  Obtain TTE, interval study from prior; AS, EF ~40-45% with mild diastolic dysfxn  TF's  JACKY 2/2 ATN from sepsis and seizure  Trend labs  Troponin > 10k; no DC, will consult Cardiology, not a candidate for invasive therapy  No s/s active bleeding  Imaging reviewed  May require central vascular access, minimize as much as able    Dispo: Continue critical care. Cont care and workup. Continue GOC discussions.     Date of entry of this note is equal to the date of services rendered    TOTAL CRITICAL CARE TIME: 35 minutes (EXCLUSIVE of any non bundled procedures)    Note: This is a critically ill patient. Time spent has been in salvage of life, limb and vital organ systems. This time INCLUDES time spent directly as this patient's bedside with evaluation and management, review of chart including review of laboratory and imaging studies, interpretation of vital signs and cardiac output measurements, any necessary ventilator management, and time spent discussing plan of care with patient and family, including goals of care discussion. This also includes time spent in multidisciplinary discussion with care team and various consultants to optimize treatment plan. This time may NOT include various procedures, which will be noted seperately.

## 2025-02-21 NOTE — PROGRESS NOTE ADULT - SUBJECTIVE AND OBJECTIVE BOX
Date of Service:02-21-25 @ 12:07  Interval History/ROS: Afebrile overnight, remains intubated, no leukocytosis, BCx negative, full RVP negative      REVIEW OF SYSTEMS  [ x ] ROS unobtainable because:  mental status  [  ] All other systems negative except as noted below    Constitutional:  [ ] fever [ ] chills  [ ] weight loss  [ ]night sweat  [ ]poor appetite/PO intake [ ]fatigue   Skin:  [ ] rash [ ] phlebitis	  Eyes: [ ] icterus [ ] pain  [ ] discharge	  ENMT: [ ] sore throat  [ ] thrush [ ] ulcers [ ] exudates [ ]anosmia  Respiratory: [ ] dyspnea [ ] hemoptysis [ ] cough [ ] sputum	  Cardiovascular:  [ ] chest pain [ ] palpitations [ ] edema	  Gastrointestinal:  [ ] nausea [ ] vomiting [ ] diarrhea [ ] constipation [ ] pain	  Genitourinary:  [ ] dysuria [ ] frequency [ ] hematuria [ ] discharge [ ] flank pain  [ ] incontinence  Musculoskeletal:  [ ] myalgias [ ] arthralgias [ ] arthritis  [ ] back pain  Neurological:  [ ] headache [ ] weakness [ ] seizures  [ ] confusion/altered mental status    Allergies  No Known Allergies        ANTIMICROBIALS:    acyclovir IVPB 490 every 24 hours  ampicillin  IVPB 2 every 12 hours  cefTRIAXone Injectable. 2000 every 12 hours        OTHER MEDS: MEDICATIONS  (STANDING):  aspirin  chewable 81 daily  dexMEDEtomidine Infusion 0.2 <Continuous>  heparin   Injectable 5000 every 8 hours  hydrocortisone sodium succinate Injectable 50 every 6 hours  lacosamide IVPB 100 <User Schedule>  levETIRAcetam   Injectable 500 every 12 hours  LORazepam   Injectable 1 every 30 minutes PRN  metoprolol tartrate 12.5 two times a day  pantoprazole  Injectable 40 daily  polyethylene glycol 3350 17 daily  propofol Infusion 10 <Continuous>  senna 2 at bedtime      Vital Signs Last 24 Hrs  T(F): 99.5 (02-21-25 @ 08:00), Max: 101.5 (02-18-25 @ 15:00)    Vital Signs Last 24 Hrs  HR: 73 (02-21-25 @ 11:00) (70 - 95)  BP: 122/60 (02-21-25 @ 11:00) (106/54 - 141/66)  RR: 21 (02-21-25 @ 11:00)  SpO2: 99% (02-21-25 @ 11:00) (95% - 100%)  Wt(kg): --    EXAM:    Constitutional:  intubated, sedated  Head/Eyes: no icterus  LUNGS:  Course  CVS:  regular rhythm  Abd:  soft, non-tender; non-distended  Ext:  no edema  Vascular:  IV site no erythema tenderness or discharge  Neuro: AAO X 0    Labs:                        10.1   8.91  )-----------( 196      ( 21 Feb 2025 05:47 )             31.4     02-21    141  |  106  |  44[H]  ----------------------------<  163[H]  4.1   |  26  |  1.41[H]    Ca    8.7      21 Feb 2025 05:47  Phos  2.7     02-21  Mg     2.5     02-21    TPro  6.3  /  Alb  2.6[L]  /  TBili  0.2  /  DBili  x   /  AST  37  /  ALT  42  /  AlkPhos  78  02-21      WBC Trend:  WBC Count: 8.91 (02-21-25 @ 05:47)  WBC Count: 10.30 (02-20-25 @ 06:00)  WBC Count: 14.09 (02-19-25 @ 14:02)  WBC Count: 16.82 (02-19-25 @ 06:05)      Creatine Trend:  Creatinine: 1.41 (02-21)  Creatinine: 1.64 (02-20)  Creatinine: 1.61 (02-19)  Creatinine: 1.70 (02-19)      Liver Biochemical Testing Trend:  Alanine Aminotransferase (ALT/SGPT): 42 (02-21)  Alanine Aminotransferase (ALT/SGPT): 42 (02-20)  Alanine Aminotransferase (ALT/SGPT): 25 (02-18)  Alanine Aminotransferase (ALT/SGPT): 26 (01-19)  Alanine Aminotransferase (ALT/SGPT): 17 (07-30)  Aspartate Aminotransferase (AST/SGOT): 37 (02-21-25 @ 05:47)  Aspartate Aminotransferase (AST/SGOT): 66 (02-20-25 @ 06:00)  Aspartate Aminotransferase (AST/SGOT): 72 (02-18-25 @ 08:05)  Aspartate Aminotransferase (AST/SGOT): 29 (01-19-25 @ 09:29)  Aspartate Aminotransferase (AST/SGOT): 34 (07-30-24 @ 06:48)  Bilirubin Total: 0.2 (02-21)  Bilirubin Total: 0.3 (02-20)  Bilirubin Total: 0.5 (02-18)  Bilirubin Total: 0.4 (01-19)  Bilirubin Total: 1.4 (07-30)      Trend LDH      Urinalysis Basic - ( 21 Feb 2025 05:47 )    Color: x / Appearance: x / SG: x / pH: x  Gluc: 163 mg/dL / Ketone: x  / Bili: x / Urobili: x   Blood: x / Protein: x / Nitrite: x   Leuk Esterase: x / RBC: x / WBC x   Sq Epi: x / Non Sq Epi: x / Bacteria: x        MICROBIOLOGY:  Vancomycin Level, Random: 11.5 (02-21 @ 05:47)  Vancomycin Level, Random: 6.8 (02-20 @ 06:00)    MRSA PCR Result.: NotDetec (02-18-25 @ 14:10)      Culture - Sputum (collected 20 Feb 2025 16:51)  Source: ET Tube ET Tube    Urinalysis with Rflx Culture (collected 18 Feb 2025 08:25)    Culture - Blood (collected 18 Feb 2025 08:05)  Source: .Blood BLOOD  Preliminary Report:    No growth at 48 Hours    Culture - Blood (collected 18 Feb 2025 08:05)  Source: .Blood BLOOD  Preliminary Report:    No growth at 48 Hours    Culture - Urine (collected 21 Jan 2025 20:43)  Source: Catheterized Catheterized  Final Report:    No growth    Culture - Blood (collected 19 Jan 2025 14:25)  Source: .Blood BLOOD  Final Report:    No growth at 5 days    Culture - Blood (collected 19 Jan 2025 14:18)  Source: .Blood BLOOD  Final Report:    No growth at 5 days    Urinalysis with Rflx Culture (collected 19 Jan 2025 10:45)    Culture - Blood (collected 01 Aug 2024 06:06)  Source: .Blood None  Final Report:    No growth at 5 days    Culture - Blood (collected 01 Aug 2024 06:06)  Source: .Blood None  Final Report:    No growth at 5 days      Legionella Antigen, Urine: Negative (02-20 @ 06:20)  Rapid RVP Result: NotDetec (02-19 @ 11:37)        Procalcitonin: 0.63 (02-18)                Lactate, Blood: 1.9 (02-19 @ 06:05)        RADIOLOGY:  imaging below personally reviewed

## 2025-02-21 NOTE — CHART NOTE - NSCHARTNOTEFT_GEN_A_CORE
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  Advised EEG tech to place on CEEG monitoring    Critical prelim report given to consulting neurologist Dr. Tobias Yi at time of immediate review. ASM escalation advised.      Final report to be produced after completion of the study tomorrow morning.    ____________  Adolfo Valencia MD, CONCHITA  Attending Physician, [facility]  ------------------------------------    Also reachable via TEAMS
EEG preliminary read (not final) on the initial recording hour(s) = 5.5 hr  10:35-16:12 2/21/25    No seizures.  Prevalence and frequency of right frontocentral LPDs (lateralized periodic discharges) decreased compared to prior days of recording, indicating continued but comparatively decreased risk of focal-onset seizures from this region.    Final report to follow tomorrow morning after completion of study.    EEG Reading Room Ph#: [phone]  Epilepsy Answering Service after 5PM and before 8:30AM: Ph#: [phone]

## 2025-02-21 NOTE — PROGRESS NOTE ADULT - ASSESSMENT
89 year old woman with HTN, HLD, aortic stenosis, ?dementia, nursing home resident, presenting to  ED on 2/18 with unresponsiveness, having a witnessed seizure while in the ED, identified to be in status epilepticus on initial evaluation.  Now remains intubated, on propofol infusion, and 2 AED's.      On exam, more eye opening, not attending, cranial nerves intact, no clear motor focal deficits.  Now afebrile over last 24h.      Her MRI does show several foci of very small strokes, as well as a region of the R thalamus with diffusion restriction.  EEG initially with focal seizures, now only LPD's on the R.  Has not had seizure over last 24h. Labs with downtrend in troponin and leukocytosis.     Initial suspicion for meningoencephalitis as possible cause of new onset seizures in patient with fever, and LPD's.  Still suspecting this despite MRI results, which do not fully explain her presentation.  Family refusing LP for CSF verification of the diagnosis at this time.     -continue keppra, renally dosed, 500mg BID  -continue on vimpat 100mg TID  -continuous EEG  -started on empiric meningoencephalitis anti-infective medications on day 1 of admission, renally dosed: vancomycin, ampicillin, and acyclovir, would continue for now.    -seizure precautions  -trial of weaning sedation today.  Yesterday was tolerating pressure support from vent.    -goals of care discussion on going with family.    -neurology to follow.  Please page or call with any acute neuro changes.  Dr. Douglas is on service for neurology starting 2/22.  Will sign out the case to her.

## 2025-02-21 NOTE — PROGRESS NOTE ADULT - SUBJECTIVE AND OBJECTIVE BOX
Patient seen and examined at bedside.     No acute events overnight.  She remains intubated.    She had an MRI brain overnight that showed several very small strokes.     On exam:   GEN: critically ill appearing  CV: RRR, S1, S2  PULM: CTAB  NEURO:   Eyes opening, does not attend the examiner, she does not follow commands and does not acknowledge orientation questions.   Pupils 3-2mm, symmetric, blinking to threat bilaterally, VOR intact, grimace is symmetric.   MOTOR: normal bulk and tone, withdrawing bilateral upper and lower extremities briskly.   SENSORY: grimace and withdraw present in all extremities.     MRI brain images reviewed: punctate diffusion restriction in the R and L occipital cortices, and the R lateral temporal cortex.    CTA H&N images reviewed: mild calcified atherosclerotic plaques bilateral carotid at bifurcation, non flow limiting, no intracranial LVO.  Also diffusion restriction in the R thalamus.

## 2025-02-21 NOTE — PROGRESS NOTE ADULT - SUBJECTIVE AND OBJECTIVE BOX
OVERNIGHT EVENTS / SUBJECTIVE: Patient seen and examined at bedside.     OBJECTIVE:    VITAL SIGNS:  ICU Vital Signs Last 24 Hrs  T(C): 37.4 (21 Feb 2025 04:00), Max: 37.5 (20 Feb 2025 11:00)  T(F): 99.4 (21 Feb 2025 04:00), Max: 99.5 (20 Feb 2025 11:00)  HR: 73 (21 Feb 2025 07:00) (70 - 98)  BP: 125/58 (21 Feb 2025 07:00) (106/54 - 165/75)  BP(mean): 77 (21 Feb 2025 07:00) (69 - 102)  ABP: --  ABP(mean): --  RR: 19 (21 Feb 2025 07:00) (16 - 24)  SpO2: 99% (21 Feb 2025 07:00) (97% - 100%)    O2 Parameters below as of 21 Feb 2025 06:00  Patient On (Oxygen Delivery Method): ventilator    O2 Concentration (%): 40      Mode: AC/ CMV (Assist Control/ Continuous Mandatory Ventilation), RR (machine): 16, TV (machine): 400, FiO2: 40, PEEP: 6, PS: 10, ITime: 1.5, PIP: 13    02-20 @ 07:01  -  02-21 @ 07:00  --------------------------------------------------------  IN: 2269.1 mL / OUT: 555 mL / NET: 1714.1 mL      CAPILLARY BLOOD GLUCOSE          PHYSICAL EXAM:    General: NAD  HEENT: NC/AT; PERRL, clear conjunctiva  Neck: supple  Respiratory: CTA b/l  Cardiovascular: +S1/S2; RRR  Abdomen: soft, NT/ND; +BS x4  Extremities: WWP, 2+ peripheral pulses b/l; no LE edema  Skin: normal color and turgor; no rash  Neurological:    MEDICATIONS:  MEDICATIONS  (STANDING):  acyclovir IVPB 490 milliGRAM(s) IV Intermittent every 24 hours  ampicillin  IVPB 2 Gram(s) IV Intermittent every 12 hours  aspirin  chewable 81 milliGRAM(s) Oral daily  cefTRIAXone Injectable. 2000 milliGRAM(s) IV Push every 12 hours  chlorhexidine 0.12% Liquid 15 milliLiter(s) Oral Mucosa every 12 hours  heparin   Injectable 5000 Unit(s) SubCutaneous every 8 hours  hydrocortisone sodium succinate Injectable 50 milliGRAM(s) IV Push every 6 hours  lacosamide IVPB 100 milliGRAM(s) IV Intermittent <User Schedule>  levETIRAcetam   Injectable 500 milliGRAM(s) IV Push every 12 hours  metoprolol tartrate 12.5 milliGRAM(s) Oral two times a day  pantoprazole  Injectable 40 milliGRAM(s) IV Push daily  polyethylene glycol 3350 17 Gram(s) Oral daily  propofol Infusion 10 MICROgram(s)/kG/Min (3.06 mL/Hr) IV Continuous <Continuous>  senna 2 Tablet(s) Oral at bedtime  thiamine 100 milliGRAM(s) Oral daily    MEDICATIONS  (PRN):  LORazepam   Injectable 1 milliGRAM(s) IV Push every 30 minutes PRN seizures      ALLERGIES:  Allergies    No Known Allergies    Intolerances        LABS:                        10.1   8.91  )-----------( 196      ( 21 Feb 2025 05:47 )             31.4     Hemoglobin: 10.1 g/dL (02-21 @ 05:47)  Hemoglobin: 11.0 g/dL (02-20 @ 06:00)  Hemoglobin: 11.4 g/dL (02-19 @ 14:02)  Hemoglobin: 12.3 g/dL (02-19 @ 06:05)  Hemoglobin: 13.2 g/dL (02-18 @ 08:05)    CBC Full  -  ( 21 Feb 2025 05:47 )  WBC Count : 8.91 K/uL  RBC Count : 3.27 M/uL  Hemoglobin : 10.1 g/dL  Hematocrit : 31.4 %  Platelet Count - Automated : 196 K/uL  Mean Cell Volume : 96.0 fl  Mean Cell Hemoglobin : 30.9 pg  Mean Cell Hemoglobin Concentration : 32.2 g/dL  Auto Neutrophil # : 7.08 K/uL  Auto Lymphocyte # : 0.96 K/uL  Auto Monocyte # : 0.81 K/uL  Auto Eosinophil # : 0.00 K/uL  Auto Basophil # : 0.01 K/uL  Auto Neutrophil % : 79.4 %  Auto Lymphocyte % : 10.8 %  Auto Monocyte % : 9.1 %  Auto Eosinophil % : 0.0 %  Auto Basophil % : 0.1 %    02-21    141  |  106  |  44[H]  ----------------------------<  163[H]  4.1   |  26  |  1.41[H]    Ca    8.7      21 Feb 2025 05:47  Phos  2.7     02-21  Mg     2.5     02-21    TPro  6.3  /  Alb  2.6[L]  /  TBili  0.2  /  DBili  x   /  AST  37  /  ALT  42  /  AlkPhos  78  02-21    Creatinine Trend: 1.41<--, 1.64<--, 1.61<--, 1.70<--, 1.29<--, 1.47<--  LIVER FUNCTIONS - ( 21 Feb 2025 05:47 )  Alb: 2.6 g/dL / Pro: 6.3 gm/dL / ALK PHOS: 78 U/L / ALT: 42 U/L / AST: 37 U/L / GGT: x           PTT - ( 19 Feb 2025 14:02 )  PTT:25.4 sec    hs Troponin:            Urinalysis Basic - ( 21 Feb 2025 05:47 )    Color: x / Appearance: x / SG: x / pH: x  Gluc: 163 mg/dL / Ketone: x  / Bili: x / Urobili: x   Blood: x / Protein: x / Nitrite: x   Leuk Esterase: x / RBC: x / WBC x   Sq Epi: x / Non Sq Epi: x / Bacteria: x      CSF:                      EKG:   MICROBIOLOGY:    Urinalysis with Rflx Culture (collected 18 Feb 2025 08:25)    Culture - Blood (collected 18 Feb 2025 08:05)  Source: .Blood BLOOD  Preliminary Report (20 Feb 2025 13:01):    No growth at 48 Hours    Culture - Blood (collected 18 Feb 2025 08:05)  Source: .Blood BLOOD  Preliminary Report (20 Feb 2025 13:01):    No growth at 48 Hours      IMAGING:      Labs, imaging, EKG personally reviewed    RADIOLOGY & ADDITIONAL TESTS: Reviewed. OVERNIGHT EVENTS / SUBJECTIVE: Patient seen and examined at bedside.     Fevers and WBC downtrending. Off pressor. Pt on prop 15 this AM, opening eyes intermittently. EEG report this AM with subtle R frontocentral Sz, risk of focal onset Sz R regions, R hemispheric focal cerebral dysfunction.    OBJECTIVE:    VITAL SIGNS:  ICU Vital Signs Last 24 Hrs  T(C): 37.4 (21 Feb 2025 04:00), Max: 37.5 (20 Feb 2025 11:00)  T(F): 99.4 (21 Feb 2025 04:00), Max: 99.5 (20 Feb 2025 11:00)  HR: 73 (21 Feb 2025 07:00) (70 - 98)  BP: 125/58 (21 Feb 2025 07:00) (106/54 - 165/75)  BP(mean): 77 (21 Feb 2025 07:00) (69 - 102)  ABP: --  ABP(mean): --  RR: 19 (21 Feb 2025 07:00) (16 - 24)  SpO2: 99% (21 Feb 2025 07:00) (97% - 100%)    O2 Parameters below as of 21 Feb 2025 06:00  Patient On (Oxygen Delivery Method): ventilator    O2 Concentration (%): 40      Mode: AC/ CMV (Assist Control/ Continuous Mandatory Ventilation), RR (machine): 16, TV (machine): 400, FiO2: 40, PEEP: 6, PS: 10, ITime: 1.5, PIP: 13    02-20 @ 07:01  -  02-21 @ 07:00  --------------------------------------------------------  IN: 2269.1 mL / OUT: 555 mL / NET: 1714.1 mL      CAPILLARY BLOOD GLUCOSE          PHYSICAL EXAM:    General: NAD, opens eyes slightly to voice, elderly and frail appearing  HEENT: NC/AT; clear conjunctiva  Neck: supple  Respiratory: CTA b/l  Cardiovascular: +S1/S2; RRR  Abdomen: soft, NT/ND; +BS x4  Extremities: Warm, no LE edema  Skin: normal color and turgor; no rash  Neurological: Opens eyes slightly to voice, not following commands, occasionally raising upper extremities slightly    MEDICATIONS:  MEDICATIONS  (STANDING):  acyclovir IVPB 490 milliGRAM(s) IV Intermittent every 24 hours  ampicillin  IVPB 2 Gram(s) IV Intermittent every 12 hours  aspirin  chewable 81 milliGRAM(s) Oral daily  cefTRIAXone Injectable. 2000 milliGRAM(s) IV Push every 12 hours  chlorhexidine 0.12% Liquid 15 milliLiter(s) Oral Mucosa every 12 hours  heparin   Injectable 5000 Unit(s) SubCutaneous every 8 hours  hydrocortisone sodium succinate Injectable 50 milliGRAM(s) IV Push every 6 hours  lacosamide IVPB 100 milliGRAM(s) IV Intermittent <User Schedule>  levETIRAcetam   Injectable 500 milliGRAM(s) IV Push every 12 hours  metoprolol tartrate 12.5 milliGRAM(s) Oral two times a day  pantoprazole  Injectable 40 milliGRAM(s) IV Push daily  polyethylene glycol 3350 17 Gram(s) Oral daily  propofol Infusion 10 MICROgram(s)/kG/Min (3.06 mL/Hr) IV Continuous <Continuous>  senna 2 Tablet(s) Oral at bedtime  thiamine 100 milliGRAM(s) Oral daily    MEDICATIONS  (PRN):  LORazepam   Injectable 1 milliGRAM(s) IV Push every 30 minutes PRN seizures      ALLERGIES:  Allergies    No Known Allergies    Intolerances        LABS:                        10.1   8.91  )-----------( 196      ( 21 Feb 2025 05:47 )             31.4     Hemoglobin: 10.1 g/dL (02-21 @ 05:47)  Hemoglobin: 11.0 g/dL (02-20 @ 06:00)  Hemoglobin: 11.4 g/dL (02-19 @ 14:02)  Hemoglobin: 12.3 g/dL (02-19 @ 06:05)  Hemoglobin: 13.2 g/dL (02-18 @ 08:05)    CBC Full  -  ( 21 Feb 2025 05:47 )  WBC Count : 8.91 K/uL  RBC Count : 3.27 M/uL  Hemoglobin : 10.1 g/dL  Hematocrit : 31.4 %  Platelet Count - Automated : 196 K/uL  Mean Cell Volume : 96.0 fl  Mean Cell Hemoglobin : 30.9 pg  Mean Cell Hemoglobin Concentration : 32.2 g/dL  Auto Neutrophil # : 7.08 K/uL  Auto Lymphocyte # : 0.96 K/uL  Auto Monocyte # : 0.81 K/uL  Auto Eosinophil # : 0.00 K/uL  Auto Basophil # : 0.01 K/uL  Auto Neutrophil % : 79.4 %  Auto Lymphocyte % : 10.8 %  Auto Monocyte % : 9.1 %  Auto Eosinophil % : 0.0 %  Auto Basophil % : 0.1 %    02-21    141  |  106  |  44[H]  ----------------------------<  163[H]  4.1   |  26  |  1.41[H]    Ca    8.7      21 Feb 2025 05:47  Phos  2.7     02-21  Mg     2.5     02-21    TPro  6.3  /  Alb  2.6[L]  /  TBili  0.2  /  DBili  x   /  AST  37  /  ALT  42  /  AlkPhos  78  02-21    Creatinine Trend: 1.41<--, 1.64<--, 1.61<--, 1.70<--, 1.29<--, 1.47<--  LIVER FUNCTIONS - ( 21 Feb 2025 05:47 )  Alb: 2.6 g/dL / Pro: 6.3 gm/dL / ALK PHOS: 78 U/L / ALT: 42 U/L / AST: 37 U/L / GGT: x           PTT - ( 19 Feb 2025 14:02 )  PTT:25.4 sec    hs Troponin:            Urinalysis Basic - ( 21 Feb 2025 05:47 )    Color: x / Appearance: x / SG: x / pH: x  Gluc: 163 mg/dL / Ketone: x  / Bili: x / Urobili: x   Blood: x / Protein: x / Nitrite: x   Leuk Esterase: x / RBC: x / WBC x   Sq Epi: x / Non Sq Epi: x / Bacteria: x      CSF:                      EKG:   MICROBIOLOGY:    Urinalysis with Rflx Culture (collected 18 Feb 2025 08:25)    Culture - Blood (collected 18 Feb 2025 08:05)  Source: .Blood BLOOD  Preliminary Report (20 Feb 2025 13:01):    No growth at 48 Hours    Culture - Blood (collected 18 Feb 2025 08:05)  Source: .Blood BLOOD  Preliminary Report (20 Feb 2025 13:01):    No growth at 48 Hours      IMAGING:      Labs, imaging, EKG personally reviewed    RADIOLOGY & ADDITIONAL TESTS: Reviewed.

## 2025-02-21 NOTE — PROGRESS NOTE ADULT - ASSESSMENT
89 y/o F PMH severe AS, dementia (baseline A&O x 2–3), GERD, HLD, HTN BIBEMS from [facility] for AMS, unknown LSW. Per EMS pt was found in AM unresponsive, aphasic with dark emesis in mouth- no robel blood or witnessed hematemesis, minimally responsive on ED arrival with L eye deviation, after undergoing CTH imaging pt then had witnessed seizure and was loaded with keppra, EEG then showed continuous non-convulsive status and pt was loaded with phenytoin, after seizure broke pt was less responsive, apneic and hypotensive and son at bedside elected for patient to be intubated. Per ER chart review patient's daughter Tammy reported pt had been DNR/DNI.    Problems:  #Status epilepticus  #Septic shock due to multifocal pneumonia versus meningitis, resolved  #Acute hypoxic respiratory failure  #JACKY  #Elevated troponin  #Acute systolic heart failure  #Severe AS    Plan:  - On prop this AM after MRI yesterday, will titrate off for SAT/SBT today, EEG this AM with ?subtle R frontocentral Sz, continuing vEEG, neuro following recs appreciated, c/w keppra 500 BID, vimpat 100 TID, c/w empiric abx for meningitis, no acute findings on CTH/CTA, MRI brain w/wo contrast 2/20 with punctate emboli in b/l occipital lobes and R parahippocampal gyrus and R thalamic signal abnormality from ?Sz vs infection vs infarction  - Off pressor c/w stress dose steroid (also for severe CAP v meningitis) will begin to taper today to q8h, TTE w/ new decreased EF 40-45%, severe AS, mod pulm HTN, trops peaked at 22k downtrending, cards consulted possible underlying CAD w/ demand related ischemia in setting of sepsis/status epilepticus, c/w conservative management, ASA, statin, low dose metop  - On vent, tolerated SBT yesterday ~2 hours, will repeat SBT today, no extubation until Sz on EEG resolved, c/w empiric pna tx  - NPO, c/w TF today, bowel regimen  - Cr downtrending slightly, given very elevated BNP and ?pulm edema v infection on CT will not give additional IVF, will maintenance with TF and free water, cont to trend  - C/w empiric acyclovir, ampicillin, CTX, vanc by level, blood cultures NGTD, UA negative, d/w family will hold off invasive procedure like LP at this time and cover empirically pending clinical course, ID consulted recs appreciated  - DVT ppx hep subq    Dispo: ICU, on vent, DNR/intubate, d/w patient's children at bedside given her similar presentation in the past with unresponsiveness due to infection they would like to see if she may recover from this infection as well, will continue GOC conversations, prognosis guarded.

## 2025-02-22 LAB
ANION GAP SERPL CALC-SCNC: 7 MMOL/L — SIGNIFICANT CHANGE UP (ref 5–17)
BUN SERPL-MCNC: 53 MG/DL — HIGH (ref 7–23)
CALCIUM SERPL-MCNC: 8.8 MG/DL — SIGNIFICANT CHANGE UP (ref 8.5–10.1)
CHLORIDE SERPL-SCNC: 106 MMOL/L — SIGNIFICANT CHANGE UP (ref 96–108)
CO2 SERPL-SCNC: 25 MMOL/L — SIGNIFICANT CHANGE UP (ref 22–31)
CREAT SERPL-MCNC: 1.33 MG/DL — HIGH (ref 0.5–1.3)
CULTURE RESULTS: NO GROWTH — SIGNIFICANT CHANGE UP
EGFR: 36 ML/MIN/1.73M2 — LOW
GLUCOSE SERPL-MCNC: 214 MG/DL — HIGH (ref 70–99)
HCT VFR BLD CALC: 32.9 % — LOW (ref 34.5–45)
HGB BLD-MCNC: 10.5 G/DL — LOW (ref 11.5–15.5)
MAGNESIUM SERPL-MCNC: 2.7 MG/DL — HIGH (ref 1.6–2.6)
MCHC RBC-ENTMCNC: 30.9 PG — SIGNIFICANT CHANGE UP (ref 27–34)
MCHC RBC-ENTMCNC: 31.9 G/DL — LOW (ref 32–36)
MCV RBC AUTO: 96.8 FL — SIGNIFICANT CHANGE UP (ref 80–100)
NRBC # BLD AUTO: 0 K/UL — SIGNIFICANT CHANGE UP (ref 0–0)
NRBC # FLD: 0 K/UL — SIGNIFICANT CHANGE UP (ref 0–0)
NRBC BLD AUTO-RTO: 0 /100 WBCS — SIGNIFICANT CHANGE UP (ref 0–0)
NT-PROBNP SERPL-SCNC: HIGH PG/ML (ref 0–450)
PHOSPHATE SERPL-MCNC: 3.3 MG/DL — SIGNIFICANT CHANGE UP (ref 2.5–4.5)
PLATELET # BLD AUTO: 125 K/UL — LOW (ref 150–400)
PMV BLD: 11.2 FL — SIGNIFICANT CHANGE UP (ref 7–13)
POTASSIUM SERPL-MCNC: 4.2 MMOL/L — SIGNIFICANT CHANGE UP (ref 3.5–5.3)
POTASSIUM SERPL-SCNC: 4.2 MMOL/L — SIGNIFICANT CHANGE UP (ref 3.5–5.3)
RBC # BLD: 3.4 M/UL — LOW (ref 3.8–5.2)
RBC # FLD: 14.4 % — SIGNIFICANT CHANGE UP (ref 10.3–14.5)
SODIUM SERPL-SCNC: 138 MMOL/L — SIGNIFICANT CHANGE UP (ref 135–145)
SPECIMEN SOURCE: SIGNIFICANT CHANGE UP
TROPONIN I, HIGH SENSITIVITY RESULT: 2335.97 NG/L — HIGH
VANCOMYCIN FLD-MCNC: 13 UG/ML — SIGNIFICANT CHANGE UP (ref 10–20)
WBC # BLD: 6.86 K/UL — SIGNIFICANT CHANGE UP (ref 3.8–10.5)
WBC # FLD AUTO: 6.86 K/UL — SIGNIFICANT CHANGE UP (ref 3.8–10.5)

## 2025-02-22 PROCEDURE — 71045 X-RAY EXAM CHEST 1 VIEW: CPT | Mod: 26

## 2025-02-22 PROCEDURE — 99233 SBSQ HOSP IP/OBS HIGH 50: CPT | Mod: FS

## 2025-02-22 PROCEDURE — 99232 SBSQ HOSP IP/OBS MODERATE 35: CPT

## 2025-02-22 PROCEDURE — 95720 EEG PHY/QHP EA INCR W/VEEG: CPT

## 2025-02-22 PROCEDURE — 99291 CRITICAL CARE FIRST HOUR: CPT

## 2025-02-22 RX ORDER — MELATONIN 5 MG
5 TABLET ORAL AT BEDTIME
Refills: 0 | Status: COMPLETED | OUTPATIENT
Start: 2025-02-22 | End: 2025-02-22

## 2025-02-22 RX ORDER — HYDROCORTISONE 20 MG
25 TABLET ORAL EVERY 8 HOURS
Refills: 0 | Status: DISCONTINUED | OUTPATIENT
Start: 2025-02-22 | End: 2025-02-23

## 2025-02-22 RX ORDER — ACETAMINOPHEN 500 MG/5ML
1000 LIQUID (ML) ORAL ONCE
Refills: 0 | Status: COMPLETED | OUTPATIENT
Start: 2025-02-22 | End: 2025-02-22

## 2025-02-22 RX ORDER — SODIUM CHLORIDE 9 G/1000ML
1000 INJECTION, SOLUTION INTRAVENOUS
Refills: 0 | Status: DISCONTINUED | OUTPATIENT
Start: 2025-02-22 | End: 2025-02-23

## 2025-02-22 RX ORDER — VANCOMYCIN HCL IN 5 % DEXTROSE 1.5G/250ML
750 PLASTIC BAG, INJECTION (ML) INTRAVENOUS EVERY 24 HOURS
Refills: 0 | Status: DISCONTINUED | OUTPATIENT
Start: 2025-02-22 | End: 2025-02-28

## 2025-02-22 RX ADMIN — AMPICILLIN SODIUM 216 GRAM(S): 1 INJECTION, POWDER, FOR SOLUTION INTRAMUSCULAR; INTRAVENOUS at 18:35

## 2025-02-22 RX ADMIN — CEFTRIAXONE 2000 MILLIGRAM(S): 500 INJECTION, POWDER, FOR SOLUTION INTRAMUSCULAR; INTRAVENOUS at 18:35

## 2025-02-22 RX ADMIN — Medication 100 MILLIGRAM(S): at 09:32

## 2025-02-22 RX ADMIN — METOPROLOL SUCCINATE 12.5 MILLIGRAM(S): 50 TABLET, EXTENDED RELEASE ORAL at 22:38

## 2025-02-22 RX ADMIN — SODIUM CHLORIDE 50 MILLILITER(S): 9 INJECTION, SOLUTION INTRAVENOUS at 21:49

## 2025-02-22 RX ADMIN — Medication 1000 MILLIGRAM(S): at 16:45

## 2025-02-22 RX ADMIN — LACOSAMIDE 100 MILLIGRAM(S): 150 TABLET, FILM COATED ORAL at 01:11

## 2025-02-22 RX ADMIN — CEFTRIAXONE 2000 MILLIGRAM(S): 500 INJECTION, POWDER, FOR SOLUTION INTRAMUSCULAR; INTRAVENOUS at 05:37

## 2025-02-22 RX ADMIN — HEPARIN SODIUM 5000 UNIT(S): 1000 INJECTION INTRAVENOUS; SUBCUTANEOUS at 05:36

## 2025-02-22 RX ADMIN — METOPROLOL SUCCINATE 12.5 MILLIGRAM(S): 50 TABLET, EXTENDED RELEASE ORAL at 09:32

## 2025-02-22 RX ADMIN — LEVETIRACETAM 500 MILLIGRAM(S): 10 INJECTION, SOLUTION INTRAVENOUS at 09:29

## 2025-02-22 RX ADMIN — AMPICILLIN SODIUM 216 GRAM(S): 1 INJECTION, POWDER, FOR SOLUTION INTRAMUSCULAR; INTRAVENOUS at 05:37

## 2025-02-22 RX ADMIN — Medication 50 MILLIGRAM(S): at 13:29

## 2025-02-22 RX ADMIN — Medication 400 MILLIGRAM(S): at 16:30

## 2025-02-22 RX ADMIN — Medication 40 MILLIGRAM(S): at 09:32

## 2025-02-22 RX ADMIN — Medication 5 MILLIGRAM(S): at 23:19

## 2025-02-22 RX ADMIN — Medication 15 MILLILITER(S): at 09:30

## 2025-02-22 RX ADMIN — ATORVASTATIN CALCIUM 40 MILLIGRAM(S): 80 TABLET, FILM COATED ORAL at 22:38

## 2025-02-22 RX ADMIN — LACOSAMIDE 100 MILLIGRAM(S): 150 TABLET, FILM COATED ORAL at 17:25

## 2025-02-22 RX ADMIN — Medication 50 MILLIGRAM(S): at 06:32

## 2025-02-22 RX ADMIN — HEPARIN SODIUM 5000 UNIT(S): 1000 INJECTION INTRAVENOUS; SUBCUTANEOUS at 13:29

## 2025-02-22 RX ADMIN — Medication 250 MILLIGRAM(S): at 13:28

## 2025-02-22 RX ADMIN — LEVETIRACETAM 500 MILLIGRAM(S): 10 INJECTION, SOLUTION INTRAVENOUS at 21:48

## 2025-02-22 RX ADMIN — Medication 81 MILLIGRAM(S): at 09:32

## 2025-02-22 RX ADMIN — LACOSAMIDE 100 MILLIGRAM(S): 150 TABLET, FILM COATED ORAL at 10:01

## 2025-02-22 RX ADMIN — HEPARIN SODIUM 5000 UNIT(S): 1000 INJECTION INTRAVENOUS; SUBCUTANEOUS at 21:47

## 2025-02-22 RX ADMIN — Medication 109.8 MILLIGRAM(S): at 06:33

## 2025-02-22 RX ADMIN — Medication 25 MILLIGRAM(S): at 21:46

## 2025-02-22 NOTE — PROGRESS NOTE ADULT - ASSESSMENT
89 year old woman with HTN, HLD, aortic stenosis, ?dementia, nursing home resident, who presented to  ED on 2/18 with unresponsiveness, having a witnessed seizure while in the ED, identified to be in status epilepticus on initial evaluation.    She remains intubated, now on Precedex.      Her MRI does show several foci of very small strokes, as well as a region of the R thalamus with diffusion restriction.  EEG initially with focal seizures, now only LPD's on the R.  Has not had seizure over last 24h. Labs with downtrend in troponin and leukocytosis.   Initial suspicion for meningoencephalitis as possible cause of new onset seizures in patient with fever, and LPD's.  Still suspecting this despite MRI results, which do not fully explain her presentation.  Family was not agreeable to LP for CSF verification of the diagnosis at this time.     -continue keppra, renally dosed, 500mg BID  -continue on vimpat 100mg TID  -continuous EEG over the last 24 hours shows some right sided lateralized periodic discharges (decreased in frequency), no seizures.   -started on empiric meningoencephalitis anti-infective medications on day 1 of admission, renally dosed: vancomycin, ampicillin, and acyclovir, would continue for now.    -seizure precautions  -Continue trial of weaning from vent as per ICU  -Will continue to follow. If patient is sedated off Precedex can consider reduction of Keppra dose.  Discussed with Dr. Servin

## 2025-02-22 NOTE — PROGRESS NOTE ADULT - SUBJECTIVE AND OBJECTIVE BOX
OVERNIGHT EVENTS / SUBJECTIVE: Patient seen and examined at bedside.     OBJECTIVE:    VITAL SIGNS:  ICU Vital Signs Last 24 Hrs  T(C): 36.1 (22 Feb 2025 04:00), Max: 37.4 (21 Feb 2025 12:00)  T(F): 97 (22 Feb 2025 04:00), Max: 99.3 (21 Feb 2025 12:00)  HR: 60 (22 Feb 2025 07:00) (55 - 86)  BP: 127/61 (22 Feb 2025 07:00) (100/52 - 149/76)  BP(mean): 80 (22 Feb 2025 07:00) (67 - 101)  ABP: --  ABP(mean): --  RR: 17 (22 Feb 2025 07:00) (15 - 23)  SpO2: 100% (22 Feb 2025 07:00) (95% - 100%)    O2 Parameters below as of 22 Feb 2025 07:00  Patient On (Oxygen Delivery Method): ventilator    O2 Concentration (%): 30      Mode: PS (Pressure Support)/ Spontaneous, FiO2: 30, PEEP: 6, PS: 10, ITime: 1, PIP: 16    02-21 @ 07:01  -  02-22 @ 07:00  --------------------------------------------------------  IN: 2959.6 mL / OUT: 550 mL / NET: 2409.6 mL      CAPILLARY BLOOD GLUCOSE          PHYSICAL EXAM:    General: NAD  HEENT: NC/AT; PERRL, clear conjunctiva  Neck: supple  Respiratory: CTA b/l  Cardiovascular: +S1/S2; RRR  Abdomen: soft, NT/ND; +BS x4  Extremities: WWP, 2+ peripheral pulses b/l; no LE edema  Skin: normal color and turgor; no rash  Neurological:    MEDICATIONS:  MEDICATIONS  (STANDING):  acyclovir IVPB 490 milliGRAM(s) IV Intermittent every 24 hours  ampicillin  IVPB 2 Gram(s) IV Intermittent every 12 hours  aspirin  chewable 81 milliGRAM(s) Oral daily  atorvastatin 40 milliGRAM(s) Oral at bedtime  cefTRIAXone Injectable. 2000 milliGRAM(s) IV Push every 12 hours  chlorhexidine 0.12% Liquid 15 milliLiter(s) Oral Mucosa every 12 hours  dexMEDEtomidine Infusion 0.2 MICROgram(s)/kG/Hr (2.55 mL/Hr) IV Continuous <Continuous>  heparin   Injectable 5000 Unit(s) SubCutaneous every 8 hours  hydrocortisone sodium succinate Injectable 50 milliGRAM(s) IV Push every 8 hours  lacosamide IVPB 100 milliGRAM(s) IV Intermittent <User Schedule>  levETIRAcetam   Injectable 500 milliGRAM(s) IV Push every 12 hours  metoprolol tartrate 12.5 milliGRAM(s) Oral two times a day  pantoprazole  Injectable 40 milliGRAM(s) IV Push daily  polyethylene glycol 3350 17 Gram(s) Oral daily  propofol Infusion 10 MICROgram(s)/kG/Min (3.06 mL/Hr) IV Continuous <Continuous>  senna 2 Tablet(s) Oral at bedtime  thiamine 100 milliGRAM(s) Oral daily    MEDICATIONS  (PRN):  LORazepam   Injectable 1 milliGRAM(s) IV Push every 30 minutes PRN seizures      ALLERGIES:  Allergies    No Known Allergies    Intolerances        LABS:                        10.5   6.86  )-----------( 125      ( 22 Feb 2025 06:13 )             32.9     Hemoglobin: 10.5 g/dL (02-22 @ 06:13)  Hemoglobin: 10.1 g/dL (02-21 @ 05:47)  Hemoglobin: 11.0 g/dL (02-20 @ 06:00)  Hemoglobin: 11.4 g/dL (02-19 @ 14:02)  Hemoglobin: 12.3 g/dL (02-19 @ 06:05)    CBC Full  -  ( 22 Feb 2025 06:13 )  WBC Count : 6.86 K/uL  RBC Count : 3.40 M/uL  Hemoglobin : 10.5 g/dL  Hematocrit : 32.9 %  Platelet Count - Automated : 125 K/uL  Mean Cell Volume : 96.8 fl  Mean Cell Hemoglobin : 30.9 pg  Mean Cell Hemoglobin Concentration : 31.9 g/dL  Auto Neutrophil # : x  Auto Lymphocyte # : x  Auto Monocyte # : x  Auto Eosinophil # : x  Auto Basophil # : x  Auto Neutrophil % : x  Auto Lymphocyte % : x  Auto Monocyte % : x  Auto Eosinophil % : x  Auto Basophil % : x    02-22    138  |  106  |  53[H]  ----------------------------<  214[H]  4.2   |  25  |  1.33[H]    Ca    8.8      22 Feb 2025 06:13  Phos  3.3     02-22  Mg     2.7     02-22    TPro  6.3  /  Alb  2.6[L]  /  TBili  0.2  /  DBili  x   /  AST  37  /  ALT  42  /  AlkPhos  78  02-21    Creatinine Trend: 1.33<--, 1.41<--, 1.64<--, 1.61<--, 1.70<--, 1.29<--  LIVER FUNCTIONS - ( 21 Feb 2025 05:47 )  Alb: 2.6 g/dL / Pro: 6.3 gm/dL / ALK PHOS: 78 U/L / ALT: 42 U/L / AST: 37 U/L / GGT: x               hs Troponin:            Urinalysis Basic - ( 22 Feb 2025 06:13 )    Color: x / Appearance: x / SG: x / pH: x  Gluc: 214 mg/dL / Ketone: x  / Bili: x / Urobili: x   Blood: x / Protein: x / Nitrite: x   Leuk Esterase: x / RBC: x / WBC x   Sq Epi: x / Non Sq Epi: x / Bacteria: x      CSF:                      EKG:   MICROBIOLOGY:    Culture - Sputum (collected 20 Feb 2025 16:51)  Source: ET Tube ET Tube  Gram Stain (21 Feb 2025 11:26):    No polymorphonuclear leukocytes per low power field    No Squamous epithelial cells per low power field    No organisms seen per oil power field      IMAGING:      Labs, imaging, EKG personally reviewed    RADIOLOGY & ADDITIONAL TESTS: Reviewed. OVERNIGHT EVENTS / SUBJECTIVE: Patient seen and examined at bedside.     Pt afebrile, leukocytosis resolved. Tolerated SBT since yesterday afternoon. Opening eyes and trying to pull ET tube placed in restraints. Likely extubate today.    OBJECTIVE:    VITAL SIGNS:  ICU Vital Signs Last 24 Hrs  T(C): 36.1 (22 Feb 2025 04:00), Max: 37.4 (21 Feb 2025 12:00)  T(F): 97 (22 Feb 2025 04:00), Max: 99.3 (21 Feb 2025 12:00)  HR: 60 (22 Feb 2025 07:00) (55 - 86)  BP: 127/61 (22 Feb 2025 07:00) (100/52 - 149/76)  BP(mean): 80 (22 Feb 2025 07:00) (67 - 101)  ABP: --  ABP(mean): --  RR: 17 (22 Feb 2025 07:00) (15 - 23)  SpO2: 100% (22 Feb 2025 07:00) (95% - 100%)    O2 Parameters below as of 22 Feb 2025 07:00  Patient On (Oxygen Delivery Method): ventilator    O2 Concentration (%): 30      Mode: PS (Pressure Support)/ Spontaneous, FiO2: 30, PEEP: 6, PS: 10, ITime: 1, PIP: 16    02-21 @ 07:01  -  02-22 @ 07:00  --------------------------------------------------------  IN: 2959.6 mL / OUT: 550 mL / NET: 2409.6 mL      CAPILLARY BLOOD GLUCOSE          PHYSICAL EXAM:    General: NAD, opens eyes slightly to voice, elderly and frail appearing  HEENT: NC/AT; clear conjunctiva  Neck: supple  Respiratory: CTA b/l  Cardiovascular: +S1/S2; RRR  Abdomen: soft, NT/ND; +BS x4  Extremities: Warm, no LE edema  Skin: normal color and turgor; no rash  Neurological: Opens eyes slightly to voice, not following commands, occasionally raising upper extremities slightly    MEDICATIONS:  MEDICATIONS  (STANDING):  acyclovir IVPB 490 milliGRAM(s) IV Intermittent every 24 hours  ampicillin  IVPB 2 Gram(s) IV Intermittent every 12 hours  aspirin  chewable 81 milliGRAM(s) Oral daily  atorvastatin 40 milliGRAM(s) Oral at bedtime  cefTRIAXone Injectable. 2000 milliGRAM(s) IV Push every 12 hours  chlorhexidine 0.12% Liquid 15 milliLiter(s) Oral Mucosa every 12 hours  dexMEDEtomidine Infusion 0.2 MICROgram(s)/kG/Hr (2.55 mL/Hr) IV Continuous <Continuous>  heparin   Injectable 5000 Unit(s) SubCutaneous every 8 hours  hydrocortisone sodium succinate Injectable 50 milliGRAM(s) IV Push every 8 hours  lacosamide IVPB 100 milliGRAM(s) IV Intermittent <User Schedule>  levETIRAcetam   Injectable 500 milliGRAM(s) IV Push every 12 hours  metoprolol tartrate 12.5 milliGRAM(s) Oral two times a day  pantoprazole  Injectable 40 milliGRAM(s) IV Push daily  polyethylene glycol 3350 17 Gram(s) Oral daily  propofol Infusion 10 MICROgram(s)/kG/Min (3.06 mL/Hr) IV Continuous <Continuous>  senna 2 Tablet(s) Oral at bedtime  thiamine 100 milliGRAM(s) Oral daily    MEDICATIONS  (PRN):  LORazepam   Injectable 1 milliGRAM(s) IV Push every 30 minutes PRN seizures      ALLERGIES:  Allergies    No Known Allergies    Intolerances        LABS:                        10.5   6.86  )-----------( 125      ( 22 Feb 2025 06:13 )             32.9     Hemoglobin: 10.5 g/dL (02-22 @ 06:13)  Hemoglobin: 10.1 g/dL (02-21 @ 05:47)  Hemoglobin: 11.0 g/dL (02-20 @ 06:00)  Hemoglobin: 11.4 g/dL (02-19 @ 14:02)  Hemoglobin: 12.3 g/dL (02-19 @ 06:05)    CBC Full  -  ( 22 Feb 2025 06:13 )  WBC Count : 6.86 K/uL  RBC Count : 3.40 M/uL  Hemoglobin : 10.5 g/dL  Hematocrit : 32.9 %  Platelet Count - Automated : 125 K/uL  Mean Cell Volume : 96.8 fl  Mean Cell Hemoglobin : 30.9 pg  Mean Cell Hemoglobin Concentration : 31.9 g/dL  Auto Neutrophil # : x  Auto Lymphocyte # : x  Auto Monocyte # : x  Auto Eosinophil # : x  Auto Basophil # : x  Auto Neutrophil % : x  Auto Lymphocyte % : x  Auto Monocyte % : x  Auto Eosinophil % : x  Auto Basophil % : x    02-22    138  |  106  |  53[H]  ----------------------------<  214[H]  4.2   |  25  |  1.33[H]    Ca    8.8      22 Feb 2025 06:13  Phos  3.3     02-22  Mg     2.7     02-22    TPro  6.3  /  Alb  2.6[L]  /  TBili  0.2  /  DBili  x   /  AST  37  /  ALT  42  /  AlkPhos  78  02-21    Creatinine Trend: 1.33<--, 1.41<--, 1.64<--, 1.61<--, 1.70<--, 1.29<--  LIVER FUNCTIONS - ( 21 Feb 2025 05:47 )  Alb: 2.6 g/dL / Pro: 6.3 gm/dL / ALK PHOS: 78 U/L / ALT: 42 U/L / AST: 37 U/L / GGT: x               hs Troponin:            Urinalysis Basic - ( 22 Feb 2025 06:13 )    Color: x / Appearance: x / SG: x / pH: x  Gluc: 214 mg/dL / Ketone: x  / Bili: x / Urobili: x   Blood: x / Protein: x / Nitrite: x   Leuk Esterase: x / RBC: x / WBC x   Sq Epi: x / Non Sq Epi: x / Bacteria: x      CSF:                      EKG:   MICROBIOLOGY:    Culture - Sputum (collected 20 Feb 2025 16:51)  Source: ET Tube ET Tube  Gram Stain (21 Feb 2025 11:26):    No polymorphonuclear leukocytes per low power field    No Squamous epithelial cells per low power field    No organisms seen per oil power field      IMAGING:      Labs, imaging, EKG personally reviewed    RADIOLOGY & ADDITIONAL TESTS: Reviewed.

## 2025-02-22 NOTE — PROGRESS NOTE ADULT - ASSESSMENT
89 y/o F PMH severe AS, dementia (baseline A&O x 2–3), GERD, HLD, HTN BIBEMS from [facility] for AMS, unknown LSW. Per EMS pt was found in AM unresponsive, aphasic with dark emesis in mouth- no robel blood or witnessed hematemesis, minimally responsive on ED arrival with L eye deviation, after undergoing CTH imaging pt then had witnessed seizure and was loaded with keppra, EEG then showed continuous non-convulsive status and pt was loaded with phenytoin, after seizure broke pt was less responsive, apneic and hypotensive and son at bedside elected for patient to be intubated. Per ER chart review patient's daughter Tammy reported pt had been DNR/DNI.    Problems:  #Status epilepticus  #Septic shock due to multifocal pneumonia versus meningitis, resolved  #Acute hypoxic respiratory failure  #JACKY  #Elevated troponin  #Acute systolic heart failure  #Severe AS    Plan:  - On low dose precedex this AM, moving in response to questions ?following commands, will titrate off for likely extubation today, EEG no Sz x24h, neuro following recs appreciated, c/w keppra 500 BID, vimpat 100 TID, c/w empiric abx for meningitis, no acute findings on CTH/CTA, MRI brain w/wo contrast 2/20 with punctate emboli in b/l occipital lobes and R parahippocampal gyrus and R thalamic signal abnormality from ?Sz vs infection vs infarction  - Off pressor c/w stress dose steroid (also for severe CAP v meningitis) will continue to taper today to 25mg q8h, TTE w/ new decreased EF 40-45%, severe AS, mod pulm HTN, trops peaked at 22k downtrending, cards consulted possible underlying CAD w/ demand related ischemia in setting of sepsis/status epilepticus, c/w conservative management, ASA, statin, low dose metop  - On vent, tolerating SBT since yesterday, Sz resolved plan for extubation today, c/w empiric pna tx  - NPO, c/w TF today, bowel regimen  - Cr downtrending slightly, given very elevated BNP and ?pulm edema v infection on CT will not give additional IVF, will maintenance with TF and free water, cont to trend  - C/w empiric acyclovir, ampicillin, CTX, vanc by level, blood cultures NGTD, UA negative, d/w family will hold off invasive procedure like LP at this time per family request and cover empirically pending clinical course, ID consulted recs appreciated  - DVT ppx hep subq    Dispo: ICU, on vent, DNR/intubate, plan for extubation today

## 2025-02-22 NOTE — PROGRESS NOTE ADULT - SUBJECTIVE AND OBJECTIVE BOX
Interval History:  2/22/25: Patient remains intubated, on Precedex    MEDICATIONS  (STANDING):  acyclovir IVPB 490 milliGRAM(s) IV Intermittent every 24 hours  ampicillin  IVPB 2 Gram(s) IV Intermittent every 12 hours  aspirin  chewable 81 milliGRAM(s) Oral daily  atorvastatin 40 milliGRAM(s) Oral at bedtime  cefTRIAXone Injectable. 2000 milliGRAM(s) IV Push every 12 hours  chlorhexidine 0.12% Liquid 15 milliLiter(s) Oral Mucosa every 12 hours  dexMEDEtomidine Infusion 0.2 MICROgram(s)/kG/Hr (2.55 mL/Hr) IV Continuous <Continuous>  heparin   Injectable 5000 Unit(s) SubCutaneous every 8 hours  hydrocortisone sodium succinate Injectable 50 milliGRAM(s) IV Push every 8 hours  lacosamide IVPB 100 milliGRAM(s) IV Intermittent <User Schedule>  levETIRAcetam   Injectable 500 milliGRAM(s) IV Push every 12 hours  metoprolol tartrate 12.5 milliGRAM(s) Oral two times a day  pantoprazole  Injectable 40 milliGRAM(s) IV Push daily  polyethylene glycol 3350 17 Gram(s) Oral daily  propofol Infusion 10 MICROgram(s)/kG/Min (3.06 mL/Hr) IV Continuous <Continuous>  senna 2 Tablet(s) Oral at bedtime  thiamine 100 milliGRAM(s) Oral daily    MEDICATIONS  (PRN):  LORazepam   Injectable 1 milliGRAM(s) IV Push every 30 minutes PRN seizures      Allergies    No Known Allergies    Intolerances        PHYSICAL EXAM:  Vital Signs Last 24 Hrs  T(F): 97 (02-22-25 @ 04:00)  HR: 60 (02-22-25 @ 07:00)  BP: 127/61 (02-22-25 @ 07:00)  RR: 17 (02-22-25 @ 07:00)    GENERAL: Intubated on ventilator  On Precedex   Neuro:  Opens eyes spontaneously  Does not follow commands  CN: PERRL, + corneals  motor: some minimal spontaneous movements of all extremities  sensory: grimaces and withdraws to noxious stimuli    LABS:                        10.5   6.86  )-----------( 125      ( 22 Feb 2025 06:13 )             32.9     02-22    138  |  106  |  53[H]  ----------------------------<  214[H]  4.2   |  25  |  1.33[H]    Ca    8.8      22 Feb 2025 06:13  Phos  3.3     02-22  Mg     2.7     02-22    TPro  6.3  /  Alb  2.6[L]  /  TBili  0.2  /  DBili  x   /  AST  37  /  ALT  42  /  AlkPhos  78  02-21      Urinalysis Basic - ( 22 Feb 2025 06:13 )    Color: x / Appearance: x / SG: x / pH: x  Gluc: 214 mg/dL / Ketone: x  / Bili: x / Urobili: x   Blood: x / Protein: x / Nitrite: x   Leuk Esterase: x / RBC: x / WBC x   Sq Epi: x / Non Sq Epi: x / Bacteria: x        RADIOLOGY & ADDITIONAL STUDIES:  MR head with and without contrast 2/20/25:  1.  Punctate small emboli in both occipital lobes and the right parahippocampal gyrus, 24 hours to 7 days in age.  2. Right thalamic signal abnormalities, possibly related to seizures, infection, or infarction. Follow-up recommended.  3.  Stable senescent cerebral changes and orbital varices.    EEG 2/20/25-2/21/25:  -Possible subtle right frontocentral focal seizure with LUE slow jerky movements (extension of L 2nd finger and wrist followed by L elbow flexion) possibly time-locked to ongoing LPDs: 12:51 2/20/25  -Risk of focal-onset seizures from the right frontocentral, left posterior temporal, and left occipito-temporal regions  -Right hemispheric focal cerebral dysfunction can be structural or functional in etiology.  -Mild-moderate diffuse and/or multifocal cerebral dysfunction is nonspecific in etiology.  -No seizures.

## 2025-02-22 NOTE — PROGRESS NOTE ADULT - SUBJECTIVE AND OBJECTIVE BOX
CHIEF COMPLAINT:    HPI:  89yoF PMH  severe AS ( refused any intervention , dementia (baseline A&O x 2–3), GERD, HLD, HTN BIBEMS from [facility] for AMS, unknown LSW. Per EMS pt was found this AM unresponsive, aphasic with dark emesis in mouth- no robel blood or witnessed hematemesis. HPI limited 2/2 medical condition. Per Daughter Tammy pt is DNR DNI, patient  blood work showed markedly elevated troponin ,  in setting of status epilepticus , patient had lactic acidosis  , became hypotensive , was intubated  ,family at bedside      patient blood work increased troponin , ekg showed ischemic ST  T changes   ,   patient had severe AS ,  HTN  patient and family did not want any intervention , and now also   2/20/25 Patient opens eyes ,  intubated   improved blood pressure    2/21/25 awake , intubated   2/22/25 patient remain intubated  able to follow commands     MEDICATIONS:Home Medications:  acetaminophen 325 mg oral tablet: 2 tab(s) orally every 6 hours as needed for  pain max 3 g/24 hours (18 Feb 2025 11:04)  diclofenac 1% topical gel: Apply 2 grams topically to affected area 2 times daily to B/L knees for pain (18 Feb 2025 12:25)  docusate sodium 100 mg oral capsule: 2 cap(s) orally once a day (18 Feb 2025 12:25)  senna (sennosides) 8.6 mg oral tablet: 2 tab(s) orally once a day (at bedtime) (18 Feb 2025 12:25)    MEDICATIONS  (STANDING):  acyclovir IVPB 490 milliGRAM(s) IV Intermittent every 24 hours  ampicillin  IVPB 2 Gram(s) IV Intermittent every 12 hours  aspirin  chewable 81 milliGRAM(s) Oral daily  atorvastatin 40 milliGRAM(s) Oral at bedtime  cefTRIAXone Injectable. 2000 milliGRAM(s) IV Push every 12 hours  chlorhexidine 0.12% Liquid 15 milliLiter(s) Oral Mucosa every 12 hours  dexMEDEtomidine Infusion 0.2 MICROgram(s)/kG/Hr (2.55 mL/Hr) IV Continuous <Continuous>  heparin   Injectable 5000 Unit(s) SubCutaneous every 8 hours  hydrocortisone sodium succinate Injectable 50 milliGRAM(s) IV Push every 8 hours  lacosamide IVPB 100 milliGRAM(s) IV Intermittent <User Schedule>  levETIRAcetam   Injectable 500 milliGRAM(s) IV Push every 12 hours  metoprolol tartrate 12.5 milliGRAM(s) Oral two times a day  pantoprazole  Injectable 40 milliGRAM(s) IV Push daily  polyethylene glycol 3350 17 Gram(s) Oral daily  propofol Infusion 10 MICROgram(s)/kG/Min (3.06 mL/Hr) IV Continuous <Continuous>  senna 2 Tablet(s) Oral at bedtime  thiamine 100 milliGRAM(s) Oral daily  vancomycin  IVPB 750 milliGRAM(s) IV Intermittent every 24 hours    MEDICATIONS  (PRN):  LORazepam   Injectable 1 milliGRAM(s) IV Push every 30 minutes PRN seizures      Vital Signs Last 24 Hrs  T(C): 36.9 (22 Feb 2025 08:00), Max: 37.4 (21 Feb 2025 12:00)  T(F): 98.4 (22 Feb 2025 08:00), Max: 99.3 (21 Feb 2025 12:00)  HR: 61 (22 Feb 2025 10:00) (55 - 86)  BP: 116/53 (22 Feb 2025 10:00) (100/52 - 149/76)  BP(mean): 70 (22 Feb 2025 10:00) (67 - 101)  RR: 17 (22 Feb 2025 10:00) (15 - 22)  SpO2: 100% (22 Feb 2025 10:00) (97% - 100%)    Parameters below as of 22 Feb 2025 10:00  Patient On (Oxygen Delivery Method): ventilator    O2 Concentration (%): 30    I&O's Summary    21 Feb 2025 07:01  -  22 Feb 2025 07:00  --------------------------------------------------------  IN: 2959.6 mL / OUT: 550 mL / NET: 2409.6 mL      PHYSICAL EXAM:    Constitutional: intubated sedated  frail looking   HEENT: PERR, EOMI,  No oral cyananosis.  Neck:  supple,  No JVD  Respiratory: Breath sounds are clear bilaterally, No wheezing, rales or rhonchi  Cardiovascular: S1 and S2, regular rate ESM   Gastrointestinal: Bowel Sounds present, soft, nontender.   Extremities: No peripheral edema. No clubbing or cyanosis.  Vascular: 2+ peripheral pulses  Neurological: opens eyes   intubated Musculoskeletal: no calf tenderness.  Skin: No rashes.      LABS: All Labs Reviewed:                                                     10.5   6.86  )-----------( 125      ( 22 Feb 2025 06:13 )             32.9     02-22    138  |  106  |  53[H]  ----------------------------<  214[H]  4.2   |  25  |  1.33[H]    Ca    8.8      22 Feb 2025 06:13  Phos  3.3     02-22  Mg     2.7     02-22    TPro  6.3  /  Alb  2.6[L]  /  TBili  0.2  /  DBili  x   /  AST  37  /  ALT  42  /  AlkPhos  78  02-21        LIVER FUNCTIONS - ( 21 Feb 2025 05:47 )  Alb: 2.6 g/dL / Pro: 6.3 gm/dL / ALK PHOS: 78 U/L / ALT: 42 U/L / AST: 37 U/L / GGT: x             Urinalysis Basic - ( 22 Feb 2025 06:13 )    Color: x / Appearance: x / SG: x / pH: x  Gluc: 214 mg/dL / Ketone: x  / Bili: x / Urobili: x   Blood: x / Protein: x / Nitrite: x   Leuk Esterase: x / RBC: x / WBC x   Sq Epi: x / Non Sq Epi: x / Bacteria: x      02-20 Chol 231[H] LDL -- HDL 59 Trig 109  Culture Results:   No growth (02-20-25 @ 16:51)      - TroponinI hsT: <-4069.64, <-7857.91, <-76739.29, <-49502.09, <-23962.94    RADIOLOGY/EKG:    < from: 12 Lead ECG (02.18.25 @ 08:37) >    Diagnosis Line Sinus tachycardia  Left atrial enlargement  Left axis deviation  Left ventricular hypertrophy ( Guy product )  ST & T wave abnormality, consider lateral ischemia  Abnormal ECG  When compared with ECG of 19-JAN-2025 14:32,  ST now depressed in Lateral leads  Nonspecific T wave abnormality, improved in Inferior leads  Confirmed by Madeline Fernandez ([id number]) on 2/18/2025 8:47:40 PM    < end of copied text >  < from: CT Chest w/ IV Cont (02.18.25 @ 09:15) >    IMPRESSION:  1.  Dependent posterior RUL, superior RLL and to lesser extent   apicoposterior SAMEER groundglass opacities and diffuse interlobular septal   thickening. Pulmonary edema with or without associated pneumonia   suspected.  2.  No acute abdominal/pelvic pathology.  3.  Etiology of hematemesis not determined.    < end of copied text >      < from: TTE W or WO Ultrasound Enhancing Agent (02.19.25 @ 13:11) >  CONCLUSIONS:      1. Left ventricular systolic function is mildly decreased with an ejection fraction visually estimated at 40 to 45 %.   2. Left atrium is mildly dilated.   3. Mild to moderate mitral regurgitation.   4. Moderate tricuspid regurgitation.   5. Estimated pulmonary artery systolic pressure is 49 mmHg, consistent with moderate pulmonary hypertension.   6. Mild left ventricular hypertrophy.   7. Mild mitral valve stenosis.   8. Mild to moderate aortic regurgitation.   9. Severe aortic stenosis.  10. The peak transaortic velocity is 3.96 m/s, peak transaortic gradient is 62.7 mmHg and mean transaortic gradient is 48.0 mmHg with an LVOT/aortic valve VTI ratio of 0.19. The effective orifice area is estimated at 0.60 cm² by the continuity equation.  11. There is moderate calcification of the mitral valve annulus.    < end of copied text >      monitor sinus rhythm

## 2025-02-23 LAB
ANION GAP SERPL CALC-SCNC: 8 MMOL/L — SIGNIFICANT CHANGE UP (ref 5–17)
BUN SERPL-MCNC: 44 MG/DL — HIGH (ref 7–23)
CALCIUM SERPL-MCNC: 9.5 MG/DL — SIGNIFICANT CHANGE UP (ref 8.5–10.1)
CHLORIDE SERPL-SCNC: 107 MMOL/L — SIGNIFICANT CHANGE UP (ref 96–108)
CO2 SERPL-SCNC: 26 MMOL/L — SIGNIFICANT CHANGE UP (ref 22–31)
CREAT SERPL-MCNC: 1.24 MG/DL — SIGNIFICANT CHANGE UP (ref 0.5–1.3)
CULTURE RESULTS: SIGNIFICANT CHANGE UP
CULTURE RESULTS: SIGNIFICANT CHANGE UP
EGFR: 40 ML/MIN/1.73M2 — LOW
GLUCOSE SERPL-MCNC: 99 MG/DL — SIGNIFICANT CHANGE UP (ref 70–99)
HCT VFR BLD CALC: 34.5 % — SIGNIFICANT CHANGE UP (ref 34.5–45)
HGB BLD-MCNC: 11.3 G/DL — LOW (ref 11.5–15.5)
MAGNESIUM SERPL-MCNC: 2.6 MG/DL — SIGNIFICANT CHANGE UP (ref 1.6–2.6)
MCHC RBC-ENTMCNC: 31 PG — SIGNIFICANT CHANGE UP (ref 27–34)
MCHC RBC-ENTMCNC: 32.8 G/DL — SIGNIFICANT CHANGE UP (ref 32–36)
MCV RBC AUTO: 94.8 FL — SIGNIFICANT CHANGE UP (ref 80–100)
NRBC # BLD AUTO: 0 K/UL — SIGNIFICANT CHANGE UP (ref 0–0)
NRBC # FLD: 0 K/UL — SIGNIFICANT CHANGE UP (ref 0–0)
NRBC BLD AUTO-RTO: 0 /100 WBCS — SIGNIFICANT CHANGE UP (ref 0–0)
NT-PROBNP SERPL-SCNC: HIGH PG/ML (ref 0–450)
PHOSPHATE SERPL-MCNC: 2.7 MG/DL — SIGNIFICANT CHANGE UP (ref 2.5–4.5)
PLATELET # BLD AUTO: 241 K/UL — SIGNIFICANT CHANGE UP (ref 150–400)
PMV BLD: 11 FL — SIGNIFICANT CHANGE UP (ref 7–13)
POTASSIUM SERPL-MCNC: 3 MMOL/L — LOW (ref 3.5–5.3)
POTASSIUM SERPL-SCNC: 3 MMOL/L — LOW (ref 3.5–5.3)
RBC # BLD: 3.64 M/UL — LOW (ref 3.8–5.2)
RBC # FLD: 14.5 % — SIGNIFICANT CHANGE UP (ref 10.3–14.5)
SODIUM SERPL-SCNC: 141 MMOL/L — SIGNIFICANT CHANGE UP (ref 135–145)
SPECIMEN SOURCE: SIGNIFICANT CHANGE UP
SPECIMEN SOURCE: SIGNIFICANT CHANGE UP
TROPONIN I, HIGH SENSITIVITY RESULT: 1762.27 NG/L — HIGH
WBC # BLD: 14.09 K/UL — HIGH (ref 3.8–10.5)
WBC # FLD AUTO: 14.09 K/UL — HIGH (ref 3.8–10.5)

## 2025-02-23 PROCEDURE — 71045 X-RAY EXAM CHEST 1 VIEW: CPT | Mod: 26

## 2025-02-23 PROCEDURE — 99233 SBSQ HOSP IP/OBS HIGH 50: CPT | Mod: FS

## 2025-02-23 PROCEDURE — 99232 SBSQ HOSP IP/OBS MODERATE 35: CPT

## 2025-02-23 PROCEDURE — 99233 SBSQ HOSP IP/OBS HIGH 50: CPT

## 2025-02-23 RX ORDER — MELATONIN 5 MG
5 TABLET ORAL AT BEDTIME
Refills: 0 | Status: COMPLETED | OUTPATIENT
Start: 2025-02-23 | End: 2025-02-23

## 2025-02-23 RX ORDER — METOPROLOL SUCCINATE 50 MG/1
25 TABLET, EXTENDED RELEASE ORAL
Refills: 0 | Status: DISCONTINUED | OUTPATIENT
Start: 2025-02-23 | End: 2025-02-24

## 2025-02-23 RX ORDER — LOSARTAN POTASSIUM 100 MG/1
25 TABLET, FILM COATED ORAL DAILY
Refills: 0 | Status: DISCONTINUED | OUTPATIENT
Start: 2025-02-23 | End: 2025-02-24

## 2025-02-23 RX ORDER — ALBUTEROL SULFATE 2.5 MG/3ML
2.5 VIAL, NEBULIZER (ML) INHALATION ONCE
Refills: 0 | Status: DISCONTINUED | OUTPATIENT
Start: 2025-02-23 | End: 2025-02-23

## 2025-02-23 RX ORDER — FUROSEMIDE 10 MG/ML
40 INJECTION INTRAMUSCULAR; INTRAVENOUS ONCE
Refills: 0 | Status: COMPLETED | OUTPATIENT
Start: 2025-02-23 | End: 2025-02-23

## 2025-02-23 RX ORDER — ALBUTEROL SULFATE 2.5 MG/3ML
2.5 VIAL, NEBULIZER (ML) INHALATION ONCE
Refills: 0 | Status: COMPLETED | OUTPATIENT
Start: 2025-02-23 | End: 2025-02-23

## 2025-02-23 RX ORDER — HYDROCORTISONE 20 MG
25 TABLET ORAL EVERY 12 HOURS
Refills: 0 | Status: DISCONTINUED | OUTPATIENT
Start: 2025-02-23 | End: 2025-02-23

## 2025-02-23 RX ADMIN — Medication 5 MILLIGRAM(S): at 21:15

## 2025-02-23 RX ADMIN — CEFTRIAXONE 2000 MILLIGRAM(S): 500 INJECTION, POWDER, FOR SOLUTION INTRAMUSCULAR; INTRAVENOUS at 17:06

## 2025-02-23 RX ADMIN — Medication 25 MILLIGRAM(S): at 17:05

## 2025-02-23 RX ADMIN — METOPROLOL SUCCINATE 25 MILLIGRAM(S): 50 TABLET, EXTENDED RELEASE ORAL at 21:15

## 2025-02-23 RX ADMIN — Medication 100 MILLIEQUIVALENT(S): at 08:19

## 2025-02-23 RX ADMIN — AMPICILLIN SODIUM 216 GRAM(S): 1 INJECTION, POWDER, FOR SOLUTION INTRAMUSCULAR; INTRAVENOUS at 17:06

## 2025-02-23 RX ADMIN — LACOSAMIDE 100 MILLIGRAM(S): 150 TABLET, FILM COATED ORAL at 09:28

## 2025-02-23 RX ADMIN — ATORVASTATIN CALCIUM 40 MILLIGRAM(S): 80 TABLET, FILM COATED ORAL at 21:15

## 2025-02-23 RX ADMIN — Medication 81 MILLIGRAM(S): at 09:27

## 2025-02-23 RX ADMIN — LACOSAMIDE 100 MILLIGRAM(S): 150 TABLET, FILM COATED ORAL at 17:07

## 2025-02-23 RX ADMIN — LEVETIRACETAM 500 MILLIGRAM(S): 10 INJECTION, SOLUTION INTRAVENOUS at 21:15

## 2025-02-23 RX ADMIN — Medication 40 MILLIGRAM(S): at 09:27

## 2025-02-23 RX ADMIN — LEVETIRACETAM 500 MILLIGRAM(S): 10 INJECTION, SOLUTION INTRAVENOUS at 08:19

## 2025-02-23 RX ADMIN — Medication 109.8 MILLIGRAM(S): at 05:52

## 2025-02-23 RX ADMIN — Medication 10 MILLIGRAM(S): at 23:11

## 2025-02-23 RX ADMIN — Medication 100 MILLIEQUIVALENT(S): at 12:56

## 2025-02-23 RX ADMIN — HEPARIN SODIUM 5000 UNIT(S): 1000 INJECTION INTRAVENOUS; SUBCUTANEOUS at 05:52

## 2025-02-23 RX ADMIN — HEPARIN SODIUM 5000 UNIT(S): 1000 INJECTION INTRAVENOUS; SUBCUTANEOUS at 21:15

## 2025-02-23 RX ADMIN — Medication 10 MILLIGRAM(S): at 00:07

## 2025-02-23 RX ADMIN — Medication 100 MILLIGRAM(S): at 09:27

## 2025-02-23 RX ADMIN — LOSARTAN POTASSIUM 25 MILLIGRAM(S): 100 TABLET, FILM COATED ORAL at 13:28

## 2025-02-23 RX ADMIN — Medication 40 MILLIEQUIVALENT(S): at 23:11

## 2025-02-23 RX ADMIN — HEPARIN SODIUM 5000 UNIT(S): 1000 INJECTION INTRAVENOUS; SUBCUTANEOUS at 13:28

## 2025-02-23 RX ADMIN — Medication 25 MILLIGRAM(S): at 05:52

## 2025-02-23 RX ADMIN — Medication 2.5 MILLIGRAM(S): at 00:55

## 2025-02-23 RX ADMIN — CEFTRIAXONE 2000 MILLIGRAM(S): 500 INJECTION, POWDER, FOR SOLUTION INTRAMUSCULAR; INTRAVENOUS at 05:53

## 2025-02-23 RX ADMIN — LACOSAMIDE 100 MILLIGRAM(S): 150 TABLET, FILM COATED ORAL at 01:10

## 2025-02-23 RX ADMIN — FUROSEMIDE 40 MILLIGRAM(S): 10 INJECTION INTRAMUSCULAR; INTRAVENOUS at 00:44

## 2025-02-23 RX ADMIN — Medication 100 MILLIEQUIVALENT(S): at 11:15

## 2025-02-23 RX ADMIN — AMPICILLIN SODIUM 216 GRAM(S): 1 INJECTION, POWDER, FOR SOLUTION INTRAMUSCULAR; INTRAVENOUS at 05:53

## 2025-02-23 RX ADMIN — Medication 250 MILLIGRAM(S): at 11:16

## 2025-02-23 RX ADMIN — METOPROLOL SUCCINATE 12.5 MILLIGRAM(S): 50 TABLET, EXTENDED RELEASE ORAL at 09:27

## 2025-02-23 NOTE — PROGRESS NOTE ADULT - SUBJECTIVE AND OBJECTIVE BOX
Interval History:  2/23/25: Patient extubated    MEDICATIONS  (STANDING):  acyclovir IVPB 490 milliGRAM(s) IV Intermittent every 24 hours  ampicillin  IVPB 2 Gram(s) IV Intermittent every 12 hours  aspirin  chewable 81 milliGRAM(s) Oral daily  atorvastatin 40 milliGRAM(s) Oral at bedtime  cefTRIAXone Injectable. 2000 milliGRAM(s) IV Push every 12 hours  heparin   Injectable 5000 Unit(s) SubCutaneous every 8 hours  hydrocortisone sodium succinate Injectable 25 milliGRAM(s) IV Push every 8 hours  lacosamide IVPB 100 milliGRAM(s) IV Intermittent <User Schedule>  levETIRAcetam   Injectable 500 milliGRAM(s) IV Push every 12 hours  metoprolol tartrate 12.5 milliGRAM(s) Oral two times a day  pantoprazole  Injectable 40 milliGRAM(s) IV Push daily  potassium chloride  10 mEq/100 mL IVPB 10 milliEquivalent(s) IV Intermittent every 1 hour  thiamine 100 milliGRAM(s) Oral daily  vancomycin  IVPB 750 milliGRAM(s) IV Intermittent every 24 hours    MEDICATIONS  (PRN):  LORazepam   Injectable 1 milliGRAM(s) IV Push every 30 minutes PRN seizures      Allergies    No Known Allergies    Intolerances        PHYSICAL EXAM:  Vital Signs Last 24 Hrs  T(F): 99.2 (02-23-25 @ 08:00)  HR: 85 (02-23-25 @ 10:00)  BP: 130/61 (02-23-25 @ 10:00)  RR: 23 (02-23-25 @ 10:00)    GENERAL: NAD, well-groomed, well-developed  HEAD:  Atraumatic, Normocephalic  Neuro:  Awake, alert, follows commands intermittently, stares at times  CN: PERRL, EOMI, no nystagmus, no facial weakness, tongue protrudes in the midline  motor: moves upper extremities symmetrically wiggles toes. Not lifting legs against gravity  sensory: intact to light touch  gait: unable to test    LABS:                        11.3   14.09 )-----------( 241      ( 23 Feb 2025 05:52 )             34.5     02-23    141  |  107  |  44[H]  ----------------------------<  99  3.0[L]   |  26  |  1.24    Ca    9.5      23 Feb 2025 05:52  Phos  2.7     02-23  Mg     2.6     02-23        Urinalysis Basic - ( 23 Feb 2025 05:52 )    Color: x / Appearance: x / SG: x / pH: x  Gluc: 99 mg/dL / Ketone: x  / Bili: x / Urobili: x   Blood: x / Protein: x / Nitrite: x   Leuk Esterase: x / RBC: x / WBC x   Sq Epi: x / Non Sq Epi: x / Bacteria: x        RADIOLOGY & ADDITIONAL STUDIES:  MR head with and without contrast 2/20/25:  1.  Punctate small emboli in both occipital lobes and the right parahippocampal gyrus, 24 hours to 7 days in age.  2. Right thalamic signal abnormalities, possibly related to seizures, infection, or infarction. Follow-up recommended.  3.  Stable senescent cerebral changes and orbital varices.    EEG 2/20/25-2/21/25:  -Possible subtle right frontocentral focal seizure with LUE slow jerky movements (extension of L 2nd finger and wrist followed by L elbow flexion) possibly time-locked to ongoing LPDs: 12:51 2/20/25  -Risk of focal-onset seizures from the right frontocentral, left posterior temporal, and left occipito-temporal regions  -Right hemispheric focal cerebral dysfunction can be structural or functional in etiology.  -Mild-moderate diffuse and/or multifocal cerebral dysfunction is nonspecific in etiology.  -No seizures.    EEG 2/21/25-2/22/25:  -Mild to moderate generalized slowing  -Superimposed right hemisphere slowing  -Broad right (fronto-central predominant) epileptiform discharges, at   times occurring in a period fashion.

## 2025-02-23 NOTE — SWALLOW BEDSIDE ASSESSMENT ADULT - SLP GENERAL OBSERVATIONS
pt seated in bed, head turn to left. no verbal output, and pt appears disengaged, but able to participate.  Two dtrs present  and very attentive.

## 2025-02-23 NOTE — SWALLOW BEDSIDE ASSESSMENT ADULT - NS SPL SWALLOW CLINIC TRIAL FT
pt shows no oral-pharyngeal contraindication for regular texture diet, solid and liquid. meds as tolerated.  Straw drinking OK.   Disc with family, and with Nsg.  Service will follow.

## 2025-02-23 NOTE — PROGRESS NOTE ADULT - SUBJECTIVE AND OBJECTIVE BOX
Date of Service is equal to the date of entry    HPI: 89 y/o F w/ pmh of severe AS, dementia, gerd, hld, htn, presented for AMS suspected to be in status epilepticus in the ED requiring intubation and admitted to the ICU For status, septic shock suspected to be 2/2 to pna r/o meningitis, acute hypoxic resp failure.    24 hour events: Today pt was extubated during the day, tonight pt noted to be hypertensive and having b/l crackles, IVF stopped, pt ordered for albuterol nebs, given 40mg of IV Lasix and ordered 10mg of IV hydralazine. Pt otherwise comfortable and not in resp distress    Review of Systems:  Constitutional: No fever, chills, fatigue  Neuro: No headache, numbness, weakness  Resp: No cough, wheezing, shortness of breath  CVS: No chest pain, palpitations, leg swelling  GI: No abdominal pain, nausea, vomiting, diarrhea   : No dysuria, frequency, incontinence  Skin: No itching, burning, rashes, or lesions   Msk: No joint pain or swelling  Psych: No depression, anxiety, mood swings    T(F): 99.1 (25 @ 20:00), Max: 99.1 (25 @ 20:00)  HR: 94 (25 @ 23:00) (55 - 94)  BP: 164/83 (25 @ 22:00) (111/91 - 164/83)  RR: 26 (25 @ 23:00) (15 - 26)  SpO2: 93% (25 @ 23:00) (93% - 100%)  Wt(kg): --    Mode: standby  25 @ 07:01  -  25 @ 07:00  --------------------------------------------------------  IN: 2959.6 mL / OUT: 550 mL / NET: 2409.6 mL    25 @ 07:01  -  25 @ 00:41  --------------------------------------------------------  IN: 516 mL / OUT: 900 mL / NET: -384 mL        CAPILLARY BLOOD GLUCOSE          I&O's Summary    2025 07:01  -  2025 07:00  --------------------------------------------------------  IN: 2959.6 mL / OUT: 550 mL / NET: 2409.6 mL    2025 07:01  -  2025 00:41  --------------------------------------------------------  IN: 516 mL / OUT: 900 mL / NET: -384 mL        Physical Exam:   Gen: Comfortable in bed in NAD  Neuro: awake, alert, following commands  HEENT: NC/AT  Resp: good air entry b/l  CVS: +RRR  Abd: BSx4, soft, nt/nd  Ext: no edema   Skin: warm/dry    Meds:  acyclovir IVPB IV Intermittent  ampicillin  IVPB IV Intermittent  cefTRIAXone Injectable. IV Push  vancomycin  IVPB IV Intermittent    furosemide   Injectable IV Push  metoprolol tartrate Oral    atorvastatin Oral  hydrocortisone sodium succinate Injectable IV Push    albuterol    0.083% Nebulizer    lacosamide IVPB IV Intermittent  levETIRAcetam   Injectable IV Push  LORazepam   Injectable IV Push PRN      aspirin  chewable Oral  heparin   Injectable SubCutaneous    pantoprazole  Injectable IV Push      lactated ringers. IV Continuous  thiamine Oral                            10.5   6.86  )-----------( 125      ( 2025 06:13 )             32.9           138  |  106  |  53[H]  ----------------------------<  214[H]  4.2   |  25  |  1.33[H]    Ca    8.8      2025 06:13  Phos  3.3       Mg     2.7         TPro  6.3  /  Alb  2.6[L]  /  TBili  0.2  /  DBili  x   /  AST  37  /  ALT  42  /  AlkPhos  78        Urinalysis Basic - ( 2025 06:13 )    Color: x / Appearance: x / SG: x / pH: x  Gluc: 214 mg/dL / Ketone: x  / Bili: x / Urobili: x   Blood: x / Protein: x / Nitrite: x   Leuk Esterase: x / RBC: x / WBC x   Sq Epi: x / Non Sq Epi: x / Bacteria: x      ET Tube ET Tube   No growth   No polymorphonuclear leukocytes per low power field  No Squamous epithelial cells per low power field  No organisms seen per oil power field  @ 16:51  .Blood BLOOD   No growth at 4 days --  @ 08:05      Rapid RVP Result: NotDetec ( @ 11:37)      Radiology:       < from: MR Head w/wo IV Cont (25 @ 18:24) >  ACC: [id number] EXAM:  MR BRAIN WAW IC   ORDERED BY: JEFFERSON PAULINO     PROCEDURE DATE:  2025          INTERPRETATION:  EXAMINATION: MR BRAIN WITHOUT AND WITH IV CONTRAST    CLINICAL INDICATION: seizures, AMS, eval for meningitis/encephalitis  TECHNIQUE: Multiplanar MRI images of the head were obtained before and   after IV injection of gadolinium, 5 cc administered.  COMPARISON: Head MRI 2024. Head CT CTA CTP 2025.    FINDINGS:    There are punctate foci of embolic infarction in both occipital lobes and   in the right parahippocampal gyrus. The lesions show low ADC values and   T2 hyperintensity, consistent with an event between 24 hours and 7 days   in age.    There is abnormal diffusion signal with pseudonormalized ADC values and   T2 hyperintensity in the right thalamus. There is also a suggestion of   subtle T2 hyperintensity vs artifact in the left thalamus without   diffusion restriction. There is no enhancement. These findings are more   nonspecific, and could be related to seizure activity or ischemia.   Cerebritis cannot be categorically excluded the stated clinical setting.   These findings are new compared to the prior MRI. Follow-up MRI in 4-6   weeks is recommended to exclude progression.    There are no enhancing nodules in the brain parenchyma. There is no   leptomeningeal enhancement.    Moderate generalized cerebral volume loss, with distention of the sulci   and concomitant ex-vacuo ventricular dilatation. Moderate nonspecific   T2/FLAIR hyperintensity in the periventricular and subcortical white   matter, likely due to small vessel disease.    No acute intracranial hemorrhage. No midline shift or herniation. No   acute ischemia. Intracranial flow voids are patent. The cerebellar   tonsils are normally positioned. The dural venous sinuses are patent,   although this examination was not optimized to evaluate the vasculature.    There is mild to moderate fluid or mucous in both mastoids, slightly   increased compared to the prior MRI. Correlate clinically to exclude   acute infection or an obstructive process. Mild chronic mucosal densities   in the sinuses have improved.    Bilateral orbital varices are unchanged. The cavernous sinuses are patent   and symmetric.    Limited views of the orbits and visualized soft tissues of the neck,   face, scalp, skull base, and calvarium are otherwise unremarkable.    IMPRESSION:    1.  Punctate small emboli in both occipital lobes and the right   parahippocampal gyrus, 24 hours to 7 days in age.  2. Right thalamic signal abnormalities, possibly related to seizures,   infection, or infarction. Follow-up recommended.  3.  Stable senescent cerebral changes and orbital varices.    Patient Name: JON RASHID  MRN: [id number], : [birth year]    --- End of Report ---            MARIJA LEE MD; Attending Radiologist  This document has been electronically signed. 2025  7:01PM    < end of copied text >    CODE STATUS: DNR only    Time spent on this patient encounter, which includes documenting this note in the electronic medical record, was 55 minutes including assessing the presenting problems with associated risk, reviewing the medical record to prepare for the encounter, and meeting face to face with patient to obtain additional history. I have also performed an appropriate physical exam, made interventions listed and ordered and interpreted appropriate diagnostic studies as documented. To improve communication and patient safety. I have coordinated care with the multidisciplinary team including the bedside nurse, appropriate attending of record and consultant as needed.

## 2025-02-23 NOTE — PROGRESS NOTE ADULT - SUBJECTIVE AND OBJECTIVE BOX
CHIEF COMPLAINT:    HPI:  89yoF PMH  severe AS ( refused any intervention , dementia (baseline A&O x 2–3), GERD, HLD, HTN BIBEMS from [facility] for AMS, unknown LSW. Per EMS pt was found this AM unresponsive, aphasic with dark emesis in mouth- no robel blood or witnessed hematemesis. HPI limited 2/2 medical condition. Per Daughter Tammy pt is DNR DNI, patient  blood work showed markedly elevated troponin ,  in setting of status epilepticus , patient had lactic acidosis  , became hypotensive , was intubated  ,family at bedside      patient blood work increased troponin , ekg showed ischemic ST  T changes   ,   patient had severe AS ,  HTN  patient and family did not want any intervention , and now also   2/20/25 Patient opens eyes ,  intubated   improved blood pressure    2/21/25 awake , intubated   2/22/25 patient remain intubated  able to follow commands   2/23/25 patient was extubated yesterday evening , patient was restless in night  hypertensive with mild sob , has recieved IV hydralaizine  lasix , feeling better on VM oxygen ,     MEDICATIONS:Home Medications:  acetaminophen 325 mg oral tablet: 2 tab(s) orally every 6 hours as needed for  pain max 3 g/24 hours (18 Feb 2025 11:04)  diclofenac 1% topical gel: Apply 2 grams topically to affected area 2 times daily to B/L knees for pain (18 Feb 2025 12:25)  docusate sodium 100 mg oral capsule: 2 cap(s) orally once a day (18 Feb 2025 12:25)  senna (sennosides) 8.6 mg oral tablet: 2 tab(s) orally once a day (at bedtime) (18 Feb 2025 12:25)    MEDICATIONS  (STANDING):  acyclovir IVPB 490 milliGRAM(s) IV Intermittent every 24 hours  ampicillin  IVPB 2 Gram(s) IV Intermittent every 12 hours  aspirin  chewable 81 milliGRAM(s) Oral daily  atorvastatin 40 milliGRAM(s) Oral at bedtime  cefTRIAXone Injectable. 2000 milliGRAM(s) IV Push every 12 hours  heparin   Injectable 5000 Unit(s) SubCutaneous every 8 hours  hydrocortisone sodium succinate Injectable 25 milliGRAM(s) IV Push every 12 hours  lacosamide IVPB 100 milliGRAM(s) IV Intermittent <User Schedule>  levETIRAcetam   Injectable 500 milliGRAM(s) IV Push every 12 hours  losartan 25 milliGRAM(s) Oral daily  metoprolol tartrate 25 milliGRAM(s) Oral two times a day  pantoprazole  Injectable 40 milliGRAM(s) IV Push daily  potassium chloride  10 mEq/100 mL IVPB 10 milliEquivalent(s) IV Intermittent every 1 hour  thiamine 100 milliGRAM(s) Oral daily  vancomycin  IVPB 750 milliGRAM(s) IV Intermittent every 24 hours    MEDICATIONS  (PRN):  LORazepam   Injectable 1 milliGRAM(s) IV Push every 30 minutes PRN seizures    Vital Signs Last 24 Hrs  T(C): 37.3 (23 Feb 2025 08:00), Max: 37.4 (23 Feb 2025 06:00)  T(F): 99.2 (23 Feb 2025 08:00), Max: 99.4 (23 Feb 2025 06:00)  HR: 85 (23 Feb 2025 10:00) (65 - 98)  BP: 130/61 (23 Feb 2025 10:00) (113/82 - 164/83)  BP(mean): 82 (23 Feb 2025 10:00) (72 - 109)  RR: 23 (23 Feb 2025 10:00) (16 - 26)  SpO2: 97% (23 Feb 2025 10:00) (90% - 100%)    Parameters below as of 23 Feb 2025 09:00  Patient On (Oxygen Delivery Method): mask, Venturi    O2 Concentration (%): 50    I&O's Summary    22 Feb 2025 07:01  -  23 Feb 2025 07:00  --------------------------------------------------------  IN: 916 mL / OUT: 2775 mL / NET: -1859 mL    23 Feb 2025 07:01  -  23 Feb 2025 11:18  --------------------------------------------------------  IN: 100 mL / OUT: 0 mL / NET: 100 mL        PHYSICAL EXAM:    Constitutional: intubated sedated  frail looking   HEENT: PERR, EOMI,  No oral cyananosis.  Neck:  supple,  No JVD  Respiratory: Breath sounds are equal scattered rhonchi  Cardiovascular: S1 and S2, regular rate ESM   Gastrointestinal: Bowel Sounds present, soft, nontender.   Extremities: No peripheral edema. No clubbing or cyanosis.  Vascular: 2+ peripheral pulses  Neurological: opens eyes   intubated Musculoskeletal: no calf tenderness.  Skin: No rashes.      LABS: All Labs Reviewed:                                     11.3   14.09 )-----------( 241      ( 23 Feb 2025 05:52 )             34.5     02-23    141  |  107  |  44[H]  ----------------------------<  99  3.0[L]   |  26  |  1.24    Ca    9.5      23 Feb 2025 05:52  Phos  2.7     02-23  Mg     2.6     02-23              Urinalysis Basic - ( 23 Feb 2025 05:52 )    Color: x / Appearance: x / SG: x / pH: x  Gluc: 99 mg/dL / Ketone: x  / Bili: x / Urobili: x   Blood: x / Protein: x / Nitrite: x   Leuk Esterase: x / RBC: x / WBC x   Sq Epi: x / Non Sq Epi: x / Bacteria: x      02-20 Chol 231[H] LDL -- HDL 59 Trig 109  Culture Results:   No growth (02-20-25 @ 16:51)      - TroponinI hsT: <-4069.64, <-7857.91, <-91481.29, <-33837.09, <-88335.94    RADIOLOGY/EKG:    < from: 12 Lead ECG (02.18.25 @ 08:37) >    Diagnosis Line Sinus tachycardia  < from: Xray Chest 1 View- PORTABLE-Routine (Xray Chest 1 View- PORTABLE-Routine in AM.) (02.22.25 @ 07:03) >  MPRESSION:    No focal airspace opacity.  No significant interval change since 2/18/2025.    < end of copied text >  Left atrial enlargement  Left axis deviation  Left ventricular hypertrophy ( Lockridge product )  ST & T wave abnormality, consider lateral ischemia  Abnormal ECG  When compared with ECG of 19-JAN-2025 14:32,  ST now depressed in Lateral leads  Nonspecific T wave abnormality, improved in Inferior leads  Confirmed by Madeline Fernandez ([id number]) on 2/18/2025 8:47:40 PM    < end of copied text >  < from: CT Chest w/ IV Cont (02.18.25 @ 09:15) >    IMPRESSION:  1.  Dependent posterior RUL, superior RLL and to lesser extent   apicoposterior SAMEER groundglass opacities and diffuse interlobular septal   thickening. Pulmonary edema with or without associated pneumonia   suspected.  2.  No acute abdominal/pelvic pathology.  3.  Etiology of hematemesis not determined.    < end of copied text >      < from: TTE W or WO Ultrasound Enhancing Agent (02.19.25 @ 13:11) >  CONCLUSIONS:      1. Left ventricular systolic function is mildly decreased with an ejection fraction visually estimated at 40 to 45 %.   2. Left atrium is mildly dilated.   3. Mild to moderate mitral regurgitation.   4. Moderate tricuspid regurgitation.   5. Estimated pulmonary artery systolic pressure is 49 mmHg, consistent with moderate pulmonary hypertension.   6. Mild left ventricular hypertrophy.   7. Mild mitral valve stenosis.   8. Mild to moderate aortic regurgitation.   9. Severe aortic stenosis.  10. The peak transaortic velocity is 3.96 m/s, peak transaortic gradient is 62.7 mmHg and mean transaortic gradient is 48.0 mmHg with an LVOT/aortic valve VTI ratio of 0.19. The effective orifice area is estimated at 0.60 cm² by the continuity equation.  11. There is moderate calcification of the mitral valve annulus.    < end of copied text >      monitor sinus rhythm

## 2025-02-23 NOTE — PROGRESS NOTE ADULT - ASSESSMENT
89 y/o F w/ pmh of severe AS, dementia, gerd, hld, htn, presented for AMS suspected to be in status epilepticus in the ED requiring intubation and admitted to the ICU For status, septic shock suspected to be 2/2 to pna r/o meningitis, acute hypoxic resp failure.      -Neuro: Seizures neuro following cont vimpat/keppra, MRI today done noted to have punctate cva unclear etiology neuro following  -Cardiac: Pt now off pressors, HDS, +trops likely 2/2 to demand now down trending, Tonight hypertensive pt ordered 10mg of IV hydralazine, cont lopressor  -Resp: Hypoxia b/l crackles possible fluid overload will stop IVF, pt ordered 40mg IV lasix and albuterol nebs, no signs of resp distress  -GI cont TF as ordered/tolerated  -Renal: JACKY now trending down cont to monitor  89 y/o F w/ pmh of severe AS, dementia, gerd, hld, htn, presented for AMS suspected to be in status epilepticus in the ED requiring intubation and admitted to the ICU For status, septic shock suspected to be 2/2 to pna r/o meningitis, acute hypoxic resp failure.      -Neuro: Seizures neuro following cont vimpat/keppra, MRI today done noted to have punctate cva unclear etiology neuro following  -Cardiac: Pt now off pressors, HDS, +trops likely 2/2 to demand now down trending, Tonight hypertensive pt ordered 10mg of IV hydralazine, cont lopressor  -Resp: Hypoxia b/l crackles possible fluid overload will stop IVF, pt ordered 40mg IV lasix and albuterol nebs, no signs of resp distress  -GI cont TF as ordered/tolerated  -Renal: JACKY now trending down cont to monitor, d/c IVF for now, can cont free water  -ID: r/o meningitis cont IVAB and acylovir, family declined LP   -Endo: No acute issues   -Heme: DVT ppx w/ heparin  -Dispo: Pt remains in the MICU, currently DNR only w/ ok to intubate, dispo pending ICU course

## 2025-02-23 NOTE — PROGRESS NOTE ADULT - ASSESSMENT
89 year old woman with HTN, HLD, aortic stenosis, ?dementia, nursing home resident, who presented to  ED on 2/18 with unresponsiveness, having a witnessed seizure while in the ED, identified to be in status epilepticus on initial evaluation.    She remains intubated, now on Precedex.      Her MRI does show several foci of very small strokes, as well as a region of the R thalamus with diffusion restriction.  EEG initially with focal seizures, now only LPD's on the R.  Has not had seizure over last 24h. Labs with downtrend in troponin and leukocytosis.   Initial suspicion for meningoencephalitis as possible cause of new onset seizures in patient with fever, and LPD's.  Still suspecting this despite MRI results, which do not fully explain her presentation.  Family was not agreeable to LP for CSF verification of the diagnosis at this time.     -continue keppra, renally dosed, 500mg BID  -continue on vimpat 100mg TID   -started on empiric meningoencephalitis anti-infective medications on day 1 of admission, renally dosed: vancomycin, ampicillin, and acyclovir  -seizure precautions    Will f/u as needed  Discussed with Dr. Servin

## 2025-02-23 NOTE — SWALLOW BEDSIDE ASSESSMENT ADULT - COMMENTS
pt was brought to  following being fund on floor of her room ; not known how long down.  Thought ot have had a seizure: intubated and with NG tube in . Now extubated.    Service is consulted for po intake

## 2025-02-23 NOTE — PROGRESS NOTE ADULT - ASSESSMENT
89 y/o F PMH severe AS, dementia (baseline A&O x 2–3), GERD, HLD, HTN BIBEMS from [facility] for AMS, unknown LSW. Per EMS pt was found in AM unresponsive, aphasic with dark emesis in mouth- no robel blood or witnessed hematemesis, minimally responsive on ED arrival with L eye deviation, after undergoing CTH imaging pt then had witnessed seizure and was loaded with keppra, EEG then showed continuous non-convulsive status and pt was loaded with phenytoin, after seizure broke pt was less responsive, apneic and hypotensive and son at bedside elected for patient to be intubated. Per ER chart review patient's daughter Tammy reported pt had been DNR/DNI.    Problems:  #Status epilepticus  #Septic shock due to multifocal pneumonia versus meningitis, resolved  #Acute hypoxic respiratory failure  #JACKY  #Elevated troponin  #Acute systolic heart failure  #Severe AS    Plan:  - Pt alert this AM, not following commands, family reports pt has spoken to them, no further Sz on EEG, neuro following recs appreciated, c/w keppra 500 BID, vimpat 100 TID, cont to monitor neuro status closely, c/w empiric abx for meningitis, MRI brain w/wo contrast 2/20 with punctate emboli in b/l occipital lobes and R parahippocampal gyrus and R thalamic signal abnormality from ?Sz vs infection vs infarction  - Off pressor, now hypertensive, dc stress dose steroid, increase metoprolol to 25mg BID, start losartan 25mg qD, TTE w/ new decreased EF 40-45%, severe AS, mod pulm HTN, trops peaked at 22k downtrending, cards consulted possible underlying CAD w/ demand related ischemia in setting of sepsis/status epilepticus, c/w conservative management, ASA, statin  - Acutely hypoxic overnight suspect possibly flash pulm edema in setting of severe AS, improved w/ lasix, will c/w diuresis prn, titrate down O2 as tolerated on venti mask 35% currently, c/w empiric pna tx  - NPO, S&S eval today, bowel regimen  - Cr downtrending, cont to trend renal indices  - C/w empiric acyclovir, ampicillin, CTX, vanc by level, blood cultures NGTD, UA negative, family declined LP will c/w empiric meningitis tx, ID recs appreciated  - DVT ppx hep subq    Dispo: ICU, DNR/intubate, updated daughters at bedside

## 2025-02-23 NOTE — PROGRESS NOTE ADULT - SUBJECTIVE AND OBJECTIVE BOX
OVERNIGHT EVENTS / SUBJECTIVE: Patient seen and examined at bedside.     OBJECTIVE:    VITAL SIGNS:  ICU Vital Signs Last 24 Hrs  T(C): 37.4 (23 Feb 2025 06:00), Max: 37.4 (23 Feb 2025 06:00)  T(F): 99.4 (23 Feb 2025 06:00), Max: 99.4 (23 Feb 2025 06:00)  HR: 88 (23 Feb 2025 06:00) (57 - 98)  BP: 141/75 (23 Feb 2025 06:00) (111/91 - 164/83)  BP(mean): 94 (23 Feb 2025 06:00) (70 - 109)  ABP: --  ABP(mean): --  RR: 21 (23 Feb 2025 06:00) (16 - 26)  SpO2: 98% (23 Feb 2025 06:00) (90% - 100%)    O2 Parameters below as of 23 Feb 2025 06:00  Patient On (Oxygen Delivery Method): mask, Venturi    O2 Concentration (%): 35      Mode: standby    02-22 @ 07:01  -  02-23 @ 07:00  --------------------------------------------------------  IN: 916 mL / OUT: 2775 mL / NET: -1859 mL      CAPILLARY BLOOD GLUCOSE          PHYSICAL EXAM:    General: NAD  HEENT: NC/AT; PERRL, clear conjunctiva  Neck: supple  Respiratory: CTA b/l  Cardiovascular: +S1/S2; RRR  Abdomen: soft, NT/ND; +BS x4  Extremities: WWP, 2+ peripheral pulses b/l; no LE edema  Skin: normal color and turgor; no rash  Neurological:    MEDICATIONS:  MEDICATIONS  (STANDING):  acyclovir IVPB 490 milliGRAM(s) IV Intermittent every 24 hours  ampicillin  IVPB 2 Gram(s) IV Intermittent every 12 hours  aspirin  chewable 81 milliGRAM(s) Oral daily  atorvastatin 40 milliGRAM(s) Oral at bedtime  cefTRIAXone Injectable. 2000 milliGRAM(s) IV Push every 12 hours  heparin   Injectable 5000 Unit(s) SubCutaneous every 8 hours  hydrocortisone sodium succinate Injectable 25 milliGRAM(s) IV Push every 8 hours  lacosamide IVPB 100 milliGRAM(s) IV Intermittent <User Schedule>  levETIRAcetam   Injectable 500 milliGRAM(s) IV Push every 12 hours  metoprolol tartrate 12.5 milliGRAM(s) Oral two times a day  pantoprazole  Injectable 40 milliGRAM(s) IV Push daily  potassium chloride  10 mEq/100 mL IVPB 10 milliEquivalent(s) IV Intermittent every 1 hour  thiamine 100 milliGRAM(s) Oral daily  vancomycin  IVPB 750 milliGRAM(s) IV Intermittent every 24 hours    MEDICATIONS  (PRN):  LORazepam   Injectable 1 milliGRAM(s) IV Push every 30 minutes PRN seizures      ALLERGIES:  Allergies    No Known Allergies    Intolerances        LABS:                        11.3   14.09 )-----------( 241      ( 23 Feb 2025 05:52 )             34.5     Hemoglobin: 11.3 g/dL (02-23 @ 05:52)  Hemoglobin: 10.5 g/dL (02-22 @ 06:13)  Hemoglobin: 10.1 g/dL (02-21 @ 05:47)  Hemoglobin: 11.0 g/dL (02-20 @ 06:00)  Hemoglobin: 11.4 g/dL (02-19 @ 14:02)    CBC Full  -  ( 23 Feb 2025 05:52 )  WBC Count : 14.09 K/uL  RBC Count : 3.64 M/uL  Hemoglobin : 11.3 g/dL  Hematocrit : 34.5 %  Platelet Count - Automated : 241 K/uL  Mean Cell Volume : 94.8 fl  Mean Cell Hemoglobin : 31.0 pg  Mean Cell Hemoglobin Concentration : 32.8 g/dL  Auto Neutrophil # : x  Auto Lymphocyte # : x  Auto Monocyte # : x  Auto Eosinophil # : x  Auto Basophil # : x  Auto Neutrophil % : x  Auto Lymphocyte % : x  Auto Monocyte % : x  Auto Eosinophil % : x  Auto Basophil % : x    02-23    141  |  107  |  44[H]  ----------------------------<  99  3.0[L]   |  26  |  1.24    Ca    9.5      23 Feb 2025 05:52  Phos  2.7     02-23  Mg     2.6     02-23      Creatinine Trend: 1.24<--, 1.33<--, 1.41<--, 1.64<--, 1.61<--, 1.70<--        hs Troponin:            Urinalysis Basic - ( 23 Feb 2025 05:52 )    Color: x / Appearance: x / SG: x / pH: x  Gluc: 99 mg/dL / Ketone: x  / Bili: x / Urobili: x   Blood: x / Protein: x / Nitrite: x   Leuk Esterase: x / RBC: x / WBC x   Sq Epi: x / Non Sq Epi: x / Bacteria: x      CSF:                      EKG:   MICROBIOLOGY:    Culture - Sputum (collected 20 Feb 2025 16:51)  Source: ET Tube ET Tube  Gram Stain (21 Feb 2025 11:26):    No polymorphonuclear leukocytes per low power field    No Squamous epithelial cells per low power field    No organisms seen per oil power field  Final Report (22 Feb 2025 20:56):    No growth      IMAGING:      Labs, imaging, EKG personally reviewed    RADIOLOGY & ADDITIONAL TESTS: Reviewed. OVERNIGHT EVENTS / SUBJECTIVE: Patient seen and examined at bedside.     Overnight pt became acutely hypertensive and hypoxic, placed on NRB given IV lasix 40mg IV x1 and was titrated down to 50% ventimask, suspect flash pulm edema in setting of severe AS. This AM pt down to 35% FiO2. Alert, tracks with eyes, not following commands, not answering questions.    OBJECTIVE:    VITAL SIGNS:  ICU Vital Signs Last 24 Hrs  T(C): 37.4 (23 Feb 2025 06:00), Max: 37.4 (23 Feb 2025 06:00)  T(F): 99.4 (23 Feb 2025 06:00), Max: 99.4 (23 Feb 2025 06:00)  HR: 88 (23 Feb 2025 06:00) (57 - 98)  BP: 141/75 (23 Feb 2025 06:00) (111/91 - 164/83)  BP(mean): 94 (23 Feb 2025 06:00) (70 - 109)  ABP: --  ABP(mean): --  RR: 21 (23 Feb 2025 06:00) (16 - 26)  SpO2: 98% (23 Feb 2025 06:00) (90% - 100%)    O2 Parameters below as of 23 Feb 2025 06:00  Patient On (Oxygen Delivery Method): mask, Venturi    O2 Concentration (%): 35      Mode: standby    02-22 @ 07:01  -  02-23 @ 07:00  --------------------------------------------------------  IN: 916 mL / OUT: 2775 mL / NET: -1859 mL      CAPILLARY BLOOD GLUCOSE          PHYSICAL EXAM:    General: NAD  HEENT: NC/AT; PERRL, clear conjunctiva  Neck: supple  Respiratory: Bibasilar crackles  Cardiovascular: +S1/S2; RRR  Abdomen: soft, NT/ND  Extremities: Warm, no LE edema  Skin: normal color and turgor; no rash  Neurological: Alert, not responding to questions or commands, tracks, moving all extremities    MEDICATIONS:  MEDICATIONS  (STANDING):  acyclovir IVPB 490 milliGRAM(s) IV Intermittent every 24 hours  ampicillin  IVPB 2 Gram(s) IV Intermittent every 12 hours  aspirin  chewable 81 milliGRAM(s) Oral daily  atorvastatin 40 milliGRAM(s) Oral at bedtime  cefTRIAXone Injectable. 2000 milliGRAM(s) IV Push every 12 hours  heparin   Injectable 5000 Unit(s) SubCutaneous every 8 hours  hydrocortisone sodium succinate Injectable 25 milliGRAM(s) IV Push every 8 hours  lacosamide IVPB 100 milliGRAM(s) IV Intermittent <User Schedule>  levETIRAcetam   Injectable 500 milliGRAM(s) IV Push every 12 hours  metoprolol tartrate 12.5 milliGRAM(s) Oral two times a day  pantoprazole  Injectable 40 milliGRAM(s) IV Push daily  potassium chloride  10 mEq/100 mL IVPB 10 milliEquivalent(s) IV Intermittent every 1 hour  thiamine 100 milliGRAM(s) Oral daily  vancomycin  IVPB 750 milliGRAM(s) IV Intermittent every 24 hours    MEDICATIONS  (PRN):  LORazepam   Injectable 1 milliGRAM(s) IV Push every 30 minutes PRN seizures      ALLERGIES:  Allergies    No Known Allergies    Intolerances        LABS:                        11.3   14.09 )-----------( 241      ( 23 Feb 2025 05:52 )             34.5     Hemoglobin: 11.3 g/dL (02-23 @ 05:52)  Hemoglobin: 10.5 g/dL (02-22 @ 06:13)  Hemoglobin: 10.1 g/dL (02-21 @ 05:47)  Hemoglobin: 11.0 g/dL (02-20 @ 06:00)  Hemoglobin: 11.4 g/dL (02-19 @ 14:02)    CBC Full  -  ( 23 Feb 2025 05:52 )  WBC Count : 14.09 K/uL  RBC Count : 3.64 M/uL  Hemoglobin : 11.3 g/dL  Hematocrit : 34.5 %  Platelet Count - Automated : 241 K/uL  Mean Cell Volume : 94.8 fl  Mean Cell Hemoglobin : 31.0 pg  Mean Cell Hemoglobin Concentration : 32.8 g/dL  Auto Neutrophil # : x  Auto Lymphocyte # : x  Auto Monocyte # : x  Auto Eosinophil # : x  Auto Basophil # : x  Auto Neutrophil % : x  Auto Lymphocyte % : x  Auto Monocyte % : x  Auto Eosinophil % : x  Auto Basophil % : x    02-23    141  |  107  |  44[H]  ----------------------------<  99  3.0[L]   |  26  |  1.24    Ca    9.5      23 Feb 2025 05:52  Phos  2.7     02-23  Mg     2.6     02-23      Creatinine Trend: 1.24<--, 1.33<--, 1.41<--, 1.64<--, 1.61<--, 1.70<--        hs Troponin:            Urinalysis Basic - ( 23 Feb 2025 05:52 )    Color: x / Appearance: x / SG: x / pH: x  Gluc: 99 mg/dL / Ketone: x  / Bili: x / Urobili: x   Blood: x / Protein: x / Nitrite: x   Leuk Esterase: x / RBC: x / WBC x   Sq Epi: x / Non Sq Epi: x / Bacteria: x      CSF:                      EKG:   MICROBIOLOGY:    Culture - Sputum (collected 20 Feb 2025 16:51)  Source: ET Tube ET Tube  Gram Stain (21 Feb 2025 11:26):    No polymorphonuclear leukocytes per low power field    No Squamous epithelial cells per low power field    No organisms seen per oil power field  Final Report (22 Feb 2025 20:56):    No growth      IMAGING:      Labs, imaging, EKG personally reviewed    RADIOLOGY & ADDITIONAL TESTS: Reviewed.

## 2025-02-24 DIAGNOSIS — I50.20 UNSPECIFIED SYSTOLIC (CONGESTIVE) HEART FAILURE: ICD-10-CM

## 2025-02-24 LAB
ANION GAP SERPL CALC-SCNC: 5 MMOL/L — SIGNIFICANT CHANGE UP (ref 5–17)
BASOPHILS # BLD AUTO: 0.03 K/UL — SIGNIFICANT CHANGE UP (ref 0–0.2)
BASOPHILS NFR BLD AUTO: 0.2 % — SIGNIFICANT CHANGE UP (ref 0–2)
BUN SERPL-MCNC: 38 MG/DL — HIGH (ref 7–23)
CALCIUM SERPL-MCNC: 9 MG/DL — SIGNIFICANT CHANGE UP (ref 8.5–10.1)
CHLORIDE SERPL-SCNC: 106 MMOL/L — SIGNIFICANT CHANGE UP (ref 96–108)
CO2 SERPL-SCNC: 27 MMOL/L — SIGNIFICANT CHANGE UP (ref 22–31)
CREAT SERPL-MCNC: 0.92 MG/DL — SIGNIFICANT CHANGE UP (ref 0.5–1.3)
EGFR: 57 ML/MIN/1.73M2 — LOW
EOSINOPHIL # BLD AUTO: 0.1 K/UL — SIGNIFICANT CHANGE UP (ref 0–0.5)
EOSINOPHIL NFR BLD AUTO: 0.8 % — SIGNIFICANT CHANGE UP (ref 0–6)
GLUCOSE SERPL-MCNC: 100 MG/DL — HIGH (ref 70–99)
HCT VFR BLD CALC: 36.3 % — SIGNIFICANT CHANGE UP (ref 34.5–45)
HGB BLD-MCNC: 11.9 G/DL — SIGNIFICANT CHANGE UP (ref 11.5–15.5)
IMM GRANULOCYTES # BLD AUTO: 0.13 K/UL — HIGH (ref 0–0.07)
IMM GRANULOCYTES NFR BLD AUTO: 1.1 % — HIGH (ref 0–0.9)
LYMPHOCYTES # BLD AUTO: 2.23 K/UL — SIGNIFICANT CHANGE UP (ref 1–3.3)
LYMPHOCYTES NFR BLD AUTO: 18.2 % — SIGNIFICANT CHANGE UP (ref 13–44)
MAGNESIUM SERPL-MCNC: 2.6 MG/DL — SIGNIFICANT CHANGE UP (ref 1.6–2.6)
MCHC RBC-ENTMCNC: 31.4 PG — SIGNIFICANT CHANGE UP (ref 27–34)
MCHC RBC-ENTMCNC: 32.8 G/DL — SIGNIFICANT CHANGE UP (ref 32–36)
MCV RBC AUTO: 95.8 FL — SIGNIFICANT CHANGE UP (ref 80–100)
MONOCYTES # BLD AUTO: 1.12 K/UL — HIGH (ref 0–0.9)
MONOCYTES NFR BLD AUTO: 9.1 % — SIGNIFICANT CHANGE UP (ref 2–14)
NEUTROPHILS # BLD AUTO: 8.67 K/UL — HIGH (ref 1.8–7.4)
NEUTROPHILS NFR BLD AUTO: 70.6 % — SIGNIFICANT CHANGE UP (ref 43–77)
NRBC # BLD AUTO: 0 K/UL — SIGNIFICANT CHANGE UP (ref 0–0)
NRBC # FLD: 0 K/UL — SIGNIFICANT CHANGE UP (ref 0–0)
NRBC BLD AUTO-RTO: 0 /100 WBCS — SIGNIFICANT CHANGE UP (ref 0–0)
NT-PROBNP SERPL-SCNC: HIGH PG/ML (ref 0–450)
PHOSPHATE SERPL-MCNC: 2.4 MG/DL — LOW (ref 2.5–4.5)
PLATELET # BLD AUTO: 297 K/UL — SIGNIFICANT CHANGE UP (ref 150–400)
PMV BLD: 10.6 FL — SIGNIFICANT CHANGE UP (ref 7–13)
POTASSIUM SERPL-MCNC: 4.7 MMOL/L — SIGNIFICANT CHANGE UP (ref 3.5–5.3)
POTASSIUM SERPL-SCNC: 4.7 MMOL/L — SIGNIFICANT CHANGE UP (ref 3.5–5.3)
PROCALCITONIN SERPL-MCNC: 0.16 NG/ML — HIGH (ref 0.02–0.1)
RBC # BLD: 3.79 M/UL — LOW (ref 3.8–5.2)
RBC # FLD: 14.7 % — HIGH (ref 10.3–14.5)
SODIUM SERPL-SCNC: 138 MMOL/L — SIGNIFICANT CHANGE UP (ref 135–145)
VANCOMYCIN TROUGH SERPL-MCNC: 17.1 UG/ML — SIGNIFICANT CHANGE UP (ref 10–20)
WBC # BLD: 12.28 K/UL — HIGH (ref 3.8–10.5)
WBC # FLD AUTO: 12.28 K/UL — HIGH (ref 3.8–10.5)

## 2025-02-24 PROCEDURE — 99223 1ST HOSP IP/OBS HIGH 75: CPT

## 2025-02-24 PROCEDURE — 99233 SBSQ HOSP IP/OBS HIGH 50: CPT | Mod: FS

## 2025-02-24 PROCEDURE — 99233 SBSQ HOSP IP/OBS HIGH 50: CPT

## 2025-02-24 PROCEDURE — 99232 SBSQ HOSP IP/OBS MODERATE 35: CPT

## 2025-02-24 RX ORDER — FUROSEMIDE 10 MG/ML
20 INJECTION INTRAMUSCULAR; INTRAVENOUS
Refills: 0 | Status: DISCONTINUED | OUTPATIENT
Start: 2025-02-24 | End: 2025-02-28

## 2025-02-24 RX ORDER — METOPROLOL SUCCINATE 50 MG/1
50 TABLET, EXTENDED RELEASE ORAL EVERY 12 HOURS
Refills: 0 | Status: DISCONTINUED | OUTPATIENT
Start: 2025-02-24 | End: 2025-02-28

## 2025-02-24 RX ORDER — ACETAMINOPHEN 500 MG/5ML
1000 LIQUID (ML) ORAL ONCE
Refills: 0 | Status: COMPLETED | OUTPATIENT
Start: 2025-02-24 | End: 2025-02-24

## 2025-02-24 RX ORDER — LOSARTAN POTASSIUM 100 MG/1
25 TABLET, FILM COATED ORAL DAILY
Refills: 0 | Status: DISCONTINUED | OUTPATIENT
Start: 2025-02-25 | End: 2025-02-28

## 2025-02-24 RX ORDER — LOSARTAN POTASSIUM 100 MG/1
50 TABLET, FILM COATED ORAL DAILY
Refills: 0 | Status: DISCONTINUED | OUTPATIENT
Start: 2025-02-24 | End: 2025-02-24

## 2025-02-24 RX ORDER — FUROSEMIDE 10 MG/ML
20 INJECTION INTRAMUSCULAR; INTRAVENOUS DAILY
Refills: 0 | Status: DISCONTINUED | OUTPATIENT
Start: 2025-02-24 | End: 2025-02-24

## 2025-02-24 RX ADMIN — AMPICILLIN SODIUM 216 GRAM(S): 1 INJECTION, POWDER, FOR SOLUTION INTRAMUSCULAR; INTRAVENOUS at 05:41

## 2025-02-24 RX ADMIN — LEVETIRACETAM 500 MILLIGRAM(S): 10 INJECTION, SOLUTION INTRAVENOUS at 20:59

## 2025-02-24 RX ADMIN — AMPICILLIN SODIUM 216 GRAM(S): 1 INJECTION, POWDER, FOR SOLUTION INTRAMUSCULAR; INTRAVENOUS at 17:19

## 2025-02-24 RX ADMIN — HEPARIN SODIUM 5000 UNIT(S): 1000 INJECTION INTRAVENOUS; SUBCUTANEOUS at 05:41

## 2025-02-24 RX ADMIN — Medication 10 MILLIGRAM(S): at 07:05

## 2025-02-24 RX ADMIN — Medication 250 MILLIGRAM(S): at 10:29

## 2025-02-24 RX ADMIN — CEFTRIAXONE 2000 MILLIGRAM(S): 500 INJECTION, POWDER, FOR SOLUTION INTRAMUSCULAR; INTRAVENOUS at 05:40

## 2025-02-24 RX ADMIN — Medication 1000 MILLIGRAM(S): at 06:32

## 2025-02-24 RX ADMIN — LEVETIRACETAM 500 MILLIGRAM(S): 10 INJECTION, SOLUTION INTRAVENOUS at 08:03

## 2025-02-24 RX ADMIN — LACOSAMIDE 100 MILLIGRAM(S): 150 TABLET, FILM COATED ORAL at 01:42

## 2025-02-24 RX ADMIN — LOSARTAN POTASSIUM 50 MILLIGRAM(S): 100 TABLET, FILM COATED ORAL at 10:31

## 2025-02-24 RX ADMIN — LACOSAMIDE 100 MILLIGRAM(S): 150 TABLET, FILM COATED ORAL at 17:19

## 2025-02-24 RX ADMIN — Medication 100 MILLIGRAM(S): at 10:31

## 2025-02-24 RX ADMIN — Medication 400 MILLIGRAM(S): at 06:17

## 2025-02-24 RX ADMIN — LACOSAMIDE 100 MILLIGRAM(S): 150 TABLET, FILM COATED ORAL at 10:30

## 2025-02-24 RX ADMIN — Medication 81 MILLIGRAM(S): at 10:31

## 2025-02-24 RX ADMIN — METOPROLOL SUCCINATE 50 MILLIGRAM(S): 50 TABLET, EXTENDED RELEASE ORAL at 10:30

## 2025-02-24 RX ADMIN — ATORVASTATIN CALCIUM 40 MILLIGRAM(S): 80 TABLET, FILM COATED ORAL at 21:10

## 2025-02-24 RX ADMIN — FUROSEMIDE 20 MILLIGRAM(S): 10 INJECTION INTRAMUSCULAR; INTRAVENOUS at 10:31

## 2025-02-24 RX ADMIN — METOPROLOL SUCCINATE 50 MILLIGRAM(S): 50 TABLET, EXTENDED RELEASE ORAL at 21:10

## 2025-02-24 RX ADMIN — CEFTRIAXONE 2000 MILLIGRAM(S): 500 INJECTION, POWDER, FOR SOLUTION INTRAMUSCULAR; INTRAVENOUS at 17:19

## 2025-02-24 RX ADMIN — Medication 40 MILLIGRAM(S): at 10:30

## 2025-02-24 RX ADMIN — HEPARIN SODIUM 5000 UNIT(S): 1000 INJECTION INTRAVENOUS; SUBCUTANEOUS at 14:14

## 2025-02-24 RX ADMIN — HEPARIN SODIUM 5000 UNIT(S): 1000 INJECTION INTRAVENOUS; SUBCUTANEOUS at 21:10

## 2025-02-24 RX ADMIN — Medication 109.8 MILLIGRAM(S): at 05:41

## 2025-02-24 NOTE — PROGRESS NOTE ADULT - PROBLEM SELECTOR PROBLEM 2
Acute respiratory failure with hypoxia
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

## 2025-02-24 NOTE — PROGRESS NOTE ADULT - ASSESSMENT
89 y/o F PMH severe AS, dementia (baseline A&O x 2–3), GERD, HLD, HTN BIBEMS from [facility] for AMS, unknown LSW. Per EMS pt was found in AM unresponsive, aphasic with dark emesis in mouth- no robel blood or witnessed hematemesis, minimally responsive on ED arrival with L eye deviation, after undergoing CTH imaging pt then had witnessed seizure and was loaded with keppra, EEG then showed continuous non-convulsive status and pt was loaded with phenytoin, after seizure broke pt was less responsive, apneic and hypotensive and son at bedside elected for patient to be intubated. Per ER chart review patient's daughter Tammy reported pt had been DNR/DNI.    Problems:  #Status epilepticus  #Septic shock due to multifocal pneumonia versus meningitis, resolved  #Acute hypoxic respiratory failure  #JACKY  #Elevated troponin  #Acute systolic heart failure  #Severe AS    Intubated 2/18-22    Plan:  - Pt alert this AM, follows commands, moves extremities, disoriented - continue daily reorientation. No further seizure on EEG, c/w keppra 500 BID, vimpat 100 TID. C/w empiric abx for meningitis, MRI brain w/wo contrast 2/20 with punctate emboli in b/l occipital lobes and R parahippocampal gyrus and R thalamic signal abnormality from ?Sz vs infection vs infarction  - Off pressor, now hypertensive, off stress dose steroid, increase losartan to 50 mg daily and metoprolol to 50mg BID, TTE w/ new decreased EF 40-45%, severe AS, mod pulm HTN, trops peaked at 22k downtrending, cards consulted possible underlying CAD w/ demand related ischemia in setting of sepsis/status epilepticus, c/w conservative management, ASA, statin  - Resp status improving, now on NC O2, will start daily Lasix to maintain net iqra to negative fluid balance given systolic dysfn and severe AS  - tolerating po diet  - Cr downtrending, cont to trend renal indices. D/c Gallo   - C/w empiric acyclovir, ampicillin, CTX, vanc by level, blood cultures NGTD, UA negative, family declined LP will c/w empiric meningitis tx, ID recs appreciated  - DVT ppx hep subq  - PT eval, OOB    Stable for tele    Sepsis criteria met? - NO 89 y/o F PMH severe AS, dementia (baseline A&O x 2–3), GERD, HLD, HTN BIBEMS from [facility] for AMS, unknown LSW. Per EMS pt was found in AM unresponsive, aphasic with dark emesis in mouth- no robel blood or witnessed hematemesis, minimally responsive on ED arrival with L eye deviation, after undergoing CTH imaging pt then had witnessed seizure and was loaded with keppra, EEG then showed continuous non-convulsive status and pt was loaded with phenytoin, after seizure broke pt was less responsive, apneic and hypotensive and son at bedside elected for patient to be intubated. Per ER chart review patient's daughter Tammy reported pt had been DNR/DNI.    Problems:  #Status epilepticus  #Septic shock due to multifocal pneumonia versus meningitis, resolved  #Acute hypoxic respiratory failure  #JACKY  #Elevated troponin  #Acute systolic heart failure  #Severe AS    Intubated 2/18-22    Plan:  - Pt alert this AM, follows commands, moves extremities, disoriented - continue daily reorientation. No further seizure on EEG, c/w keppra 500 BID, vimpat 100 TID. C/w empiric abx for meningitis, MRI brain w/wo contrast 2/20 with punctate emboli in b/l occipital lobes and R parahippocampal gyrus and R thalamic signal abnormality from ?Sz vs infection vs infarction  - Off pressor, now hypertensive, off stress dose steroid, increase losartan to 50 mg daily and metoprolol to 50mg BID, TTE w/ new decreased EF 40-45%, severe AS, mod pulm HTN, trops peaked at 22k downtrending, cards consulted possible underlying CAD w/ demand related ischemia in setting of sepsis/status epilepticus, c/w conservative management, ASA, statin  - Resp status improving, now on NC O2, will start daily Lasix to maintain net iqra to negative fluid balance given systolic dysfn and severe AS  - tolerating po diet  - Cr downtrending, cont to trend renal indices. D/c Gallo   - C/w empiric acyclovir, ampicillin, CTX, vanc by level, blood cultures NGTD, UA negative, family declined LP will c/w empiric meningitis tx, ID recs appreciated  - DVT ppx hep subq  - PT eval, OOB  - prognosis poor, patient DNR    Stable for tele    Sepsis criteria met? - NO 89 y/o F PMH severe AS, dementia (baseline A&O x 2–3), GERD, HLD, HTN BIBEMS from [facility] for AMS, unknown LSW. Per EMS pt was found in AM unresponsive, aphasic with dark emesis in mouth- no robel blood or witnessed hematemesis, minimally responsive on ED arrival with L eye deviation, after undergoing CTH imaging pt then had witnessed seizure and was loaded with keppra, EEG then showed continuous non-convulsive status and pt was loaded with phenytoin, after seizure broke pt was less responsive, apneic and hypotensive and son at bedside elected for patient to be intubated. Per ER chart review patient's daughter Tammy reported pt had been DNR/DNI.    Problems:  #Status epilepticus  #Septic shock due to multifocal pneumonia versus meningitis, resolved  #Acute hypoxic respiratory failure  #JACKY  #Elevated troponin  #Acute systolic heart failure  #Severe AS    Intubated 2/18-22    Plan:  - Pt alert this AM, follows commands, moves extremities, disoriented - continue daily reorientation. No further seizure on EEG, c/w keppra 500 BID, vimpat 100 TID. C/w empiric abx for meningitis, MRI brain w/wo contrast 2/20 with punctate emboli in b/l occipital lobes and R parahippocampal gyrus and R thalamic signal abnormality from ?Sz vs infection vs infarction  - Off pressor, now hypertensive, off stress dose steroid, increase losartan to 50 mg daily and metoprolol to 50mg BID, TTE w/ new decreased EF 40-45%, severe AS, mod pulm HTN, trops peaked at 22k downtrending, cards consulted possible underlying CAD w/ demand related ischemia in setting of sepsis/status epilepticus, c/w conservative management, ASA, statin  - Resp status improving, now on NC O2, will start daily Lasix to maintain net iqra to negative fluid balance given systolic dysfn and severe AS  - tolerating po diet  - Cr downtrending, cont to trend renal indices. D/c Gallo   - C/w empiric acyclovir, ampicillin, CTX, vanc by level, blood cultures NGTD, UA negative, family declined LP will c/w empiric meningitis tx, ID recs appreciated  - DVT ppx hep subq  - PT eval, OOB  - prognosis poor, patient DNR    Stable for tele, sign out given to Dr Keys     Daughter updated at bedside     Sepsis criteria met? - NO

## 2025-02-24 NOTE — PROGRESS NOTE ADULT - PROBLEM SELECTOR PROBLEM 3
Severe aortic stenosis
Acute respiratory failure with hypoxia
Severe aortic stenosis

## 2025-02-24 NOTE — PROGRESS NOTE ADULT - SUBJECTIVE AND OBJECTIVE BOX
HPI and hospital course:   89yoF PMH AS, dementia (baseline A&O x 2–3), GERD, HLD, HTN BIBEMS from [facility] for AMS, unknown LSW. Per EMS pt was found this AM unresponsive, aphasic with dark emesis in mouth- no robel blood or witnessed hematemesis. Pt found to be in status epilepticus in the ED intubated for airway protection and admitted to the ICU on 2/18/25. Pt remained on continuous EEG. Neurology was consulted and started the pt on keppra and vimpat. CTH showed no hemorrhage. or large territorial infarct and diffuse cortical atrophy. CTA H&N notable for mild calcified atherosclerotic plaques bilateral carotid at bifurcation, non flow limiting, no intracranial LVO. Pt was also started on pressors and empiric mengitis coverage. Pt had NSTEMI with troponins peaking at 22,152.09 for which cardiology was consulted and pt was managed conservatively. Patient was extubated 2 dyas ago remains on 4L NC. Pt has had waxing and waning mental status similar to her baseline since extubation. She remains on empiric meningitis treatment since family is refusing LP, ID is following and recommends completing empiric treatment course. Patient stable for downgrade form ICU.     PAST MEDICAL & SURGICAL HISTORY:  HTN (hypertension)    FAMILY HISTORY: not pertinent    Social History: from [facility]     Allergies    No Known Allergies    Intolerances        MEDICATIONS  (STANDING):  acyclovir IVPB 490 milliGRAM(s) IV Intermittent every 24 hours  ampicillin  IVPB 2 Gram(s) IV Intermittent every 12 hours  aspirin  chewable 81 milliGRAM(s) Oral daily  atorvastatin 40 milliGRAM(s) Oral at bedtime  cefTRIAXone Injectable. 2000 milliGRAM(s) IV Push every 12 hours  furosemide    Tablet 20 milliGRAM(s) Oral daily  heparin   Injectable 5000 Unit(s) SubCutaneous every 8 hours  lacosamide IVPB 100 milliGRAM(s) IV Intermittent <User Schedule>  levETIRAcetam   Injectable 500 milliGRAM(s) IV Push every 12 hours  losartan 50 milliGRAM(s) Oral daily  metoprolol tartrate 50 milliGRAM(s) Oral every 12 hours  thiamine 100 milliGRAM(s) Oral daily  vancomycin  IVPB 750 milliGRAM(s) IV Intermittent every 24 hours    MEDICATIONS  (PRN):      ROS:  General:  No fevers, chills, or unexplained weight loss  Skin: No rash or bothersome skin lesions  Musculoskeletal: No arthalgias, myalgias or joint swelling  Eyes: No visual changes or eye pain  Ears: No hearing loss , otorrhea or ear pain  Nose, Mouth, Throat: No nasal congestion, rhinorrhea, oral lesions, postnasal drip or sore throat  Cardio: No chest pain or palpitations. no lower extremity edema. no syncope. no claudication.   Respiratory: No cough, shortness of breath or wheezing   GI: No diarrhea, constipation, blood in stools, abdominal pain, vomiting or heartburn  : No urinary frequency, hematuria, incontinence, or dysuria  Neurologic: No headaches, parasthesias, confusion, dysarthria or gait instability  Psychiatric:  No anxiety or depression  Lymphatic:  No easy bruising, easy bleeding or swollen glands  Allergic: No itching, sneezing , watery eyes, clear rhinorrhea or recurrent infections    PEx  T(C): 36.9 (02-24-25 @ 09:00), Max: 37.2 (02-23-25 @ 16:00)  HR: 89 (02-24-25 @ 11:00) (78 - 107)  BP: 117/51 (02-24-25 @ 11:00) (113/52 - 167/74)  RR: 19 (02-24-25 @ 11:00) (13 - 25)  SpO2: 99% (02-24-25 @ 11:00) (92% - 100%)  Wt(kg): --  General:     no acute distress, no identifying marks , scars, or tattoos.  Skin: no rash or prominent lesions  Head: normocephalic, atraumatic     Sinuses: non-tender  Nose: no external lesions, mucosa non-inflamed, septum and turbinates normal  Throat: no erythema, exudates or lesions.  Neck: Supple without lymphadenopathy. Thyroid no thyromegaly, no palpable thyroid nodules, no palpable nodules or masses, carotid arteries no bruits.   Breasts: No palpable masses or lesions.  Heart: RRR, no murmur or gallop.  Normal S1, S2.  No S3, S4.   Lungs: CTA bilaterally, no wheezes, rhonchi, rales.  Breathing unlabored.   Chest wall: Normal insp   Abdomen:  Soft, NT/ND, normal bowel sounds, no HSM, no masses.  No peritoneal signs.   Back: spine normal without deformity or tenderness.  Normal ROM   : Exam normal.  no inguinal hernias.  Extremities: No deformities, clubbing, cyanosis, or edema.  Musculoskeletal: Normal gait and station. No decreased range of motion, instability, atrophy or abnormal strength or tone in the head, neck, spine, ribs, pelvis or extremities.   Neurologic: CN 2-12 normal. Sensation to pain, touch and proprioception normal. DTRs normal in upper and lower extremities. No pathologic reflexes.  Motor normal.  Psychiatric: Oriented X3, intact recent and remote memory, judgement and insight, normal mood and affect.                          11.9   12.28 )-----------( 297      ( 24 Feb 2025 06:03 )             36.3     02-24    138  |  106  |  38[H]  ----------------------------<  100[H]  4.7   |  27  |  0.92    Ca    9.0      24 Feb 2025 06:03  Phos  2.4     02-24  Mg     2.6     02-24      CAPILLARY BLOOD GLUCOSE          Urinalysis Basic - ( 24 Feb 2025 06:03 )    Color: x / Appearance: x / SG: x / pH: x  Gluc: 100 mg/dL / Ketone: x  / Bili: x / Urobili: x   Blood: x / Protein: x / Nitrite: x   Leuk Esterase: x / RBC: x / WBC x   Sq Epi: x / Non Sq Epi: x / Bacteria: x          Radiology/Imaging, I have personally reviewed:      MR head with and without contrast 2/20/25:  1.  Punctate small emboli in both occipital lobes and the right parahippocampal gyrus, 24 hours to 7 days in age.  2. Right thalamic signal abnormalities, possibly related to seizures, infection, or infarction. Follow-up recommended.  3.  Stable senescent cerebral changes and orbital varices.    EEG 2/20/25-2/21/25:  -Possible subtle right frontocentral focal seizure with LUE slow jerky movements (extension of L 2nd finger and wrist followed by L elbow flexion) possibly time-locked to ongoing LPDs: 12:51 2/20/25  -Risk of focal-onset seizures from the right frontocentral, left posterior temporal, and left occipito-temporal regions  -Right hemispheric focal cerebral dysfunction can be structural or functional in etiology.  -Mild-moderate diffuse and/or multifocal cerebral dysfunction is nonspecific in etiology.  -No seizures.    CT CAP   IMPRESSION:  1.  Dependent posterior RUL, superior RLL and to lesser extent   apicoposterior SAMEER groundglass opacities and diffuse interlobular septal   thickening. Pulmonary edema with or without associated pneumonia   suspected.  2.  No acute abdominal/pelvic pathology.  3.  Etiology of hematemesis not determined.    EEG 2/21/25-2/22/25:  -Mild to moderate generalized slowing  -Superimposed right hemisphere slowing  -Broad right (fronto-central predominant) epileptiform discharges, at   times occurring in a period fashion.         HPI and hospital course:   89yoF PMH AS, dementia (baseline A&O x 2–3), GERD, HLD, HTN BIBEMS from [facility] for AMS, unknown LSW. Per EMS pt was found this AM unresponsive, aphasic with dark emesis in mouth- no robel blood or witnessed hematemesis. Pt found to be in status epilepticus in the ED intubated for airway protection and admitted to the ICU on 2/18/25. Pt remained on continuous EEG. Neurology was consulted and started the pt on keppra and vimpat. CTH showed no hemorrhage. or large territorial infarct and diffuse cortical atrophy. CTA H&N notable for mild calcified atherosclerotic plaques bilateral carotid at bifurcation, non flow limiting, no intracranial LVO. Pt was also started on pressors and empiric mengitis coverage. Pt had NSTEMI with troponins peaking at 22,152.09 for which cardiology was consulted and pt was managed conservatively. Patient was extubated 2 dyas ago remains on 4L NC. Pt has had waxing and waning mental status similar to her baseline since extubation. She remains on empiric meningitis treatment since family is refusing LP, ID is following and recommends completing empiric treatment course. Patient stable for downgrade form ICU.     PAST MEDICAL & SURGICAL HISTORY:  HTN (hypertension)    FAMILY HISTORY: not pertinent no family hx of seizures    Social History: from [facility]     Allergies    No Known Allergies    Intolerances        MEDICATIONS  (STANDING):  acyclovir IVPB 490 milliGRAM(s) IV Intermittent every 24 hours  ampicillin  IVPB 2 Gram(s) IV Intermittent every 12 hours  aspirin  chewable 81 milliGRAM(s) Oral daily  atorvastatin 40 milliGRAM(s) Oral at bedtime  cefTRIAXone Injectable. 2000 milliGRAM(s) IV Push every 12 hours  furosemide    Tablet 20 milliGRAM(s) Oral daily  heparin   Injectable 5000 Unit(s) SubCutaneous every 8 hours  lacosamide IVPB 100 milliGRAM(s) IV Intermittent <User Schedule>  levETIRAcetam   Injectable 500 milliGRAM(s) IV Push every 12 hours  losartan 50 milliGRAM(s) Oral daily  metoprolol tartrate 50 milliGRAM(s) Oral every 12 hours  thiamine 100 milliGRAM(s) Oral daily  vancomycin  IVPB 750 milliGRAM(s) IV Intermittent every 24 hours    MEDICATIONS  (PRN):      ROS:  General:  No fevers, chills, or unexplained weight loss  Skin: No rash or bothersome skin lesions  Musculoskeletal: No arthalgias, myalgias or joint swelling  Eyes: No visual changes or eye pain  Ears: No hearing loss , otorrhea or ear pain  Nose, Mouth, Throat: No nasal congestion, rhinorrhea, oral lesions, postnasal drip or sore throat  Cardio: No chest pain or palpitations. no lower extremity edema. no syncope. no claudication.   Respiratory: No cough, shortness of breath or wheezing   GI: No diarrhea, constipation, blood in stools, abdominal pain, vomiting or heartburn  : No urinary frequency, hematuria, incontinence, or dysuria  Neurologic: No headaches, parasthesias, confusion, dysarthria or gait instability  Psychiatric:  No anxiety or depression  Lymphatic:  No easy bruising, easy bleeding or swollen glands  Allergic: No itching, sneezing , watery eyes, clear rhinorrhea or recurrent infections    PEx  T(C): 36.9 (02-24-25 @ 09:00), Max: 37.2 (02-23-25 @ 16:00)  HR: 89 (02-24-25 @ 11:00) (78 - 107)  BP: 117/51 (02-24-25 @ 11:00) (113/52 - 167/74)  RR: 19 (02-24-25 @ 11:00) (13 - 25)  SpO2: 99% (02-24-25 @ 11:00) (92% - 100%)  Wt(kg): --  General:  elderly frail appearing female   Skin: no rash or prominent lesions  Head: normocephalic, atraumatic     Sinuses: non-tender  Nose: no external lesions, mucosa non-inflamed, septum and turbinates normal  Throat: no erythema, exudates or lesions.  Neck: Supple without lymphadenopathy. Thyroid no thyromegaly, no palpable thyroid nodules, no palpable nodules or masses, carotid arteries no bruits.   Heart: RRR, no murmur or gallop.  Normal S1, S2.  No S3, S4.   Lungs: CTA bilaterally, no wheezes, rhonchi, rales.  Breathing unlabored.   Chest wall: Normal insp   Abdomen:  Soft, NT/ND, normal bowel sounds, no HSM, no masses.  No peritoneal signs.   Extremities: No deformities, clubbing, cyanosis, or edema.  Neurologic: AOx1-2 no focal deficits                           11.9   12.28 )-----------( 297      ( 24 Feb 2025 06:03 )             36.3     02-24    138  |  106  |  38[H]  ----------------------------<  100[H]  4.7   |  27  |  0.92    Ca    9.0      24 Feb 2025 06:03  Phos  2.4     02-24  Mg     2.6     02-24      CAPILLARY BLOOD GLUCOSE          Urinalysis Basic - ( 24 Feb 2025 06:03 )    Color: x / Appearance: x / SG: x / pH: x  Gluc: 100 mg/dL / Ketone: x  / Bili: x / Urobili: x   Blood: x / Protein: x / Nitrite: x   Leuk Esterase: x / RBC: x / WBC x   Sq Epi: x / Non Sq Epi: x / Bacteria: x          Radiology/Imaging, I have personally reviewed:      MR head with and without contrast 2/20/25:  1.  Punctate small emboli in both occipital lobes and the right parahippocampal gyrus, 24 hours to 7 days in age.  2. Right thalamic signal abnormalities, possibly related to seizures, infection, or infarction. Follow-up recommended.  3.  Stable senescent cerebral changes and orbital varices.    EEG 2/20/25-2/21/25:  -Possible subtle right frontocentral focal seizure with LUE slow jerky movements (extension of L 2nd finger and wrist followed by L elbow flexion) possibly time-locked to ongoing LPDs: 12:51 2/20/25  -Risk of focal-onset seizures from the right frontocentral, left posterior temporal, and left occipito-temporal regions  -Right hemispheric focal cerebral dysfunction can be structural or functional in etiology.  -Mild-moderate diffuse and/or multifocal cerebral dysfunction is nonspecific in etiology.  -No seizures.    CT CAP   IMPRESSION:  1.  Dependent posterior RUL, superior RLL and to lesser extent   apicoposterior SAMEER groundglass opacities and diffuse interlobular septal   thickening. Pulmonary edema with or without associated pneumonia   suspected.  2.  No acute abdominal/pelvic pathology.  3.  Etiology of hematemesis not determined.    EEG 2/21/25-2/22/25:  -Mild to moderate generalized slowing  -Superimposed right hemisphere slowing  -Broad right (fronto-central predominant) epileptiform discharges, at   times occurring in a period fashion.

## 2025-02-24 NOTE — PROGRESS NOTE ADULT - PROBLEM SELECTOR PLAN 2
LVEF 40-45% on TTE   cont PO lasix to maintain euvolemic  GDMT: losartan reduced to 25mg and cont metoprolol 50mg q12h, transition to once daily succinate on DC
status epilepticus possible aspiration pneumonia ? pulmonary edema on CT chest , with episode of mild worsening of shortness of breath likely CHF with uncontrolled hypertension , restless , now comfortable   patient hypotensive with lactic acidosis , likely from sepsis , now improved blood pressure  increase BB dose , add low dose losartan ,   discussed with dr Servin
status epilepticus possible aspiration pneumonia ? pulmonary edema on CT chest , currently intubated , IV antibiotic     patient hypotensive with lactic acidosis , likely from sepsis , now improved blood pressure  started on BB ,
status epilepticus possible aspiration pneumonia ? pulmonary edema on CT chest , currently intubated , IV antibiotic     patient hypotensive with lactic acidosis , likely from sepsis , now improved blood pressure  started on BB ,
tatus epilepticus possible aspiration pneumonia ? pulmonary edema on CT chest , currently intubated , IV antibiotic     patient hypotensive with lactic acidosis , likely from sepsis , was hypotensive , improved blood pressure  start on BB ,
tatus epilepticus possible aspiration pneumonia ? pulmonary edema on CT chest , currently intubated , IV antibiotic     patient hypotensive with lactic acidosis , likely from sepsis , was hypotensive , improved blood pressure  started on BB ,

## 2025-02-24 NOTE — PROGRESS NOTE ADULT - PROBLEM SELECTOR PLAN 1
Patient with above hx advanced dementia , HTN HLD severe AS who was noted to have elevated troponin , ischemia EKG changes in setting of status epilepticus ,possibly associated underlying CAD , possibly due to demand related ischemia , with decreased EF compared to prior echo done in july 2024 ,  conservative management  patient family and patient agreed for it , will continue ecotrin.  continue low dose metoprolol 12.5 mg po BID, titrate ,     poor prognosis
Patient with above hx advanced dementia , HTN HLD severe AS who was noted to have elevated troponin , ischemia EKG changes in setting of status epilepticus ,possibly associated underlying CAD , possibly due to demand related ischemia , with decreased EF compared to prior echo done in july 2024 ,    per discussion with Dr. Palla and family: invasive measures declined, cont conservative management with aspirin, statin, metoprolol
Patient with above hx advanced dementia , HTN HLD severe AS who was noted to have elevated troponin , ischemia EKG changes in setting of status epilepticus ,possibly associated underlying CAD , possibly due to demand related ischemia , with decreased EF compared to prior echo done in july 2024 ,  conservative management  patient family and patient agreed for it , will continue ecotrin.  increase  metoprolol dose     poor prognosis
Patient with above hx advanced dementia , HTN HLD severe AS who was noted to have elevated troponin , ischemia EKG changes in setting of status epilepticus ,possibly associated underlying CAD , possibly due to demand related ischemia , with decreased EF compared to prior echo done in july 2024 ,  conservative management  patient agreed for it , will continue ecotrin.  continue ow dose metoprolol 12.5 mg po BID, titrate ,     poor prognosis
Patient with above hx advanced dementia , HTN HLD severe AS who was noted to have elevated troponin , ischemia EKG changes in setting of status epilepticus ,possibly associated underlying CAD , possibly due to demand related ischemia , with decreased EF compared to prior echo done in july 2024 ,  conservative management  patient family and patient agreed for it , will continue ecotrin.  continue low dose metoprolol 12.5 mg po BID, titrate ,     poor prognosis
Patient with above hx advanced dementia , HTN HLD severe AS who was noted to have elevated troponin , ischemia EKG changes in setting of status epilepticus ,possibly associated underlying CAD , possibly due to demand related ischemia , with decreased EF compared to prior echo done in july 2024 ,  conservative management  patient agreed for it , will continue ecotrin.  add low dose metoprolol 12.5 mg po BID, titrate ,     poor prognosis

## 2025-02-24 NOTE — PROGRESS NOTE ADULT - ASSESSMENT
Assessment:  89F with AS, Dementia, GERD, HLD, HTN, presents 2/18 from [facility] with altered mental status, found to be in Status epilepticus   Febrile 101F on admission, on vent  WBC 16  Cr 1.70  Lactate 4.2 > 1.9  UA negative  CTH No hemorrhage.  No large territorial infarct.  Diffuse cortical atrophy.   CTA H&N mild calcified atherosclerotic plaques bilateral carotid at bifurcation, non flow limiting, no intracranial LVO.  CTAP negative  CT Chest with Dependent posterior RUL, superior RLL and to lesser extent apicoposterior SAMEER groundglass opacities and diffuse interlobular septal thickening. Pulmonary edema with or without associated pneumonia suspected  Full RVP negative, MRSA negative  BCx 2/18 negative  MRI with small emboli bilateral occipital lobes   ETT sputum 2/20 negative    Antimicrobials:  Acyclovir (2/18 --- )  ampicillin  IVPB 2 every 8 hours (2/18 --- )  cefTRIAXone Injectable. 2000 every 12 hours (2/18 --- )  Vanco (2/18 --- )    Impression:   #Status epilepticus, Metabolic Encephalopathy, Fever, r/o Meningitis  #Acute hypoxic respiratory failure, Pulmonary edema with possible Pneumonia  #Abnormal MRI Brain concerning for bilateral small emboli  #Leukocytosis  #JACKY  #Lactic Acidosis   #Hx of Dementia    Recommendations:  - continue empiric Meningitis coverage  --- Vancomycin 750mg q24 (Day #7)  --- CTX 2G q12 (Day #7)  --- Ampicillin 2G q12 (renally dosed) (Day #7)  --- Acyclovir 490mg q24 (renally dosed) (Day #7)  - monitor temperature curve  - trend WBC  - reason for abx use reviewed with patient  - side effects of antibiotic discussed, tolerating abx well so far  - Prior cultures reviewed. An epidemiologic assessment was performed. There is a significant risk for resistant microorganisms to spread to family members, and/or healthcare staff. The patient will be placed on isolation according to infection control policy. Will reconsider isolation measures based on new culture results and other clinical data as appropriate Appropriate cultures collected and an appropriate broad spectrum antibiotic therapy will be considered  - family refused LP, unable to rule out meningitis; will have to continue empiric treatment for at least 14-day course unless LP obtained  - follow up Neurology  - rest per primary team    [facility] IV to PO Token not applicable; Patient to continue with IV Therapy

## 2025-02-24 NOTE — PROGRESS NOTE ADULT - ASSESSMENT
89 year old woman with HTN, HLD, aortic stenosis, ?dementia, nursing home resident, who presented to  ED on 2/18 with unresponsiveness, having a witnessed seizure while in the ED, identified to be in status epilepticus on initial evaluation.    She remains intubated, now on Precedex.    Her MRI does show several foci of very small strokes, as well as a region of the R thalamus with diffusion restriction.  EEG initially with focal seizures, now only LPD's on the R.  Has not had seizure over last 24h. Labs with downtrend in troponin and leukocytosis.   Initial suspicion for meningoencephalitis as possible cause of new onset seizures in patient with fever, and LPD's.  Still suspecting this despite MRI results, which do not fully explain her presentation.  Family was not agreeable to LP for CSF verification of the diagnosis at this time.     -continue keppra, 500 mg BID  -continue on vimpat 100mg TID   -If at some point patient has increased sedation, can consider decreasing Keppra dose.   -started on empiric meningoencephalitis anti-infective medications on day 1 of admission, renally dosed: vancomycin, ampicillin, and acyclovir  -seizure precautions    Will f/u as needed

## 2025-02-24 NOTE — PROGRESS NOTE ADULT - ASSESSMENT
89yoF PMH AS, dementia (baseline A&O x 2–3), GERD, HLD, HTN BIBEMS from [facility] for AMS, unknown LSW. Per EMS pt was found this AM unresponsive, aphasic with dark emesis in mouth- no robel blood or witnessed hematemesis. Pt found to be in status epilepticus in the ED intubated for airway protection and admitted to the ICU on 2/18/25. Pt remained on continuous EEG. Neurology was consulted and started the pt on keppra and vimpat. CTH showed no hemorrhage. or large territorial infarct and diffuse cortical atrophy. CTA H&N notable for mild calcified atherosclerotic plaques bilateral carotid at bifurcation, non flow limiting, no intracranial LVO. Pt was also started on pressors and empiric mengitis coverage. Pt had NSTEMI with troponins peaking at 22,152.09 for which cardiology was consulted and pt was managed conservatively. Patient was extubated 2 dyas ago remains on 4L NC. Pt has had waxing and waning mental status similar to her baseline since extubation. She remains on empiric meningitis treatment since family is refusing LP, ID is following and recommends completing empiric treatment course. Patient stable for downgrade form ICU.       #Status epilepticus, Metabolic Encephalopathy, Fever, r/o Meningitis  Pt found to be in status epilepticus intubated in the ED for airway protection and admitted to ICU. Pt also hypotensive with leukocytosis. Pt started on empiric meningitis treatment family refusing LP.   - neurology consulted, appreciate recs   - ID consulted, appreciate recs   -continue keppra, 500 mg BID  -continue on vimpat 100mg TID   - seizure precautions   - pt will have to continue meningitis coverage for 14 day course   - continue empiric Meningitis coverage:  --- Vancomycin 750mg q24 (Day #7)  --- CTX 2G q12 (Day #7)  --- Ampicillin 2G q12 (renally dosed) (Day #7)  --- Acyclovir 490mg q24 (renally dosed) (Day #7)  - f/u vanc trough prior to 4th dose     #Troponemia   #HFrEF EF 40%   PT found to have elevated troponin which peaked at 22k likely iso of demand from status epileticus vs underlying CAD. EF showed new decreased EF of 40-45% compared to prior.   - cardiology consulted, appreciate recs   - cw aspirin 81 mg daily   - cw lasix 20 mg daily   - cw losartan 50 mg daily   - cw lopressor 50 mg bid     #Pulmonary edema   #Acute respiratory failure with hypoxemia   CT notable for pulmonary edema and GGO's possible pneumonia vs secondary to chf   - wean oxygen as able   - trend fever, wbc   - abx as above     #HLD   - cw statin     #Dispo: pending pt  89yoF PMH AS, dementia (baseline A&O x 2–3), GERD, HLD, HTN BIBEMS from [facility] for AMS, unknown LSW. Per EMS pt was found this AM unresponsive, aphasic with dark emesis in mouth- no robel blood or witnessed hematemesis. Pt found to be in status epilepticus in the ED intubated for airway protection and admitted to the ICU on 2/18/25. Pt remained on continuous EEG. Neurology was consulted and started the pt on keppra and vimpat. CTH showed no hemorrhage. or large territorial infarct and diffuse cortical atrophy. CTA H&N notable for mild calcified atherosclerotic plaques bilateral carotid at bifurcation, non flow limiting, no intracranial LVO. Pt was also started on pressors and empiric mengitis coverage. Pt had NSTEMI with troponins peaking at 22,152.09 for which cardiology was consulted and pt was managed conservatively. Patient was extubated 2 dyas ago remains on 4L NC. Pt has had waxing and waning mental status similar to her baseline since extubation. She remains on empiric meningitis treatment since family is refusing LP, ID is following and recommends completing empiric treatment course. Patient stable for downgrade form ICU.       #Status epilepticus, Metabolic Encephalopathy, Fever, r/o Meningitis  Pt found to be in status epilepticus intubated in the ED for airway protection and admitted to ICU. Pt also hypotensive with leukocytosis. Pt started on empiric meningitis treatment family refusing LP.   - neurology consulted, appreciate recs   - ID consulted, appreciate recs   -continue keppra, 500 mg BID  -continue on vimpat 100mg TID   - seizure precautions   - pt will have to continue meningitis coverage for 14 day course   - continue empiric Meningitis coverage:  --- Vancomycin 750mg q24 (Day #7)  --- CTX 2G q12 (Day #7)  --- Ampicillin 2G q12 (renally dosed) (Day #7)  --- Acyclovir 490mg q24 (renally dosed) (Day #7)  - f/u vanc trough prior to 4th dose     #Troponemia   #HFrEF EF 40%   PT found to have elevated troponin which peaked at 22k likely iso of demand from status epileticus vs underlying CAD. EF showed new decreased EF of 40-45% compared to prior.   - cardiology consulted, appreciate recs   - cw aspirin 81 mg daily   - cw lasix 20 mg daily M,W,F  - cw losartan 25 mg daily   - cw lopressor 50 mg bid     #Pulmonary edema   #Acute respiratory failure with hypoxemia   CT notable for pulmonary edema and GGO's possible pneumonia vs secondary to chf   - wean oxygen as able   - trend fever, wbc   - abx as above     #HLD   - cw statin     #Dispo: pending pt

## 2025-02-24 NOTE — PROGRESS NOTE ADULT - PROBLEM SELECTOR PLAN 3
patient had  hx , did not want any intervention.
patient had  hx , did not want any intervention.
status epilepticus possible aspiration pneumonia ? pulmonary edema on CT chest , with episode of mild worsening of shortness of breath likely ADHF with uncontrolled hypertension   now extubated and improved
patient had  hx , did not want any intervention.

## 2025-02-24 NOTE — PROGRESS NOTE ADULT - SUBJECTIVE AND OBJECTIVE BOX
Patient is a 89y old  Female who presents with a chief complaint of status ep, shock, resp failure (24 Feb 2025 08:27)    Allergies    No Known Allergies    Intolerances      REVIEW OF SYSTEMS: SEE BELOW       ICU Vital Signs Last 24 Hrs  T(C): 36.9 (24 Feb 2025 09:00), Max: 37.2 (23 Feb 2025 16:00)  T(F): 98.4 (24 Feb 2025 09:00), Max: 99 (23 Feb 2025 16:00)  HR: 89 (24 Feb 2025 11:00) (78 - 107)  BP: 117/51 (24 Feb 2025 11:00) (113/52 - 167/74)  BP(mean): 66 (24 Feb 2025 11:00) (65 - 106)  ABP: --  ABP(mean): --  RR: 19 (24 Feb 2025 11:00) (13 - 25)  SpO2: 99% (24 Feb 2025 11:00) (92% - 100%)    O2 Parameters below as of 24 Feb 2025 11:00  Patient On (Oxygen Delivery Method): nasal cannula  O2 Flow (L/min): 4          CAPILLARY BLOOD GLUCOSE          I&O's Summary    23 Feb 2025 07:01  -  24 Feb 2025 07:00  --------------------------------------------------------  IN: 1310 mL / OUT: 1750 mL / NET: -440 mL    24 Feb 2025 07:01  -  24 Feb 2025 11:17  --------------------------------------------------------  IN: 300 mL / OUT: 0 mL / NET: 300 mL            MEDICATIONS  (STANDING):  acyclovir IVPB 490 milliGRAM(s) IV Intermittent every 24 hours  ampicillin  IVPB 2 Gram(s) IV Intermittent every 12 hours  aspirin  chewable 81 milliGRAM(s) Oral daily  atorvastatin 40 milliGRAM(s) Oral at bedtime  cefTRIAXone Injectable. 2000 milliGRAM(s) IV Push every 12 hours  furosemide    Tablet 20 milliGRAM(s) Oral daily  heparin   Injectable 5000 Unit(s) SubCutaneous every 8 hours  lacosamide IVPB 100 milliGRAM(s) IV Intermittent <User Schedule>  levETIRAcetam   Injectable 500 milliGRAM(s) IV Push every 12 hours  losartan 50 milliGRAM(s) Oral daily  metoprolol tartrate 50 milliGRAM(s) Oral every 12 hours  pantoprazole  Injectable 40 milliGRAM(s) IV Push daily  thiamine 100 milliGRAM(s) Oral daily  vancomycin  IVPB 750 milliGRAM(s) IV Intermittent every 24 hours      MEDICATIONS  (PRN):  LORazepam   Injectable 1 milliGRAM(s) IV Push every 30 minutes PRN seizures      PHYSICAL EXAM: SEE BELOW                          11.9   12.28 )-----------( 297      ( 24 Feb 2025 06:03 )             36.3       02-24    138  |  106  |  38[H]  ----------------------------<  100[H]  4.7   |  27  |  0.92    Ca    9.0      24 Feb 2025 06:03  Phos  2.4     02-24  Mg     2.6     02-24              Urinalysis Basic - ( 24 Feb 2025 06:03 )    Color: x / Appearance: x / SG: x / pH: x  Gluc: 100 mg/dL / Ketone: x  / Bili: x / Urobili: x   Blood: x / Protein: x / Nitrite: x   Leuk Esterase: x / RBC: x / WBC x   Sq Epi: x / Non Sq Epi: x / Bacteria: x      ET Tube ET Tube   No growth   No polymorphonuclear leukocytes per low power field  No Squamous epithelial cells per low power field  No organisms seen per oil power field 02-20 @ 16:51      Rapid RVP Result: Esteban (02-19 @ 11:37)

## 2025-02-24 NOTE — PROGRESS NOTE ADULT - SUBJECTIVE AND OBJECTIVE BOX
Interval History:  2/24/25: No new events    MEDICATIONS  (STANDING):  acyclovir IVPB 490 milliGRAM(s) IV Intermittent every 24 hours  ampicillin  IVPB 2 Gram(s) IV Intermittent every 12 hours  aspirin  chewable 81 milliGRAM(s) Oral daily  atorvastatin 40 milliGRAM(s) Oral at bedtime  cefTRIAXone Injectable. 2000 milliGRAM(s) IV Push every 12 hours  heparin   Injectable 5000 Unit(s) SubCutaneous every 8 hours  lacosamide IVPB 100 milliGRAM(s) IV Intermittent <User Schedule>  levETIRAcetam   Injectable 500 milliGRAM(s) IV Push every 12 hours  losartan 25 milliGRAM(s) Oral daily  metoprolol tartrate 25 milliGRAM(s) Oral two times a day  pantoprazole  Injectable 40 milliGRAM(s) IV Push daily  thiamine 100 milliGRAM(s) Oral daily  vancomycin  IVPB 750 milliGRAM(s) IV Intermittent every 24 hours    MEDICATIONS  (PRN):  LORazepam   Injectable 1 milliGRAM(s) IV Push every 30 minutes PRN seizures      Allergies    No Known Allergies    Intolerances        PHYSICAL EXAM:  Vital Signs Last 24 Hrs  T(F): 98.1 (02-24-25 @ 04:00)  HR: 87 (02-24-25 @ 08:00)  BP: 132/51 (02-24-25 @ 08:00)  RR: 25 (02-24-25 @ 08:00)    GENERAL: NAD  HEAD:  Atraumatic, Normocephalic  Neuro:  Awake, alert, able to state her name, knows she is in the hospital  CN: PERRL, EOMI, no nystagmus, no facial weakness, tongue protrudes in the midline, hearing impaired  motor: generalized weakness, moves all extremities  sensory: intact to light touch  coordination: no involuntary movements noted  gait: not tested    LABS:                        11.9   12.28 )-----------( 297      ( 24 Feb 2025 06:03 )             36.3     02-24    138  |  106  |  38[H]  ----------------------------<  100[H]  4.7   |  27  |  0.92    Ca    9.0      24 Feb 2025 06:03  Phos  2.4     02-24  Mg     2.6     02-24        Urinalysis Basic - ( 24 Feb 2025 06:03 )    Color: x / Appearance: x / SG: x / pH: x  Gluc: 100 mg/dL / Ketone: x  / Bili: x / Urobili: x   Blood: x / Protein: x / Nitrite: x   Leuk Esterase: x / RBC: x / WBC x   Sq Epi: x / Non Sq Epi: x / Bacteria: x        RADIOLOGY & ADDITIONAL STUDIES:  MR head with and without contrast 2/20/25:  1.  Punctate small emboli in both occipital lobes and the right parahippocampal gyrus, 24 hours to 7 days in age.  2. Right thalamic signal abnormalities, possibly related to seizures, infection, or infarction. Follow-up recommended.  3.  Stable senescent cerebral changes and orbital varices.    EEG 2/20/25-2/21/25:  -Possible subtle right frontocentral focal seizure with LUE slow jerky movements (extension of L 2nd finger and wrist followed by L elbow flexion) possibly time-locked to ongoing LPDs: 12:51 2/20/25  -Risk of focal-onset seizures from the right frontocentral, left posterior temporal, and left occipito-temporal regions  -Right hemispheric focal cerebral dysfunction can be structural or functional in etiology.  -Mild-moderate diffuse and/or multifocal cerebral dysfunction is nonspecific in etiology.  -No seizures.    EEG 2/21/25-2/22/25:  -Mild to moderate generalized slowing  -Superimposed right hemisphere slowing  -Broad right (fronto-central predominant) epileptiform discharges, at   times occurring in a period fashion.

## 2025-02-24 NOTE — PROGRESS NOTE ADULT - SUBJECTIVE AND OBJECTIVE BOX
CHIEF COMPLAINT:    HPI:  89yoF PMH  severe AS ( refused any intervention , dementia (baseline A&O x 2–3), GERD, HLD, HTN BIBEMS from [facility] for AMS, unknown LSW. Per EMS pt was found this AM unresponsive, aphasic with dark emesis in mouth- no robel blood or witnessed hematemesis. HPI limited 2/2 medical condition. Per Daughter Tammy pt is DNR DNI, patient  blood work showed markedly elevated troponin ,  in setting of status epilepticus , patient had lactic acidosis  , became hypotensive , was intubated  ,family at bedside      patient blood work increased troponin , ekg showed ischemic ST  T changes   ,   patient had severe AS ,  HTN  patient and family did not want any intervention , and now also   2/20/25 Patient opens eyes ,  intubated   improved blood pressure    2/21/25 awake , intubated   2/22/25 patient remain intubated  able to follow commands   2/23/25 patient was extubated yesterday evening , patient was restless in night  hypertensive with mild sob , has recieved IV hydralaizine  lasix , feeling better on VM oxygen   2/24/25 pt is very confused AAO x0    MEDICATIONS:Home Medications:  acetaminophen 325 mg oral tablet: 2 tab(s) orally every 6 hours as needed for  pain max 3 g/24 hours (18 Feb 2025 11:04)  diclofenac 1% topical gel: Apply 2 grams topically to affected area 2 times daily to B/L knees for pain (18 Feb 2025 12:25)  docusate sodium 100 mg oral capsule: 2 cap(s) orally once a day (18 Feb 2025 12:25)  senna (sennosides) 8.6 mg oral tablet: 2 tab(s) orally once a day (at bedtime) (18 Feb 2025 12:25)    MEDICATIONS  (STANDING):  acyclovir IVPB 490 milliGRAM(s) IV Intermittent every 24 hours  ampicillin  IVPB 2 Gram(s) IV Intermittent every 12 hours  aspirin  chewable 81 milliGRAM(s) Oral daily  atorvastatin 40 milliGRAM(s) Oral at bedtime  cefTRIAXone Injectable. 2000 milliGRAM(s) IV Push every 12 hours  heparin   Injectable 5000 Unit(s) SubCutaneous every 8 hours  hydrocortisone sodium succinate Injectable 25 milliGRAM(s) IV Push every 12 hours  lacosamide IVPB 100 milliGRAM(s) IV Intermittent <User Schedule>  levETIRAcetam   Injectable 500 milliGRAM(s) IV Push every 12 hours  losartan 25 milliGRAM(s) Oral daily  metoprolol tartrate 25 milliGRAM(s) Oral two times a day  pantoprazole  Injectable 40 milliGRAM(s) IV Push daily  potassium chloride  10 mEq/100 mL IVPB 10 milliEquivalent(s) IV Intermittent every 1 hour  thiamine 100 milliGRAM(s) Oral daily  vancomycin  IVPB 750 milliGRAM(s) IV Intermittent every 24 hours    MEDICATIONS  (PRN):  LORazepam   Injectable 1 milliGRAM(s) IV Push every 30 minutes PRN seizures    Vital Signs Last 24 Hrs  T(C): 37.3 (23 Feb 2025 08:00), Max: 37.4 (23 Feb 2025 06:00)  T(F): 99.2 (23 Feb 2025 08:00), Max: 99.4 (23 Feb 2025 06:00)  HR: 85 (23 Feb 2025 10:00) (65 - 98)  BP: 130/61 (23 Feb 2025 10:00) (113/82 - 164/83)  BP(mean): 82 (23 Feb 2025 10:00) (72 - 109)  RR: 23 (23 Feb 2025 10:00) (16 - 26)  SpO2: 97% (23 Feb 2025 10:00) (90% - 100%)    Parameters below as of 23 Feb 2025 09:00  Patient On (Oxygen Delivery Method): mask, Venturi    O2 Concentration (%): 50    I&O's Summary    22 Feb 2025 07:01  -  23 Feb 2025 07:00  --------------------------------------------------------  IN: 916 mL / OUT: 2775 mL / NET: -1859 mL    23 Feb 2025 07:01  -  23 Feb 2025 11:18  --------------------------------------------------------  IN: 100 mL / OUT: 0 mL / NET: 100 mL        PHYSICAL EXAM:    Constitutional: frail appearing, confused   HEENT: PERR, EOMI,  No oral cyanosis   Neck:  supple,  No JVD  Respiratory: diminished  Cardiovascular: S1 and S2, systolic murmur   Gastrointestinal: Bowel Sounds present, soft, nontender.   Extremities: No peripheral edema. No clubbing or cyanosis.  Neurological: opens eyes, disoriented, nonverbal  Skin: No rashes.      LABS: All Labs Reviewed:                                     11.3   14.09 )-----------( 241      ( 23 Feb 2025 05:52 )             34.5     02-23    141  |  107  |  44[H]  ----------------------------<  99  3.0[L]   |  26  |  1.24    Ca    9.5      23 Feb 2025 05:52  Phos  2.7     02-23  Mg     2.6     02-23              Urinalysis Basic - ( 23 Feb 2025 05:52 )    Color: x / Appearance: x / SG: x / pH: x  Gluc: 99 mg/dL / Ketone: x  / Bili: x / Urobili: x   Blood: x / Protein: x / Nitrite: x   Leuk Esterase: x / RBC: x / WBC x   Sq Epi: x / Non Sq Epi: x / Bacteria: x      02-20 Chol 231[H] LDL -- HDL 59 Trig 109  Culture Results:   No growth (02-20-25 @ 16:51)      - TroponinI hsT: <-4069.64, <-7857.91, <-34030.29, <-31509.09, <-80968.94    RADIOLOGY/EKG:    < from: 12 Lead ECG (02.18.25 @ 08:37) >    Diagnosis Line Sinus tachycardia  < from: Xray Chest 1 View- PORTABLE-Routine (Xray Chest 1 View- PORTABLE-Routine in AM.) (02.22.25 @ 07:03) >  MPRESSION:    No focal airspace opacity.  No significant interval change since 2/18/2025.    < end of copied text >  Left atrial enlargement  Left axis deviation  Left ventricular hypertrophy ( [place] product )  ST & T wave abnormality, consider lateral ischemia  Abnormal ECG  When compared with ECG of 19-JAN-2025 14:32,  ST now depressed in Lateral leads  Nonspecific T wave abnormality, improved in Inferior leads  Confirmed by Madeline Fernandez ([id number]) on 2/18/2025 8:47:40 PM    < end of copied text >  < from: CT Chest w/ IV Cont (02.18.25 @ 09:15) >    IMPRESSION:  1.  Dependent posterior RUL, superior RLL and to lesser extent   apicoposterior SAMEER groundglass opacities and diffuse interlobular septal   thickening. Pulmonary edema with or without associated pneumonia   suspected.  2.  No acute abdominal/pelvic pathology.  3.  Etiology of hematemesis not determined.    < end of copied text >      < from: TTE W or WO Ultrasound Enhancing Agent (02.19.25 @ 13:11) >  CONCLUSIONS:      1. Left ventricular systolic function is mildly decreased with an ejection fraction visually estimated at 40 to 45 %.   2. Left atrium is mildly dilated.   3. Mild to moderate mitral regurgitation.   4. Moderate tricuspid regurgitation.   5. Estimated pulmonary artery systolic pressure is 49 mmHg, consistent with moderate pulmonary hypertension.   6. Mild left ventricular hypertrophy.   7. Mild mitral valve stenosis.   8. Mild to moderate aortic regurgitation.   9. Severe aortic stenosis.  10. The peak transaortic velocity is 3.96 m/s, peak transaortic gradient is 62.7 mmHg and mean transaortic gradient is 48.0 mmHg with an LVOT/aortic valve VTI ratio of 0.19. The effective orifice area is estimated at 0.60 cm² by the continuity equation.  11. There is moderate calcification of the mitral valve annulus.    < end of copied text >      monitor sinus rhythm

## 2025-02-24 NOTE — PROGRESS NOTE ADULT - SUBJECTIVE AND OBJECTIVE BOX
Date of Service:02-24-25 @ 11:29  Interval History/ROS: Afebrile overnight, now extubated, appears comfortable on NC, awake but confused, does not answer questions appropriately      REVIEW OF SYSTEMS  [ x ] ROS unobtainable because:  mental status  [  ] All other systems negative except as noted below    Constitutional:  [ ] fever [ ] chills  [ ] weight loss  [ ]night sweat  [ ]poor appetite/PO intake [ ]fatigue   Skin:  [ ] rash [ ] phlebitis	  Eyes: [ ] icterus [ ] pain  [ ] discharge	  ENMT: [ ] sore throat  [ ] thrush [ ] ulcers [ ] exudates [ ]anosmia  Respiratory: [ ] dyspnea [ ] hemoptysis [ ] cough [ ] sputum	  Cardiovascular:  [ ] chest pain [ ] palpitations [ ] edema	  Gastrointestinal:  [ ] nausea [ ] vomiting [ ] diarrhea [ ] constipation [ ] pain	  Genitourinary:  [ ] dysuria [ ] frequency [ ] hematuria [ ] discharge [ ] flank pain  [ ] incontinence  Musculoskeletal:  [ ] myalgias [ ] arthralgias [ ] arthritis  [ ] back pain  Neurological:  [ ] headache [ ] weakness [ ] seizures  [ ] confusion/altered mental status    Allergies  No Known Allergies        ANTIMICROBIALS:    acyclovir IVPB 490 every 24 hours  ampicillin  IVPB 2 every 12 hours  cefTRIAXone Injectable. 2000 every 12 hours  vancomycin  IVPB 750 every 24 hours        OTHER MEDS: MEDICATIONS  (STANDING):  aspirin  chewable 81 daily  atorvastatin 40 at bedtime  furosemide    Tablet 20 daily  heparin   Injectable 5000 every 8 hours  lacosamide IVPB 100 <User Schedule>  levETIRAcetam   Injectable 500 every 12 hours  losartan 50 daily  metoprolol tartrate 50 every 12 hours      Vital Signs Last 24 Hrs  T(F): 98.4 (02-24-25 @ 09:00), Max: 101.5 (02-18-25 @ 15:00)    Vital Signs Last 24 Hrs  HR: 89 (02-24-25 @ 11:00) (78 - 107)  BP: 117/51 (02-24-25 @ 11:00) (113/52 - 167/74)  RR: 19 (02-24-25 @ 11:00)  SpO2: 99% (02-24-25 @ 11:00) (92% - 100%)  Wt(kg): --    EXAM:    Constitutional: awake but confused  Head/Eyes: no icterus  LUNGS:  crackles bases  CVS:  regular rhythm  Abd:  soft, non-tender; non-distended  Ext:  no edema  Vascular:  IV site no erythema tenderness or discharge  Neuro: AAO X 0    Labs:                        11.9   12.28 )-----------( 297      ( 24 Feb 2025 06:03 )             36.3     02-24    138  |  106  |  38[H]  ----------------------------<  100[H]  4.7   |  27  |  0.92    Ca    9.0      24 Feb 2025 06:03  Phos  2.4     02-24  Mg     2.6     02-24        WBC Trend:  WBC Count: 12.28 (02-24-25 @ 06:03)  WBC Count: 14.09 (02-23-25 @ 05:52)  WBC Count: 6.86 (02-22-25 @ 06:13)  WBC Count: 8.91 (02-21-25 @ 05:47)      Creatine Trend:  Creatinine: 0.92 (02-24)  Creatinine: 1.24 (02-23)  Creatinine: 1.33 (02-22)  Creatinine: 1.41 (02-21)      Liver Biochemical Testing Trend:  Alanine Aminotransferase (ALT/SGPT): 42 (02-21)  Alanine Aminotransferase (ALT/SGPT): 42 (02-20)  Alanine Aminotransferase (ALT/SGPT): 25 (02-18)  Alanine Aminotransferase (ALT/SGPT): 26 (01-19)  Alanine Aminotransferase (ALT/SGPT): 17 (07-30)  Aspartate Aminotransferase (AST/SGOT): 37 (02-21-25 @ 05:47)  Aspartate Aminotransferase (AST/SGOT): 66 (02-20-25 @ 06:00)  Aspartate Aminotransferase (AST/SGOT): 72 (02-18-25 @ 08:05)  Aspartate Aminotransferase (AST/SGOT): 29 (01-19-25 @ 09:29)  Aspartate Aminotransferase (AST/SGOT): 34 (07-30-24 @ 06:48)  Bilirubin Total: 0.2 (02-21)  Bilirubin Total: 0.3 (02-20)  Bilirubin Total: 0.5 (02-18)  Bilirubin Total: 0.4 (01-19)  Bilirubin Total: 1.4 (07-30)      Trend LDH      Urinalysis Basic - ( 24 Feb 2025 06:03 )    Color: x / Appearance: x / SG: x / pH: x  Gluc: 100 mg/dL / Ketone: x  / Bili: x / Urobili: x   Blood: x / Protein: x / Nitrite: x   Leuk Esterase: x / RBC: x / WBC x   Sq Epi: x / Non Sq Epi: x / Bacteria: x        MICROBIOLOGY:  Vancomycin Level, Trough: 17.1 (02-24 @ 06:03)  Vancomycin Level, Random: 13.0 (02-22 @ 06:13)  Vancomycin Level, Random: 11.5 (02-21 @ 05:47)  Vancomycin Level, Random: 6.8 (02-20 @ 06:00)    MRSA PCR Result.: NotDetec (02-18-25 @ 14:10)      Culture - Sputum (collected 20 Feb 2025 16:51)  Source: ET Tube ET Tube  Final Report:    No growth    Urinalysis with Rflx Culture (collected 18 Feb 2025 08:25)    Culture - Blood (collected 18 Feb 2025 08:05)  Source: .Blood BLOOD  Final Report:    No growth at 5 days    Culture - Blood (collected 18 Feb 2025 08:05)  Source: .Blood BLOOD  Final Report:    No growth at 5 days    Culture - Urine (collected 21 Jan 2025 20:43)  Source: Catheterized Catheterized  Final Report:    No growth    Culture - Blood (collected 19 Jan 2025 14:25)  Source: .Blood BLOOD  Final Report:    No growth at 5 days    Culture - Blood (collected 19 Jan 2025 14:18)  Source: .Blood BLOOD  Final Report:    No growth at 5 days    Urinalysis with Rflx Culture (collected 19 Jan 2025 10:45)    Culture - Blood (collected 01 Aug 2024 06:06)  Source: .Blood None  Final Report:    No growth at 5 days    Culture - Blood (collected 01 Aug 2024 06:06)  Source: .Blood None  Final Report:    No growth at 5 days      Legionella Antigen, Urine: Negative (02-20 @ 06:20)  Rapid RVP Result: NotDetec (02-19 @ 11:37)        Procalcitonin: 0.63 (02-18)      RADIOLOGY:  imaging below personally reviewed    Xray Chest 1 View- PORTABLE-Urgent:  (23 Feb 2025 09:05)

## 2025-02-25 LAB
ANION GAP SERPL CALC-SCNC: 5 MMOL/L — SIGNIFICANT CHANGE UP (ref 5–17)
BUN SERPL-MCNC: 33 MG/DL — HIGH (ref 7–23)
CALCIUM SERPL-MCNC: 9 MG/DL — SIGNIFICANT CHANGE UP (ref 8.5–10.1)
CHLORIDE SERPL-SCNC: 105 MMOL/L — SIGNIFICANT CHANGE UP (ref 96–108)
CO2 SERPL-SCNC: 27 MMOL/L — SIGNIFICANT CHANGE UP (ref 22–31)
CREAT SERPL-MCNC: 1.09 MG/DL — SIGNIFICANT CHANGE UP (ref 0.5–1.3)
EGFR: 46 ML/MIN/1.73M2 — LOW
GLUCOSE BLDC GLUCOMTR-MCNC: 184 MG/DL — HIGH (ref 70–99)
GLUCOSE SERPL-MCNC: 106 MG/DL — HIGH (ref 70–99)
HCT VFR BLD CALC: 36.6 % — SIGNIFICANT CHANGE UP (ref 34.5–45)
HGB BLD-MCNC: 12 G/DL — SIGNIFICANT CHANGE UP (ref 11.5–15.5)
MAGNESIUM SERPL-MCNC: 2.4 MG/DL — SIGNIFICANT CHANGE UP (ref 1.6–2.6)
MCHC RBC-ENTMCNC: 31.1 PG — SIGNIFICANT CHANGE UP (ref 27–34)
MCHC RBC-ENTMCNC: 32.8 G/DL — SIGNIFICANT CHANGE UP (ref 32–36)
MCV RBC AUTO: 94.8 FL — SIGNIFICANT CHANGE UP (ref 80–100)
NRBC # BLD AUTO: 0 K/UL — SIGNIFICANT CHANGE UP (ref 0–0)
NRBC # FLD: 0 K/UL — SIGNIFICANT CHANGE UP (ref 0–0)
NRBC BLD AUTO-RTO: 0 /100 WBCS — SIGNIFICANT CHANGE UP (ref 0–0)
PHOSPHATE SERPL-MCNC: 2.6 MG/DL — SIGNIFICANT CHANGE UP (ref 2.5–4.5)
PLATELET # BLD AUTO: 313 K/UL — SIGNIFICANT CHANGE UP (ref 150–400)
PMV BLD: 10.4 FL — SIGNIFICANT CHANGE UP (ref 7–13)
POTASSIUM SERPL-MCNC: 3.6 MMOL/L — SIGNIFICANT CHANGE UP (ref 3.5–5.3)
POTASSIUM SERPL-SCNC: 3.6 MMOL/L — SIGNIFICANT CHANGE UP (ref 3.5–5.3)
RBC # BLD: 3.86 M/UL — SIGNIFICANT CHANGE UP (ref 3.8–5.2)
RBC # FLD: 14.6 % — HIGH (ref 10.3–14.5)
SODIUM SERPL-SCNC: 137 MMOL/L — SIGNIFICANT CHANGE UP (ref 135–145)
VANCOMYCIN TROUGH SERPL-MCNC: 16 UG/ML — SIGNIFICANT CHANGE UP (ref 10–20)
WBC # BLD: 12.65 K/UL — HIGH (ref 3.8–10.5)
WBC # FLD AUTO: 12.65 K/UL — HIGH (ref 3.8–10.5)

## 2025-02-25 PROCEDURE — 99233 SBSQ HOSP IP/OBS HIGH 50: CPT

## 2025-02-25 PROCEDURE — 99232 SBSQ HOSP IP/OBS MODERATE 35: CPT

## 2025-02-25 RX ADMIN — HEPARIN SODIUM 5000 UNIT(S): 1000 INJECTION INTRAVENOUS; SUBCUTANEOUS at 13:34

## 2025-02-25 RX ADMIN — LACOSAMIDE 100 MILLIGRAM(S): 150 TABLET, FILM COATED ORAL at 18:36

## 2025-02-25 RX ADMIN — HEPARIN SODIUM 5000 UNIT(S): 1000 INJECTION INTRAVENOUS; SUBCUTANEOUS at 21:43

## 2025-02-25 RX ADMIN — METOPROLOL SUCCINATE 50 MILLIGRAM(S): 50 TABLET, EXTENDED RELEASE ORAL at 21:44

## 2025-02-25 RX ADMIN — ATORVASTATIN CALCIUM 40 MILLIGRAM(S): 80 TABLET, FILM COATED ORAL at 21:44

## 2025-02-25 RX ADMIN — Medication 250 MILLIGRAM(S): at 12:56

## 2025-02-25 RX ADMIN — Medication 100 MILLIGRAM(S): at 11:34

## 2025-02-25 RX ADMIN — LEVETIRACETAM 500 MILLIGRAM(S): 10 INJECTION, SOLUTION INTRAVENOUS at 21:44

## 2025-02-25 RX ADMIN — Medication 109.8 MILLIGRAM(S): at 06:08

## 2025-02-25 RX ADMIN — AMPICILLIN SODIUM 216 GRAM(S): 1 INJECTION, POWDER, FOR SOLUTION INTRAMUSCULAR; INTRAVENOUS at 18:24

## 2025-02-25 RX ADMIN — LOSARTAN POTASSIUM 25 MILLIGRAM(S): 100 TABLET, FILM COATED ORAL at 11:34

## 2025-02-25 RX ADMIN — CEFTRIAXONE 2000 MILLIGRAM(S): 500 INJECTION, POWDER, FOR SOLUTION INTRAMUSCULAR; INTRAVENOUS at 06:08

## 2025-02-25 RX ADMIN — AMPICILLIN SODIUM 216 GRAM(S): 1 INJECTION, POWDER, FOR SOLUTION INTRAMUSCULAR; INTRAVENOUS at 06:08

## 2025-02-25 RX ADMIN — LACOSAMIDE 100 MILLIGRAM(S): 150 TABLET, FILM COATED ORAL at 03:06

## 2025-02-25 RX ADMIN — LACOSAMIDE 100 MILLIGRAM(S): 150 TABLET, FILM COATED ORAL at 11:40

## 2025-02-25 RX ADMIN — LEVETIRACETAM 500 MILLIGRAM(S): 10 INJECTION, SOLUTION INTRAVENOUS at 11:33

## 2025-02-25 RX ADMIN — CEFTRIAXONE 2000 MILLIGRAM(S): 500 INJECTION, POWDER, FOR SOLUTION INTRAMUSCULAR; INTRAVENOUS at 18:24

## 2025-02-25 RX ADMIN — HEPARIN SODIUM 5000 UNIT(S): 1000 INJECTION INTRAVENOUS; SUBCUTANEOUS at 06:08

## 2025-02-25 RX ADMIN — Medication 81 MILLIGRAM(S): at 11:33

## 2025-02-25 RX ADMIN — METOPROLOL SUCCINATE 50 MILLIGRAM(S): 50 TABLET, EXTENDED RELEASE ORAL at 11:33

## 2025-02-25 NOTE — PROGRESS NOTE ADULT - SUBJECTIVE AND OBJECTIVE BOX
Interval History:  2/25/25: Patient transferred from ICU to [address] [place] [address]    MEDICATIONS  (STANDING):  acyclovir IVPB 490 milliGRAM(s) IV Intermittent every 24 hours  ampicillin  IVPB 2 Gram(s) IV Intermittent every 12 hours  aspirin  chewable 81 milliGRAM(s) Oral daily  atorvastatin 40 milliGRAM(s) Oral at bedtime  cefTRIAXone Injectable. 2000 milliGRAM(s) IV Push every 12 hours  furosemide    Tablet 20 milliGRAM(s) Oral <User Schedule>  heparin   Injectable 5000 Unit(s) SubCutaneous every 8 hours  lacosamide IVPB 100 milliGRAM(s) IV Intermittent <User Schedule>  levETIRAcetam   Injectable 500 milliGRAM(s) IV Push every 12 hours  losartan 25 milliGRAM(s) Oral daily  metoprolol tartrate 50 milliGRAM(s) Oral every 12 hours  thiamine 100 milliGRAM(s) Oral daily  vancomycin  IVPB 750 milliGRAM(s) IV Intermittent every 24 hours    MEDICATIONS  (PRN):      Allergies    No Known Allergies    Intolerances        PHYSICAL EXAM:  Vital Signs Last 24 Hrs  T(F): 98.1 (02-25-25 @ 04:57)  HR: 89 (02-25-25 @ 04:57)  BP: 134/100 (02-25-25 @ 04:57)  RR: 18 (02-25-25 @ 04:57)    GENERAL: NAD, wearing mittens as she was pulling out oxygen  HEAD:  Atraumatic, Normocephalic  Neuro:  Awake, alert, but confused  CN: PERRL, EOMI, no nystagmus, no facial weakness, tongue protrudes in the midline  motor: normal tone, moving both upper extremities well, moves lower extremities less  sensory: intact to light touch  coordination: unable to test due to confusion, no involuntary movements  gait: unable to test      LABS:                        11.9   12.28 )-----------( 297      ( 24 Feb 2025 06:03 )             36.3     02-24    138  |  106  |  38[H]  ----------------------------<  100[H]  4.7   |  27  |  0.92    Ca    9.0      24 Feb 2025 06:03  Phos  2.4     02-24  Mg     2.6     02-24        Urinalysis Basic - ( 24 Feb 2025 06:03 )    Color: x / Appearance: x / SG: x / pH: x  Gluc: 100 mg/dL / Ketone: x  / Bili: x / Urobili: x   Blood: x / Protein: x / Nitrite: x   Leuk Esterase: x / RBC: x / WBC x   Sq Epi: x / Non Sq Epi: x / Bacteria: x        RADIOLOGY & ADDITIONAL STUDIES:  MR head with and without contrast 2/20/25:  1.  Punctate small emboli in both occipital lobes and the right parahippocampal gyrus, 24 hours to 7 days in age.  2. Right thalamic signal abnormalities, possibly related to seizures, infection, or infarction. Follow-up recommended.  3.  Stable senescent cerebral changes and orbital varices.    EEG 2/20/25-2/21/25:  -Possible subtle right frontocentral focal seizure with LUE slow jerky movements (extension of L 2nd finger and wrist followed by L elbow flexion) possibly time-locked to ongoing LPDs: 12:51 2/20/25  -Risk of focal-onset seizures from the right frontocentral, left posterior temporal, and left occipito-temporal regions  -Right hemispheric focal cerebral dysfunction can be structural or functional in etiology.  -Mild-moderate diffuse and/or multifocal cerebral dysfunction is nonspecific in etiology.  -No seizures.    EEG 2/21/25-2/22/25:  -Mild to moderate generalized slowing  -Superimposed right hemisphere slowing  -Broad right (fronto-central predominant) epileptiform discharges, at   times occurring in a period fashion.

## 2025-02-25 NOTE — PHYSICAL THERAPY INITIAL EVALUATION ADULT - PATIENT/FAMILY/SIGNIFICANT OTHER GOALS STATEMENT, PT EVAL
Family doesn't want patient to return back to [facility]. They have requested for patient to be transitioned to a Nursing Facility. [facility] has been given as the 1st choice.

## 2025-02-25 NOTE — PHYSICAL THERAPY INITIAL EVALUATION ADULT - GENERAL OBSERVATIONS, REHAB EVAL
Pt was found lying in bed on tele, primafit, very confused, talking to herself, pt had mittens on earlier which she removed, on o2 as desatting on RA, pt agreeable to PT but unable to follow much commands

## 2025-02-25 NOTE — PROGRESS NOTE ADULT - ASSESSMENT
89 year old woman with HTN, HLD, aortic stenosis, ?dementia, nursing home resident, who presented to  ED on 2/18 with unresponsiveness, having a witnessed seizure while in the ED, identified to be in status epilepticus on initial evaluation.      Her MRI showed several foci of very small strokes, as well as a region of the R thalamus with diffusion restriction.  EEG initially with focal seizures, then lateralized periodic discharges on the right.  She was also found to have an NSTEMI which was treated conservatively.  There was initial suspicion for meningoencephalitis as a possible cause of new onset seizures in this patient with fever, and LPD's.  Family was not agreeable to LP for CSF verification of the diagnosis and empiric treatment was initiated.    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 Plan is to complete a 14 day course  -seizure precautions    Strokes:  -Very small strokes noted in the bilateral occipital lobes and right parahippocampal gyrus  -Continue aspirin 81 mg/day, atorvastatin 40 mg/day    Will f/u as needed

## 2025-02-25 NOTE — PROGRESS NOTE ADULT - ASSESSMENT
89yoF PMH AS, dementia (baseline A&O x 2–3), GERD, HLD, HTN BIBEMS from [facility] for AMS, unknown LSW. Per EMS pt was found this AM unresponsive, aphasic with dark emesis in mouth- no robel blood or witnessed hematemesis. Pt found to be in status epilepticus in the ED intubated for airway protection and admitted to the ICU on 2/18/25. Pt remained on continuous EEG. Neurology was consulted and started the pt on keppra and vimpat. CTH showed no hemorrhage. or large territorial infarct and diffuse cortical atrophy. CTA H&N notable for mild calcified atherosclerotic plaques bilateral carotid at bifurcation, non flow limiting, no intracranial LVO. Pt was also started on pressors and empiric mengitis coverage. Pt had NSTEMI with troponins peaking at 22,152.09 for which cardiology was consulted and pt was managed conservatively. Patient was extubated 2 dyas ago remains on 4L NC. Pt has had waxing and waning mental status similar to her baseline since extubation. She remains on empiric meningitis treatment since family is refusing LP, ID is following and recommends completing empiric treatment course. Patient deemed stable for downgrade form ICU on 2/24.       #Status epilepticus, Metabolic Encephalopathy, Fever, r/o Meningitis  Pt found to be in status epilepticus intubated in the ED for airway protection and admitted to ICU. Pt also hypotensive with leukocytosis. Pt started on empiric meningitis treatment family refusing LP.   - neurology consulted, appreciate recs   - ID consulted, appreciate recs   -continue keppra, 500 mg BID  -continue on vimpat 100mg TID   - seizure precautions   - pt will have to continue meningitis coverage for 14 day course   - continue empiric Meningitis coverage:  --- Vancomycin 750mg q24 (Day #8)  --- CTX 2G q12 (Day #8)  --- Ampicillin 2G q12 (renally dosed) (Day #8)  --- Acyclovir 490mg q24 (renally dosed) (Day #8)    #CVA  Very small strokes noted in the bilateral occipital lobes and right parahippocampal gyrus  - Neuro input appreciated; Continue aspirin 81 mg/day, atorvastatin 40 mg/day    #Troponemia   #HFrEF EF 40%   PT found to have elevated troponin which peaked at 22k likely iso of demand from status epileticus vs underlying CAD. EF showed new decreased EF of 40-45% compared to prior.   - cardiology consulted, appreciate recs   - cw aspirin 81 mg daily   - cw lasix 20 mg daily M,W,F  - cw losartan 25 mg daily   - cw lopressor 50 mg bid     #Pulmonary edema   #Acute respiratory failure with hypoxemia   CT notable for pulmonary edema and GGO's possible pneumonia vs secondary to chf   - wean oxygen as able   - trend fever, wbc   - abx as above     #HLD   - cw statin     #DVT Ppx  - Heparin SC    #Dispo: PT recommending [facility]. A

## 2025-02-25 NOTE — PHYSICAL THERAPY INITIAL EVALUATION ADULT - ADDITIONAL COMMENTS
Patient is deaf, can speak but was unable to participate in conversation.  Pt has R/Walker, Walker, W/C, hearing aides

## 2025-02-25 NOTE — PROGRESS NOTE ADULT - SUBJECTIVE AND OBJECTIVE BOX
HOSPITALIST ATTENDING PROGRESS NOTE    Chart and meds reviewed.      Subjective:  Patient seen and examined at the bedside. NAEON    All other systems reviewed and found to be negative with the exception of what has been described above.    MEDICATIONS  (STANDING):  acyclovir IVPB 490 milliGRAM(s) IV Intermittent every 24 hours  ampicillin  IVPB 2 Gram(s) IV Intermittent every 12 hours  aspirin  chewable 81 milliGRAM(s) Oral daily  atorvastatin 40 milliGRAM(s) Oral at bedtime  cefTRIAXone Injectable. 2000 milliGRAM(s) IV Push every 12 hours  furosemide    Tablet 20 milliGRAM(s) Oral <User Schedule>  heparin   Injectable 5000 Unit(s) SubCutaneous every 8 hours  lacosamide IVPB 100 milliGRAM(s) IV Intermittent <User Schedule>  levETIRAcetam   Injectable 500 milliGRAM(s) IV Push every 12 hours  losartan 25 milliGRAM(s) Oral daily  metoprolol tartrate 50 milliGRAM(s) Oral every 12 hours  thiamine 100 milliGRAM(s) Oral daily  vancomycin  IVPB 750 milliGRAM(s) IV Intermittent every 24 hours    MEDICATIONS  (PRN):      PHYSICAL EXAM:  Gen: No acute distress  HEENT:  Normocephalic/Atraumatic  CV:  +S1, +S2, regular, no murmurs or rubs  RESP:   lungs clear to auscultation bilaterally, no wheezing, rales, rhonchi  GI:  abdomen soft, non-tender, non-distended  EXT:  no clubbing, no cyanosis, no edema  NEURO:  PATINO, unable to participate in neuro exam due to dementia/delirium    LABS:                            12.0   12.65 )-----------( 313      ( 25 Feb 2025 08:47 )             36.6     02-25    137  |  105  |  33[H]  ----------------------------<  106[H]  3.6   |  27  |  1.09    Ca    9.0      25 Feb 2025 08:47  Phos  2.6     02-25  Mg     2.4     02-25              Urinalysis Basic - ( 25 Feb 2025 08:47 )    Color: x / Appearance: x / SG: x / pH: x  Gluc: 106 mg/dL / Ketone: x  / Bili: x / Urobili: x   Blood: x / Protein: x / Nitrite: x   Leuk Esterase: x / RBC: x / WBC x   Sq Epi: x / Non Sq Epi: x / Bacteria: x              CULTURES:      Additional results/Imaging, I have personally reviewed:    Telemetry, personally reviewed:

## 2025-02-25 NOTE — PHYSICAL THERAPY INITIAL EVALUATION ADULT - PERTINENT HX OF CURRENT PROBLEM, REHAB EVAL
89yoF PMH  severe AS ( refused any intervention , dementia (baseline A&O x 2–3), GERD, HLD, HTN BIBEMS from [facility] for AMS, unknown LSW. Per EMS pt was found this AM unresponsive, aphasic with dark emesis in mouth- no robel blood or witnessed hematemesis. HPI limited 2/2 medical condition. Per Daughter Tammy pt is DNR DNI, patient  blood work showed markedly elevated troponin ,  in setting of status epilepticus , patient had lactic acidosis  , became hypotensive , was intubated  , patient was extubated on 2/22, Pt has Very small strokes noted in the bilateral occipital lobes and right parahippocampal gyrus

## 2025-02-25 NOTE — PHYSICAL THERAPY INITIAL EVALUATION ADULT - DIAGNOSIS, PT EVAL
NSTEMI , Acute respiratory failure with hypoxia. HF, Severe aortic stenosis. Possible meningoencephalitis

## 2025-02-26 LAB
ANION GAP SERPL CALC-SCNC: 5 MMOL/L — SIGNIFICANT CHANGE UP (ref 5–17)
BUN SERPL-MCNC: 22 MG/DL — SIGNIFICANT CHANGE UP (ref 7–23)
CALCIUM SERPL-MCNC: 9 MG/DL — SIGNIFICANT CHANGE UP (ref 8.5–10.1)
CHLORIDE SERPL-SCNC: 106 MMOL/L — SIGNIFICANT CHANGE UP (ref 96–108)
CO2 SERPL-SCNC: 28 MMOL/L — SIGNIFICANT CHANGE UP (ref 22–31)
CREAT SERPL-MCNC: 0.86 MG/DL — SIGNIFICANT CHANGE UP (ref 0.5–1.3)
EGFR: 61 ML/MIN/1.73M2 — SIGNIFICANT CHANGE UP
GLUCOSE SERPL-MCNC: 116 MG/DL — HIGH (ref 70–99)
HCT VFR BLD CALC: 34.8 % — SIGNIFICANT CHANGE UP (ref 34.5–45)
HGB BLD-MCNC: 11.4 G/DL — LOW (ref 11.5–15.5)
MCHC RBC-ENTMCNC: 31.2 PG — SIGNIFICANT CHANGE UP (ref 27–34)
MCHC RBC-ENTMCNC: 32.8 G/DL — SIGNIFICANT CHANGE UP (ref 32–36)
MCV RBC AUTO: 95.3 FL — SIGNIFICANT CHANGE UP (ref 80–100)
NRBC # BLD AUTO: 0 K/UL — SIGNIFICANT CHANGE UP (ref 0–0)
NRBC # FLD: 0 K/UL — SIGNIFICANT CHANGE UP (ref 0–0)
NRBC BLD AUTO-RTO: 0 /100 WBCS — SIGNIFICANT CHANGE UP (ref 0–0)
PLATELET # BLD AUTO: 321 K/UL — SIGNIFICANT CHANGE UP (ref 150–400)
PMV BLD: 10.1 FL — SIGNIFICANT CHANGE UP (ref 7–13)
POTASSIUM SERPL-MCNC: 3.3 MMOL/L — LOW (ref 3.5–5.3)
POTASSIUM SERPL-SCNC: 3.3 MMOL/L — LOW (ref 3.5–5.3)
RBC # BLD: 3.65 M/UL — LOW (ref 3.8–5.2)
RBC # FLD: 14.6 % — HIGH (ref 10.3–14.5)
SODIUM SERPL-SCNC: 139 MMOL/L — SIGNIFICANT CHANGE UP (ref 135–145)
WBC # BLD: 11.59 K/UL — HIGH (ref 3.8–10.5)
WBC # FLD AUTO: 11.59 K/UL — HIGH (ref 3.8–10.5)

## 2025-02-26 PROCEDURE — 99232 SBSQ HOSP IP/OBS MODERATE 35: CPT

## 2025-02-26 PROCEDURE — 99233 SBSQ HOSP IP/OBS HIGH 50: CPT

## 2025-02-26 RX ORDER — NYSTATIN 100000 [USP'U]/G
1 CREAM TOPICAL
Refills: 0 | Status: DISCONTINUED | OUTPATIENT
Start: 2025-02-26 | End: 2025-02-28

## 2025-02-26 RX ORDER — LACOSAMIDE 150 MG/1
100 TABLET, FILM COATED ORAL
Refills: 0 | Status: DISCONTINUED | OUTPATIENT
Start: 2025-02-26 | End: 2025-02-28

## 2025-02-26 RX ADMIN — AMPICILLIN SODIUM 216 GRAM(S): 1 INJECTION, POWDER, FOR SOLUTION INTRAMUSCULAR; INTRAVENOUS at 18:45

## 2025-02-26 RX ADMIN — NYSTATIN 1 APPLICATION(S): 100000 CREAM TOPICAL at 22:38

## 2025-02-26 RX ADMIN — HEPARIN SODIUM 5000 UNIT(S): 1000 INJECTION INTRAVENOUS; SUBCUTANEOUS at 21:29

## 2025-02-26 RX ADMIN — LEVETIRACETAM 500 MILLIGRAM(S): 10 INJECTION, SOLUTION INTRAVENOUS at 21:28

## 2025-02-26 RX ADMIN — ATORVASTATIN CALCIUM 40 MILLIGRAM(S): 80 TABLET, FILM COATED ORAL at 21:29

## 2025-02-26 RX ADMIN — LACOSAMIDE 120 MILLIGRAM(S): 150 TABLET, FILM COATED ORAL at 11:20

## 2025-02-26 RX ADMIN — Medication 109.8 MILLIGRAM(S): at 06:19

## 2025-02-26 RX ADMIN — METOPROLOL SUCCINATE 50 MILLIGRAM(S): 50 TABLET, EXTENDED RELEASE ORAL at 11:20

## 2025-02-26 RX ADMIN — CEFTRIAXONE 2000 MILLIGRAM(S): 500 INJECTION, POWDER, FOR SOLUTION INTRAMUSCULAR; INTRAVENOUS at 06:21

## 2025-02-26 RX ADMIN — LOSARTAN POTASSIUM 25 MILLIGRAM(S): 100 TABLET, FILM COATED ORAL at 11:20

## 2025-02-26 RX ADMIN — CEFTRIAXONE 2000 MILLIGRAM(S): 500 INJECTION, POWDER, FOR SOLUTION INTRAMUSCULAR; INTRAVENOUS at 18:49

## 2025-02-26 RX ADMIN — LACOSAMIDE 100 MILLIGRAM(S): 150 TABLET, FILM COATED ORAL at 02:16

## 2025-02-26 RX ADMIN — Medication 100 MILLIGRAM(S): at 11:20

## 2025-02-26 RX ADMIN — Medication 81 MILLIGRAM(S): at 11:20

## 2025-02-26 RX ADMIN — HEPARIN SODIUM 5000 UNIT(S): 1000 INJECTION INTRAVENOUS; SUBCUTANEOUS at 15:19

## 2025-02-26 RX ADMIN — HEPARIN SODIUM 5000 UNIT(S): 1000 INJECTION INTRAVENOUS; SUBCUTANEOUS at 06:20

## 2025-02-26 RX ADMIN — LEVETIRACETAM 500 MILLIGRAM(S): 10 INJECTION, SOLUTION INTRAVENOUS at 11:19

## 2025-02-26 RX ADMIN — METOPROLOL SUCCINATE 50 MILLIGRAM(S): 50 TABLET, EXTENDED RELEASE ORAL at 21:29

## 2025-02-26 RX ADMIN — FUROSEMIDE 20 MILLIGRAM(S): 10 INJECTION INTRAMUSCULAR; INTRAVENOUS at 11:20

## 2025-02-26 RX ADMIN — Medication 40 MILLIEQUIVALENT(S): at 21:30

## 2025-02-26 RX ADMIN — AMPICILLIN SODIUM 216 GRAM(S): 1 INJECTION, POWDER, FOR SOLUTION INTRAMUSCULAR; INTRAVENOUS at 06:19

## 2025-02-26 RX ADMIN — LACOSAMIDE 100 MILLIGRAM(S): 150 TABLET, FILM COATED ORAL at 18:51

## 2025-02-26 RX ADMIN — Medication 250 MILLIGRAM(S): at 12:24

## 2025-02-26 NOTE — PROGRESS NOTE ADULT - SUBJECTIVE AND OBJECTIVE BOX
HOSPITALIST ATTENDING PROGRESS NOTE    Chart and meds reviewed.      Subjective:  Patient seen and examined at the bedside. Noted to be more lethargic, sleepy today. Does not offer any acute complaints. DaughterCarol updated at the bedside.    All other systems reviewed and found to be negative with the exception of what has been described above.    MEDICATIONS  (STANDING):  acyclovir IVPB 490 milliGRAM(s) IV Intermittent every 24 hours  ampicillin  IVPB 2 Gram(s) IV Intermittent every 12 hours  aspirin  chewable 81 milliGRAM(s) Oral daily  atorvastatin 40 milliGRAM(s) Oral at bedtime  cefTRIAXone Injectable. 2000 milliGRAM(s) IV Push every 12 hours  furosemide    Tablet 20 milliGRAM(s) Oral <User Schedule>  heparin   Injectable 5000 Unit(s) SubCutaneous every 8 hours  lacosamide IVPB 100 milliGRAM(s) IV Intermittent <User Schedule>  levETIRAcetam   Injectable 500 milliGRAM(s) IV Push every 12 hours  losartan 25 milliGRAM(s) Oral daily  metoprolol tartrate 50 milliGRAM(s) Oral every 12 hours  thiamine 100 milliGRAM(s) Oral daily  vancomycin  IVPB 750 milliGRAM(s) IV Intermittent every 24 hours    MEDICATIONS  (PRN):      PHYSICAL EXAM:  Gen: No acute distress  HEENT:  Normocephalic/Atraumatic  CV:  +S1, +S2, regular, no murmurs or rubs  RESP:   lungs clear to auscultation bilaterally, no wheezing, rales, rhonchi  GI:  abdomen soft, non-tender, non-distended  EXT:  no clubbing, no cyanosis, no edema  NEURO:  PATINO, unable to participate in neuro exam due to dementia/delirium    LABS:                            12.0   12.65 )-----------( 313      ( 25 Feb 2025 08:47 )             36.6     02-25    137  |  105  |  33[H]  ----------------------------<  106[H]  3.6   |  27  |  1.09    Ca    9.0      25 Feb 2025 08:47  Phos  2.6     02-25  Mg     2.4     02-25              Urinalysis Basic - ( 25 Feb 2025 08:47 )    Color: x / Appearance: x / SG: x / pH: x  Gluc: 106 mg/dL / Ketone: x  / Bili: x / Urobili: x   Blood: x / Protein: x / Nitrite: x   Leuk Esterase: x / RBC: x / WBC x   Sq Epi: x / Non Sq Epi: x / Bacteria: x              CULTURES:      Additional results/Imaging, I have personally reviewed:    Telemetry, personally reviewed:

## 2025-02-26 NOTE — PROGRESS NOTE ADULT - ASSESSMENT
89 year old woman with HTN, HLD, aortic stenosis, ?dementia, nursing home resident, who presented to  ED on 2/18 with unresponsiveness, having a witnessed seizure while in the ED, identified to be in status epilepticus on initial evaluation.      Her MRI showed several foci of very small strokes, as well as a region of the R thalamus with diffusion restriction.  EEG initially with focal seizures, then lateralized periodic discharges on the right.  She was also found to have an NSTEMI which was treated conservatively.  There was initial suspicion for meningoencephalitis as a possible cause of new onset seizures in this patient with fever, and LPD's.  Family was not agreeable to LP for CSF verification of the diagnosis and empiric treatment was initiated.    Status Epilepticus:  -Admitted with status epilepticus  -No clinical seizures noted for several days. Continue to observe.  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 Plan is to complete a 14 day course  -seizure precautions    Strokes:  -Very small strokes noted in the bilateral occipital lobes and right parahippocampal gyrus  -Continue aspirin 81 mg/day, atorvastatin 40 mg/day    Will f/u as needed

## 2025-02-26 NOTE — PROGRESS NOTE ADULT - SUBJECTIVE AND OBJECTIVE BOX
Interval History:  2/26/25: No new events.     MEDICATIONS  (STANDING):  acyclovir IVPB 490 milliGRAM(s) IV Intermittent every 24 hours  ampicillin  IVPB 2 Gram(s) IV Intermittent every 12 hours  aspirin  chewable 81 milliGRAM(s) Oral daily  atorvastatin 40 milliGRAM(s) Oral at bedtime  cefTRIAXone Injectable. 2000 milliGRAM(s) IV Push every 12 hours  furosemide    Tablet 20 milliGRAM(s) Oral <User Schedule>  heparin   Injectable 5000 Unit(s) SubCutaneous every 8 hours  lacosamide IVPB 100 milliGRAM(s) IV Intermittent <User Schedule>  levETIRAcetam   Injectable 500 milliGRAM(s) IV Push every 12 hours  losartan 25 milliGRAM(s) Oral daily  metoprolol tartrate 50 milliGRAM(s) Oral every 12 hours  thiamine 100 milliGRAM(s) Oral daily  vancomycin  IVPB 750 milliGRAM(s) IV Intermittent every 24 hours    MEDICATIONS  (PRN):      Allergies    No Known Allergies    Intolerances        PHYSICAL EXAM:  Vital Signs Last 24 Hrs  T(F): 97.9 (02-26-25 @ 08:03)  HR: 74 (02-26-25 @ 08:03)  BP: 157/59 (02-26-25 @ 08:03)  RR: 18 (02-26-25 @ 08:03)      GENERAL: NAD, wearing mittens as she was pulling out oxygen  HEAD:  Atraumatic, Normocephalic  Neuro:  Somnolent  CN: PERRL, EOMI, no nystagmus, no facial weakness, tongue protrudes in the midline  motor: normal tone, moving both upper extremities well, moves lower extremities less  sensory: intact to light touch  coordination: unable to test due to confusion, no involuntary movements  gait: unable to test        LABS:                        11.4   11.59 )-----------( 321      ( 26 Feb 2025 07:46 )             34.8     02-26    139  |  106  |  22  ----------------------------<  116[H]  3.3[L]   |  28  |  0.86    Ca    9.0      26 Feb 2025 07:46  Phos  2.6     02-25  Mg     2.4     02-25        Urinalysis Basic - ( 26 Feb 2025 07:46 )    Color: x / Appearance: x / SG: x / pH: x  Gluc: 116 mg/dL / Ketone: x  / Bili: x / Urobili: x   Blood: x / Protein: x / Nitrite: x   Leuk Esterase: x / RBC: x / WBC x   Sq Epi: x / Non Sq Epi: x / Bacteria: x        RADIOLOGY & ADDITIONAL STUDIES:  MR head with and without contrast 2/20/25:  1.  Punctate small emboli in both occipital lobes and the right parahippocampal gyrus, 24 hours to 7 days in age.  2. Right thalamic signal abnormalities, possibly related to seizures, infection, or infarction. Follow-up recommended.  3.  Stable senescent cerebral changes and orbital varices.    EEG 2/20/25-2/21/25:  -Possible subtle right frontocentral focal seizure with LUE slow jerky movements (extension of L 2nd finger and wrist followed by L elbow flexion) possibly time-locked to ongoing LPDs: 12:51 2/20/25  -Risk of focal-onset seizures from the right frontocentral, left posterior temporal, and left occipito-temporal regions  -Right hemispheric focal cerebral dysfunction can be structural or functional in etiology.  -Mild-moderate diffuse and/or multifocal cerebral dysfunction is nonspecific in etiology.  -No seizures.    EEG 2/21/25-2/22/25:  -Mild to moderate generalized slowing  -Superimposed right hemisphere slowing  -Broad right (fronto-central predominant) epileptiform discharges, at   times occurring in a period fashion.

## 2025-02-26 NOTE — PROGRESS NOTE ADULT - SUBJECTIVE AND OBJECTIVE BOX
Date of Service:02-26-25 @ 11:51  Interval History/ROS: Afebrile overnight, appears comfortable on NC, awake but confused, does not answer questions appropriately, family at bedside reports she appears somewhat improved      REVIEW OF SYSTEMS  [ x ] ROS unobtainable because:  mental status  [  ] All other systems negative except as noted below    Constitutional:  [ ] fever [ ] chills  [ ] weight loss  [ ]night sweat  [ ]poor appetite/PO intake [ ]fatigue   Skin:  [ ] rash [ ] phlebitis	  Eyes: [ ] icterus [ ] pain  [ ] discharge	  ENMT: [ ] sore throat  [ ] thrush [ ] ulcers [ ] exudates [ ]anosmia  Respiratory: [ ] dyspnea [ ] hemoptysis [ ] cough [ ] sputum	  Cardiovascular:  [ ] chest pain [ ] palpitations [ ] edema	  Gastrointestinal:  [ ] nausea [ ] vomiting [ ] diarrhea [ ] constipation [ ] pain	  Genitourinary:  [ ] dysuria [ ] frequency [ ] hematuria [ ] discharge [ ] flank pain  [ ] incontinence  Musculoskeletal:  [ ] myalgias [ ] arthralgias [ ] arthritis  [ ] back pain  Neurological:  [ ] headache [ ] weakness [ ] seizures  [ ] confusion/altered mental status    Allergies  No Known Allergies        ANTIMICROBIALS:    acyclovir IVPB 490 every 24 hours  ampicillin  IVPB 2 every 12 hours  cefTRIAXone Injectable. 2000 every 12 hours  vancomycin  IVPB 750 every 24 hours        OTHER MEDS: MEDICATIONS  (STANDING):  aspirin  chewable 81 daily  atorvastatin 40 at bedtime  furosemide    Tablet 20 <User Schedule>  heparin   Injectable 5000 every 8 hours  lacosamide IVPB 100 <User Schedule>  levETIRAcetam   Injectable 500 every 12 hours  losartan 25 daily  metoprolol tartrate 50 every 12 hours      Vital Signs Last 24 Hrs  T(F): 97.9 (02-26-25 @ 08:03), Max: 100 (02-19-25 @ 18:00)    Vital Signs Last 24 Hrs  HR: 74 (02-26-25 @ 08:03) (73 - 85)  BP: 157/59 (02-26-25 @ 08:03) (113/80 - 174/85)  RR: 18 (02-26-25 @ 08:03)  SpO2: 96% (02-26-25 @ 08:03) (95% - 98%)  Wt(kg): --    EXAM:    Constitutional: awake but confused  Head/Eyes: no icterus  LUNGS:  crackles bases  CVS:  regular rhythm  Abd:  soft, non-tender; non-distended  Ext:  no edema  Vascular:  IV site no erythema tenderness or discharge  Neuro: AAO X 0    Labs:                        11.4   11.59 )-----------( 321      ( 26 Feb 2025 07:46 )             34.8     02-26    139  |  106  |  22  ----------------------------<  116[H]  3.3[L]   |  28  |  0.86    Ca    9.0      26 Feb 2025 07:46  Phos  2.6     02-25  Mg     2.4     02-25        WBC Trend:  WBC Count: 11.59 (02-26-25 @ 07:46)  WBC Count: 12.65 (02-25-25 @ 08:47)  WBC Count: 12.28 (02-24-25 @ 06:03)  WBC Count: 14.09 (02-23-25 @ 05:52)      Creatine Trend:  Creatinine: 0.86 (02-26)  Creatinine: 1.09 (02-25)  Creatinine: 0.92 (02-24)  Creatinine: 1.24 (02-23)      Liver Biochemical Testing Trend:  Alanine Aminotransferase (ALT/SGPT): 42 (02-21)  Alanine Aminotransferase (ALT/SGPT): 42 (02-20)  Alanine Aminotransferase (ALT/SGPT): 25 (02-18)  Alanine Aminotransferase (ALT/SGPT): 26 (01-19)  Alanine Aminotransferase (ALT/SGPT): 17 (07-30)  Aspartate Aminotransferase (AST/SGOT): 37 (02-21-25 @ 05:47)  Aspartate Aminotransferase (AST/SGOT): 66 (02-20-25 @ 06:00)  Aspartate Aminotransferase (AST/SGOT): 72 (02-18-25 @ 08:05)  Aspartate Aminotransferase (AST/SGOT): 29 (01-19-25 @ 09:29)  Aspartate Aminotransferase (AST/SGOT): 34 (07-30-24 @ 06:48)  Bilirubin Total: 0.2 (02-21)  Bilirubin Total: 0.3 (02-20)  Bilirubin Total: 0.5 (02-18)  Bilirubin Total: 0.4 (01-19)  Bilirubin Total: 1.4 (07-30)      Trend LDH      Urinalysis Basic - ( 26 Feb 2025 07:46 )    Color: x / Appearance: x / SG: x / pH: x  Gluc: 116 mg/dL / Ketone: x  / Bili: x / Urobili: x   Blood: x / Protein: x / Nitrite: x   Leuk Esterase: x / RBC: x / WBC x   Sq Epi: x / Non Sq Epi: x / Bacteria: x        MICROBIOLOGY:  Vancomycin Level, Trough: 16.0 (02-25 @ 10:54)  Vancomycin Level, Trough: 17.1 (02-24 @ 06:03)  Vancomycin Level, Random: 13.0 (02-22 @ 06:13)  Vancomycin Level, Random: 11.5 (02-21 @ 05:47)  Vancomycin Level, Random: 6.8 (02-20 @ 06:00)    MRSA PCR Result.: NotDetec (02-18-25 @ 14:10)      Culture - Sputum (collected 20 Feb 2025 16:51)  Source: ET Tube ET Tube  Final Report:    No growth    Urinalysis with Rflx Culture (collected 18 Feb 2025 08:25)    Culture - Blood (collected 18 Feb 2025 08:05)  Source: .Blood BLOOD  Final Report:    No growth at 5 days    Culture - Blood (collected 18 Feb 2025 08:05)  Source: .Blood BLOOD  Final Report:    No growth at 5 days    Culture - Urine (collected 21 Jan 2025 20:43)  Source: Catheterized Catheterized  Final Report:    No growth    Culture - Blood (collected 19 Jan 2025 14:25)  Source: .Blood BLOOD  Final Report:    No growth at 5 days    Culture - Blood (collected 19 Jan 2025 14:18)  Source: .Blood BLOOD  Final Report:    No growth at 5 days    Urinalysis with Rflx Culture (collected 19 Jan 2025 10:45)    Culture - Blood (collected 01 Aug 2024 06:06)  Source: .Blood None  Final Report:    No growth at 5 days    Culture - Blood (collected 01 Aug 2024 06:06)  Source: .Blood None  Final Report:    No growth at 5 days    Legionella Antigen, Urine: Negative (02-20 @ 06:20)    Procalcitonin: 0.16 (02-24)    RADIOLOGY:  imaging below personally reviewed

## 2025-02-26 NOTE — PROGRESS NOTE ADULT - ASSESSMENT
89yoF PMH AS, dementia (baseline A&O x 2–3), GERD, HLD, HTN BIBEMS from [facility] for AMS, unknown LSW. Per EMS pt was found this AM unresponsive, aphasic with dark emesis in mouth- no robel blood or witnessed hematemesis. Pt found to be in status epilepticus in the ED intubated for airway protection and admitted to the ICU on 2/18/25. Pt remained on continuous EEG. Neurology was consulted and started the pt on keppra and vimpat. CTH showed no hemorrhage. or large territorial infarct and diffuse cortical atrophy. CTA H&N notable for mild calcified atherosclerotic plaques bilateral carotid at bifurcation, non flow limiting, no intracranial LVO. Pt was also started on pressors and empiric mengitis coverage. Pt had NSTEMI with troponins peaking at 22,152.09 for which cardiology was consulted and pt was managed conservatively. Patient was extubated 2 dyas ago remains on 4L NC. Pt has had waxing and waning mental status similar to her baseline since extubation. She remains on empiric meningitis treatment since family is refusing LP, ID is following and recommends completing empiric treatment course. Patient deemed stable for downgrade form ICU on 2/24.       #Status epilepticus, Metabolic Encephalopathy, Fever, r/o Meningitis  Pt found to be in status epilepticus intubated in the ED for airway protection and admitted to ICU. Pt also hypotensive with leukocytosis. Pt started on empiric meningitis treatment family refusing LP.   - neurology consulted, appreciate recs   - ID consulted, appreciate recs   - continue keppra, 500 mg BID  - continue on vimpat 100mg TID   - seizure precautions   - pt will have to continue meningitis coverage for 14 day course   - continue empiric Meningitis coverage:  --- Vancomycin 750mg q24 (Day #9)  --- CTX 2G q12 (Day #9)  --- Ampicillin 2G q12 (renally dosed) (Day #9)  --- Acyclovir 490mg q24 (renally dosed) (Day #9)    #CVA  Very small strokes noted in the bilateral occipital lobes and right parahippocampal gyrus  - Neuro input appreciated; Continue aspirin 81 mg/day, atorvastatin 40 mg/day    #Troponemia   #HFrEF EF 40%   PT found to have elevated troponin which peaked at 22k likely iso of demand from status epileticus vs underlying CAD. EF showed new decreased EF of 40-45% compared to prior.   - cardiology consulted, appreciate recs   - cw aspirin 81 mg daily   - cw lasix 20 mg daily M,W,F  - cw losartan 25 mg daily   - cw lopressor 50 mg bid     #Pulmonary edema   #Acute respiratory failure with hypoxemia   CT notable for pulmonary edema and GGO's possible pneumonia vs secondary to chf   - wean oxygen as able   - abx as above     #HLD   - cw statin     #DVT Ppx  - Heparin SC    #Dispo: PT recommending [facility]. Anticipate D/C to [facility] in 1-2 days

## 2025-02-26 NOTE — PROGRESS NOTE ADULT - ASSESSMENT
Assessment:  89F with AS, Dementia, GERD, HLD, HTN, presents 2/18 from [facility] with altered mental status, found to be in Status epilepticus   Febrile 101F on admission, on vent  WBC 16  Cr 1.70  Lactate 4.2 > 1.9  UA negative  CTH No hemorrhage.  No large territorial infarct.  Diffuse cortical atrophy.   CTA H&N mild calcified atherosclerotic plaques bilateral carotid at bifurcation, non flow limiting, no intracranial LVO.  CTAP negative  CT Chest with Dependent posterior RUL, superior RLL and to lesser extent apicoposterior SAMEER groundglass opacities and diffuse interlobular septal thickening. Pulmonary edema with or without associated pneumonia suspected  Full RVP negative, MRSA negative  BCx 2/18 negative  MRI with small emboli bilateral occipital lobes   ETT sputum 2/20 negative    Antimicrobials:  Acyclovir (2/18 --- )  ampicillin  IVPB 2 every 8 hours (2/18 --- )  cefTRIAXone Injectable. 2000 every 12 hours (2/18 --- )  Vanco (2/18 --- )    Impression:   #Status epilepticus, Metabolic Encephalopathy, Fever, r/o Meningitis  #Acute hypoxic respiratory failure, Pulmonary edema with possible Pneumonia  #Abnormal MRI Brain concerning for bilateral small emboli  #Leukocytosis  #JACKY  #Lactic Acidosis   #Hx of Dementia    Recommendations:  - continue empiric Meningitis coverage  --- Vancomycin 750mg q24 (Day #9)  --- CTX 2G q12 (Day #9)  --- Ampicillin 2G q12 (renally dosed) (Day #9)  --- Acyclovir 490mg q24 (renally dosed) (Day #9)  - monitor temperature curve  - trend WBC  - reason for abx use reviewed with patient  - side effects of antibiotic discussed, tolerating abx well so far  - Prior cultures reviewed. An epidemiologic assessment was performed. There is a significant risk for resistant microorganisms to spread to family members, and/or healthcare staff. The patient will be placed on isolation according to infection control policy. Will reconsider isolation measures based on new culture results and other clinical data as appropriate Appropriate cultures collected and an appropriate broad spectrum antibiotic therapy will be considered  - family refused LP, unable to rule out meningitis; will have to continue empiric treatment for 14-day course; if plan for discharge, can plan for IV Vanco 750mg q24, CTX 2G q12, Ampicillin 2G q12 and Acyclovir 490mg q24 via midline to complete 14-day course (enddate: 3/4/2025)  - follow up Neurology  - rest per primary team    [facility] IV to PO Token not applicable; Patient to continue with IV Therapy

## 2025-02-27 LAB
ANION GAP SERPL CALC-SCNC: 4 MMOL/L — LOW (ref 5–17)
BUN SERPL-MCNC: 20 MG/DL — SIGNIFICANT CHANGE UP (ref 7–23)
CALCIUM SERPL-MCNC: 8.7 MG/DL — SIGNIFICANT CHANGE UP (ref 8.5–10.1)
CHLORIDE SERPL-SCNC: 108 MMOL/L — SIGNIFICANT CHANGE UP (ref 96–108)
CO2 SERPL-SCNC: 27 MMOL/L — SIGNIFICANT CHANGE UP (ref 22–31)
CREAT SERPL-MCNC: 0.84 MG/DL — SIGNIFICANT CHANGE UP (ref 0.5–1.3)
EGFR: 63 ML/MIN/1.73M2 — SIGNIFICANT CHANGE UP
GLUCOSE SERPL-MCNC: 133 MG/DL — HIGH (ref 70–99)
HCT VFR BLD CALC: 32.4 % — LOW (ref 34.5–45)
HGB BLD-MCNC: 10.5 G/DL — LOW (ref 11.5–15.5)
MCHC RBC-ENTMCNC: 31.3 PG — SIGNIFICANT CHANGE UP (ref 27–34)
MCHC RBC-ENTMCNC: 32.4 G/DL — SIGNIFICANT CHANGE UP (ref 32–36)
MCV RBC AUTO: 96.7 FL — SIGNIFICANT CHANGE UP (ref 80–100)
NRBC # BLD AUTO: 0 K/UL — SIGNIFICANT CHANGE UP (ref 0–0)
NRBC # FLD: 0 K/UL — SIGNIFICANT CHANGE UP (ref 0–0)
NRBC BLD AUTO-RTO: 0 /100 WBCS — SIGNIFICANT CHANGE UP (ref 0–0)
PLATELET # BLD AUTO: 335 K/UL — SIGNIFICANT CHANGE UP (ref 150–400)
PMV BLD: 9.9 FL — SIGNIFICANT CHANGE UP (ref 7–13)
POTASSIUM SERPL-MCNC: 3.7 MMOL/L — SIGNIFICANT CHANGE UP (ref 3.5–5.3)
POTASSIUM SERPL-SCNC: 3.7 MMOL/L — SIGNIFICANT CHANGE UP (ref 3.5–5.3)
RBC # BLD: 3.35 M/UL — LOW (ref 3.8–5.2)
RBC # FLD: 14.6 % — HIGH (ref 10.3–14.5)
SODIUM SERPL-SCNC: 139 MMOL/L — SIGNIFICANT CHANGE UP (ref 135–145)
WBC # BLD: 12.1 K/UL — HIGH (ref 3.8–10.5)
WBC # FLD AUTO: 12.1 K/UL — HIGH (ref 3.8–10.5)

## 2025-02-27 PROCEDURE — 99233 SBSQ HOSP IP/OBS HIGH 50: CPT

## 2025-02-27 RX ADMIN — HEPARIN SODIUM 5000 UNIT(S): 1000 INJECTION INTRAVENOUS; SUBCUTANEOUS at 22:27

## 2025-02-27 RX ADMIN — LOSARTAN POTASSIUM 25 MILLIGRAM(S): 100 TABLET, FILM COATED ORAL at 10:01

## 2025-02-27 RX ADMIN — CEFTRIAXONE 2000 MILLIGRAM(S): 500 INJECTION, POWDER, FOR SOLUTION INTRAMUSCULAR; INTRAVENOUS at 17:04

## 2025-02-27 RX ADMIN — LACOSAMIDE 100 MILLIGRAM(S): 150 TABLET, FILM COATED ORAL at 17:04

## 2025-02-27 RX ADMIN — Medication 109.8 MILLIGRAM(S): at 06:11

## 2025-02-27 RX ADMIN — NYSTATIN 1 APPLICATION(S): 100000 CREAM TOPICAL at 17:42

## 2025-02-27 RX ADMIN — AMPICILLIN SODIUM 216 GRAM(S): 1 INJECTION, POWDER, FOR SOLUTION INTRAMUSCULAR; INTRAVENOUS at 17:39

## 2025-02-27 RX ADMIN — CEFTRIAXONE 2000 MILLIGRAM(S): 500 INJECTION, POWDER, FOR SOLUTION INTRAMUSCULAR; INTRAVENOUS at 06:11

## 2025-02-27 RX ADMIN — HEPARIN SODIUM 5000 UNIT(S): 1000 INJECTION INTRAVENOUS; SUBCUTANEOUS at 06:11

## 2025-02-27 RX ADMIN — AMPICILLIN SODIUM 216 GRAM(S): 1 INJECTION, POWDER, FOR SOLUTION INTRAMUSCULAR; INTRAVENOUS at 06:03

## 2025-02-27 RX ADMIN — Medication 250 MILLIGRAM(S): at 11:26

## 2025-02-27 RX ADMIN — LEVETIRACETAM 500 MILLIGRAM(S): 10 INJECTION, SOLUTION INTRAVENOUS at 10:00

## 2025-02-27 RX ADMIN — LACOSAMIDE 100 MILLIGRAM(S): 150 TABLET, FILM COATED ORAL at 02:02

## 2025-02-27 RX ADMIN — Medication 81 MILLIGRAM(S): at 10:04

## 2025-02-27 RX ADMIN — METOPROLOL SUCCINATE 50 MILLIGRAM(S): 50 TABLET, EXTENDED RELEASE ORAL at 10:01

## 2025-02-27 RX ADMIN — Medication 100 MILLIGRAM(S): at 10:02

## 2025-02-27 RX ADMIN — LEVETIRACETAM 500 MILLIGRAM(S): 10 INJECTION, SOLUTION INTRAVENOUS at 22:26

## 2025-02-27 RX ADMIN — ATORVASTATIN CALCIUM 40 MILLIGRAM(S): 80 TABLET, FILM COATED ORAL at 22:27

## 2025-02-27 RX ADMIN — LACOSAMIDE 100 MILLIGRAM(S): 150 TABLET, FILM COATED ORAL at 10:01

## 2025-02-27 RX ADMIN — HEPARIN SODIUM 5000 UNIT(S): 1000 INJECTION INTRAVENOUS; SUBCUTANEOUS at 14:32

## 2025-02-27 RX ADMIN — METOPROLOL SUCCINATE 50 MILLIGRAM(S): 50 TABLET, EXTENDED RELEASE ORAL at 22:27

## 2025-02-27 NOTE — PROGRESS NOTE ADULT - PROVIDER SPECIALTY LIST ADULT
Critical Care
Infectious Disease
Neurology
Cardiology
Critical Care
Infectious Disease
Neurology
Neurology
Critical Care
Hospitalist
Critical Care
Critical Care
Infectious Disease
Neurology
Cardiology
Hospitalist
Cardiology
Cardiology
Hospitalist
Critical Care
Hospitalist
Cardiology

## 2025-02-27 NOTE — PROGRESS NOTE ADULT - REASON FOR ADMISSION
status ep, shock, resp failure

## 2025-02-27 NOTE — PROGRESS NOTE ADULT - NUTRITIONAL ASSESSMENT
This patient has been assessed with a concern for Malnutrition and has been determined to have a diagnosis/diagnoses of Severe protein-calorie malnutrition and Underweight (BMI < 19).    This patient is being managed with:   Diet NPO with Tube Feed-  Tube Feeding Modality: Nasogastric  Jevity 1.5 Malik (JEVITY1.5)  Total Volume for 24 Hours (mL): 1320  Continuous  Starting Tube Feed Rate {mL per Hour}: 20  Increase Tube Feed Rate by (mL): 10     Every 6 hours  Until Goal Tube Feed Rate (mL per Hour): 55  Tube Feed Duration (in Hours): 24  Tube Feed Start Time: 00:00  Free Water Flush  Free Water Flush Instructions:  free water flushes of 20 cc/hr; adjust prn to maintain hydration as per MD  Entered: Feb 19 2025 11:12AM  
This patient has been assessed with a concern for Malnutrition and has been determined to have a diagnosis/diagnoses of Severe protein-calorie malnutrition and Underweight (BMI < 19).    This patient is being managed with:   Diet NPO with Tube Feed-  Tube Feeding Modality: Nasogastric  Jevity 1.5 Malik (JEVITY1.5)  Total Volume for 24 Hours (mL): 1320  Continuous  Starting Tube Feed Rate {mL per Hour}: 20  Increase Tube Feed Rate by (mL): 10     Every 6 hours  Until Goal Tube Feed Rate (mL per Hour): 55  Tube Feed Duration (in Hours): 24  Tube Feed Start Time: 00:00  Free Water Flush  Free Water Flush Instructions:  free water flushes of 20 cc/hr; adjust prn to maintain hydration as per MD  Entered: Feb 19 2025 11:12AM  
89 year old woman with HTN, HLD, aortic stenosis, ?dementia, nursing home resident, presenting with unresponsiveness, having a witnessed seizure while in the ED on 2/18.  Now intubated, on propofol infusion, and 2 AED's.      On exam, febrile, deeply sedated, but intact cranial nerves, and symmetric trace movements of upper extremities to stimulus.     No acute findings on her CTH/CTA.  EEG initially with focal seizures, now only LPD's on the R.  Labs with uptrending creatinine, troponin, and leukocytosis.      Suspicion for meningoencephalitis as possible cause of new onset seizures in patient with fever, and LPD's.  Family refusing LP for CSF verification of the diagnosis at this time.     -continue keppra, renally dosed, 500mg BID  -start on vimpat 100mg TID  -continuous EEG  -started on empiric meningoencephalitis anti-infective medications yesterday, renally dosed: vancomycin, ampicillin, and acyclovir.  -seizure precautions  -goals of care discussion on going with family.    -neurology to follow.  Please page or call with any acute neuro changes.
This patient has been assessed with a concern for Malnutrition and has been determined to have a diagnosis/diagnoses of Severe protein-calorie malnutrition and Underweight (BMI < 19).    This patient is being managed with:   Diet NPO with Tube Feed-  Tube Feeding Modality: Nasogastric  Jevity 1.5 Malik (JEVITY1.5)  Total Volume for 24 Hours (mL): 1320  Continuous  Starting Tube Feed Rate {mL per Hour}: 20  Increase Tube Feed Rate by (mL): 10     Every 6 hours  Until Goal Tube Feed Rate (mL per Hour): 55  Tube Feed Duration (in Hours): 24  Tube Feed Start Time: 00:00  Free Water Flush  Free Water Flush Instructions:  free water flushes of 20 cc/hr; adjust prn to maintain hydration as per MD  Entered: Feb 19 2025 11:12AM  
This patient has been assessed with a concern for Malnutrition and has been determined to have a diagnosis/diagnoses of Severe protein-calorie malnutrition and Underweight (BMI < 19).    This patient is being managed with:   Diet Regular-  Entered: Feb 23 2025  3:01PM  

## 2025-02-27 NOTE — PROGRESS NOTE ADULT - SUBJECTIVE AND OBJECTIVE BOX
HOSPITALIST ATTENDING PROGRESS NOTE    Chart and meds reviewed.      Subjective:  Patient seen and examined at the bedside. BRADEN.    All other systems reviewed and found to be negative with the exception of what has been described above.    MEDICATIONS  (STANDING):  acyclovir IVPB 490 milliGRAM(s) IV Intermittent every 24 hours  ampicillin  IVPB 2 Gram(s) IV Intermittent every 12 hours  aspirin  chewable 81 milliGRAM(s) Oral daily  atorvastatin 40 milliGRAM(s) Oral at bedtime  cefTRIAXone Injectable. 2000 milliGRAM(s) IV Push every 12 hours  furosemide    Tablet 20 milliGRAM(s) Oral <User Schedule>  heparin   Injectable 5000 Unit(s) SubCutaneous every 8 hours  lacosamide IVPB 100 milliGRAM(s) IV Intermittent <User Schedule>  levETIRAcetam   Injectable 500 milliGRAM(s) IV Push every 12 hours  losartan 25 milliGRAM(s) Oral daily  metoprolol tartrate 50 milliGRAM(s) Oral every 12 hours  thiamine 100 milliGRAM(s) Oral daily  vancomycin  IVPB 750 milliGRAM(s) IV Intermittent every 24 hours    MEDICATIONS  (PRN):      PHYSICAL EXAM:  Gen: No acute distress  HEENT:  Normocephalic/Atraumatic  CV:  +S1, +S2, regular, no murmurs or rubs  RESP:   lungs clear to auscultation bilaterally, no wheezing, rales, rhonchi  GI:  abdomen soft, non-tender, non-distended  EXT:  no clubbing, no cyanosis, no edema  NEURO:  PATINO, unable to participate in neuro exam due to dementia/delirium    LABS:                            12.0   12.65 )-----------( 313      ( 25 Feb 2025 08:47 )             36.6     02-25    137  |  105  |  33[H]  ----------------------------<  106[H]  3.6   |  27  |  1.09    Ca    9.0      25 Feb 2025 08:47  Phos  2.6     02-25  Mg     2.4     02-25              Urinalysis Basic - ( 25 Feb 2025 08:47 )    Color: x / Appearance: x / SG: x / pH: x  Gluc: 106 mg/dL / Ketone: x  / Bili: x / Urobili: x   Blood: x / Protein: x / Nitrite: x   Leuk Esterase: x / RBC: x / WBC x   Sq Epi: x / Non Sq Epi: x / Bacteria: x              CULTURES:      Additional results/Imaging, I have personally reviewed:    Telemetry, personally reviewed:

## 2025-02-27 NOTE — PROGRESS NOTE ADULT - ASSESSMENT
89yoF PMH AS, dementia (baseline A&O x 2–3), GERD, HLD, HTN BIBEMS from [facility] for AMS, unknown LSW. Per EMS pt was found this AM unresponsive, aphasic with dark emesis in mouth- no robel blood or witnessed hematemesis. Pt found to be in status epilepticus in the ED intubated for airway protection and admitted to the ICU on 2/18/25. Pt remained on continuous EEG. Neurology was consulted and started the pt on keppra and vimpat. CTH showed no hemorrhage. or large territorial infarct and diffuse cortical atrophy. CTA H&N notable for mild calcified atherosclerotic plaques bilateral carotid at bifurcation, non flow limiting, no intracranial LVO. Pt was also started on pressors and empiric mengitis coverage. Pt had NSTEMI with troponins peaking at 22,152.09 for which cardiology was consulted and pt was managed conservatively. Patient was extubated 2 dyas ago remains on 4L NC. Pt has had waxing and waning mental status similar to her baseline since extubation. She remains on empiric meningitis treatment since family is refusing LP, ID is following and recommends completing empiric treatment course. Patient deemed stable for downgrade form ICU on 2/24.       #Status epilepticus, Metabolic Encephalopathy, Fever, r/o Meningitis  Pt found to be in status epilepticus intubated in the ED for airway protection and admitted to ICU. Pt also hypotensive with leukocytosis. Pt started on empiric meningitis treatment family refusing LP.   - neurology consulted, appreciate recs   - ID consulted, appreciate recs   - continue keppra, 500 mg BID  - continue on vimpat 100mg TID   - seizure precautions   - pt will have to continue meningitis coverage for 14 day course   - continue empiric Meningitis coverage:  --- Vancomycin 750mg q24 (Day #10)  --- CTX 2G q12 (Day #10)  --- Ampicillin 2G q12 (renally dosed) (Day #10)  --- Acyclovir 490mg q24 (renally dosed) (Day #10)    #CVA  Very small strokes noted in the bilateral occipital lobes and right parahippocampal gyrus  - Neuro input appreciated; Continue aspirin 81 mg/day, atorvastatin 40 mg/day    #Troponemia   #HFrEF EF 40%   PT found to have elevated troponin which peaked at 22k likely iso of demand from status epileticus vs underlying CAD. EF showed new decreased EF of 40-45% compared to prior.   - cardiology consulted, appreciate recs   - cw aspirin 81 mg daily   - cw lasix 20 mg daily M,W,F  - cw losartan 25 mg daily   - cw lopressor 50 mg bid     #Pulmonary edema, improved   #Acute respiratory failure with hypoxemia, resolved   CT notable for pulmonary edema and GGO's possible pneumonia vs secondary to chf   - Monitor O2 levels   - abx as above     #HLD   - cw statin     #DVT Ppx  - Heparin SC    #Dispo: PT recommending [facility]. Anticipate D/C to [facility] tomorrow

## 2025-02-27 NOTE — PROGRESS NOTE ADULT - TIME BILLING
Time spent  coordinating the patient's care. This includes reviewing documentation pertinent to this admission, results and imaging. Further tests, medications, and procedures have been ordered as indicated. Laboratory results and the plan of care were communicated to the patient. Discussed care plan with consultants including . Supporting documentation was completed and added to the patient's chart.
.
.

## 2025-02-28 ENCOUNTER — TRANSCRIPTION ENCOUNTER (OUTPATIENT)
Age: 89
End: 2025-02-28

## 2025-02-28 VITALS
OXYGEN SATURATION: 94 % | SYSTOLIC BLOOD PRESSURE: 138 MMHG | RESPIRATION RATE: 20 BRPM | TEMPERATURE: 98 F | HEART RATE: 71 BPM | DIASTOLIC BLOOD PRESSURE: 55 MMHG

## 2025-02-28 PROCEDURE — 99239 HOSP IP/OBS DSCHRG MGMT >30: CPT

## 2025-02-28 RX ORDER — ASPIRIN 325 MG
1 TABLET ORAL
Qty: 0 | Refills: 0 | DISCHARGE
Start: 2025-02-28

## 2025-02-28 RX ORDER — LACOSAMIDE 150 MG/1
1 TABLET, FILM COATED ORAL
Qty: 90 | Refills: 0
Start: 2025-02-28 | End: 2025-03-29

## 2025-02-28 RX ORDER — AMPICILLIN SODIUM 1 G/1
2 INJECTION, POWDER, FOR SOLUTION INTRAMUSCULAR; INTRAVENOUS
Qty: 12 | Refills: 0
Start: 2025-02-28 | End: 2025-03-02

## 2025-02-28 RX ORDER — CEFTRIAXONE 500 MG/1
2 INJECTION, POWDER, FOR SOLUTION INTRAMUSCULAR; INTRAVENOUS
Qty: 6 | Refills: 0
Start: 2025-02-28 | End: 2025-03-02

## 2025-02-28 RX ORDER — LOSARTAN POTASSIUM 100 MG/1
1 TABLET, FILM COATED ORAL
Qty: 0 | Refills: 0 | DISCHARGE
Start: 2025-02-28

## 2025-02-28 RX ORDER — LEVETIRACETAM 10 MG/ML
1 INJECTION, SOLUTION INTRAVENOUS
Qty: 60 | Refills: 0
Start: 2025-02-28 | End: 2025-03-29

## 2025-02-28 RX ORDER — NYSTATIN 100000 [USP'U]/G
1 CREAM TOPICAL
Qty: 0 | Refills: 0 | DISCHARGE
Start: 2025-02-28

## 2025-02-28 RX ORDER — VANCOMYCIN HCL IN 5 % DEXTROSE 1.5G/250ML
750 PLASTIC BAG, INJECTION (ML) INTRAVENOUS
Qty: 0 | Refills: 0 | DISCHARGE
Start: 2025-02-28

## 2025-02-28 RX ORDER — ATORVASTATIN CALCIUM 80 MG/1
1 TABLET, FILM COATED ORAL
Qty: 0 | Refills: 0 | DISCHARGE
Start: 2025-02-28

## 2025-02-28 RX ORDER — METOPROLOL SUCCINATE 50 MG/1
1 TABLET, EXTENDED RELEASE ORAL
Qty: 0 | Refills: 0 | DISCHARGE
Start: 2025-02-28

## 2025-02-28 RX ORDER — FUROSEMIDE 10 MG/ML
1 INJECTION INTRAMUSCULAR; INTRAVENOUS
Qty: 0 | Refills: 0 | DISCHARGE
Start: 2025-02-28

## 2025-02-28 RX ADMIN — Medication 109.8 MILLIGRAM(S): at 06:15

## 2025-02-28 RX ADMIN — FUROSEMIDE 20 MILLIGRAM(S): 10 INJECTION INTRAMUSCULAR; INTRAVENOUS at 09:15

## 2025-02-28 RX ADMIN — AMPICILLIN SODIUM 216 GRAM(S): 1 INJECTION, POWDER, FOR SOLUTION INTRAMUSCULAR; INTRAVENOUS at 06:15

## 2025-02-28 RX ADMIN — LACOSAMIDE 100 MILLIGRAM(S): 150 TABLET, FILM COATED ORAL at 02:30

## 2025-02-28 RX ADMIN — HEPARIN SODIUM 5000 UNIT(S): 1000 INJECTION INTRAVENOUS; SUBCUTANEOUS at 06:15

## 2025-02-28 RX ADMIN — Medication 250 MILLIGRAM(S): at 11:55

## 2025-02-28 RX ADMIN — NYSTATIN 1 APPLICATION(S): 100000 CREAM TOPICAL at 09:15

## 2025-02-28 RX ADMIN — LACOSAMIDE 100 MILLIGRAM(S): 150 TABLET, FILM COATED ORAL at 10:57

## 2025-02-28 RX ADMIN — LEVETIRACETAM 500 MILLIGRAM(S): 10 INJECTION, SOLUTION INTRAVENOUS at 09:12

## 2025-02-28 RX ADMIN — METOPROLOL SUCCINATE 50 MILLIGRAM(S): 50 TABLET, EXTENDED RELEASE ORAL at 09:15

## 2025-02-28 RX ADMIN — Medication 81 MILLIGRAM(S): at 09:12

## 2025-02-28 RX ADMIN — LOSARTAN POTASSIUM 25 MILLIGRAM(S): 100 TABLET, FILM COATED ORAL at 09:14

## 2025-02-28 RX ADMIN — Medication 100 MILLIGRAM(S): at 15:21

## 2025-02-28 RX ADMIN — HEPARIN SODIUM 5000 UNIT(S): 1000 INJECTION INTRAVENOUS; SUBCUTANEOUS at 15:21

## 2025-02-28 RX ADMIN — CEFTRIAXONE 2000 MILLIGRAM(S): 500 INJECTION, POWDER, FOR SOLUTION INTRAMUSCULAR; INTRAVENOUS at 06:15

## 2025-02-28 RX ADMIN — NYSTATIN 1 APPLICATION(S): 100000 CREAM TOPICAL at 02:30

## 2025-02-28 NOTE — DISCHARGE NOTE PROVIDER - NSDCFUSCHEDAPPT_GEN_ALL_CORE_FT
Palla, Venugopal R  [facility] Physician Count includes the [facility]  CARDIOLOGY [address]  Scheduled Appointment: 03/03/2025

## 2025-02-28 NOTE — DISCHARGE NOTE NURSING/CASE MANAGEMENT/SOCIAL WORK - FINANCIAL ASSISTANCE
[facility] provides services at a reduced cost to those who are determined to be eligible through [facility]’s financial assistance program. Information regarding [facility]’s financial assistance program can be found by going to [email or web address] or by calling [phone].

## 2025-02-28 NOTE — DISCHARGE NOTE PROVIDER - NSDCFUADDAPPT_GEN_ALL_CORE_FT
Dr Palla  [facility] [address] [address]  [phone]  Monday, March 3, 2025 8:30 a.m

## 2025-02-28 NOTE — DISCHARGE NOTE PROVIDER - NSDCCPCAREPLAN_GEN_ALL_CORE_FT
PRINCIPAL DISCHARGE DIAGNOSIS  Diagnosis: Status epilepticus  Assessment and Plan of Treatment:

## 2025-02-28 NOTE — DISCHARGE NOTE NURSING/CASE MANAGEMENT/SOCIAL WORK - NSDCVIVACCINE_GEN_ALL_CORE_FT
Td (adult) preservative free; 28-Sep-2019 16:56; Klever Davis (RN); [employer]; [id number] (Exp. Date: 04-Jul-2021); IntraMuscular; Deltoid Left.; 0.5 milliLiter(s); VIS (VIS Published: 28-Sep-2019, VIS Presented: 28-Sep-2019);   Tdap; 19-Jan-2025 09:19; Steffanie Campos (TIFFANIE); [employer]; [id number] (Exp. Date: 01-Aug-2026); IntraMuscular; Deltoid Left.; 0.5 milliLiter(s); VIS (VIS Published: 09-May-2013, VIS Presented: 19-Jan-2025);

## 2025-02-28 NOTE — DISCHARGE NOTE PROVIDER - NSDCMRMEDTOKEN_GEN_ALL_CORE_FT
acetaminophen 325 mg oral tablet: 2 tab(s) orally every 6 hours as needed for  pain max 3 g/24 hours  acyclovir 50 mg/mL intravenous solution: 9.8 milliliter(s) intravenous every 24 hours  aspirin 81 mg oral tablet, chewable: 1 tab(s) orally once a day  atorvastatin 40 mg oral tablet: 1 tab(s) orally once a day (at bedtime)  diclofenac 1% topical gel: Apply 2 grams topically to affected area 2 times daily to B/L knees for pain  docusate sodium 100 mg oral capsule: 2 cap(s) orally once a day  furosemide 20 mg oral tablet: 1 tab(s) orally 3 times a week M, W, F  Keppra 500 mg oral tablet: 1 tab(s) orally 2 times a day  losartan 25 mg oral tablet: 1 tab(s) orally once a day  metoprolol tartrate 50 mg oral tablet: 1 tab(s) orally every 12 hours  nystatin 100,000 units/g topical powder: 1 Apply topically to affected area 2 times a day  polyethylene glycol 3350 oral powder for reconstitution: 17 gram(s) orally once a day (at bedtime)  senna (sennosides) 8.6 mg oral tablet: 2 tab(s) orally once a day (at bedtime)  Seroquel 25 mg oral tablet: 0.5 tab(s) orally once a day (at bedtime) as needed for  agitation  vancomycin 750 mg intravenous injection: 750 milligram(s) intravenous every 24 hours  Vimpat 100 mg oral tablet: 1 tab(s) orally 3 times a day MDD: 300 mg   acetaminophen 325 mg oral tablet: 2 tab(s) orally every 6 hours as needed for  pain max 3 g/24 hours  acyclovir 50 mg/mL intravenous solution: 9.8 milliliter(s) intravenous every 24 hours  ampicillin 2 g injection: 2 gram(s) injectable every 12 hours  aspirin 81 mg oral tablet, chewable: 1 tab(s) orally once a day  atorvastatin 40 mg oral tablet: 1 tab(s) orally once a day (at bedtime)  cefTRIAXone 2 g/50 mL-iso-osmotic dextrose intravenous solution: 2 gram(s) intravenous every 12 hours  diclofenac 1% topical gel: Apply 2 grams topically to affected area 2 times daily to B/L knees for pain  docusate sodium 100 mg oral capsule: 2 cap(s) orally once a day  furosemide 20 mg oral tablet: 1 tab(s) orally 3 times a week M, W, F  Keppra 500 mg oral tablet: 1 tab(s) orally 2 times a day  losartan 25 mg oral tablet: 1 tab(s) orally once a day  metoprolol tartrate 50 mg oral tablet: 1 tab(s) orally every 12 hours  nystatin 100,000 units/g topical powder: 1 Apply topically to affected area 2 times a day  polyethylene glycol 3350 oral powder for reconstitution: 17 gram(s) orally once a day (at bedtime)  senna (sennosides) 8.6 mg oral tablet: 2 tab(s) orally once a day (at bedtime)  Seroquel 25 mg oral tablet: 0.5 tab(s) orally once a day (at bedtime) as needed for  agitation  vancomycin 750 mg intravenous injection: 750 milligram(s) intravenous every 24 hours  Vimpat 100 mg oral tablet: 1 tab(s) orally 3 times a day MDD: 300 mg

## 2025-02-28 NOTE — DISCHARGE NOTE PROVIDER - HOSPITAL COURSE
89yoF PMH AS, dementia (baseline A&O x 2–3), GERD, HLD, HTN BIBEMS from [facility] for AMS, unknown LSW. Per EMS pt was found this AM unresponsive, aphasic with dark emesis in mouth- no robel blood or witnessed hematemesis. Pt found to be in status epilepticus in the ED intubated for airway protection and admitted to the ICU on 2/18/25. Pt remained on continuous EEG. Neurology was consulted and started the pt on keppra and vimpat. CTH showed no hemorrhage. or large territorial infarct and diffuse cortical atrophy. CTA H&N notable for mild calcified atherosclerotic plaques bilateral carotid at bifurcation, non flow limiting, no intracranial LVO. Pt was also started on pressors and empiric mengitis coverage. Pt had NSTEMI with troponins peaking at 22,152.09 for which cardiology was consulted and pt was managed conservatively. Patient was extubated 2 dyas ago remains on 4L NC. Pt has had waxing and waning mental status similar to her baseline since extubation. She remains on empiric meningitis treatment since family is refusing LP, ID is following and recommends completing empiric treatment course. Patient deemed stable for downgrade form ICU on 2/24.       #Status epilepticus, Metabolic Encephalopathy, Fever, r/o Meningitis  Pt found to be in status epilepticus intubated in the ED for airway protection and admitted to ICU. Pt also hypotensive with leukocytosis. Pt started on empiric meningitis treatment family refusing LP. Patient afebrile, wbc around 12K--mentation appears to be close to baseline as per daughter (Tammy) at the bedside.  - Neurology consulted, appreciate recs   - continue keppra, 500 mg BID  - continue on vimpat 100mg TID   - seizure precautions   - ID consulted, appreciate recs   - Midline placed on 2/28   - pt will have to continue meningitis coverage for 14 day course   - continue empiric Meningitis coverage:  --- Vancomycin 750mg q24 (Day #11)  --- CTX 2G q12 (Day #11)  --- Ampicillin 2G q12 (renally dosed) (Day #11)  --- Acyclovir 490mg q24 (renally dosed) (Day #11)    #CVA  Very small strokes noted in the bilateral occipital lobes and right parahippocampal gyrus  - Neuro input appreciated; Continue aspirin 81 mg/day, atorvastatin 40 mg/day    #Troponemia   #HFrEF EF 40%   PT found to have elevated troponin which peaked at 22k likely iso of demand from status epileticus vs underlying CAD. EF showed new decreased EF of 40-45% compared to prior.   - cardiology consulted, appreciate recs   - cw aspirin 81 mg daily   - cw lasix 20 mg daily M,W,F  - cw losartan 25 mg daily   - cw lopressor 50 mg bid     #Pulmonary edema, improved   #Acute respiratory failure with hypoxemia, resolved   CT notable for pulmonary edema and GGO's possible pneumonia vs secondary to chf   - Monitor O2 levels   - abx as above     #HLD   - cw statin

## 2025-02-28 NOTE — DISCHARGE NOTE NURSING/CASE MANAGEMENT/SOCIAL WORK - NSDCFUADDAPPT_GEN_ALL_CORE_FT
Dr Palla  [facility] [address] [address]  [phone]  Monday, March 3, 2025 8:30 a.m

## 2025-02-28 NOTE — ADVANCED PRACTICE NURSE CONSULT - ASSESSMENT
Procedure Note: Vascular Access  Procedure Name: RUE Midline Placement  Time out: Patient’s first and last name, , MRN, procedure and correct site confirmed prior to start of procedure.  Procedure Date and Time:   2025 1205  Informed Consent: Benefits, risks, and possible complications of procedure explained to patient/caregiver who verbalized understanding and gave written consent.  Local Anesthesia:  Procedure findings and Details:  Successful placement of RUE 18g Single lumen PoerGlide Pro Midline, Lot # [id number], via basilic vein inserted under ultrasound guidance 10cm    Follow up: CXR not needed for placement verification.  OK to use.  Report given to District RN Eliana.

## 2025-02-28 NOTE — DISCHARGE NOTE PROVIDER - PROVIDER TOKENS
PROVIDER:[TOKEN:[[id number]],FOLLOWUP:[1 week],ESTABLISHEDPATIENT:[T]],PROVIDER:[TOKEN:[[id number]],FOLLOWUP:[2 weeks]],PROVIDER:[TOKEN:[[id number]],FOLLOWUP:[2 weeks]]

## 2025-02-28 NOTE — DISCHARGE NOTE PROVIDER - ATTENDING DISCHARGE PHYSICAL EXAMINATION:
VITALS:  T(F): 98.6 (02-28-25 @ 08:35), Max: 98.6 (02-28-25 @ 08:35)  HR: 75 (02-28-25 @ 08:35) (72 - 89)  BP: 153/89 (02-28-25 @ 08:35) (119/58 - 162/89)  RR: 18 (02-28-25 @ 08:35) (16 - 18)  SpO2: 93% (02-28-25 @ 08:35) (92% - 93%)  Wt(kg): --    I&O's Summary      CAPILLARY BLOOD GLUCOSE          PHYSICAL EXAM:  Gen: No acute distress  HEENT:  Normocephalic/Atraumatic  CV:  +S1, +S2, regular, no murmurs or rubs  RESP:   lungs clear to auscultation bilaterally, no wheezing, rales, rhonchi  GI:  abdomen soft, non-tender, non-distended  EXT:  no clubbing, no cyanosis, no edema. +RUE Midline  NEURO:  PATINO, unable to participate in neuro exam due to dementia/delirium

## 2025-02-28 NOTE — DISCHARGE NOTE PROVIDER - DETAILS OF MALNUTRITION DIAGNOSIS/DIAGNOSES
This patient has been assessed with a concern for Malnutrition and was treated during this hospitalization for the following Nutrition diagnosis/diagnoses:     -  02/19/2025: Severe protein-calorie malnutrition   -  02/19/2025: Underweight (BMI < 19)

## 2025-02-28 NOTE — DISCHARGE NOTE PROVIDER - CARE PROVIDERS DIRECT ADDRESSES
,DirectAddress_Unknown,[email or web address]

## 2025-02-28 NOTE — DISCHARGE NOTE PROVIDER - CARE PROVIDER_API CALL
Armando Caruso  Family Medicine  [address] [place] [address]  Phone: [phone]  Fax: [phone]  Established Patient  Follow Up Time: 1 week    Maggie Douglas  Neurology  [address], [address]  [place] [address]  Phone: [phone]  Fax: [phone]  Follow Up Time: 2 weeks    Palla, Venugopal Reddy  Cardiovascular Disease  [address], [address]  [place] [address]  Phone: [phone]  Fax: [phone]  Follow Up Time: 2 weeks

## 2025-02-28 NOTE — DISCHARGE NOTE PROVIDER - NSDCCAREPROVSEEN_GEN_ALL_CORE_FT
Misael, Maggie Fernandez, Madeline Servin, Stephon Mtz, Radhames Yi, Tobias Hunt, Luis Fernando Torre, Candice Valencia, Adolfo Veliz, Anaar N Palla, Obed Mckee, Anthony Hargrove, Nadir Murguia, [facility]

## 2025-02-28 NOTE — DISCHARGE NOTE NURSING/CASE MANAGEMENT/SOCIAL WORK - PATIENT PORTAL LINK FT
You can access the FollowMyHealth Patient Portal offered by [facility] by registering at the following website: [email or web address]. By joining [facility]’s FollowMyHealth portal, you will also be able to view your health information using other applications (apps) compatible with our system.

## 2025-03-01 PROBLEM — Z00.00 ENCOUNTER FOR PREVENTIVE HEALTH EXAMINATION: Status: ACTIVE | Noted: 2025-03-01

## 2025-03-03 ENCOUNTER — APPOINTMENT (OUTPATIENT)
Dept: CARDIOLOGY | Facility: CLINIC | Age: 89
End: 2025-03-03

## 2025-03-05 DIAGNOSIS — G40.901 EPILEPSY, UNSPECIFIED, NOT INTRACTABLE, WITH STATUS EPILEPTICUS: ICD-10-CM

## 2025-03-05 DIAGNOSIS — J81.1 CHRONIC PULMONARY EDEMA: ICD-10-CM

## 2025-03-05 DIAGNOSIS — F03.90 UNSPECIFIED DEMENTIA, UNSPECIFIED SEVERITY, WITHOUT BEHAVIORAL DISTURBANCE, PSYCHOTIC DISTURBANCE, MOOD DISTURBANCE, AND ANXIETY: ICD-10-CM

## 2025-03-05 DIAGNOSIS — E43 UNSPECIFIED SEVERE PROTEIN-CALORIE MALNUTRITION: ICD-10-CM

## 2025-03-05 DIAGNOSIS — I21.4 NON-ST ELEVATION (NSTEMI) MYOCARDIAL INFARCTION: ICD-10-CM

## 2025-03-05 DIAGNOSIS — J18.9 PNEUMONIA, UNSPECIFIED ORGANISM: ICD-10-CM

## 2025-03-05 DIAGNOSIS — J96.01 ACUTE RESPIRATORY FAILURE WITH HYPOXIA: ICD-10-CM

## 2025-03-05 DIAGNOSIS — A41.9 SEPSIS, UNSPECIFIED ORGANISM: ICD-10-CM

## 2025-03-05 DIAGNOSIS — Z66 DO NOT RESUSCITATE: ICD-10-CM

## 2025-03-05 DIAGNOSIS — R65.21 SEVERE SEPSIS WITH SEPTIC SHOCK: ICD-10-CM

## 2025-03-05 DIAGNOSIS — I11.0 HYPERTENSIVE HEART DISEASE WITH HEART FAILURE: ICD-10-CM

## 2025-03-05 DIAGNOSIS — I63.9 CEREBRAL INFARCTION, UNSPECIFIED: ICD-10-CM

## 2025-03-05 DIAGNOSIS — I25.10 ATHEROSCLEROTIC HEART DISEASE OF NATIVE CORONARY ARTERY WITHOUT ANGINA PECTORIS: ICD-10-CM

## 2025-03-05 DIAGNOSIS — I35.0 NONRHEUMATIC AORTIC (VALVE) STENOSIS: ICD-10-CM

## 2025-03-05 DIAGNOSIS — E78.5 HYPERLIPIDEMIA, UNSPECIFIED: ICD-10-CM

## 2025-03-05 DIAGNOSIS — N17.0 ACUTE KIDNEY FAILURE WITH TUBULAR NECROSIS: ICD-10-CM

## 2025-03-05 DIAGNOSIS — E87.20 ACIDOSIS, UNSPECIFIED: ICD-10-CM

## 2025-03-05 DIAGNOSIS — G04.90 ENCEPHALITIS AND ENCEPHALOMYELITIS, UNSPECIFIED: ICD-10-CM

## 2025-03-05 DIAGNOSIS — I27.20 PULMONARY HYPERTENSION, UNSPECIFIED: ICD-10-CM

## 2025-03-05 DIAGNOSIS — I50.21 ACUTE SYSTOLIC (CONGESTIVE) HEART FAILURE: ICD-10-CM

## 2025-03-05 DIAGNOSIS — K21.9 GASTRO-ESOPHAGEAL REFLUX DISEASE WITHOUT ESOPHAGITIS: ICD-10-CM

## 2025-03-05 DIAGNOSIS — G93.41 METABOLIC ENCEPHALOPATHY: ICD-10-CM

## 2025-03-10 ENCOUNTER — INPATIENT (INPATIENT)
Facility: HOSPITAL | Age: 89
LOS: 6 days | Discharge: HOME | DRG: 91 | End: 2025-03-17
Attending: STUDENT IN AN ORGANIZED HEALTH CARE EDUCATION/TRAINING PROGRAM | Admitting: INTERNAL MEDICINE
Payer: MEDICARE

## 2025-03-10 VITALS
SYSTOLIC BLOOD PRESSURE: 136 MMHG | HEIGHT: 62 IN | TEMPERATURE: 97 F | RESPIRATION RATE: 16 BRPM | OXYGEN SATURATION: 91 % | WEIGHT: 109.35 LBS | DIASTOLIC BLOOD PRESSURE: 81 MMHG | HEART RATE: 67 BPM

## 2025-03-10 DIAGNOSIS — R79.89 OTHER SPECIFIED ABNORMAL FINDINGS OF BLOOD CHEMISTRY: ICD-10-CM

## 2025-03-10 DIAGNOSIS — Z86.73 PERSONAL HISTORY OF TRANSIENT ISCHEMIC ATTACK (TIA), AND CEREBRAL INFARCTION WITHOUT RESIDUAL DEFICITS: ICD-10-CM

## 2025-03-10 DIAGNOSIS — G93.41 METABOLIC ENCEPHALOPATHY: ICD-10-CM

## 2025-03-10 DIAGNOSIS — G40.909 EPILEPSY, UNSPECIFIED, NOT INTRACTABLE, WITHOUT STATUS EPILEPTICUS: ICD-10-CM

## 2025-03-10 DIAGNOSIS — E46 UNSPECIFIED PROTEIN-CALORIE MALNUTRITION: ICD-10-CM

## 2025-03-10 DIAGNOSIS — R55 SYNCOPE AND COLLAPSE: ICD-10-CM

## 2025-03-10 DIAGNOSIS — Z71.89 OTHER SPECIFIED COUNSELING: ICD-10-CM

## 2025-03-10 DIAGNOSIS — I42.8 OTHER CARDIOMYOPATHIES: ICD-10-CM

## 2025-03-10 DIAGNOSIS — I50.22 CHRONIC SYSTOLIC (CONGESTIVE) HEART FAILURE: ICD-10-CM

## 2025-03-10 DIAGNOSIS — I35.0 NONRHEUMATIC AORTIC (VALVE) STENOSIS: ICD-10-CM

## 2025-03-10 LAB
A1C WITH ESTIMATED AVERAGE GLUCOSE RESULT: 5.8 % — HIGH (ref 4–5.6)
APPEARANCE UR: CLEAR — SIGNIFICANT CHANGE UP
BASOPHILS # BLD AUTO: 0.02 K/UL — SIGNIFICANT CHANGE UP (ref 0–0.2)
BASOPHILS NFR BLD AUTO: 0.2 % — SIGNIFICANT CHANGE UP (ref 0–2)
BILIRUB UR-MCNC: NEGATIVE — SIGNIFICANT CHANGE UP
CAST: 0 /LPF — SIGNIFICANT CHANGE UP (ref 0–4)
COLOR SPEC: YELLOW — SIGNIFICANT CHANGE UP
EOSINOPHIL # BLD AUTO: 0.24 K/UL — SIGNIFICANT CHANGE UP (ref 0–0.5)
EOSINOPHIL NFR BLD AUTO: 3 % — SIGNIFICANT CHANGE UP (ref 0–6)
ESTIMATED AVERAGE GLUCOSE: 120 MG/DL — HIGH (ref 68–114)
FLUBV AG NPH QL: SIGNIFICANT CHANGE UP
GLUCOSE UR QL: NEGATIVE MG/DL — SIGNIFICANT CHANGE UP
HCT VFR BLD CALC: 31.4 % — LOW (ref 34.5–45)
HGB BLD-MCNC: 10.2 G/DL — LOW (ref 11.5–15.5)
IMM GRANULOCYTES # BLD AUTO: 0.04 K/UL — SIGNIFICANT CHANGE UP (ref 0–0.07)
IMM GRANULOCYTES NFR BLD AUTO: 0.5 % — SIGNIFICANT CHANGE UP (ref 0–0.9)
KETONES UR-MCNC: NEGATIVE MG/DL — SIGNIFICANT CHANGE UP
LACTATE SERPL-SCNC: 1.5 MMOL/L — SIGNIFICANT CHANGE UP (ref 0.7–2)
LEUKOCYTE ESTERASE UR-ACNC: NEGATIVE — SIGNIFICANT CHANGE UP
LYMPHOCYTES # BLD AUTO: 1.25 K/UL — SIGNIFICANT CHANGE UP (ref 1–3.3)
LYMPHOCYTES NFR BLD AUTO: 15.4 % — SIGNIFICANT CHANGE UP (ref 13–44)
MCHC RBC-ENTMCNC: 31.9 PG — SIGNIFICANT CHANGE UP (ref 27–34)
MCHC RBC-ENTMCNC: 32.5 G/DL — SIGNIFICANT CHANGE UP (ref 32–36)
MCV RBC AUTO: 98.1 FL — SIGNIFICANT CHANGE UP (ref 80–100)
MONOCYTES # BLD AUTO: 0.55 K/UL — SIGNIFICANT CHANGE UP (ref 0–0.9)
MONOCYTES NFR BLD AUTO: 6.8 % — SIGNIFICANT CHANGE UP (ref 2–14)
NEUTROPHILS # BLD AUTO: 6.03 K/UL — SIGNIFICANT CHANGE UP (ref 1.8–7.4)
NEUTROPHILS NFR BLD AUTO: 74.1 % — SIGNIFICANT CHANGE UP (ref 43–77)
NITRITE UR-MCNC: NEGATIVE — SIGNIFICANT CHANGE UP
NRBC # BLD AUTO: 0 K/UL — SIGNIFICANT CHANGE UP (ref 0–0)
NRBC # FLD: 0 K/UL — SIGNIFICANT CHANGE UP (ref 0–0)
PH UR: 7 — SIGNIFICANT CHANGE UP (ref 5–8)
PLATELET # BLD AUTO: 360 K/UL — SIGNIFICANT CHANGE UP (ref 150–400)
PMV BLD: 10.1 FL — SIGNIFICANT CHANGE UP (ref 7–13)
PROT UR-MCNC: 30 MG/DL
RBC # BLD: 3.2 M/UL — LOW (ref 3.8–5.2)
RBC # FLD: 14.5 % — SIGNIFICANT CHANGE UP (ref 10.3–14.5)
RBC CASTS # UR COMP ASSIST: 1 /HPF — SIGNIFICANT CHANGE UP (ref 0–4)
RSV RNA NPH QL NAA+NON-PROBE: SIGNIFICANT CHANGE UP
SARS-COV-2 RNA SPEC QL NAA+PROBE: SIGNIFICANT CHANGE UP
SP GR SPEC: 1.02 — SIGNIFICANT CHANGE UP (ref 1–1.03)
SQUAMOUS # UR AUTO: 4 /HPF — SIGNIFICANT CHANGE UP (ref 0–5)
UROBILINOGEN FLD QL: 0.2 MG/DL — SIGNIFICANT CHANGE UP (ref 0.2–1)
WBC # BLD: 8.13 K/UL — SIGNIFICANT CHANGE UP (ref 3.8–10.5)
WBC # FLD AUTO: 8.13 K/UL — SIGNIFICANT CHANGE UP (ref 3.8–10.5)
WBC UR QL: 1 /HPF — SIGNIFICANT CHANGE UP (ref 0–5)

## 2025-03-10 PROCEDURE — 83735 ASSAY OF MAGNESIUM: CPT

## 2025-03-10 PROCEDURE — 97162 PT EVAL MOD COMPLEX 30 MIN: CPT | Mod: GP

## 2025-03-10 PROCEDURE — 74176 CT ABD & PELVIS W/O CONTRAST: CPT | Mod: 26

## 2025-03-10 PROCEDURE — 95816 EEG AWAKE AND DROWSY: CPT

## 2025-03-10 PROCEDURE — 97116 GAIT TRAINING THERAPY: CPT | Mod: GP

## 2025-03-10 PROCEDURE — 70450 CT HEAD/BRAIN W/O DYE: CPT | Mod: 26,XU

## 2025-03-10 PROCEDURE — 71250 CT THORAX DX C-: CPT | Mod: MC

## 2025-03-10 PROCEDURE — 83550 IRON BINDING TEST: CPT

## 2025-03-10 PROCEDURE — 84100 ASSAY OF PHOSPHORUS: CPT

## 2025-03-10 PROCEDURE — 99223 1ST HOSP IP/OBS HIGH 75: CPT

## 2025-03-10 PROCEDURE — 36415 COLL VENOUS BLD VENIPUNCTURE: CPT

## 2025-03-10 PROCEDURE — 85018 HEMOGLOBIN: CPT

## 2025-03-10 PROCEDURE — 85014 HEMATOCRIT: CPT

## 2025-03-10 PROCEDURE — 76705 ECHO EXAM OF ABDOMEN: CPT

## 2025-03-10 PROCEDURE — 70496 CT ANGIOGRAPHY HEAD: CPT | Mod: 26

## 2025-03-10 PROCEDURE — 82728 ASSAY OF FERRITIN: CPT

## 2025-03-10 PROCEDURE — 80048 BASIC METABOLIC PNL TOTAL CA: CPT

## 2025-03-10 PROCEDURE — 82140 ASSAY OF AMMONIA: CPT

## 2025-03-10 PROCEDURE — 99497 ADVNCD CARE PLAN 30 MIN: CPT | Mod: 25

## 2025-03-10 PROCEDURE — 97530 THERAPEUTIC ACTIVITIES: CPT | Mod: GP

## 2025-03-10 PROCEDURE — 0042T: CPT

## 2025-03-10 PROCEDURE — 84443 ASSAY THYROID STIM HORMONE: CPT

## 2025-03-10 PROCEDURE — 95816 EEG AWAKE AND DROWSY: CPT | Mod: 26

## 2025-03-10 PROCEDURE — 74176 CT ABD & PELVIS W/O CONTRAST: CPT | Mod: MC

## 2025-03-10 PROCEDURE — 76705 ECHO EXAM OF ABDOMEN: CPT | Mod: 26

## 2025-03-10 PROCEDURE — 93010 ELECTROCARDIOGRAM REPORT: CPT

## 2025-03-10 PROCEDURE — 82607 VITAMIN B-12: CPT

## 2025-03-10 PROCEDURE — 71250 CT THORAX DX C-: CPT | Mod: 26

## 2025-03-10 PROCEDURE — 80076 HEPATIC FUNCTION PANEL: CPT

## 2025-03-10 PROCEDURE — 70551 MRI BRAIN STEM W/O DYE: CPT | Mod: MC

## 2025-03-10 PROCEDURE — 99222 1ST HOSP IP/OBS MODERATE 55: CPT | Mod: FS

## 2025-03-10 PROCEDURE — 0241U: CPT

## 2025-03-10 PROCEDURE — 95700 EEG CONT REC W/VID EEG TECH: CPT

## 2025-03-10 PROCEDURE — 84436 ASSAY OF TOTAL THYROXINE: CPT

## 2025-03-10 PROCEDURE — 82746 ASSAY OF FOLIC ACID SERUM: CPT

## 2025-03-10 PROCEDURE — 83540 ASSAY OF IRON: CPT

## 2025-03-10 PROCEDURE — 70498 CT ANGIOGRAPHY NECK: CPT | Mod: 26

## 2025-03-10 PROCEDURE — 99285 EMERGENCY DEPT VISIT HI MDM: CPT

## 2025-03-10 PROCEDURE — 99283 EMERGENCY DEPT VISIT LOW MDM: CPT

## 2025-03-10 PROCEDURE — 83880 ASSAY OF NATRIURETIC PEPTIDE: CPT

## 2025-03-10 PROCEDURE — 95711 VEEG 2-12 HR UNMONITORED: CPT

## 2025-03-10 PROCEDURE — 71045 X-RAY EXAM CHEST 1 VIEW: CPT | Mod: 26

## 2025-03-10 PROCEDURE — 80061 LIPID PANEL: CPT

## 2025-03-10 PROCEDURE — 99222 1ST HOSP IP/OBS MODERATE 55: CPT

## 2025-03-10 PROCEDURE — 85027 COMPLETE CBC AUTOMATED: CPT

## 2025-03-10 RX ORDER — FERROUS SULFATE 137(45) MG
1 TABLET, EXTENDED RELEASE ORAL
Refills: 0 | DISCHARGE

## 2025-03-10 RX ORDER — VANCOMYCIN HCL IN 5 % DEXTROSE 1.5G/250ML
750 PLASTIC BAG, INJECTION (ML) INTRAVENOUS ONCE
Refills: 0 | Status: COMPLETED | OUTPATIENT
Start: 2025-03-10 | End: 2025-03-10

## 2025-03-10 RX ORDER — ASPIRIN 325 MG
300 TABLET ORAL DAILY
Refills: 0 | Status: DISCONTINUED | OUTPATIENT
Start: 2025-03-10 | End: 2025-03-10

## 2025-03-10 RX ORDER — ASPIRIN 325 MG
324 TABLET ORAL ONCE
Refills: 0 | Status: DISCONTINUED | OUTPATIENT
Start: 2025-03-10 | End: 2025-03-10

## 2025-03-10 RX ORDER — ASPIRIN 325 MG
300 TABLET ORAL ONCE
Refills: 0 | Status: COMPLETED | OUTPATIENT
Start: 2025-03-10 | End: 2025-03-10

## 2025-03-10 RX ORDER — DICLOFENAC SODIUM 10 MG/G
0 GEL TOPICAL
Refills: 0 | DISCHARGE

## 2025-03-10 RX ORDER — HEPARIN SODIUM 1000 [USP'U]/ML
5000 INJECTION INTRAVENOUS; SUBCUTANEOUS EVERY 12 HOURS
Refills: 0 | Status: DISCONTINUED | OUTPATIENT
Start: 2025-03-10 | End: 2025-03-17

## 2025-03-10 RX ORDER — ATORVASTATIN CALCIUM 80 MG/1
80 TABLET, FILM COATED ORAL AT BEDTIME
Refills: 0 | Status: DISCONTINUED | OUTPATIENT
Start: 2025-03-10 | End: 2025-03-17

## 2025-03-10 RX ORDER — ASPIRIN 325 MG
81 TABLET ORAL DAILY
Refills: 0 | Status: DISCONTINUED | OUTPATIENT
Start: 2025-03-10 | End: 2025-03-10

## 2025-03-10 RX ORDER — ASPIRIN 325 MG
81 TABLET ORAL DAILY
Refills: 0 | Status: DISCONTINUED | OUTPATIENT
Start: 2025-03-10 | End: 2025-03-17

## 2025-03-10 RX ORDER — PIPERACILLIN-TAZO-DEXTROSE,ISO 3.375G/5
3.38 IV SOLUTION, PIGGYBACK PREMIX FROZEN(ML) INTRAVENOUS ONCE
Refills: 0 | Status: COMPLETED | OUTPATIENT
Start: 2025-03-10 | End: 2025-03-10

## 2025-03-10 RX ORDER — LACOSAMIDE 150 MG/1
100 TABLET, FILM COATED ORAL
Refills: 0 | Status: DISCONTINUED | OUTPATIENT
Start: 2025-03-10 | End: 2025-03-12

## 2025-03-10 RX ORDER — METOPROLOL SUCCINATE 50 MG/1
50 TABLET, EXTENDED RELEASE ORAL EVERY 12 HOURS
Refills: 0 | Status: DISCONTINUED | OUTPATIENT
Start: 2025-03-10 | End: 2025-03-17

## 2025-03-10 RX ORDER — DOCUSATE SODIUM 100 MG
2 CAPSULE ORAL
Refills: 0 | DISCHARGE

## 2025-03-10 RX ORDER — FUROSEMIDE 10 MG/ML
20 INJECTION INTRAMUSCULAR; INTRAVENOUS DAILY
Refills: 0 | Status: DISCONTINUED | OUTPATIENT
Start: 2025-03-10 | End: 2025-03-17

## 2025-03-10 RX ORDER — LEVETIRACETAM 10 MG/ML
500 INJECTION, SOLUTION INTRAVENOUS
Refills: 0 | Status: DISCONTINUED | OUTPATIENT
Start: 2025-03-10 | End: 2025-03-12

## 2025-03-10 RX ORDER — MELATONIN 5 MG
3 TABLET ORAL ONCE
Refills: 0 | Status: COMPLETED | OUTPATIENT
Start: 2025-03-10 | End: 2025-03-10

## 2025-03-10 RX ORDER — ACETAMINOPHEN 500 MG/5ML
700 LIQUID (ML) ORAL ONCE
Refills: 0 | Status: COMPLETED | OUTPATIENT
Start: 2025-03-10 | End: 2025-03-10

## 2025-03-10 RX ORDER — VANCOMYCIN HCL IN 5 % DEXTROSE 1.5G/250ML
1000 PLASTIC BAG, INJECTION (ML) INTRAVENOUS ONCE
Refills: 0 | Status: DISCONTINUED | OUTPATIENT
Start: 2025-03-10 | End: 2025-03-10

## 2025-03-10 RX ORDER — CYANOCOBALAMIN 1000 UG/ML
1 INJECTION INTRAMUSCULAR; SUBCUTANEOUS
Refills: 0 | DISCHARGE

## 2025-03-10 RX ORDER — LORAZEPAM 4 MG/ML
0.25 VIAL (ML) INJECTION ONCE
Refills: 0 | Status: DISCONTINUED | OUTPATIENT
Start: 2025-03-10 | End: 2025-03-11

## 2025-03-10 RX ORDER — SENNA 187 MG
2 TABLET ORAL
Refills: 0 | DISCHARGE

## 2025-03-10 RX ADMIN — HEPARIN SODIUM 5000 UNIT(S): 1000 INJECTION INTRAVENOUS; SUBCUTANEOUS at 21:56

## 2025-03-10 RX ADMIN — ATORVASTATIN CALCIUM 80 MILLIGRAM(S): 80 TABLET, FILM COATED ORAL at 21:56

## 2025-03-10 RX ADMIN — METOPROLOL SUCCINATE 50 MILLIGRAM(S): 50 TABLET, EXTENDED RELEASE ORAL at 11:35

## 2025-03-10 RX ADMIN — Medication 3 MILLIGRAM(S): at 23:55

## 2025-03-10 RX ADMIN — LACOSAMIDE 100 MILLIGRAM(S): 150 TABLET, FILM COATED ORAL at 22:01

## 2025-03-10 RX ADMIN — Medication 250 MILLIGRAM(S): at 05:37

## 2025-03-10 RX ADMIN — HEPARIN SODIUM 5000 UNIT(S): 1000 INJECTION INTRAVENOUS; SUBCUTANEOUS at 11:36

## 2025-03-10 RX ADMIN — METOPROLOL SUCCINATE 50 MILLIGRAM(S): 50 TABLET, EXTENDED RELEASE ORAL at 21:56

## 2025-03-10 RX ADMIN — LEVETIRACETAM 500 MILLIGRAM(S): 10 INJECTION, SOLUTION INTRAVENOUS at 21:56

## 2025-03-10 RX ADMIN — Medication 280 MILLIGRAM(S): at 23:55

## 2025-03-10 RX ADMIN — Medication 300 MILLIGRAM(S): at 04:49

## 2025-03-10 RX ADMIN — Medication 1000 MILLILITER(S): at 05:37

## 2025-03-10 RX ADMIN — Medication 200 GRAM(S): at 04:50

## 2025-03-10 RX ADMIN — FUROSEMIDE 20 MILLIGRAM(S): 10 INJECTION INTRAMUSCULAR; INTRAVENOUS at 11:35

## 2025-03-10 NOTE — PATIENT PROFILE ADULT - FALL HARM RISK - HARM RISK INTERVENTIONS

## 2025-03-10 NOTE — H&P ADULT - NSHPLABSRESULTS_GEN_ALL_CORE
CBC Full  -  ( 10 Mar 2025 03:09 )  WBC Count : 8.13 K/uL  RBC Count : 3.20 M/uL  Hemoglobin : 10.2 g/dL  Hematocrit : 31.4 %  Platelet Count - Automated : 360 K/uL  Mean Cell Volume : 98.1 fl  Mean Cell Hemoglobin : 31.9 pg  Mean Cell Hemoglobin Concentration : 32.5 g/dL  Auto Neutrophil # : 6.03 K/uL  Auto Lymphocyte # : 1.25 K/uL  Auto Monocyte # : 0.55 K/uL  Auto Eosinophil # : 0.24 K/uL  Auto Basophil # : 0.02 K/uL  Auto Neutrophil % : 74.1 %  Auto Lymphocyte % : 15.4 %  Auto Monocyte % : 6.8 %  Auto Eosinophil % : 3.0 %  Auto Basophil % : 0.2 %    PT/INR - ( 10 Mar 2025 04:02 )   PT: 11.9 sec;   INR: 1.01 ratio         PTT - ( 10 Mar 2025 04:02 )  PTT:32.2 sec  Urinalysis Basic - ( 10 Mar 2025 04:02 )    Color: x / Appearance: x / SG: x / pH: x  Gluc: 111 mg/dL / Ketone: x  / Bili: x / Urobili: x   Blood: x / Protein: x / Nitrite: x   Leuk Esterase: x / RBC: x / WBC x   Sq Epi: x / Non Sq Epi: x / Bacteria: x      03-10    138  |  106  |  25[H]  ----------------------------<  111[H]  4.0   |  25  |  1.05    Ca    9.5      10 Mar 2025 04:02    TPro  6.5  /  Alb  2.2[L]  /  TBili  0.4  /  DBili  x   /  AST  63[H]  /  ALT  98[H]  /  AlkPhos  190[H]  03-10

## 2025-03-10 NOTE — PHARMACOTHERAPY INTERVENTION NOTE - COMMENTS
Med history complete, reviewed medications and allergies with patients med list from [facility] and confirmed medication list with doctor first med profile, all medication related questions answered

## 2025-03-10 NOTE — PATIENT PROFILE ADULT - NSPROPRESSUREINJURY03_GEN_A_NUR
CASSANDRA was notified by Myrtle Schlatter requesting transportation home for the patient. CASSANDRA spoke with Ricardo Dan at [employer] and ETA for a [employer] is 45 minutes.  Per Dejon Julio will cost the patient $45. CASSANDRA spoke with Myrtle Schlatter, RN and requested he confirm this stage I

## 2025-03-10 NOTE — H&P ADULT - HISTORY OF PRESENT ILLNESS
Patient is a 89 y/o female with past medical history AS, dementia (baseline A&O x 2–3), GERD, HLD, HTH -  recent hospitalization last month with metabolic encephalopathy, CVA and heart failure admitted in the hospital 3/10 with cc of fall. Patient is a limited historian. Majority of the information obtained from the chart. Current resident of [facility].     Patient currently pending stroke and infectious etiology workup.       Medical progress: Denies any HA, CP, SOB. Comfortable.   Complaints: No new complaints  State of mind: AO x 1  Estimated Day of Discharge: 3/11

## 2025-03-10 NOTE — ED ADULT NURSE REASSESSMENT NOTE - NS ED NURSE REASSESS COMMENT FT1
Pt received from Yolie MORENO. Pt has yellow gown applied. Pt is A&OX1 at baseline. Pt has no complaints. Side rails up. Pt in view of the nursing station. Pt observed to be resting comfortably on the stretcher at this time and appears to be in no respiratory distress.

## 2025-03-10 NOTE — CONSULT NOTE ADULT - PROBLEM SELECTOR RECOMMENDATION 2
Mild troponin elevation. Recent NSTEMI with troponin 22k. Invasive therapies were declined.  Continue medical therapy for presumed underlying CAD

## 2025-03-10 NOTE — ED PROVIDER NOTE - CLINICAL SUMMARY MEDICAL DECISION MAKING FREE TEXT BOX
Patient with unknown baseline mental status. Code stroke called. Found on ground 30 min PTA but last known well is unknown. Not a thrombolytic candidate.   CT perfusion: 33 mL scattered regions of   apparent Tmax greater than 6 seconds involving the left occipital,   bilateral frontal, right temporal, and right cerebellum. Findings are of   uncertain clinical significance and may be artifactual. Given aspirin. Admitted to medicine.

## 2025-03-10 NOTE — ED ADULT NURSE REASSESSMENT NOTE - NS ED NURSE REASSESS COMMENT FT1
Cardiac monitoring maintained. Pt A/Ox1- not following commands, inappropriate response to questions. Daughter at bedside to provide history. Plan of care discussed with daughter.

## 2025-03-10 NOTE — CONSULT NOTE ADULT - ASSESSMENT
1.) AMS  - r/o stroke  - ID consult to r/o infection    2.) Pain  - continue with current PRN analgesics as necessary    3.) Weakness  -PT as tolerated     Psychological and Psychiatric Aspects of Care:   --Greif/Bereavment: emotional support provided  --Hx of psychiatric dx: none  -Pastoral Care Available PRN     Social Aspects of Care  -SW involved     Cultural Aspects  -Primary Language: English    Goals of Care:     We discussed Palliative Care team being a supportive team when a patient has ongoing illnesses.  We also discussed that it is not an end of life care service, but can help navigate symptoms and emotional support througout their hospital stay here.    Hospice was explained as well  as an end of life care philosophy.  When a disease cannot be cured, or family/patient decide the treatment burdens out weigh the risk and one choses to change focus of treatment from cure to quality/comfort.       Ethical and Legal Aspects:   NA        Capacity: limited   HCP/Surrogate: Carol (daughter) - awaiting HCP form       Code Status: DNR/DNI with trial of NIV  MOLST: re-done 3/10  Dispo Plan: ongoing     Discussed With: Case coordinated with attending and SW and RN     Time Spent: 90 minutes including the care, coordination and counseling of this patient, 50% of which was spent coordinating and counseling.  1.) AMS  - r/o stroke  - ID consult to r/o infection    2.) Pain  - continue with current PRN analgesics as necessary    3.) Weakness  -PT as tolerated     Psychological and Psychiatric Aspects of Care:   --Greif/Bereavment: emotional support provided  --Hx of psychiatric dx: none  -Pastoral Care Available PRN     Social Aspects of Care  -SW involved     Cultural Aspects  -Primary Language: English    Goals of Care:     We discussed Palliative Care team being a supportive team when a patient has ongoing illnesses.  We also discussed that it is not an end of life care service, but can help navigate symptoms and emotional support througout their hospital stay here.    Ethical and Legal Aspects: NA  Capacity: limited   HCP/Surrogate: Carol (daughter) - awaiting HCP form   Code Status: DNR/DNI with trial of NIV  MOLST: re-done 3/10  Dispo Plan: ongoing     Discussed With: Case coordinated with attending and SW and RN     Time Spent: 60 minutes including the care, coordination and counseling of this patient, 50% of which was spent coordinating and counseling.

## 2025-03-10 NOTE — PATIENT PROFILE ADULT - FUNCTIONAL ASSESSMENT - DAILY ACTIVITY 5.
LLE with upper thigh hematoma to the lateral aspect and ecchymosis down the entire LLE. Area of erythema to the anterior left shin. 1 = Total assistance

## 2025-03-10 NOTE — H&P ADULT - NSHPPHYSICALEXAM_GEN_ALL_CORE
Physical Exam:   GENERAL APPEARANCE:  elderly, frail, deconditioned.   T(C): 36 (03-10-25 @ 03:00), Max: 36.3 (03-10-25 @ 02:37)  HR: 72 (03-10-25 @ 06:50) (67 - 81)  BP: 153/80 (03-10-25 @ 06:50) (136/81 - 166/63)  RR: 14 (03-10-25 @ 06:50) (14 - 18)  SpO2: 97% (03-10-25 @ 06:50) (91% - 97%)  HEENT: temporal wasting  Skin: thin /  pale /  dry  NECK:  Supple without lymphadenopathy.   HEART:  Regular rate and rhythm. normal S1 and S2, No M/R/G  LUNGS:  Good ins/exp effort,   ABDOMEN:  Soft, nontender, nondistended with good bowel sounds heard  EXTREMITIES:  Without cyanosis, clubbing or edema.   NEUROLOGICAL:  underlying dementia

## 2025-03-10 NOTE — ED ADULT NURSE NOTE - CHIEF COMPLAINT QUOTE
Pt BIBEMS from [facility] s/p unwitnessed fall. Per ems pt found next to bed at residence 30 mins PTA, LKW 1hr pta. Pt  is normally able to communicate but only nods yes or no in triage and unable to follow commands. -AC use, no obvious deformities noted. Dr. Uribe at bedside, code stroke called in triage due to acute change in mental status.

## 2025-03-10 NOTE — CONSULT NOTE ADULT - CONVERSATION DETAILS
I spoke with both the patient and her daughter, Carol, at the beside in the ED. Patient had limited capacity but was able to confirm her current wishes that were documented on a MOLST form from an admission in July of 2024. Patient states that she does not want to be resuscitated or intubated but would be okay with a trial of non-invasive ventilation. I also went through the other sections of the MOLST and discussed the option of a feeding tube if the patient was no longer able to tolerate oral nutrition. At this time, the patient would not want a feeding tube placed for artificial nutrients but would want IV antibiotics and IV fluids if necessary. A new MOLST was filled out at bedside and witnessed by her daughter, who was in agreement with patient's wishes.    I also asked about the HCP form, to which the daughter stated was filled out during her last admission here ten days ago. No HCP form was currently on file or on [employer]. Daughter, Carol, stated that she was named the official HCP and would email a copy of this form to the palliative team either tonight or early tomorrow morning.    I educated the patient and the daughter about the philosophy of palliative care and how we were there to add an extra layer of support. I explained that we could revisit the MOLST form if anything changes and that someone from the palliative care team would follow-up with the patient tomorrow. Emotion support was provided and active listening was utilized.

## 2025-03-10 NOTE — H&P ADULT - PROBLEM SELECTOR PLAN 1
- tele monitor for arrhythmias  - CT scan reviewed  - Pending MRI  - ASA / STATIN   - neurology francois - tele monitor for arrhythmias  - CT scans reviewed  - Pending MRI  - ASA / STATIN   - neurology francois

## 2025-03-10 NOTE — ED ADULT NURSE NOTE - OBJECTIVE STATEMENT
Pt. is a 89 y/o female a&o x1 complaining of code: stroke. PMHx AS, dementia (baseline A&O x 2–3), GERD, HLD, HTN BIBEMS from [facility] s/p unwitnessed fall. As per EMS, facility found next to bed at residence 30 mins PTA, last known well unknown. Pt  is normally able to communicate but only saying one worded sentences of no sense at this time. -AC use, no obvious deformities noted. Dr. Uribe at bedside, code stroke called in triage due to acute change in mental status. 20 g IV placed in left AC with labs sent. Dysphagia screen passed. NIH stroke scale 4.

## 2025-03-10 NOTE — H&P ADULT - PROBLEM SELECTOR PLAN 2
- No clinical seizures noted   - continue keppra, 500 mg q12  - continue on vimpat 100mg TID   - Seizure precautions

## 2025-03-10 NOTE — CONSULT NOTE ADULT - ASSESSMENT
89 y/o female with past medical history AS, [place], dementia (baseline A&O x 2–3), HLD, HTN, NSTEMI recently, small strokes on MRI previously- hospitalized recently 2/18 with unresponsiveness, and had a witnessed seizure and was started on Keppra and vimpat for focal seizures initially then lateralized periodic discharges on the right.  Pt. was seen by Dr. Douglas.  On the last admission she was also empirically treated for possible meningoencephalitis as .  [facility]  She now presents with a fall out of bed at 2:30 am which she does not remember.  Patient's daughter is at bedside and does not want the AED's as she thinks it is causing her to have SE of drowsiness, and does not believe the patient had a previous seizure.  Denies any LOC, head trauma, tongue bite, incontinence, facial droop, dysarthria, focal numbness/weakness.  She has general weakness of both her LE's and R knee pain.  In triage pt. nods yes or no in triage and unable to follow commands, and is normally able to communicate, therefore a code stroke was called for AMS.  NIHSS not clearly documented yet.  CTH/CTA/CTP showed no acute stroke or bleed, no LVO, +athero sclerotic changes and upto severe proximal LICA as before, no core infarct. On PE no focal findings.  Currently as per daughter patient is at baseline.       #unwitnessed fall-patient does not remember falling? mechanical vs seizure vs syncope  -r/o seizure.  Doubt stroke.  NO focal deficits.     #h/o seizure with abnormal EEG on previous admission   -On keppra 500mg BID, lacosamide 100mg q 8    #intracranial atherosclerosis-moderate to severe      Recommendations  -Monitor on tele  -Neurochecks/VS q 4  -F/U routine EEG-further recommendations pending EEG results  -continue current AED's at current dose for now  -F/U MRI brain  -seizure precautions  -Check A1c, LDL  -check for underlying metabolic instability/infection  -DVT ppx  -continue ASA, HD statin  -Check orthostatics  -Neurology to follow.  Please page or call with any questions.    D/W Dr. Dixon, Dr. Rick, patient's daughter at bedside   89 y/o female with past medical history AS, [place], dementia (baseline A&O x 2–3), HLD, HTN, NSTEMI recently, small strokes on MRI previously- hospitalized recently 2/18 with unresponsiveness, and had a witnessed seizure and was started on Keppra and vimpat for focal seizures initially then lateralized periodic discharges on the right.  Pt. was seen by Dr. Douglas.  On the last admission she was also empirically treated for possible meningoencephalitis as .  [facility]  She now presents with a fall out of bed at 2:30 am which she does not remember.  Patient's daughter is at bedside and does not want the AED's as she thinks it is causing her to have SE of drowsiness, and does not believe the patient had a previous seizure.  Denies any LOC, head trauma, tongue bite, incontinence, facial droop, dysarthria, focal numbness/weakness.  She has general weakness of both her LE's and R knee pain.  In triage pt. nods yes or no in triage and unable to follow commands, and is normally able to communicate, therefore a code stroke was called for AMS.  NIHSS not clearly documented yet.  CTH/CTA/CTP showed no acute stroke or bleed, no LVO, +athero sclerotic changes and upto severe proximal LICA as before, no core infarct. On PE no focal findings.  Currently as per daughter patient is at baseline.       #unwitnessed fall-patient does not remember falling? mechanical vs seizure vs syncope  -r/o seizure.  Doubt stroke.  NO focal deficits.     #h/o seizure with abnormal EEG on previous admission   -On keppra 500mg BID, lacosamide 100mg q 8    #intracranial atherosclerosis-moderate to severe      Recommendations  -Monitor on tele  -Neurochecks/VS q 4  -F/U routine EEG-further recommendations pending EEG results  -continue current AED's at current dose for now  -seizure precautions  -check for underlying metabolic instability/infection  -DVT ppx  -continue ASA, HD statin  -Check orthostatics  -Neurology to follow.  Please page or call with any questions.    D/W Dr. Dixon, Dr. Rick, patient's daughter at bedside

## 2025-03-10 NOTE — PATIENT PROFILE ADULT - NSPROPTRIGHTSUPPORTPERSON_GEN_A_NUR
Price (Use Numbers Only, No Special Characters Or $): 00 Prep: The treated area was degreased with acetone and vaseline was applied for protection of mucous membranes. Treatment Number: 0 Post Peel Care: The peel was neutralized with sodium bicarbonate.  After the procedure, the treatment area was washed with soap and water, and a post-peel cream was applied. Sun protection and post-care instructions were reviewed with the patient. Detail Level: Zone Glycolic Acid %: 30% Time (Mins): 3 same name as above

## 2025-03-10 NOTE — ED ADULT NURSE NOTE - NS ED PATIENT SAFETY CONCERN
Unable to assess due to medical condition Bhavana Turcios  (RN)  2023 16:25:11 hBavana Turcios  (RN)  2023 16:25:11

## 2025-03-10 NOTE — ED PROVIDER NOTE - OBJECTIVE STATEMENT
89 yo female with hx of dementia, htn, hld presents to ED for unwitnessed fall. Patient found on floor 30 min PTA. Unable to answer questions. Following some commands. Unknown baseline mental statuts. 89 yo female with hx of dementia, htn, hld, seizure presents to ED for unwitnessed fall. Patient found on floor 30 min PTA. Unable to answer questions. Following some commands. Unknown baseline mental statuts.

## 2025-03-10 NOTE — H&P ADULT - CONVERSATION DETAILS
DNR /  DNI -  no molst present on admission. One constent reviewed. Will discuss with patient's daughter once at the bedside.

## 2025-03-10 NOTE — ED ADULT NURSE NOTE - NSFALLLASTDATE_ED_ALL_ED_DT
From: Cathleen Sam  To: Dr. Estelita Nettles  Sent: 3/1/2024 3:12 PM EST  Subject: Synthroid    Hi Dr Nettles, the pharmacy notified me that my prescription is ready for lev. Nurse Lillie tried the lev and it did not work as well. Please let the pharmacy know that I need the brand. Thank you and have a great weekend.   10-Mar-2025 03:00

## 2025-03-10 NOTE — ED ADULT TRIAGE NOTE - IDEAL BODY WEIGHT(KG)
You can access the FollowMyHealth Patient Portal offered by [facility] by registering at the following website: [email or web address]. By joining [facility]’s FollowMyHealth portal, you will also be able to view your health information using other applications (apps) compatible with our system.
50

## 2025-03-10 NOTE — ED PROVIDER NOTE - PHYSICAL EXAMINATION
Const: NAD  Eyes: PERRL, EOM intact  CV: RRR, no murmurs, no chest wall tenderness, distal pulses intact  Resp: CTAB, normal resp effort  GI: soft, nondistended, nontender. No CVA tenderness  MSK: Full ROM, no muscle or bony deformity or tenderness  Neuro: AOx1, GCS 14, Follow some commands. Does not appropriately answer questions. Normal strength b/l.   Skin: No rash, laceration or abrasion  Psych: calm, cooperative.

## 2025-03-10 NOTE — CONSULT NOTE ADULT - SUBJECTIVE AND OBJECTIVE BOX
HPI: Pt is a 89y old Female with hx of GERD, HLD, HTH ,CVA and heart failure presenting [facility] unwitnessed [facility]. Patient was alert and oriented to person, place and time but very hard of hearing and without her hearing aides in place. Patients daughter, Carol, was at the beside who stated that the patients hearing aides do not currently fit and so she cannot wear them. Patient was recently admitted to  for metabolic encephalopathy from her assisted-living residence of [facility] in [place]. From there, patient was discharge to [facility] for rehab services, where she fell and was then sent back to [facility]. Patient currently being worked up for stroke and/or infectious etiology. Patient currently endorses right knee pain but denies any headache, blurred vision, nausea or vomiting. Patient denies any SOB, chest pain or palpitations.       PAIN: (X )Yes   ( )No  Level: moderate  Location: right knee  Intensity:    5/10  Quality: throbbing  Aggravating Factors: movement/activity   Alleviating Factors: heat packs and OTC pain medications  Radiation: denies   Duration/Timing: constant   Impact on ADLs: yes     DYSPNEA: ( ) Yes  (X ) No  Level:    PAST MEDICAL & SURGICAL HISTORY:  HTN (hypertension)          SOCIAL HX: pt currently lives at [facility] but was admitted from [facility]     Hx opiate tolerance ( )YES  (X )NO    Baseline ADLs  (Prior to Admission)  ( ) Independent   ( X)Dependent    FAMILY HISTORY:      Review of Systems:      Depression- pt denies   Physical Discomfort- pt endorses right knee pain  Dyspnea- pt denies   Constipation- pt denies   Diarrhea- pt denies   Nausea- pt denies   Vomiting- pt denies   Anorexia- pt denies   Weight Loss- pt denies   Cough- pt denies   Secretions- pt denies       All other systems reviewed and negative  Unable to obtain/Limited due to: limited insight and hearing ability       PHYSICAL EXAM:    Vital Signs Last 24 Hrs  T(C): 36.4 (10 Mar 2025 11:08), Max: 36.4 (10 Mar 2025 11:08)  T(F): 97.6 (10 Mar 2025 11:08), Max: 97.6 (10 Mar 2025 11:08)  HR: 69 (10 Mar 2025 11:08) (67 - 81)  BP: 148/74 (10 Mar 2025 11:08) (136/81 - 166/63)  BP(mean): 91 (10 Mar 2025 11:08) (89 - 102)  RR: 18 (10 Mar 2025 11:08) (14 - 18)  SpO2: 94% (10 Mar 2025 11:08) (91% - 97%)    Parameters below as of 10 Mar 2025 11:08  Patient On (Oxygen Delivery Method): room air      Daily Height in cm: 157.48 (10 Mar 2025 02:37)    Daily     PPSV2:   50-60%  FAST: as per daughter, no cognitive decline or history of dementia, reports patient has decreased hearing ability     General: well-appearing patient, appears in no acute distress   Mental Status:  HEENT: dry mucous membranes, PERRLA, +EOMI   Lungs: clear to ausculation, no accessory muscle use, no dyspnea, clubbing or cyanosis   Cardiac: s1 and s2 normal, no murmurs, no JVD  GI: + bowel sounds heard in all quadrants, no tenderness to palpation   : no CVA tenderness  Ext: active ROM moderately impaired, mild deficits noted, +1 edema noted to the ankles bilaterally  Neuro: pt alert and oriented to person, place and time- could not tell me why she was in the hospital       LABS:                        10.2   8.13  )-----------( 360      ( 10 Mar 2025 03:09 )             31.4     03-10    138  |  106  |  25[H]  ----------------------------<  111[H]  4.0   |  25  |  1.05    Ca    9.5      10 Mar 2025 04:02    TPro  6.5  /  Alb  2.2[L]  /  TBili  0.4  /  DBili  x   /  AST  63[H]  /  ALT  98[H]  /  AlkPhos  190[H]  03-10    PT/INR - ( 10 Mar 2025 04:02 )   PT: 11.9 sec;   INR: 1.01 ratio         PTT - ( 10 Mar 2025 04:02 )  PTT:32.2 sec  Albumin: Albumin: 2.2 g/dL (03-10 @ 04:02)      Allergies    No Known Allergies    Intolerances      MEDICATIONS  (STANDING):  aspirin  chewable 81 milliGRAM(s) Oral daily  atorvastatin 80 milliGRAM(s) Oral at bedtime  furosemide    Tablet 20 milliGRAM(s) Oral daily  heparin   Injectable 5000 Unit(s) SubCutaneous every 12 hours  lacosamide 100 milliGRAM(s) Oral <User Schedule>  levETIRAcetam 500 milliGRAM(s) Oral two times a day  metoprolol tartrate 50 milliGRAM(s) Oral every 12 hours    MEDICATIONS  (PRN):      RADIOLOGY/ADDITIONAL STUDIES:
Patient is a 89y old  Female who presents with a chief complaint of Confusion /  Fall. (10 Mar 2025 07:58)      HPI:  Patient is a 89 y/o female with past medical history severe AS, EF 40-45%, nonobstructive CAD, dementia (baseline A&O x 2–3), GERD, HLD, HTH -  recent hospitalization last month with metabolic encephalopathy, CVA and heart failure admitted in the hospital 3/10 with cc of fall. Patient is a limited historian. Majority of the information obtained from the chart. Current resident of [facility].     Patient currently pending stroke and infectious etiology workup.             PAST MEDICAL & SURGICAL HISTORY:  HTN (hypertension)            Allergies    No Known Allergies    Intolerances        MEDICATIONS  (STANDING):  aspirin  chewable 81 milliGRAM(s) Oral daily  atorvastatin 80 milliGRAM(s) Oral at bedtime  furosemide    Tablet 20 milliGRAM(s) Oral daily  heparin   Injectable 5000 Unit(s) SubCutaneous every 12 hours  lacosamide 100 milliGRAM(s) Oral <User Schedule>  levETIRAcetam 500 milliGRAM(s) Oral two times a day  metoprolol tartrate 50 milliGRAM(s) Oral every 12 hours    MEDICATIONS  (PRN):      FAMILY HISTORY:      SOCIAL HISTORY  Tobacco use:   Alcohol consumption:   Illicit drug use:     REVIEW OF SYSTEMS:  Unable to obtain detailed ROS    Vital Signs Last 24 Hrs  T(C): 36 (10 Mar 2025 03:00), Max: 36.3 (10 Mar 2025 02:37)  T(F): 96.8 (10 Mar 2025 03:00), Max: 97.4 (10 Mar 2025 02:37)  HR: 72 (10 Mar 2025 06:50) (67 - 81)  BP: 153/80 (10 Mar 2025 06:50) (136/81 - 166/63)  BP(mean): 102 (10 Mar 2025 06:50) (89 - 102)  RR: 14 (10 Mar 2025 06:50) (14 - 18)  SpO2: 97% (10 Mar 2025 06:50) (91% - 97%)    Parameters below as of 10 Mar 2025 06:50  Patient On (Oxygen Delivery Method): room air        Daily Height in cm: 157.48 (10 Mar 2025 02:37)    Daily     I&O's Detail      PHYSICAL EXAM:  Appearance: No distress  HEENT:   Normal oral mucosa  Cardiovascular: Normal S1 S2, No JVD, 2/6 NAJMA RUSB, No peripheral edema  Respiratory: Lungs clear to auscultation	  Psychiatry: diminished MS  Gastrointestinal:  Soft, Non-tender, + BS, no bruits	  Skin: No rashes, No ecchymoses, No cyanosis  Extremities: No clubbing, cyanosis or edema  Vascular: Peripheral pulses palpable 2+ bilaterally      ECG: SR, LAD, nonspecific repol abn    LABS:                        10.2   8.13  )-----------( 360      ( 10 Mar 2025 03:09 )             31.4     03-10    138  |  106  |  25[H]  ----------------------------<  111[H]  4.0   |  25  |  1.05    Ca    9.5      10 Mar 2025 04:02    TPro  6.5  /  Alb  2.2[L]  /  TBili  0.4  /  DBili  x   /  AST  63[H]  /  ALT  98[H]  /  AlkPhos  190[H]  03-10    PT/INR - ( 10 Mar 2025 04:02 )   PT: 11.9 sec;   INR: 1.01 ratio         PTT - ( 10 Mar 2025 04:02 )  PTT:32.2 sec  Thyroid Stimulating Hormone, Serum: 2.19 uU/mL (01-22-25 @ 07:26)  Thyroid Stimulating Hormone, Serum: 0.32 uU/mL *L* (07-30-24 @ 06:48)  Thyroid Stimulating Hormone, Serum: 0.33 uU/mL *L* (07-29-24 @ 06:34)    Troponin I, High Sensitivity Result: 124.20 ng/L *H* (03-10-25 @ 04:02)        Admitting attending: Landon You  Outpatient provider: Armando Caruso    
CC: confusion/fall      HPI:  89 y/o female with past medical history AS, [place], dementia (baseline A&O x 2–3), HLD, HTN, NSTEMI recently, small strokes on MRI previously- hospitalized recently 2/18 with unresponsiveness, and had a witnessed seizure and was started on Keppra and vimpat for focal seizures initially then lateralized periodic discharges on the right.  Pt. was seen by Dr. Douglas.  On the last admission she was also empirically treated for possible meningoencephalitis as .  [facility]  She now presents with a fall out of bed at 2:30 am which she does not remember.  Patient's daughter is at bedside and does not want the AED's as she thinks it is causing her to have SE of drowsiness, and does not believe the patient had a previous seizure.  Denies any LOC, head trauma, tongue bite, incontinence, facial droop, dysarthria, focal numbness/weakness.  She has general weakness of both her LE's and R knee pain.  In triage pt. nods yes or no in triage and unable to follow commands, and is normally able to communicate, therefore a code stroke was called for AMS.  NIHSS not clearly documented yet.  CTH/CTA/CTP showed no acute stroke or bleed, no LVO, +athero sclerotic changes and upto severe proximal LICA as before, no core infarct.  Currently as per daughter patient is at baseline.         PAST MEDICAL & SURGICAL HISTORY:  HTN (hypertension)  SZ' s  dementia  AS  HLD  NSTEMI     FAMILY HISTORY: non-contributory      Social Hx:  Nonsmoker, no drug or alcohol use    MEDICATIONS  (STANDING):  aspirin  chewable 81 milliGRAM(s) Oral daily  atorvastatin 80 milliGRAM(s) Oral at bedtime  furosemide    Tablet 20 milliGRAM(s) Oral daily  heparin   Injectable 5000 Unit(s) SubCutaneous every 12 hours  lacosamide 100 milliGRAM(s) Oral <User Schedule>  levETIRAcetam 500 milliGRAM(s) Oral two times a day  metoprolol tartrate 50 milliGRAM(s) Oral every 12 hours       Allergies  No Known Allergies        ROS: Pertinent positives in HPI, all other ROS were reviewed and are negative.      Vital Signs Last 24 Hrs  T(C): 36.4 (10 Mar 2025 11:08), Max: 36.4 (10 Mar 2025 11:08)  T(F): 97.6 (10 Mar 2025 11:08), Max: 97.6 (10 Mar 2025 11:08)  HR: 69 (10 Mar 2025 11:08) (67 - 81)  BP: 148/74 (10 Mar 2025 11:08) (136/81 - 166/63)  BP(mean): 91 (10 Mar 2025 11:08) (89 - 102)  RR: 18 (10 Mar 2025 11:08) (14 - 18)  SpO2: 94% (10 Mar 2025 11:08) (91% - 97%)    Parameters below as of 10 Mar 2025 11:08  Patient On (Oxygen Delivery Method): room air      GEN  Constitutional: awake, NAD, very [place]  HEAD: Normocephalic  Neck: Supple.  Extremities:  no edema  Musculoskeletal: no joint swelling/tenderness, no abnormal movements  Skin: No rashes    Neurological exam:  HF: A x O x 3. Appropriately interactive, but very [place]. normal affect. Speech fluent, No Aphasia or paraphasic errors. Naming /repetition intact   CN: JUDE, EOMI, VFF, facial sensation normal, no NLFD, tongue midline, Palate moves equally, SCM equal bilaterally  Motor: No pronator drift, moves both upper ex's well, moves lower extremities less, normal bulk and tone, no tremors  Sens: Intact to light touch   Reflexes:  Symmetric. BJ trace, BR trace, KJ absent,  downgoing toes b/l  Coord:  No FNFA  Gait/Balance: Cannot test    NIHSS: 0          Labs:   03-10    138  |  106  |  25[H]  ----------------------------<  111[H]  4.0   |  25  |  1.05    Ca    9.5      10 Mar 2025 04:02    TPro  6.5  /  Alb  2.2[L]  /  TBili  0.4  /  DBili  x   /  AST  63[H]  /  ALT  98[H]  /  AlkPhos  190[H]  03-10    02-20 Chol 231[H] LDL -- HDL 59 Trig 109                          10.2   8.13  )-----------( 360      ( 10 Mar 2025 03:09 )             31.4       Radiology:  < from: CT Angio Neck Stroke Protocol w/ IV Cont (03.10.25 @ 03:11) >    IMPRESSION:    CT HEAD:  No acute intracranial hemorrhage, mass effect, or midline shift.    Findings were discussed with Dr. BRINDA HIGH [phone] 3/10/2025 2:56   AM by Dr. Soto with read back confirmation.    CT PERFUSION:  Technical limitations: Motion degraded with suboptimal bolus.    Core infarction: 0 ml  Penumbra / tissue at risk for active ischemia: 33 mL scattered regions of   apparent Tmax greater than 6 seconds involving the left occipital,   bilateral frontal, right temporal, and right cerebellum. Findings are of   uncertain clinical significance and may be artifactual. Consider further   evaluation with MRI.    CTANECK:  Redemonstrated atherosclerotic changes of the bilateral carotid bulbs   with up to severe stenosis of the left proximal ICA, similar to prior.    CTA HEAD:  No large vessel occlusion or vascular abnormality identified.  Redemonstrated multifocal intracranial atherosclerotic disease as above,   not significantly changed from prior.

## 2025-03-10 NOTE — CONSULT NOTE ADULT - NS ATTEND AMEND GEN_ALL_CORE FT
89 y/o woman PMHx  AS, [place], dementia, HLD, HTN, NSTEMI , recently in HH 2/18 with unresponsiveness,  seizure and was started on Keppra and vimpat for focal seizures, presents s/p fall in ED code stroke was called for AMS.  CTH/CTA/CTP showed no acute stroke or bleed, no LVO, +athero sclerotic changes and up to severe proximal LICA as before, no core infarct. Non focal findings, c/w undurlying dementia, hearing loss.   Agree with above.
pt w/o capacity in ?NAD   admitted to r/o stroke  GOC as above- pt MOLST currently signed on chart  pt in NAD no pain nausea vomiting noted on exam

## 2025-03-10 NOTE — ED ADULT TRIAGE NOTE - CHIEF COMPLAINT QUOTE
Pt BIBEMS from [facility] s/p unwitnessed fall. Per ems pt found next to bed at residence 30 mins PTA, pt  is normally able to communicate but only nods yes or no in triage and unable to follow commands.   -AC use, no obvious deformities noted. Dr. Uribe at bedside, code stroke called in triage. Pt BIBEMS from [facility] s/p unwitnessed fall. Per ems pt found next to bed at residence 30 mins PTA, LNK 1hr pta. Pt  is normally able to communicate but only nods yes or no in triage and unable to follow commands.   -AC use, no obvious deformities noted. Dr. Uribe at bedside, code stroke called in triage. Pt BIBEMS from [facility] s/p unwitnessed fall. Per ems pt found next to bed at residence 30 mins PTA, LKW 1hr pta. Pt  is normally able to communicate but only nods yes or no in triage and unable to follow commands. -AC use, no obvious deformities noted. Dr. Uribe at bedside, code stroke called in triage due to acute change in mental status.

## 2025-03-10 NOTE — ED ADULT NURSE NOTE - ISOLATION TYPE:
EMERGENCY DEPARTMENT HISTORY AND PHYSICAL EXAM 
 
 
Date: 12/10/2018 Patient Name: Binu Walker History of Presenting Illness Chief Complaint Patient presents with  
 High Blood Sugar History Provided By: Patient HPI: Binu Walker, 44 y.o. female with PMHx significant for IDDM, HTN, pancreatitis, alcohol abuse, cocaine abuse, GERD, Borderline personality disorder, asthma, schizophrenia, depression, anxiety, presents via EMS to the ED with cc of acute moderate hyperglycemia x 1 day. Pt endorses she has not had her insulin because she did not have the finances to buy her medications. Pt was recently admitted to ED for DKA on 11/22/2018; hx of similar admissions in the past as well. Per chart review, pt was provided with Rx for NPH 70/30 U BID because of her financial barriers. Pt endorses she has not had anything to eat in 1 day. Denies alcohol or substance abuse. States she had one episode of SOB at home so she called EMS, but denies SOB presently. Pt denies CP, dyspnea, abd pain, headache, lightheadedness, dizziness, fatigue, n/v, urinary sxs, diarrhea. Requesting food in ED. Pt denies alcohol but endorses using cocaine today pta. Pt was started on 1 L NS IV en route via EMS. EMS additionally reports pt was bleeding from left hand on arrival. Pt states she accidentally cut herself on a knife in kitchen while opening a can just pta. Bleeding controlled with bandage. There are no other complaints, changes, or physical findings at this time. PCP: None Current Facility-Administered Medications Medication Dose Route Frequency Provider Last Rate Last Dose  sodium chloride (NS) flush 5-10 mL  5-10 mL IntraVENous Q8H Anisha Cornea, PA-C      
 sodium chloride (NS) flush 5-10 mL  5-10 mL IntraVENous PRN Anisha Cornea, PA-C      
 insulin regular (NOVOLIN R, HUMULIN R) 100 Units in 0.9% sodium chloride 100 mL infusion  0-50 Units/hr IntraVENous TITRATE Anisha Cornea, PA-C 11.6 mL/hr at 12/10/18 1759 11.6 Units/hr at 12/10/18 1759  
 insulin lispro (HUMALOG) injection   SubCUTAneous TIDAC Elizabeth Sarmiento PA-C      
 glucose chewable tablet 16 g  4 Tab Oral PRN Elizabeth Sarmiento PA-C      
 dextrose (D50W) injection syrg 12.5-25 g  25-50 mL IntraVENous PRN Elizabeth Sarmiento PA-C      
 glucagon (GLUCAGEN) injection 1 mg  1 mg IntraMUSCular PRN Elizabeth Sarmiento PA-C      
 0.9% sodium chloride with KCl 20 mEq/L infusion   IntraVENous CONTINUOUS Pearly Boxer, MD      
 
Current Outpatient Medications Medication Sig Dispense Refill  amLODIPine (NORVASC) 5 mg tablet Take 1 Tab by mouth daily for 30 days. 30 Tab 0  
 insulin NPH/insulin regular (NOVOLIN 70/30, HUMULIN 70/30) 100 unit/mL (70-30) injection 15 Units by SubCUTAneous route two (2) times a day. 10 mL 6  
 haloperidol decanoate (HALDOL DECANOATE) 100 mg/mL injection 100 mg by IntraMUSCular route every twenty-eight (28) days.  meloxicam (MOBIC) 15 mg tablet Take 15 mg by mouth daily as needed for Pain.  sertraline (ZOLOFT) 100 mg tablet Take 100 mg by mouth daily.  benztropine (COGENTIN) 1 mg tablet Take 1 Tab by mouth two (2) times a day. Indications: drug-induced extrapyramidal reaction (Patient taking differently: Take 1 mg by mouth every twelve (12) hours.) 28 Tab 0  
 divalproex DR (DEPAKOTE) 500 mg tablet Take 1 Tab by mouth two (2) times a day. Indications: Schizoaffective disorder (Patient taking differently: Take 500 mg by mouth every twelve (12) hours.) 28 Tab 0  
 haloperidol (HALDOL) 5 mg tablet Take 1 Tab by mouth every twelve (12) hours. Indications: Schizoaffective disorder 28 Tab 0  
 albuterol (PROVENTIL HFA, VENTOLIN HFA, PROAIR HFA) 90 mcg/actuation inhaler Take 1 Puff by inhalation every four (4) hours as needed for Wheezing. Past History Past Medical History: 
Past Medical History:  
Diagnosis Date  Alcohol abuse, in remission   
 quit 17 days ago  Asthma   
 does not use inhalers  Borderline personality disorder ([facility] [address]  Diabetes ([facility] [address]  GERD (gastroesophageal reflux disease)  Hypertension  Other ill-defined conditions(799.89)   
 kidney stones ,passed one  Other ill-defined conditions(799.89) sickle cell trait  Other ill-defined conditions(799.89)   
 increased cholesterol  Pancreatitis  Psychiatric disorder   
 schizophrenia, bipolar, depression, anxiety Past Surgical History: 
Past Surgical History:  
Procedure Laterality Date  ABDOMEN SURGERY PROC UNLISTED  3/12/14 CHOLECYSTECTOMY LAPAROSCOPIC    
 HX GASTRIC BYPASS  HX GYN  11/8/2012  
 c section x2  HX OTHER SURGICAL  3/13/14 ENDOSCOPIC RETROGRADE CHOLANGIOPANCREATOGRAPHY  HX OTHER SURGICAL  8/4/14  
 endoscopic stent placed to bile duct  HX TUBAL LIGATION  2012 Family History: 
Family History Problem Relation Age of Onset  Heart Disease Mother  Diabetes Mother  Hypertension Mother  Hypertension Maternal Grandmother Social History: 
Social History Tobacco Use  Smoking status: Current Every Day Smoker Packs/day: 0.50 Years: 14.00 Pack years: 7.00 Types: Cigarettes  Smokeless tobacco: Never Used  Tobacco comment: cigarettes Substance Use Topics  Alcohol use: Yes Alcohol/week: 0.6 oz Types: 1 Cans of beer per week Comment: occasionally, last had \"i had one beer\" today  Drug use: Yes Types: Cocaine Comment: every day Allergies: Allergies Allergen Reactions  Dilaudid [Hydromorphone (Bulk)] Itching  Ibuprofen Not Reported This Time Review of Systems Review of Systems Constitutional: Negative for activity change, appetite change, chills, diaphoresis, fatigue and unexpected weight change. HENT: Negative for congestion and sore throat. Eyes: Negative for photophobia and visual disturbance.   
Respiratory: Positive for shortness of breath (none presently in ED.). Negative for cough, chest tightness and wheezing. Cardiovascular: Negative for chest pain, palpitations and leg swelling. Gastrointestinal: Negative for abdominal pain, diarrhea, nausea and vomiting. Endocrine: Negative for polydipsia, polyphagia and polyuria. Genitourinary: Negative for difficulty urinating, dysuria, frequency and urgency. Musculoskeletal: Negative for arthralgias and myalgias. Neurological: Negative for dizziness, syncope, weakness, light-headedness and numbness. Physical Exam  
Physical Exam  
Constitutional: She is oriented to person, place, and time. No distress. Disheveled AAF sitting upright in NAD. HENT:  
Head: Normocephalic and atraumatic. Right Ear: External ear normal.  
Left Ear: External ear normal.  
Mouth/Throat: Oropharynx is clear and moist.  
Eyes: Conjunctivae and EOM are normal. Pupils are equal, round, and reactive to light. Right eye exhibits no discharge. Left eye exhibits no discharge. Neck: Normal range of motion. Cardiovascular: Normal rate, regular rhythm, normal heart sounds and intact distal pulses. Pulmonary/Chest: Effort normal and breath sounds normal. No respiratory distress. She has no wheezes. She has no rales. Abdominal: Soft. Bowel sounds are normal. She exhibits no distension. There is no tenderness. There is no rebound and no guarding. Musculoskeletal: Normal range of motion. Neurological: She is alert and oriented to person, place, and time. Skin: Skin is warm and dry. Laceration (0.5 cm hemostatic superficial laceration to left 1st/2nd web spacing. ) noted. She is not diaphoretic. No pallor. Psychiatric: She has a normal mood and affect. Her behavior is normal. Judgment and thought content normal.  
 
 
Diagnostic Study Results Labs - Recent Results (from the past 12 hour(s)) GLUCOSE, POC Collection Time: 12/10/18  2:25 PM  
Result Value Ref Range  Glucose (POC) >600 (HH) 65 - 100 mg/dL Performed by Nash Lugo GLUCOSE, POC Collection Time: 12/10/18  2:26 PM  
Result Value Ref Range Glucose (POC) >600 (HH) 65 - 100 mg/dL Performed by Nash Lugo ETHYL ALCOHOL Collection Time: 12/10/18  2:38 PM  
Result Value Ref Range ALCOHOL(ETHYL),SERUM <10 <10 MG/DL  
CBC WITH AUTOMATED DIFF Collection Time: 12/10/18  2:40 PM  
Result Value Ref Range WBC 5.0 3.6 - 11.0 K/uL  
 RBC 5.04 3.80 - 5.20 M/uL  
 HGB 13.3 11.5 - 16.0 g/dL HCT 39.0 35.0 - 47.0 % MCV 77.4 (L) 80.0 - 99.0 FL  
 MCH 26.4 26.0 - 34.0 PG  
 MCHC 34.1 30.0 - 36.5 g/dL  
 RDW 16.4 (H) 11.5 - 14.5 % PLATELET 593 769 - 558 K/uL MPV 11.6 8.9 - 12.9 FL  
 NRBC 0.0 0  WBC ABSOLUTE NRBC 0.00 0.00 - 0.01 K/uL NEUTROPHILS 78 (H) 32 - 75 % LYMPHOCYTES 12 12 - 49 % MONOCYTES 10 5 - 13 % EOSINOPHILS 0 0 - 7 % BASOPHILS 0 0 - 1 % IMMATURE GRANULOCYTES 0 0.0 - 0.5 % ABS. NEUTROPHILS 3.9 1.8 - 8.0 K/UL  
 ABS. LYMPHOCYTES 0.6 (L) 0.8 - 3.5 K/UL  
 ABS. MONOCYTES 0.5 0.0 - 1.0 K/UL  
 ABS. EOSINOPHILS 0.0 0.0 - 0.4 K/UL  
 ABS. BASOPHILS 0.0 0.0 - 0.1 K/UL  
 ABS. IMM. GRANS. 0.0 0.00 - 0.04 K/UL  
 DF SMEAR SCANNED    
 RBC COMMENTS ANISOCYTOSIS 1+ 
    
 RBC COMMENTS REGINALD CELLS 2+ METABOLIC PANEL, COMPREHENSIVE Collection Time: 12/10/18  2:40 PM  
Result Value Ref Range Sodium 133 (L) 136 - 145 mmol/L Potassium 4.0 3.5 - 5.1 mmol/L Chloride 96 (L) 97 - 108 mmol/L  
 CO2 12 (LL) 21 - 32 mmol/L Anion gap 25 (H) 5 - 15 mmol/L Glucose 617 (HH) 65 - 100 mg/dL BUN 15 6 - 20 MG/DL Creatinine 1.11 (H) 0.55 - 1.02 MG/DL  
 BUN/Creatinine ratio 14 12 - 20 GFR est AA >60 >60 ml/min/1.73m2 GFR est non-AA 54 (L) >60 ml/min/1.73m2 Calcium 8.2 (L) 8.5 - 10.1 MG/DL Bilirubin, total 0.6 0.2 - 1.0 MG/DL  
 ALT (SGPT) 16 12 - 78 U/L  
 AST (SGOT) 11 (L) 15 - 37 U/L Alk. phosphatase 91 45 - 117 U/L  Protein, total 7.0 6.4 - 8.2 g/dL Albumin 2.6 (L) 3.5 - 5.0 g/dL Globulin 4.4 (H) 2.0 - 4.0 g/dL A-G Ratio 0.6 (L) 1.1 - 2.2 PHOSPHORUS Collection Time: 12/10/18  2:40 PM  
Result Value Ref Range Phosphorus 4.2 2.6 - 4.7 MG/DL  
ACETONE/KETONE, QL Collection Time: 12/10/18  3:23 PM  
Result Value Ref Range Acetone/Ketone serum, QL. LARGE (A) NEG       
GLUCOSE, POC Collection Time: 12/10/18  3:31 PM  
Result Value Ref Range Glucose (POC) 582 (H) 65 - 100 mg/dL Performed by Mike lAberto GLUCOSE, POC Collection Time: 12/10/18  3:32 PM  
Result Value Ref Range Glucose (POC) 577 (H) 65 - 100 mg/dL Performed by Mike Alberto BLOOD GAS, ARTERIAL Collection Time: 12/10/18  3:57 PM  
Result Value Ref Range pH 7.30 (L) 7.35 - 7.45    
 PCO2 24 (L) 35.0 - 45.0 mmHg PO2 118 (H) 80 - 100 mmHg O2 SAT 98 (H) 92 - 97 % BICARBONATE 12 (L) 22 - 26 mmol/L  
 BASE DEFICIT 12.9 mmol/L  
 O2 METHOD ROOM AIR Sample source ARTERIAL    
 SITE LEFT BRACHIAL TOSHIA'S TEST N/A    
GLUCOSE, POC Collection Time: 12/10/18  4:27 PM  
Result Value Ref Range Glucose (POC) 561 (H) 65 - 100 mg/dL Performed by Mike Alberto GLUCOSE, POC Collection Time: 12/10/18  4:29 PM  
Result Value Ref Range Glucose (POC) 524 (H) 65 - 100 mg/dL Performed by Mike Alberto Vanessa Couch Collection Time: 12/10/18  4:55 PM  
Result Value Ref Range Glucose 524 mg/dL Insulin order 9.3 units/hour Insulin adminstered 9.3 units/hour Multiplier 0.020 Low target 150 mg/dL High target 250 mg/dL D50 order 0.0 ml  
 D50 administered 0.00 ml Minutes until next BG 60 min Order initials SAG Administered initials SAG   
 [facility] Comments URINALYSIS W/ REFLEX CULTURE Collection Time: 12/10/18  5:00 PM  
Result Value Ref Range Color YELLOW/STRAW Appearance CLEAR CLEAR Specific gravity 1.015 1.003 - 1.030    
 pH (UA) 5.5 5.0 - 8.0  Protein NEGATIVE  NEG mg/dL Glucose >1,000 (A) NEG mg/dL Ketone >80 (A) NEG mg/dL Bilirubin NEGATIVE  NEG Blood NEGATIVE  NEG Urobilinogen 0.2 0.2 - 1.0 EU/dL Nitrites NEGATIVE  NEG Leukocyte Esterase NEGATIVE  NEG    
 WBC 0-4 0 - 4 /hpf  
 RBC 0-5 0 - 5 /hpf Epithelial cells FEW FEW /lpf Bacteria NEGATIVE  NEG /hpf  
 UA:UC IF INDICATED CULTURE NOT INDICATED BY UA RESULT CNI    
GLUCOSE, POC Collection Time: 12/10/18  5:56 PM  
Result Value Ref Range Glucose (POC) 447 (H) 65 - 100 mg/dL Performed by Jie Yañez Patvenkat Collection Time: 12/10/18  5:58 PM  
Result Value Ref Range Glucose 447 mg/dL Insulin order 11.6 units/hour Insulin adminstered 11.6 units/hour Multiplier 0.030 Low target 150 mg/dL High target 250 mg/dL D50 order 0.0 ml  
 D50 administered 0.00 ml Minutes until next BG 60 min Order initials SAG Administered initials SAG   
 [facility] Comments Radiologic Studies - No orders to display CT Results  (Last 48 hours) None CXR Results  (Last 48 hours) None Medical Decision Making I am the first provider for this patient. I reviewed the vital signs, available nursing notes, past medical history, past surgical history, family history and social history. Vital Signs-Reviewed the patient's vital signs. Patient Vitals for the past 12 hrs: 
 Temp Pulse Resp BP SpO2  
12/10/18 1730    127/75 100 % 12/10/18 1630  80 12 132/72 100 % 12/10/18 1548  86 14 138/76 100 % 12/10/18 1422 97.8 °F (36.6 °C) 87 15 149/85 100 % Pulse Oximetry Analysis - 100% on RA Records Reviewed: Nursing Notes, Old Medical Records, Previous electrocardiograms, Ambulance Run Sheet, Previous Radiology Studies and Previous Laboratory Studies Provider Notes (Medical Decision Making): DDx: DKA, HHS, hyperglycemia, electrolyte abnormality, FABIENNE, noncompliance, dehydration ED Course:  
Initial assessment performed.  The patients presenting problems have been discussed, and they are in agreement with the care plan formulated and outlined with them. I have encouraged them to ask questions as they arise throughout their visit. 3:20 PM 
Case management has evaluated pt. [facility] will be coming out to patient's house tomorrow. Pt has also been educated on her insurance and importance of filling her Rx for insulin and other medications. 3:27 PM 
I reviewed pt's hx, sxs, vitals, exam, lab findings (including gap and CO2) w/ attending, Dr. Schneider. She is in agreement with the care plan. Recommends ABG as well as continue IV fluids and insulin. Plan to reassess after ABG results. Pt doing well in room in NAD. Procedure Note - Wound Repair:   
Performed by Gail Jennings PA-C . Immediately prior to the procedure, the patient was reevaluated and found suitable for the planned procedure and any planned medications. Immediately prior to the procedure a time out was called to verify the correct patient, procedure, equipment, staff, and marking as appropriate. Tendon/Joint function was Intact. Neurovascular function was Intact. Wound irrigated with copious amounts of normal saline and explored. Wound was located on the left hand, measured 0.5 cm and was linear and clean wound edges. Level of complexity was: simple. Wound was closed using steri-strips. Foreign body was not suspected. Foreign body was not found. Procedure was tolerated well. Critical Care Time: CRITICAL CARE NOTE : 
 
4:38 PM 
 
 
IMPENDING DETERIORATION -Metabolic and Renal 
 
ASSOCIATED RISK FACTORS - Metabolic changes, Dehydration and Vascular Compromise MANAGEMENT- Bedside Assessment, Supervision of Care and admission INTERPRETATION -  Blood Gases and Blood Pressure INTERVENTIONS - hemodynamic mngmt, Metobolic interventions and insulin infusion CASE REVIEW - Hospitalist and Case Management TREATMENT RESPONSE -Improved PERFORMED BY - Self and Name:Vicky Goff PA-C. 
 
 
 
NOTES   : 
 
 
I have spent 30 minutes of critical care time involved in lab review, consultations with specialist, family decision- making, bedside attention and documentation. During this entire length of time I was immediately available to the patient . Cindy Yepez PA-C Disposition: 
4:35 PM 
Patient is being admitted to the hospital by Dr. Karla Coreas. The results of their tests and reasons for their admission have been discussed with them and/or available family. They convey agreement and understanding for the need to be admitted and for their admission diagnosis. Consultation has been made with the inpatient physician specialist for hospitalization. PLAN: 
1. Current Discharge Medication List  
  
 
2. Follow-up Information Follow up With Specialties Details Why Contact Info Mad Mimi   On 12/11/2018 Mad Mimi will call to schedule a time for tomorrow's appointment. Mad Mimi [phone] Return to ED if worse Diagnosis Clinical Impression: 1. Diabetic ketoacidosis without coma associated with type 1 diabetes mellitus ([facility] [address] Attestations: None

## 2025-03-11 LAB
ANION GAP SERPL CALC-SCNC: 7 MMOL/L — SIGNIFICANT CHANGE UP (ref 5–17)
BUN SERPL-MCNC: 22 MG/DL — SIGNIFICANT CHANGE UP (ref 7–23)
CALCIUM SERPL-MCNC: 9.7 MG/DL — SIGNIFICANT CHANGE UP (ref 8.5–10.1)
CHLORIDE SERPL-SCNC: 106 MMOL/L — SIGNIFICANT CHANGE UP (ref 96–108)
CHOLEST SERPL-MCNC: 172 MG/DL — SIGNIFICANT CHANGE UP
CO2 SERPL-SCNC: 25 MMOL/L — SIGNIFICANT CHANGE UP (ref 22–31)
CREAT SERPL-MCNC: 0.91 MG/DL — SIGNIFICANT CHANGE UP (ref 0.5–1.3)
EGFR: 57 ML/MIN/1.73M2 — LOW
EGFR: 57 ML/MIN/1.73M2 — LOW
GLUCOSE SERPL-MCNC: 92 MG/DL — SIGNIFICANT CHANGE UP (ref 70–99)
HDLC SERPL-MCNC: 46 MG/DL — LOW
LIPID PNL WITH DIRECT LDL SERPL: 109 MG/DL — HIGH
NON HDL CHOLESTEROL: 126 MG/DL — SIGNIFICANT CHANGE UP
POTASSIUM SERPL-MCNC: 3.7 MMOL/L — SIGNIFICANT CHANGE UP (ref 3.5–5.3)
POTASSIUM SERPL-SCNC: 3.7 MMOL/L — SIGNIFICANT CHANGE UP (ref 3.5–5.3)
SODIUM SERPL-SCNC: 138 MMOL/L — SIGNIFICANT CHANGE UP (ref 135–145)
TRIGL SERPL-MCNC: 95 MG/DL — SIGNIFICANT CHANGE UP

## 2025-03-11 PROCEDURE — 99232 SBSQ HOSP IP/OBS MODERATE 35: CPT | Mod: FS

## 2025-03-11 PROCEDURE — 99233 SBSQ HOSP IP/OBS HIGH 50: CPT

## 2025-03-11 PROCEDURE — 70551 MRI BRAIN STEM W/O DYE: CPT | Mod: 26

## 2025-03-11 PROCEDURE — 95720 EEG PHY/QHP EA INCR W/VEEG: CPT

## 2025-03-11 RX ORDER — LOSARTAN POTASSIUM 100 MG/1
25 TABLET, FILM COATED ORAL DAILY
Refills: 0 | Status: DISCONTINUED | OUTPATIENT
Start: 2025-03-11 | End: 2025-03-17

## 2025-03-11 RX ORDER — LORAZEPAM 4 MG/ML
0.25 VIAL (ML) INJECTION ONCE
Refills: 0 | Status: DISCONTINUED | OUTPATIENT
Start: 2025-03-11 | End: 2025-03-11

## 2025-03-11 RX ADMIN — Medication 0.25 MILLIGRAM(S): at 20:42

## 2025-03-11 RX ADMIN — LACOSAMIDE 100 MILLIGRAM(S): 150 TABLET, FILM COATED ORAL at 23:16

## 2025-03-11 RX ADMIN — ATORVASTATIN CALCIUM 80 MILLIGRAM(S): 80 TABLET, FILM COATED ORAL at 23:16

## 2025-03-11 RX ADMIN — Medication 81 MILLIGRAM(S): at 06:12

## 2025-03-11 RX ADMIN — Medication 0.25 MILLIGRAM(S): at 10:22

## 2025-03-11 RX ADMIN — FUROSEMIDE 20 MILLIGRAM(S): 10 INJECTION INTRAMUSCULAR; INTRAVENOUS at 11:44

## 2025-03-11 RX ADMIN — HEPARIN SODIUM 5000 UNIT(S): 1000 INJECTION INTRAVENOUS; SUBCUTANEOUS at 11:45

## 2025-03-11 RX ADMIN — LEVETIRACETAM 500 MILLIGRAM(S): 10 INJECTION, SOLUTION INTRAVENOUS at 23:16

## 2025-03-11 RX ADMIN — HEPARIN SODIUM 5000 UNIT(S): 1000 INJECTION INTRAVENOUS; SUBCUTANEOUS at 23:16

## 2025-03-11 RX ADMIN — METOPROLOL SUCCINATE 50 MILLIGRAM(S): 50 TABLET, EXTENDED RELEASE ORAL at 23:17

## 2025-03-11 RX ADMIN — LEVETIRACETAM 500 MILLIGRAM(S): 10 INJECTION, SOLUTION INTRAVENOUS at 11:45

## 2025-03-11 RX ADMIN — METOPROLOL SUCCINATE 50 MILLIGRAM(S): 50 TABLET, EXTENDED RELEASE ORAL at 11:45

## 2025-03-11 RX ADMIN — LACOSAMIDE 100 MILLIGRAM(S): 150 TABLET, FILM COATED ORAL at 10:22

## 2025-03-11 RX ADMIN — Medication 700 MILLIGRAM(S): at 00:30

## 2025-03-11 NOTE — DIETITIAN INITIAL EVALUATION ADULT - PROBLEM SELECTOR PLAN 1
- tele monitor for arrhythmias  - CT scans reviewed  - Pending MRI  - ASA / STATIN   - neurology francois

## 2025-03-11 NOTE — DIETITIAN INITIAL EVALUATION ADULT - PERTINENT LABORATORY DATA
03-11    138  |  106  |  22  ----------------------------<  92  3.7   |  25  |  0.91    Ca    9.7      11 Mar 2025 07:46    TPro  6.5  /  Alb  2.2[L]  /  TBili  0.4  /  DBili  x   /  AST  63[H]  /  ALT  98[H]  /  AlkPhos  190[H]  03-10  A1C with Estimated Average Glucose Result: 5.8 % (03-10-25 @ 03:09)  A1C with Estimated Average Glucose Result: 5.4 % (07-29-24 @ 06:34)

## 2025-03-11 NOTE — DIETITIAN INITIAL EVALUATION ADULT - PROBLEM/PLAN-8
LVM for pt to call back and schd appt w/ [facility] as general cards per SC. Okay to use SHP spot per SC. /cg 01/28/25   DISPLAY PLAN FREE TEXT

## 2025-03-11 NOTE — DIETITIAN INITIAL EVALUATION ADULT - SIGNS/SYMPTOMS
AEB moderate-severe muscle/fat wasting and 14% wt loss since 2/19.  AEB moderate-severe muscle/fat wasting, BMI <19

## 2025-03-11 NOTE — PROGRESS NOTE ADULT - SUBJECTIVE AND OBJECTIVE BOX
Patient is a 89y old  Female who presents with a chief complaint of Confusion /  Fall. (10 Mar 2025 07:58)      HPI:  Patient is a 89 y/o female with past medical history severe AS, EF 40-45%, nonobstructive CAD, dementia (baseline A&O x 2–3), GERD, HLD, HTH -  recent hospitalization last month with metabolic encephalopathy, CVA and heart failure admitted in the hospital 3/10 with cc of fall. Patient is a limited historian. Majority of the information obtained from the chart. Current resident of [facility].     Patient currently pending stroke and infectious etiology workup.     3/11/25: Pt resting comfortably, unresponsive, does not appear to be in distress. Tele: RSR          PAST MEDICAL & SURGICAL HISTORY:  HTN (hypertension)            Allergies    No Known Allergies    Intolerances    Home Medications:  acetaminophen 325 mg oral tablet: 2 tab(s) orally every 6 hours as needed for  pain max 3 g/24 hours (10 Mar 2025 08:44)  aspirin 81 mg oral tablet, chewable: 1 tab(s) orally once a day (10 Mar 2025 08:44)  atorvastatin 40 mg oral tablet: 1 tab(s) orally once a day (at bedtime) (10 Mar 2025 08:44)  cyanocobalamin 1000 mcg oral tablet: 1 tab(s) orally once a day (10 Mar 2025 08:43)  diclofenac 1% topical gel: Apply 2 grams topically to affected area 2 times daily to B/L knees for pain (10 Mar 2025 08:44)  ferrous sulfate: 1 tab(s) orally once a day (10 Mar 2025 08:43)  furosemide 20 mg oral tablet: 1 tab(s) orally 3 times a week M, W, F (10 Mar 2025 08:44)  losartan 25 mg oral tablet: 1 tab(s) orally once a day (10 Mar 2025 08:44)  metoprolol tartrate 50 mg oral tablet: 1 tab(s) orally every 12 hours (10 Mar 2025 08:44)  nystatin 100,000 units/g topical powder: 1 Apply topically to affected area 2 times a day (10 Mar 2025 08:44)  senna (sennosides) 8.6 mg oral tablet: 2 tab(s) orally once a day (at bedtime) (10 Mar 2025 08:44)      MEDICATIONS  (STANDING):  aspirin  chewable 81 milliGRAM(s) Oral daily  atorvastatin 80 milliGRAM(s) Oral at bedtime  furosemide    Tablet 20 milliGRAM(s) Oral daily  heparin   Injectable 5000 Unit(s) SubCutaneous every 12 hours  lacosamide 100 milliGRAM(s) Oral <User Schedule>  levETIRAcetam 500 milliGRAM(s) Oral two times a day  metoprolol tartrate 50 milliGRAM(s) Oral every 12 hours    MEDICATIONS  (PRN):      FAMILY HISTORY:      SOCIAL HISTORY  Tobacco use:   Alcohol consumption:   Illicit drug use:     REVIEW OF SYSTEMS:  Unable to obtain detailed ROS    Vital Signs Last 24 Hrs  T(C): 36.3 (11 Mar 2025 05:00), Max: 37 (10 Mar 2025 14:58)  T(F): 97.4 (11 Mar 2025 05:00), Max: 98.6 (10 Mar 2025 14:58)  HR: 71 (11 Mar 2025 05:00) (65 - 82)  BP: 155/65 (11 Mar 2025 05:00) (142/103 - 173/65)  BP(mean): 85 (10 Mar 2025 16:32) (11 - 85)  RR: 18 (11 Mar 2025 05:00) (17 - 18)  SpO2: 100% (11 Mar 2025 05:00) (92% - 100%)    Parameters below as of 10 Mar 2025 20:41  Patient On (Oxygen Delivery Method): room air        Daily Height in cm: 157.48 (10 Mar 2025 02:37)    Daily     I&O's Detail      PHYSICAL EXAM:  Appearance: No distress  HEENT:   Normal oral mucosa  Cardiovascular: Normal S1 S2, No JVD, 2/6 NAJMA RUSB, No peripheral edema  Respiratory: Lungs clear to auscultation	  Psychiatry: diminished MS  Gastrointestinal:  Soft, Non-tender, + BS, no bruits	  Skin: No rashes, No ecchymoses, No cyanosis  Extremities: No clubbing, cyanosis or edema  Vascular: Peripheral pulses palpable 2+ bilaterally      ECG: SR, LAD, nonspecific repol abn    LABS:                                   10.2   8.13  )-----------( 360      ( 10 Mar 2025 03:09 )             31.4     03-11    138  |  106  |  22  ----------------------------<  92  3.7   |  25  |  0.91    Ca    9.7      11 Mar 2025 07:46    TPro  6.5  /  Alb  2.2[L]  /  TBili  0.4  /  DBili  x   /  AST  63[H]  /  ALT  98[H]  /  AlkPhos  190[H]  03-10      03-10    138  |  106  |  25[H]  ----------------------------<  111[H]  4.0   |  25  |  1.05    Ca    9.5      10 Mar 2025 04:02    TPro  6.5  /  Alb  2.2[L]  /  TBili  0.4  /  DBili  x   /  AST  63[H]  /  ALT  98[H]  /  AlkPhos  190[H]  03-10    PT/INR - ( 10 Mar 2025 04:02 )   PT: 11.9 sec;   INR: 1.01 ratio         PTT - ( 10 Mar 2025 04:02 )  PTT:32.2 sec  Thyroid Stimulating Hormone, Serum: 2.19 uU/mL (01-22-25 @ 07:26)  Thyroid Stimulating Hormone, Serum: 0.32 uU/mL *L* (07-30-24 @ 06:48)  Thyroid Stimulating Hormone, Serum: 0.33 uU/mL *L* (07-29-24 @ 06:34)    Troponin I, High Sensitivity Result: 124.20 ng/L *H* (03-10-25 @ 04:02)      < from: TTE W or WO Ultrasound Enhancing Agent (02.19.25 @ 13:11) >  CONCLUSIONS:      1. Left ventricular systolic function is mildly decreased with an ejection fraction visually estimated at 40 to 45 %.   2. Left atrium is mildly dilated.   3. Mild to moderate mitral regurgitation.   4. Moderate tricuspid regurgitation.   5. Estimated pulmonary artery systolic pressure is 49 mmHg, consistent with moderate pulmonary hypertension.   6. Mild left ventricular hypertrophy.   7. Mild mitral valve stenosis.   8. Mild to moderate aortic regurgitation.   9. Severe aortic stenosis.  10. The peak transaortic velocity is 3.96 m/s, peak transaortic gradient is 62.7 mmHg and mean transaortic gradient is 48.0 mmHg with an LVOT/aortic valve VTI ratio of 0.19. The effective orifice area is estimated at 0.60 cm² by the continuity equation.  11. There is moderate calcification of the mitral valve annulus.    < end of copied text >

## 2025-03-11 NOTE — PROGRESS NOTE ADULT - SUBJECTIVE AND OBJECTIVE BOX
HOSPITALIST ATTENDING PROGRESS NOTE    Chart and meds reviewed.      Subjective:  Patient seen and examined at the bedside. Noted to be lethargic but rousable. Does not offer any acute complaints.    All other systems reviewed and found to be negative with the exception of what has been described above.    MEDICATIONS  (STANDING):  aspirin  chewable 81 milliGRAM(s) Oral daily  atorvastatin 80 milliGRAM(s) Oral at bedtime  furosemide    Tablet 20 milliGRAM(s) Oral daily  heparin   Injectable 5000 Unit(s) SubCutaneous every 12 hours  lacosamide 100 milliGRAM(s) Oral <User Schedule>  levETIRAcetam 500 milliGRAM(s) Oral two times a day  metoprolol tartrate 50 milliGRAM(s) Oral every 12 hours    MEDICATIONS  (PRN):      PHYSICAL EXAM:  Gen: No acute distress  HEENT:  Normocephalic/Atraumatic  CV:  +S1, +S2, regular, no murmurs or rubs  RESP:   lungs clear to auscultation bilaterally, no wheezing, rales, rhonchi  GI:  abdomen soft, non-tender, non-distended  EXT:  no clubbing, no cyanosis, no edema    LABS:                            10.2   8.13  )-----------( 360      ( 10 Mar 2025 03:09 )             31.4     03-11    138  |  106  |  22  ----------------------------<  92  3.7   |  25  |  0.91    Ca    9.7      11 Mar 2025 07:46    TPro  6.5  /  Alb  2.2[L]  /  TBili  0.4  /  DBili  x   /  AST  63[H]  /  ALT  98[H]  /  AlkPhos  190[H]  03-10        LIVER FUNCTIONS - ( 10 Mar 2025 04:02 )  Alb: 2.2 g/dL / Pro: 6.5 gm/dL / ALK PHOS: 190 U/L / ALT: 98 U/L / AST: 63 U/L / GGT: x           PT/INR - ( 10 Mar 2025 04:02 )   PT: 11.9 sec;   INR: 1.01 ratio         PTT - ( 10 Mar 2025 04:02 )  PTT:32.2 sec  Urinalysis Basic - ( 11 Mar 2025 07:46 )    Color: x / Appearance: x / SG: x / pH: x  Gluc: 92 mg/dL / Ketone: x  / Bili: x / Urobili: x   Blood: x / Protein: x / Nitrite: x   Leuk Esterase: x / RBC: x / WBC x   Sq Epi: x / Non Sq Epi: x / Bacteria: x              CULTURES:      Additional results/Imaging, I have personally reviewed:    Telemetry, personally reviewed:

## 2025-03-11 NOTE — PROGRESS NOTE ADULT - PROBLEM SELECTOR PLAN 1
·  Problem: Aortic stenosis.   ·  Recommendation: Severe AS by TTE 3 weeks ago.   EF 40-45%  Invasive therapies declined  BP mildly elevated. Pt takes losartan 25mg PO QD OP. Will restart OP dose and monitor renal function. ·  Problem: Aortic stenosis.   ·  Recommendation: Severe AS by TTE 3 weeks ago.   EF 40-45%  Invasive therapies declined  BP mildly elevated. Pt takes losartan 25mg PO QD OP. Consider restart OP dose and monitor renal function.  Feel free to call or reconsult.   FOllow up in 2-4 weeks

## 2025-03-11 NOTE — DIETITIAN INITIAL EVALUATION ADULT - LAB (SPECIFY)
Consider obtaining vitamin D 25OH level to assess nutriture  Consider checking B6, B12, thiamine, folate, carnitine, and copper levels as malnutrition can cause deficiency Consider obtaining vitamin D 25OH level to assess nutriture

## 2025-03-11 NOTE — PHYSICAL THERAPY INITIAL EVALUATION ADULT - GENERAL OBSERVATIONS, REHAB EVAL
Pt was found lying in bed on tele, pt is awake but confused today, trying to remove her gown and tele monitor, Pt is [place], but able to follow cues today to participate in PT

## 2025-03-11 NOTE — DIETITIAN INITIAL EVALUATION ADULT - FACTORS AFF FOOD INTAKE
change in mental status/difficulty feeding self change in mental status/difficulty feeding self/difficulty swallowing

## 2025-03-11 NOTE — PROGRESS NOTE ADULT - PROBLEM SELECTOR PLAN 2
·  Problem: Troponin I above reference range.   ·  Recommendation: Mild troponin elevation. Recent NSTEMI with troponin 22k. Invasive therapies were declined.  Continue medical therapy for presumed underlying CAD.

## 2025-03-11 NOTE — DIETITIAN INITIAL EVALUATION ADULT - OTHER INFO
Patient is a 89 y/o female with past medical history AS, dementia (baseline A&O x 2–3), GERD, HLD, HTH -  recent hospitalization last month with metabolic encephalopathy, CVA and heart failure admitted in the hospital 3/10 with cc of fall. Patient is a limited historian. Majority of the information obtained from the chart.     Unable to obtain meaningful information 2/2 pt's mental status. RD and dietetic intern observed breakfast tray at bedside untouched. Pt seen by RD services 2/19/25, 1/20/25, 7/29/24 meeting criteria for severe PCM each time. Wt hx: 2/19- 97#, 1/20- 93#. New bed scale wt taken by dietetic intern 3/11/25 at 83#. (14% wt loss in 1mo clin sig). NFPE reveals severe muscle/fat wasting. Pt appears thin.  Meets criteria for PCM. Consider SLP eval to assess safest/ least restrictive diet/consistency/texture. Trial Ensure Plus TID awaiting SLP eval. Confirm goals of care regarding nutrition support - Nutrition support is not recommended due to overall declining medical status which evidenced based studies indicate EN is not effective in prolonging survival and improving quality of life. It can also increase risk of aspiration pneumonia as well as other related issues.   Patient is a 89 y/o female with past medical history AS, dementia (baseline A&O x 2–3), GERD, HLD, HTH -  recent hospitalization last month with metabolic encephalopathy, CVA and heart failure admitted in the hospital 3/10 with cc of fall. Patient is a limited historian. Majority of the information obtained from the chart. Admit for: syncope, monitoring status of epilepsy, chronic CHF     Unable to obtain diet/wt hx 2/2 pt's mental status. S/p EEG on 3/10 - abnormal; "with mild generalized diffuse cerebral dysfunction." GOC conversation w/ daughter on 3/10 (pt w/o capacity) - "patient states that she does not want to be resuscitated or intubated but would be okay with a trial of non-invasive ventilation. I also went through the other sections of the MOLST and discussed the option of a feeding tube if the patient was no longer able to tolerate oral nutrition. At this time, the patient would not want a feeding tube placed for artificial nutrients but would want IV antibiotics and IV fluids if necessary." GOC confirmed regarding nutrition support - is DNR/DNI w/ NFT. RD and dietetic intern observed breakfast tray at bedside untouched (scrambled eggs, oatmeal, juice). Pt seen by RD services 2/19/25, 1/20/25, 7/29/24 meeting criteria for severe PCM each time. Wt hx: 2/19- 97#, 1/20- 93#. New bed scale wt taken by dietetic intern 3/11/25 at 83#.  Unintentional wt loss of 14# / 14.4% wt loss x 3 weeks - severe and clinically significant. NFPE reveals severe muscle/fat wasting - appears very thin, frail, and shana.  Continues to meet criteria for PCM at this time. C/w pureed diet for now; strongly rec'd SLP consult to confirm consistency of diet for safest/least restrictive consistency diet.*** Add ensure + HP shake TID (350kcal, 20g protein) to optimize nutritional needs. Maintain aspiration precautions, back of bed >35 degrees. W/ stage 2 PI  - Recommend to add MVI w/minerals, Vit C 500 mg BID, add Zinc Sulfate 220 mg x 10 days to promote wound healing. See below for other recommendations.

## 2025-03-11 NOTE — PHYSICAL THERAPY INITIAL EVALUATION ADULT - ADDITIONAL COMMENTS
Pt was recently at  2/18-2/28, discharged to [facility] for physical therapy. Prior to hospitalization, pt was staying at [facility], as she was not yet ready to be at her home in [place] again. Lived alone prior to last admission, number of steps 1 DC, 6 step to bedroom.

## 2025-03-11 NOTE — DIETITIAN INITIAL EVALUATION ADULT - NSFNSGIASSESSMENTFT_GEN_A_CORE
As per [facility] forms, pt is on a bowel regimen PRN for constipation including dulcolax and fleet enemas.  no BM doc'd

## 2025-03-11 NOTE — PHYSICAL THERAPY INITIAL EVALUATION ADULT - PERTINENT HX OF CURRENT PROBLEM, REHAB EVAL
89 y/o female with past medical history AS, [place], dementia (baseline A&O x 2–3), HLD, HTN, NSTEMI recently, small strokes on MRI previously- hospitalized recently 2/18 with unresponsiveness, and had a witnessed seizure and was started on Keppra and vimpat for focal seizures initially then lateralized periodic discharges on the right.  Pt. was seen by Dr. Douglas.  On the last admission she was also empirically treated for possible meningoencephalitis as .  [facility]  She now presents with a fall out of bed at 2:30 am which she does not remember. She has general weakness of both her LE's and R knee pain.  In triage pt. nods yes or no in triage and unable to follow commands, and is normally able to communicate, therefore a code stroke was called for AMS

## 2025-03-11 NOTE — PROGRESS NOTE ADULT - ASSESSMENT
89 y/o female with past medical history AS, dementia (baseline A&O x 2–3), GERD, HLD, HTH -  recent hospitalization last month with Status epilepticus, Metabolic Encephalopathy, possible Meningitis, CVA and heart failure admitted in the hospital 3/10 with cc of fall.    #Unwitnessed Fall, Possibly 2/2 Mechanical Etiology vs Seizure vs Syncope  CTH/CTA/CTP/MRI Brain with no evidence of acute stroke or bleed, no LVO. Clinical presentation and CT C/A/P and UA with no evidence of acute infection.  - Monitor on Telemetry  - Check Orthostatics  - Neurochecks    #Hx of Seizure Disorder  EEG on this admission with no epileptiform activity noted  - Cont on keppra 500mg BID, lacosamide 100mg q8h    #Hx of CVA  Very small strokes noted in the bilateral occipital lobes and right parahippocampal gyrus during previous admission on MRI Brain on 2/20/25.  - Neuro input appreciated; Continue aspirin 81 mg/day, atorvastatin 40 mg/day    #HFrEF EF 40%   #HTN  #HLD  PT found to have elevated troponin which peaked at 22k likely iso of demand from status epileticus vs underlying CAD during previous admission. EF showed new decreased EF of 40-45% compared to prior.   - cardiology consulted, appreciate recs--patient/family has declined invasive work up   - cw aspirin 81 mg daily   - cw atorvastatin 80 mg qhs  - cw lasix 20 mg daily M,W,F  - cw losartan 25 mg daily   - cw lopressor 50 mg bid     #DVT Ppx  Heparin SC    Dispo: Anticipate D/C in 1-2 days

## 2025-03-11 NOTE — DIETITIAN INITIAL EVALUATION ADULT - PERTINENT MEDS FT
MEDICATIONS  (STANDING):  aspirin  chewable 81 milliGRAM(s) Oral daily- analgesic  atorvastatin 80 milliGRAM(s) Oral at bedtime- antihyperlipidemic, avoid grapefruit  furosemide    Tablet 20 milliGRAM(s) Oral daily- very 12 hours- diuretic  lacosamide 100 milliGRAM(s) Oral <User Schedule>- anticonvulsant   levETIRAcetam 500 milliGRAM(s) Oral two times a day- antiseizure   metoprolol tartrate 50 milliGRAM(s) Oral every 12 hours- HTN     MEDICATIONS  (PRN):  LORazepam   Injectable 0.25 milliGRAM(s) IV Push once PRN persistant agitation- antianxiety

## 2025-03-11 NOTE — DIETITIAN INITIAL EVALUATION ADULT - ADD RECOMMEND
1) Add Ensure Plus shakes TID (provides 350kcal, 20g protein per shake)  2) Recommend MVI w/ minerals daily to ensure 100% RDA met.  3) Consider adding thiamine 100mg daily 2/2 malnutrition   4) Confirm goals of care regarding nutrition support - Nutrition support is not recommended due to overall declining medical status which evidenced based studies indicate EN is not effective in prolonging survival and improving quality of life. It can also increase risk of aspiration pneumonia as well as other related issues.  5) Encourage protein-rich foods, maximize food preferences  6)Consider obtaining vitamin D 25OH level to assess nutriture  7)Consider checking B6, B12, thiamine, folate, carnitine, and copper levels as malnutrition can cause deficiency  RD will continue to monitor PO intake labs, hydration, and wt prn.  1) C/w pureed diet as tolerated for now  ** Strongly rec'd SLP consult to confirm consistency of diet for safest/least restrictive consistency diet   2) Add Ensure Plus shakes TID (provides 350kcal, 20g protein per shake)  3) Recommend to add MVI w/minerals, Vit C 500 mg BID, add Zinc Sulfate 220 mg x 10 days to promote wound healing.  4) Consider adding thiamine 100 mg daily 2/2 poor PO intake/ malnutrition  5) Encourage protein-rich foods, maximize food preferences  6) Monitor lytes/ min and replete prn (especially K+, phos, and mg) - at HIGH RISK for REFEEDING SYNDROME  7) Monitor bowel movements, if no BM for >3 days, consider implementing bowel regimen.  8) Maintain aspiration precautions, back of bed >35 degrees.  9) Total assistance/supervision during meal times; suggest meal encouragement to optimize po intake   10) GOC regarding nutrition support confirmed; is DNR/DNI w/ NFT. Nutrition support is not recommended due to overall declining medical status which evidenced based studies indicate EN is not effective in prolonging survival and improving quality of life. It can also increase risk of aspiration pneumonia as well as other related issues (infection, GI complications, and worsening/ non-healing PI's). However, will provide nutrition/ hydration within GOC.  RD will continue to monitor PO intake labs, hydration, and wt prn.

## 2025-03-11 NOTE — DIETITIAN INITIAL EVALUATION ADULT - ETIOLOGY
r/t decreased ability to meet nutrient needs 2/2 to AMS d/t metabolic encephalopathy r/t decreased ability to meet nutrient needs 2/2 adv age/dementia

## 2025-03-11 NOTE — DIETITIAN INITIAL EVALUATION ADULT - NSFNSPHYEXAMSKINFT_GEN_A_CORE
Pressure Injury 1: Right:, heel, Stage I  Pressure Injury 2: coccyx, Stage II  Pressure Injury 3: sacrum, Stage I  Pressure Injury 4: none, none  Pressure Injury 5: none, none  Pressure Injury 6: none, none  Pressure Injury 7: none, none  Pressure Injury 8: none, none  Pressure Injury 9: none, none  Pressure Injury 10: none, none  Pressure Injury 11: none, none, Pressure Injury 1: Right:, Heel, Stage I  Pressure Injury 2: coccyx, Stage II  Pressure Injury 3: sacrum, Stage I  Pressure Injury 4: none, none  Pressure Injury 5: none, none  Pressure Injury 6: none, none  Pressure Injury 7: none, none  Pressure Injury 8: none, none  Pressure Injury 9: none, none  Pressure Injury 10: none, none  Pressure Injury 11: none, none  Callum score= 12 Callum = 12   Pressure Injury 1: Right:, heel, Stage I  Pressure Injury 2: coccyx, Stage II  Pressure Injury 3: sacrum, Stage I

## 2025-03-11 NOTE — CHART NOTE - NSCHARTNOTEFT_GEN_A_CORE
HPI: As per medical record,   "Patient is a 89 y/o female with past medical history AS, dementia (baseline A&O x 2–3), GERD, HLD, HTH -  recent hospitalization last month with metabolic encephalopathy, CVA and heart failure admitted in the hospital 3/10 with cc of fall. Patient is a limited historian. Majority of the information obtained from the chart. Current resident of [facility]. (10 Mar 2025 07:58)"    PERTINENT PMH REVIEWED:  [ X ] YES [ ] NO           Primary Contact: Daughter Tammy ([phone])    HCP [ X ] Surrogate [   ] Guardian [   ]    Mental Status: Pt asleep during both visits  Concerns of Depression [  ]- Not identified   Anxiety [   ]- Not identified   Baseline ADLs (prior to admission):  Independent [ X ] moderately [  ] fully   Dependent   [ ] moderately [ ] fully    Anticipated Grief: Patient [  ] Family [ X ]    Caregiver [place] Assessed: Yes [ X ] No [  ]    Zoroastrianism: Gnosticism     Spiritual Concerns: Not identified.  available PRN    Goals of Care: Anticipate ongoing discussion    Previous Services: None     ADVANCE DIRECTIVES:    - MOLST on chart reflecting DNR/DNI trial NIV   - Daughter states she brought copy of HCP but this SW does not see it on chart.     Anticipated D/C Plan: TBD                      Summary: Palliative SW met with pt's daughter Carol at bedside to follow up and offer support. Palliative roles explained. Pt asleep during discussion --reportedly received Ativan dose for MRI. Daughter acknowledges meeting with Pall NP Consuelo yesterday. Carol explains that pt had been living home alone up until the age of 89. in 2024, She had an episode at home she was found unconscious and they were r/o seizure/infection. They subsequently moved her to [facility] where she has been living for the past 6 months. She had another episode which resulted in a fall. Family explains that pt is normally alert and oriented at baseline. The report she has never been diagnosed with dementia. They share that pt's current presentation is not her baseline. Family is waiting update from medical team and results from tests. Emotional support provided. Family offers no questions at this time. MOLST on chart. Our team will continue to follow.

## 2025-03-11 NOTE — DIETITIAN INITIAL EVALUATION ADULT - ORAL INTAKE PTA/DIET HISTORY
Pt AMS, unable to provide diet/wt hx. As per shadow documentation, pt currently resides in [facility] and diet hx at facility includes "ground texture" (minced and moist equivalent) thin liquids, 2.4-4.5 reduced sodium diet, receives Ensure plus TID.  Unable to obtain meaningful information 2/2 AMS/dementia. As per shadow paperwork: resides at [facility]; diet hx at facility includes "ground texture" (minced and moist equivalent) thin liquids, 2.4-4.5 reduced sodium diet, receives Ensure plus TID. Likely w/ poor PO intake

## 2025-03-11 NOTE — DIETITIAN NUTRITION RISK NOTIFICATION - ADDITIONAL COMMENTS/DIETITIAN RECOMMENDATIONS
1) C/w pureed diet as tolerated for now  ** Strongly rec'd SLP consult to confirm consistency of diet for safest/least restrictive consistency diet   2) Add Ensure Plus shakes TID (provides 350kcal, 20g protein per shake)  3) Recommend to add MVI w/minerals, Vit C 500 mg BID, add Zinc Sulfate 220 mg x 10 days to promote wound healing.  4) Consider adding thiamine 100 mg daily 2/2 poor PO intake/ malnutrition  5) Encourage protein-rich foods, maximize food preferences  6) Monitor lytes/ min and replete prn (especially K+, phos, and mg) - at HIGH RISK for REFEEDING SYNDROME  7) Monitor bowel movements, if no BM for >3 days, consider implementing bowel regimen.  8) Maintain aspiration precautions, back of bed >35 degrees.  9) Total assistance/supervision during meal times; suggest meal encouragement to optimize po intake   10) GOC regarding nutrition support confirmed; is DNR/DNI w/ NFT. Nutrition support is not recommended due to overall declining medical status which evidenced based studies indicate EN is not effective in prolonging survival and improving quality of life. It can also increase risk of aspiration pneumonia as well as other related issues (infection, GI complications, and worsening/ non-healing PI's). However, will provide nutrition/ hydration within GOC.  RD will continue to monitor PO intake labs, hydration, and wt prn.

## 2025-03-12 LAB
ALBUMIN SERPL ELPH-MCNC: 2.4 G/DL — LOW (ref 3.3–5)
ALT FLD-CCNC: 70 U/L — SIGNIFICANT CHANGE UP (ref 12–78)
ANION GAP SERPL CALC-SCNC: 8 MMOL/L — SIGNIFICANT CHANGE UP (ref 5–17)
AST SERPL-CCNC: 38 U/L — HIGH (ref 15–37)
BILIRUB DIRECT SERPL-MCNC: 0.2 MG/DL — SIGNIFICANT CHANGE UP (ref 0–0.3)
BILIRUB INDIRECT FLD-MCNC: 0.3 MG/DL — SIGNIFICANT CHANGE UP (ref 0.2–1)
BUN SERPL-MCNC: 18 MG/DL — SIGNIFICANT CHANGE UP (ref 7–23)
CALCIUM SERPL-MCNC: 9.6 MG/DL — SIGNIFICANT CHANGE UP (ref 8.5–10.1)
CO2 SERPL-SCNC: 25 MMOL/L — SIGNIFICANT CHANGE UP (ref 22–31)
CREAT SERPL-MCNC: 0.95 MG/DL — SIGNIFICANT CHANGE UP (ref 0.5–1.3)
EGFR: 54 ML/MIN/1.73M2 — LOW
EGFR: 54 ML/MIN/1.73M2 — LOW
GLUCOSE SERPL-MCNC: 93 MG/DL — SIGNIFICANT CHANGE UP (ref 70–99)
HCT VFR BLD CALC: 30.5 % — LOW (ref 34.5–45)
HGB BLD-MCNC: 10 G/DL — LOW (ref 11.5–15.5)
MCHC RBC-ENTMCNC: 31.5 PG — SIGNIFICANT CHANGE UP (ref 27–34)
MCHC RBC-ENTMCNC: 32.8 G/DL — SIGNIFICANT CHANGE UP (ref 32–36)
MCV RBC AUTO: 96.2 FL — SIGNIFICANT CHANGE UP (ref 80–100)
NRBC # BLD AUTO: 0 K/UL — SIGNIFICANT CHANGE UP (ref 0–0)
NRBC # FLD: 0 K/UL — SIGNIFICANT CHANGE UP (ref 0–0)
NRBC BLD AUTO-RTO: 0 /100 WBCS — SIGNIFICANT CHANGE UP (ref 0–0)
PLATELET # BLD AUTO: 373 K/UL — SIGNIFICANT CHANGE UP (ref 150–400)
PMV BLD: 9.7 FL — SIGNIFICANT CHANGE UP (ref 7–13)
POTASSIUM SERPL-MCNC: 3.5 MMOL/L — SIGNIFICANT CHANGE UP (ref 3.5–5.3)
POTASSIUM SERPL-SCNC: 3.5 MMOL/L — SIGNIFICANT CHANGE UP (ref 3.5–5.3)
PROT SERPL-MCNC: 6.5 GM/DL — SIGNIFICANT CHANGE UP (ref 6–8.3)
RBC # BLD: 3.17 M/UL — LOW (ref 3.8–5.2)
RBC # FLD: 14.4 % — SIGNIFICANT CHANGE UP (ref 10.3–14.5)
SODIUM SERPL-SCNC: 139 MMOL/L — SIGNIFICANT CHANGE UP (ref 135–145)
WBC # BLD: 5.31 K/UL — SIGNIFICANT CHANGE UP (ref 3.8–10.5)
WBC # FLD AUTO: 5.31 K/UL — SIGNIFICANT CHANGE UP (ref 3.8–10.5)

## 2025-03-12 PROCEDURE — 99233 SBSQ HOSP IP/OBS HIGH 50: CPT

## 2025-03-12 PROCEDURE — 99231 SBSQ HOSP IP/OBS SF/LOW 25: CPT

## 2025-03-12 RX ORDER — LACOSAMIDE 150 MG/1
200 TABLET, FILM COATED ORAL
Refills: 0 | Status: DISCONTINUED | OUTPATIENT
Start: 2025-03-12 | End: 2025-03-17

## 2025-03-12 RX ORDER — LACOSAMIDE 150 MG/1
100 TABLET, FILM COATED ORAL
Refills: 0 | Status: DISCONTINUED | OUTPATIENT
Start: 2025-03-12 | End: 2025-03-17

## 2025-03-12 RX ORDER — LEVETIRACETAM 10 MG/ML
250 INJECTION, SOLUTION INTRAVENOUS
Refills: 0 | Status: DISCONTINUED | OUTPATIENT
Start: 2025-03-12 | End: 2025-03-17

## 2025-03-12 RX ORDER — LORAZEPAM 4 MG/ML
0.25 VIAL (ML) INJECTION ONCE
Refills: 0 | Status: DISCONTINUED | OUTPATIENT
Start: 2025-03-12 | End: 2025-03-12

## 2025-03-12 RX ORDER — LEVETIRACETAM 10 MG/ML
500 INJECTION, SOLUTION INTRAVENOUS
Refills: 0 | Status: DISCONTINUED | OUTPATIENT
Start: 2025-03-12 | End: 2025-03-17

## 2025-03-12 RX ADMIN — LACOSAMIDE 100 MILLIGRAM(S): 150 TABLET, FILM COATED ORAL at 08:38

## 2025-03-12 RX ADMIN — LEVETIRACETAM 500 MILLIGRAM(S): 10 INJECTION, SOLUTION INTRAVENOUS at 22:14

## 2025-03-12 RX ADMIN — LACOSAMIDE 200 MILLIGRAM(S): 150 TABLET, FILM COATED ORAL at 22:21

## 2025-03-12 RX ADMIN — HEPARIN SODIUM 5000 UNIT(S): 1000 INJECTION INTRAVENOUS; SUBCUTANEOUS at 10:14

## 2025-03-12 RX ADMIN — METOPROLOL SUCCINATE 50 MILLIGRAM(S): 50 TABLET, EXTENDED RELEASE ORAL at 10:14

## 2025-03-12 RX ADMIN — HEPARIN SODIUM 5000 UNIT(S): 1000 INJECTION INTRAVENOUS; SUBCUTANEOUS at 22:14

## 2025-03-12 RX ADMIN — LEVETIRACETAM 500 MILLIGRAM(S): 10 INJECTION, SOLUTION INTRAVENOUS at 10:24

## 2025-03-12 RX ADMIN — FUROSEMIDE 20 MILLIGRAM(S): 10 INJECTION INTRAMUSCULAR; INTRAVENOUS at 10:14

## 2025-03-12 RX ADMIN — ATORVASTATIN CALCIUM 80 MILLIGRAM(S): 80 TABLET, FILM COATED ORAL at 22:14

## 2025-03-12 RX ADMIN — LOSARTAN POTASSIUM 25 MILLIGRAM(S): 100 TABLET, FILM COATED ORAL at 10:14

## 2025-03-12 RX ADMIN — Medication 0.25 MILLIGRAM(S): at 22:26

## 2025-03-12 RX ADMIN — Medication 81 MILLIGRAM(S): at 05:46

## 2025-03-12 RX ADMIN — METOPROLOL SUCCINATE 50 MILLIGRAM(S): 50 TABLET, EXTENDED RELEASE ORAL at 22:13

## 2025-03-12 NOTE — PROGRESS NOTE ADULT - NS ATTEND AMEND GEN_ALL_CORE FT
89 y/o woman ADM after fall. Recent admission 2/2025, for seizures, MR + for CVA.  Patient's daughter at bedside does not want the AED CTH/CTA/CTP showed no acute stroke or bleed, no LVO, +athero sclerotic changes and up to severe proximal LICA as before, no core infarct. On PE no focal findings.  EEG video shows no seizure activity, slow. Agree with above.

## 2025-03-12 NOTE — PROGRESS NOTE ADULT - ASSESSMENT
1.) AMS  - r/o stroke  - ID consult to r/o infection    2.) Pain  - continue with current PRN analgesics as necessary    3.) Weakness  -PT as tolerated     Psychological and Psychiatric Aspects of Care:   --Greif/Bereavment: emotional support provided  --Hx of psychiatric dx: none  -Pastoral Care Available PRN     Social Aspects of Care  -SW involved     Cultural Aspects  -Primary Language: English    Goals of Care:     We discussed Palliative Care team being a supportive team when a patient has ongoing illnesses.  We also discussed that it is not an end of life care service, but can help navigate symptoms and emotional support througout their hospital stay here.    Ethical and Legal Aspects: NA  Capacity: limited   HCP/Surrogate: Carol (daughter) - awaiting HCP form   Code Status: DNR/DNI with trial of NIV  MOLST: re-done 3/10  Dispo Plan: ongoing     Discussed With: Case coordinated with attending and SW and RN     Time Spent: 60 minutes including the care, coordination and counseling of this patient, 50% of which was spent coordinating and counseling.  Will sign off as goals are clear please reconsult if needed

## 2025-03-12 NOTE — PROGRESS NOTE ADULT - CONVERSATION DETAILS
Palliative SW spoke with Carol power via telephone to follow up and offer support.  She shared the plan to return to [facility] for [facility] in hopes to regain strength and independence.  Daughter states Pt was independent with a rolling walker at the [facility] prior to hospitalization.  We discussed in the event Pt does not improve the option to change the focus to comfort with the support of hospice.  Daughter states she gave up Pts bed at the [facility] and will determine plans moving forward while at [facility].  We discussed in the event Pt does not improve or family decide to change the focus of care from treatment and cure to comfort and quality with the support of hospice.  The philosophy of hospice reviewed.  Discussed stopping medical work ups, hospitalizations, blood work, xrays,etc to treat symptoms as they arise at home to ensure Pt is comfortable and not in distress.  Daughter aware of all levels of home care and options moving forward.  At this time are not ready for a comfort focus.      Plan to return to [facility] for [facility].  Educated of palliative team availability.  Emotional support provided.

## 2025-03-12 NOTE — PROGRESS NOTE ADULT - ASSESSMENT
89 y/o female with past medical history AS, [place], dementia (baseline A&O x 2–3), HLD, HTN, NSTEMI recently, small strokes on MRI previously- hospitalized recently 2/18 with unresponsiveness, and had a witnessed seizure and was started on Keppra and vimpat for focal seizures initially then lateralized periodic discharges on the right.  Pt. was seen by Dr. Douglas.  On the last admission she was also empirically treated for possible meningoencephalitis as .  The [facility] is [facility].  She now presents with a fall out of bed at 2:30 am which she does not remember.  Patient's daughter is at bedside and does not want the AED's as she thinks it is causing her to have SE of drowsiness, and does not believe the patient had a previous seizure.  Denies any LOC, head trauma, tongue bite, incontinence, facial droop, dysarthria, focal numbness/weakness.  She has general weakness of both her LE's and R knee pain.  In triage pt. nods yes or no in triage and unable to follow commands, and is normally able to communicate, therefore a code stroke was called for AMS.  NIHSS not clearly documented yet.  CTH/CTA/CTP showed no acute stroke or bleed, no LVO, +athero sclerotic changes and upto severe proximal LICA as before, no core infarct. On PE no focal findings.  Currently as per daughter patient is at baseline.       #unwitnessed fall-patient does not remember falling? mechanical vs seizure vs syncope  -r/o seizure.  Doubt stroke.  NO focal deficits.     #h/o seizure with abnormal EEG on previous admission   -On keppra 500mg BID, lacosamide 100mg q 8-patient's daughter states having drowsiness as side effects    #intracranial atherosclerosis-moderate to severe  -on Atorvastatin, ASA    Recommendations  -Monitor on tele  -Made adjustments to AED's-lacosamide 100mg AM, 200mg PM, and keppra 250mg AM, 500mg PM  -seizure precautions  -check for underlying metabolic instability/infection  -DVT ppx  -continue ASA, HD statin (consider lowering dose to 40mg in this 89yo lady)  -Check orthostatics  -F/U with Neurology outpatient, check levels of lacosamide, keppra outpatient  -PT eval  -will sign off.  Please call or page Neurology with any questions    D/W Dr. Dixon, Dr. Pink

## 2025-03-12 NOTE — PROGRESS NOTE ADULT - SUBJECTIVE AND OBJECTIVE BOX
No acute events overnight, no new complaints.  MRI is neg for acute findings.  VEEG is consistent with a structural lesion in the multifocal; right anterior temporal; left anterior temporal area, and with moderate generalized diffuse or multifocal slowing.      ROS: As stated above, otherwise neg    MEDICATIONS  (STANDING):  aspirin  chewable 81 milliGRAM(s) Oral daily  atorvastatin 80 milliGRAM(s) Oral at bedtime  furosemide    Tablet 20 milliGRAM(s) Oral daily  heparin   Injectable 5000 Unit(s) SubCutaneous every 12 hours  lacosamide 100 milliGRAM(s) Oral <User Schedule>  levETIRAcetam 500 milliGRAM(s) Oral two times a day  losartan 25 milliGRAM(s) Oral daily  metoprolol tartrate 50 milliGRAM(s) Oral every 12 hours      Vital Signs Last 24 Hrs  T(C): 36.5 (12 Mar 2025 12:43), Max: 36.7 (11 Mar 2025 16:52)  T(F): 97.7 (12 Mar 2025 12:43), Max: 98 (11 Mar 2025 16:52)  HR: 60 (12 Mar 2025 12:43) (60 - 73)  BP: 127/60 (12 Mar 2025 12:43) (127/60 - 156/75)  BP(mean): 80 (12 Mar 2025 12:43) (80 - 93)  RR: 18 (12 Mar 2025 12:43) (18 - 18)  SpO2: 97% (12 Mar 2025 12:43) (92% - 97%)    Parameters below as of 12 Mar 2025 12:43  Patient On (Oxygen Delivery Method): room air    Neurological exam:  HF: A x O x 3. Appropriately interactive, but very [place]. normal affect. Speech fluent, No Aphasia or paraphasic errors. Naming /repetition intact   CN: JUDE, EOMI, VFF, facial sensation normal, no NLFD, tongue midline, Palate moves equally, SCM equal bilaterally  Motor: No pronator drift, moves both upper ex's well, moves lower extremities less, normal bulk and tone, no tremors  Sens: Intact to light touch   Reflexes:  Symmetric. BJ trace, BR trace, KJ absent,  downgoing toes b/l  Coord:  No FNFA  Gait/Balance: Cannot test    NIHSS: 0                          10.0   5.31  )-----------( 373      ( 12 Mar 2025 07:21 )             30.5     03-12    139  |  106  |  18  ----------------------------<  93  3.5   |  25  |  0.95    Ca    9.6      12 Mar 2025 07:21    TPro  6.5  /  Alb  2.4[L]  /  TBili  0.5  /  DBili  0.2  /  AST  38[H]  /  ALT  70  /  AlkPhos  182[H]  03-12 03-11 Chol 172 LDL -- HDL 46[L] Trig 95, 02-20 Chol 231[H] LDL -- HDL 59 Trig 109    Radiology report:  < from: CT Angio Neck Stroke Protocol w/ IV Cont (03.10.25 @ 03:11) >    IMPRESSION:    CT HEAD:  No acute intracranial hemorrhage, mass effect, or midline shift.    Findings were discussed with Dr. BRINDA HIGH [phone] 3/10/2025 2:56   AM by Dr. Soto with read back confirmation.    CT PERFUSION:  Technical limitations: Motion degraded with suboptimal bolus.    Core infarction: 0 ml  Penumbra / tissue at risk for active ischemia: 33 mL scattered regions of   apparent Tmax greater than 6 seconds involving the left occipital,   bilateral frontal, right temporal, and right cerebellum. Findings are of   uncertain clinical significance and may be artifactual. Consider further   evaluation with MRI.    CTANECK:  Redemonstrated atherosclerotic changes of the bilateral carotid bulbs   with up to severe stenosis of the left proximal ICA, similar to prior.    CTA HEAD:  No large vessel occlusion or vascular abnormality identified.  Redemonstrated multifocal intracranial atherosclerotic disease as above,   not significantly changed from prior.    EEG INTERPRETATION 3/12:   No seizure sequences occurred.     This is an abnormal EEG.     This is an abnormal EEG consistent with moderate generalized diffuse or multifocal.     This finding is consistent with a structural lesion in the multifocal; right anterior temporal; left anterior temporal area.

## 2025-03-12 NOTE — PROGRESS NOTE ADULT - SUBJECTIVE AND OBJECTIVE BOX
HPI: pt seen and examined with no family at bedside, patients has periods of confusion still, [facility] meeting held over the phone with patients daughter     PAIN: ( )Yes   (x )No  DYSPNEA: ( ) Yes  (x ) No    Review of Systems:    Unable to obtain/Limited due to: AMS    PHYSICAL EXAM:    Vital Signs Last 24 Hrs  T(C): 36.5 (12 Mar 2025 12:43), Max: 36.7 (11 Mar 2025 16:52)  T(F): 97.7 (12 Mar 2025 12:43), Max: 98 (11 Mar 2025 16:52)  HR: 60 (12 Mar 2025 12:43) (60 - 73)  BP: 127/60 (12 Mar 2025 12:43) (127/60 - 156/75)  BP(mean): 80 (12 Mar 2025 12:43) (80 - 93)  RR: 18 (12 Mar 2025 12:43) (18 - 18)  SpO2: 97% (12 Mar 2025 12:43) (92% - 97%)    Parameters below as of 12 Mar 2025 12:43  Patient On (Oxygen Delivery Method): room air    Daily Weight in k.6 (12 Mar 2025 05:43)    PPSV2:   50-60%  FAST: as per daughter, no cognitive decline or history of dementia, reports patient has decreased hearing ability     General: well-appearing patient, appears in no acute distress   Mental Status: more confused today   HEENT: dry mucous membranes, PERRLA, +EOMI   Lungs: clear to ausculation, no accessory muscle use, no dyspnea, clubbing or cyanosis   Cardiac: s1 and s2 normal, no murmurs, no JVD  GI: + bowel sounds heard in all quadrants, no tenderness to palpation   : no CVA tenderness  Ext: active ROM moderately impaired, mild deficits noted, +1 edema noted to the ankles bilaterally  Neuro: pt alert and oriented to person, place and time- could not tell me why she was in the hospital     LABS:                        10.0   5.31  )-----------( 373      ( 12 Mar 2025 07:21 )             30.5     03-12    139  |  106  |  18  ----------------------------<  93  3.5   |  25  |  0.95    Ca    9.6      12 Mar 2025 07:21    TPro  6.5  /  Alb  2.4[L]  /  TBili  0.5  /  DBili  0.2  /  AST  38[H]  /  ALT  70  /  AlkPhos  182[H]        Albumin: Albumin: 2.4 g/dL ( @ 07:21)      Allergies    No Known Allergies    Intolerances      MEDICATIONS  (STANDING):  aspirin  chewable 81 milliGRAM(s) Oral daily  atorvastatin 80 milliGRAM(s) Oral at bedtime  furosemide    Tablet 20 milliGRAM(s) Oral daily  heparin   Injectable 5000 Unit(s) SubCutaneous every 12 hours  lacosamide 100 milliGRAM(s) Oral <User Schedule>  lacosamide 200 milliGRAM(s) Oral <User Schedule>  levETIRAcetam 250 milliGRAM(s) Oral <User Schedule>  levETIRAcetam 500 milliGRAM(s) Oral <User Schedule>  losartan 25 milliGRAM(s) Oral daily  metoprolol tartrate 50 milliGRAM(s) Oral every 12 hours    MEDICATIONS  (PRN):      RADIOLOGY:

## 2025-03-12 NOTE — EEG REPORT - NS EEG HISTORY CONTINUOUS VIDEO EEG SUG
evaluation of mental status change
diagnostic evaluation of paraoxy events/evaluation of mental status change

## 2025-03-12 NOTE — GOALS OF CARE CONVERSATION - ADVANCED CARE PLANNING - WHAT MATTERS MOST
hopeful for improvement
Breath Sounds equal & clear to percussion & auscultation, no accessory muscle use

## 2025-03-12 NOTE — EEG REPORT - NS EEG RECRD DROWSINESS Q5 RES
attenuation of the posterior dominant rhythm, the emergence of diffuse theta activity and an increase in beta activity
slowing of the background

## 2025-03-12 NOTE — PROGRESS NOTE ADULT - SUBJECTIVE AND OBJECTIVE BOX
CHIEF COMPLAINT: AMS/Confusion    SUBJECTIVE/SIGNIFICANT INTERVAL EVENTS/OVERNIGHT EVENTS:    3/12:   Saw and evaluated patient  Overnight with confusion/agitation s/p IM Ativan 0.25 + mittens placed. Confused and lethargic in morning. Discussed with neurology. EEG with spikes that could suggest underlying seizure disorder  Discussed in detail with daughter/HCP, Tammy Hayward ([phone]) and she feels that cognitive decline augmented by seizure medicaitons. Neurology dose reduced.   Plan is to watch cognitive status and possible further dose reduction and could lead to eventual deferral of seizure rx with the understanding of the risks  Also discussed heart status and excluding possible seizure disorder, episodes of unresponsiveness, syncope or event acute heart event likely in the setting of severe stenosis and systolic heart failure which likely to lead to revolving door to the hospital. Daughter verbalized understanding.   Discharge planning when cognition improves.     Review of Systems: 14 Point review of systems reviewed and reported as negative unless otherwise stated in HPI    FROM H&P:  ""    PHYSICAL EXAM:    T(C): 36.3 (03-12-25 @ 17:24), Max: 36.7 (03-11-25 @ 21:34)  HR: 71 (03-12-25 @ 17:24) (60 - 73)  BP: 125/50 (03-12-25 @ 17:24) (125/50 - 156/75)  RR: 18 (03-12-25 @ 17:24) (18 - 18)  SpO2: 95% (03-12-25 @ 17:24) (92% - 97%)    General: AAOx3; NAD  Head: AT/NC  ENT: Moist Mucous Membranes; No Injury  Eyes: EOMI; PERRL  Neck: Non-tender; No JVD  CVS: RRR, S1&S2, No murmur, No edema  Respiratory: Lungs CTA B/L; Normal Respiratory Effort  Abdomen/GI: Soft, non-tender, non-distended, no guarding, no rebound, normal bowel sounds  : No bladder distention, No Gallo  Extremities: No cyanosis, No clubbing, No edema  MSK: No CVA tenderness, Normal ROM, No injury  Neuro: AAOx3, CNII-XII grossly intact, non-focal  Psych: Appropriate, Cooperative, No depression, No anxiety  Skin: Clean, Dry and Intact      LABS:                          10.0   5.31  )-----------( 373      ( 12 Mar 2025 07:21 )             30.5     03-12    139  |  106  |  18  ----------------------------<  93  3.5   |  25  |  0.95    Ca    9.6      12 Mar 2025 07:21    TPro  6.5  /  Alb  2.4[L]  /  TBili  0.5  /  DBili  0.2  /  AST  38[H]  /  ALT  70  /  AlkPhos  182[H]  03-12    SARS-CoV-2: NotDetec (19 Feb 2025 11:37)    CAPILLARY BLOOD GLUCOSE          Urinalysis with Rflx Culture (collected 10 Mar 2025 03:08)          RADIOLOGY:      EKG:      ECHO:      PROCEDURES:        I personally reviewed labs, imaging, ekg, orders and vitals.    Discussed case with:  []RN  []CM/SW  []Patient  []Family  []Specialist:        MEDICATIONS  (STANDING):  aspirin  chewable 81 milliGRAM(s) Oral daily  atorvastatin 80 milliGRAM(s) Oral at bedtime  furosemide    Tablet 20 milliGRAM(s) Oral daily  heparin   Injectable 5000 Unit(s) SubCutaneous every 12 hours  lacosamide 100 milliGRAM(s) Oral <User Schedule>  lacosamide 200 milliGRAM(s) Oral <User Schedule>  levETIRAcetam 250 milliGRAM(s) Oral <User Schedule>  levETIRAcetam 500 milliGRAM(s) Oral <User Schedule>  losartan 25 milliGRAM(s) Oral daily  metoprolol tartrate 50 milliGRAM(s) Oral every 12 hours    MEDICATIONS  (PRN):     CHIEF COMPLAINT: AMS/Confusion    SUBJECTIVE/SIGNIFICANT INTERVAL EVENTS/OVERNIGHT EVENTS:    3/12:   Saw and evaluated patient  Overnight with confusion/agitation s/p IM Ativan 0.25 + mittens placed. Confused and lethargic in morning. Discussed with neurology. EEG with spikes that could suggest underlying seizure disorder  Discussed in detail with daughter/HCP, Tammy Hayward ([phone]) and she feels that cognitive decline augmented by seizure medicaitons. Neurology dose reduced.   Plan is to watch cognitive status and possible further dose reduction and could lead to eventual deferral of seizure rx with the understanding of the risks  Also discussed heart status and excluding possible seizure disorder, episodes of unresponsiveness, syncope or event acute heart event likely in the setting of severe stenosis and systolic heart failure which likely to lead to revolving door to the hospital. Daughter verbalized understanding.   Discharge planning when cognition improves.   Limited ROS in the setting of AMS  Cleared by cardiology      FROM H&P:  "Patient is a 89 y/o female with past medical history AS, dementia (baseline A&O x 2–3), GERD, HLD, HTH -  recent hospitalization last month with metabolic encephalopathy, CVA and heart failure admitted in the hospital 3/10 with cc of fall. Patient is a limited historian. Majority of the information obtained from the chart. Current resident of [facility].     Patient currently pending stroke and infectious etiology workup.       Medical progress: Denies any HA, CP, SOB. Comfortable.   Complaints: No new complaints  State of mind: AO x 1"    PHYSICAL EXAM:    T(C): 36.3 (03-12-25 @ 17:24), Max: 36.7 (03-11-25 @ 21:34)  HR: 71 (03-12-25 @ 17:24) (60 - 73)  BP: 125/50 (03-12-25 @ 17:24) (125/50 - 156/75)  RR: 18 (03-12-25 @ 17:24) (18 - 18)  SpO2: 95% (03-12-25 @ 17:24) (92% - 97%)    General: AAOx0; NAD; weak/frail  Head: AT/NC  ENT: Moist Mucous Membranes; No Injury  Eyes: EOMI; PERRL  Neck: Non-tender; No JVD  CVS: RRR, S1&S2, Murmur +  Respiratory: Lungs CTA B/L; Normal Respiratory Effort  Abdomen/GI: Soft, non-tender, non-distended, no guarding, no rebound, normal bowel sounds  Extremities: No cyanosis, No clubbing, No edema  MSK: No CVA tenderness, Normal ROM, No injury  Neuro: AAOx0, CNII-XII grossly intact, non-focal  Psych: Unable to evaluate  Skin: Clean, Dry and Intact      LABS:                          10.0   5.31  )-----------( 373      ( 12 Mar 2025 07:21 )             30.5     03-12    139  |  106  |  18  ----------------------------<  93  3.5   |  25  |  0.95    Ca    9.6      12 Mar 2025 07:21    TPro  6.5  /  Alb  2.4[L]  /  TBili  0.5  /  DBili  0.2  /  AST  38[H]  /  ALT  70  /  AlkPhos  182[H]  03-12    SARS-CoV-2: NotDetec (19 Feb 2025 11:37)    CAPILLARY BLOOD GLUCOSE          Urinalysis with Rflx Culture (collected 10 Mar 2025 03:08)    LDL Cholesterol Calculated: 109: Troponin I, High Sensitivity (03.10.25 @ 04:02)   Troponin I, High Sensitivity Result: 124.20: Troponin I, High Sensitivity (02.23.25 @ 05:52)   Troponin I, High Sensitivity Result: 1762.27: Troponin I, High Sensitivity (02.22.25 @ 06:13)   Troponin I, High Sensitivity Result: 2335.97Troponin I, High Sensitivity (02.21.25 @ 05:47)   Troponin I, High Sensitivity Result: 4069.64      RADIOLOGY:  < from: MR Head No Cont (03.11.25 @ 11:10) >  IMPRESSION:    Study is motion degraded.    1.  No evidence of acute infarct or midline shift.  2.  Chronic ischemic changes as discussed above.    < end of copied text >  < from: US Abdomen Upper Quadrant Right (03.10.25 @ 08:10) >  IMPRESSION:  No cholelithiasis or evidence of acute cholecystitis.  Adenomyomatosis.  Increased right renal echogenicity, compatible with medical renal disease.    < end of copied text >  < from: CT Abdomen and Pelvis No Cont (03.10.25 @ 06:17) >  IMPRESSION:  1.   Study somewhat limited due to absence of contrast.  2.   Since the prior study of02/18/2025, there has been the development  increased left pleural effusion with development of compressive   atelectasis in  the left lower lobe. Due to absence of contrast, assessment of the left   hilum  is limited. Suggest contrast administrationCT scan to assess for the  possibility of left hilar mass/endobronchial lesion and if needed   bronchoscopy.  Dependent atelectasis is noted in the left upper lobe.  3.   The right lung demonstrates some slight increase in right pleural   effusion  as well as continued subsegmental atelectasis right middle lobe and   dependent  atelectasis right lower lobe. There is no evidence of pneumothorax.  4.   Atherosclerotic changes thoracic aorta without evidence aneurysm.   Valvular  calcification notedinvolving the aortic valve and coronary artery  calcification. As on the prior study there is cardiomegaly and trace  pericardial effusion.  5.   Worsening anasarca soft tissue of chest since prior study.  Other incidental findings as above    < end of copied text >      EKG:  < from: 12 Lead ECG (03.10.25 @ 04:28) >  Diagnosis Line Normal sinus rhythm  Possible Left atrial enlargement  Left axis deviation  T wave abnormality, consider anterolateral ischemia  Abnormal ECG  When compared with ECG of 18-FEB-2025 08:37,  Significant changes have occurred    < end of copied text >      ECHO:  < from: TTE W or WO Ultrasound Enhancing Agent (02.19.25 @ 13:11) >     CONCLUSIONS:      1. Left ventricular systolic function is mildly decreased with an ejection fraction visually estimated at 40 to 45 %.   2. Left atrium is mildly dilated.   3. Mild to moderate mitral regurgitation.   4. Moderate tricuspid regurgitation.   5. Estimated pulmonary artery systolic pressure is 49 mmHg, consistent with moderate pulmonary hypertension.   6. Mild left ventricular hypertrophy.   7. Mild mitral valve stenosis.   8. Mild to moderate aortic regurgitation.   9. Severe aortic stenosis.  10. The peak transaortic velocity is 3.96 m/s, peak transaortic gradient is 62.7 mmHg and mean transaortic gradient is 48.0 mmHg with an LVOT/aortic valve VTI ratio of 0.19. The effective orifice area is estimated at 0.60 cm² by the continuity equation.  11. There is moderate calcification of the mitral valve annulus.    < end of copied text >              I personally reviewed labs, imaging, ekg, orders and vitals.    Discussed case with:  [X]RN  [X]CM/KAILA  [X]Patient  [X]Family  [X]Specialist: Neurology

## 2025-03-12 NOTE — PROGRESS NOTE ADULT - ASSESSMENT
MEDICATIONS  (STANDING):  aspirin  chewable 81 milliGRAM(s) Oral daily  atorvastatin 80 milliGRAM(s) Oral at bedtime  furosemide    Tablet 20 milliGRAM(s) Oral daily  heparin   Injectable 5000 Unit(s) SubCutaneous every 12 hours  lacosamide 100 milliGRAM(s) Oral <User Schedule>  lacosamide 200 milliGRAM(s) Oral <User Schedule>  levETIRAcetam 250 milliGRAM(s) Oral <User Schedule>  levETIRAcetam 500 milliGRAM(s) Oral <User Schedule>  losartan 25 milliGRAM(s) Oral daily  metoprolol tartrate 50 milliGRAM(s) Oral every 12 hours    MEDICATIONS  (PRN):      89 y/o female with past medical history AS, dementia (baseline A&O x 2–3), GERD, HLD, HTH -  recent hospitalization last month with Status epilepticus, Metabolic Encephalopathy, possible Meningitis, CVA and heart failure admitted in the hospital 3/10 with cc of fall.    #Unwitnessed Fall, Possibly 2/2 Mechanical Etiology vs Seizure vs Syncope. Unable to clinically determine  CTH/CTA/CTP/MRI Brain with no evidence of acute stroke or bleed, no LVO. Clinical presentation and CT C/A/P and UA with no evidence of acute infection.  - Monitor on Telemetry  - Neurochecks    #Hx of Seizure Disorder  EEG on this admission with no epileptiform activity noted  - Cont on keppra 500mg BID, lacosamide 100mg q8h    #Hx of CVA  Very small strokes noted in the bilateral occipital lobes and right parahippocampal gyrus during previous admission on MRI Brain on 2/20/25.  - Neuro input appreciated; Continue aspirin 81 mg/day, atorvastatin 40 mg/day    #HFrEF EF 40%   #HTN  #HLD  PT found to have elevated troponin which peaked at 22k likely iso of demand from status epileticus vs underlying CAD during previous admission. EF showed new decreased EF of 40-45% compared to prior.   - cardiology consulted, appreciate recs--patient/family has declined invasive work up   - cw aspirin 81 mg daily   - cw atorvastatin 80 mg qhs  - cw lasix 20 mg daily M,W,F  - cw losartan 25 mg daily   - cw lopressor 50 mg bid     #DVT Ppx  Heparin SC    Dispo:   Saw and evaluated patient  Overnight with confusion/agitation s/p IM Ativan 0.25 + mittens placed. Confused and lethargic in morning. Discussed with neurology. EEG with spikes that could suggest underlying seizure disorder  Discussed in detail with daughter/HCP, Tammy Hayward ([phone]) and she feels that cognitive decline augmented by seizure medicaitons. Neurology dose reduced.   Plan is to watch cognitive status and possible further dose reduction and could lead to eventual deferral of seizure rx with the understanding of the risks  Also discussed heart status and excluding possible seizure disorder, episodes of unresponsiveness, syncope or event acute heart event likely in the setting of severe stenosis and systolic heart failure which likely to lead to revolving door to the hospital. Daughter verbalized understanding.   Discharge planning when cognition improves.      MEDICATIONS  (STANDING):  aspirin  chewable 81 milliGRAM(s) Oral daily  atorvastatin 80 milliGRAM(s) Oral at bedtime  furosemide    Tablet 20 milliGRAM(s) Oral daily  heparin   Injectable 5000 Unit(s) SubCutaneous every 12 hours  lacosamide 100 milliGRAM(s) Oral <User Schedule>  lacosamide 200 milliGRAM(s) Oral <User Schedule>  levETIRAcetam 250 milliGRAM(s) Oral <User Schedule>  levETIRAcetam 500 milliGRAM(s) Oral <User Schedule>  losartan 25 milliGRAM(s) Oral daily  metoprolol tartrate 50 milliGRAM(s) Oral every 12 hours    MEDICATIONS  (PRN):      89 y/o female with past medical history AS, dementia (baseline A&O x 2–3), GERD, HLD, HTH -  recent hospitalization last month with Status epilepticus, Metabolic Encephalopathy, possible Meningitis, CVA and heart failure admitted in the hospital 3/10 with cc of fall.    #Unwitnessed Fall, Possibly 2/2 Mechanical Etiology vs Seizure vs Syncope. Unable to clinically determine  CTH/CTA/CTP/MRI Brain with no evidence of acute stroke or bleed, no LVO. Clinical presentation and CT C/A/P and UA with no evidence of acute infection.  - Monitor on Telemetry  - Neurochecks    #Hx of Seizure Disorder  EEG on this admission with no epileptiform activity noted  - Cont on keppra 500mg BID, lacosamide 100mg q8h    #Hx of CVA  Very small strokes noted in the bilateral occipital lobes and right parahippocampal gyrus during previous admission on MRI Brain on 2/20/25.  - Neuro input appreciated; Continue aspirin 81 mg/day, atorvastatin 40 mg/day    #HFrEF EF 40%   #HTN  #HLD  PT found to have elevated troponin which peaked at 22k likely iso of demand from status epileticus vs underlying CAD during previous admission. EF showed new decreased EF of 40-45% compared to prior.   - cardiology consulted, appreciate recs--patient/family has declined invasive work up   - cw aspirin 81 mg daily   - cw atorvastatin 80 mg qhs  - cw lasix 20 mg daily M,W,F  - cw losartan 25 mg daily   - cw lopressor 50 mg bid     #DVT Ppx  Heparin SC    Dispo:   Saw and evaluated patient  Overnight with confusion/agitation s/p IM Ativan 0.25 + mittens placed. Confused and lethargic in morning. Discussed with neurology. EEG with spikes that could suggest underlying seizure disorder  Discussed in detail with daughter/HCP, Tammy Hayward ([phone]) and she feels that cognitive decline augmented by seizure medicaitons. Neurology dose reduced.   Plan is to watch cognitive status and possible further dose reduction and could lead to eventual deferral of seizure rx with the understanding of the risks  Also discussed heart status and excluding possible seizure disorder, episodes of unresponsiveness, syncope or event acute heart event likely in the setting of severe stenosis and systolic heart failure which likely to lead to revolving door to the hospital. Daughter verbalized understanding.   Discharge planning when cognition improves.     I spent a total of 53 minutes in face-to-face time with the patient and on the floor managing patient including coordination of care. Overall 50% of the time spent in discussion of the diagnosis, counseling and treatment plan.

## 2025-03-12 NOTE — GOALS OF CARE CONVERSATION - ADVANCED CARE PLANNING - CONVERSATION DETAILS
Palliative SW spoke with Carol power via telephone to follow up and offer support.  She shared the plan to return to [facility] for [facility] in hopes to regain strength and independence.  Daughter states Pt was independent with a rolling walker at the [facility] prior to hospitalization.  We discussed in the event Pt does not improve the option to change the focus to comfort with the support of hospice.  Daughter states she gave up Pts bed at the [facility] and will determine plans moving forward while at [facility].  We discussed in the event Pt does not improve or family decide to change the focus of care from treatment and cure to comfort and quality with the support of hospice.  The philosophy of hospice reviewed.  Discussed stopping medical work ups, hospitalizations, blood work, xrays,etc to treat symptoms as they arise at home to ensure Pt is comfortable and not in distress.  Daughter aware of all levels of home care and options moving forward.  At this time are not ready for a comfort focus.      Plan to return to [facility] for [facility].  Educated of palliative team availability.  Emotional support provided.

## 2025-03-13 LAB
AMMONIA BLD-MCNC: <10 UMOL/L — LOW (ref 11–32)
ANION GAP SERPL CALC-SCNC: 6 MMOL/L — SIGNIFICANT CHANGE UP (ref 5–17)
BUN SERPL-MCNC: 23 MG/DL — SIGNIFICANT CHANGE UP (ref 7–23)
CALCIUM SERPL-MCNC: 9.7 MG/DL — SIGNIFICANT CHANGE UP (ref 8.5–10.1)
CHLORIDE SERPL-SCNC: 105 MMOL/L — SIGNIFICANT CHANGE UP (ref 96–108)
CO2 SERPL-SCNC: 27 MMOL/L — SIGNIFICANT CHANGE UP (ref 22–31)
CREAT SERPL-MCNC: 0.92 MG/DL — SIGNIFICANT CHANGE UP (ref 0.5–1.3)
EGFR: 57 ML/MIN/1.73M2 — LOW
EGFR: 57 ML/MIN/1.73M2 — LOW
FERRITIN SERPL-MCNC: 881 NG/ML — HIGH (ref 13–330)
FOLATE SERPL-MCNC: 8.9 NG/ML — SIGNIFICANT CHANGE UP
GLUCOSE SERPL-MCNC: 113 MG/DL — HIGH (ref 70–99)
HCT VFR BLD CALC: 30.5 % — LOW (ref 34.5–45)
HGB BLD-MCNC: 9.8 G/DL — LOW (ref 11.5–15.5)
IRON SATN MFR SERPL: 32 % — SIGNIFICANT CHANGE UP (ref 14–50)
IRON SATN MFR SERPL: 72 UG/DL — SIGNIFICANT CHANGE UP (ref 30–160)
NT-PROBNP SERPL-SCNC: 5021 PG/ML — HIGH (ref 0–450)
PHOSPHATE SERPL-MCNC: 3.6 MG/DL — SIGNIFICANT CHANGE UP (ref 2.5–4.5)
POTASSIUM SERPL-MCNC: 3.6 MMOL/L — SIGNIFICANT CHANGE UP (ref 3.5–5.3)
POTASSIUM SERPL-SCNC: 3.6 MMOL/L — SIGNIFICANT CHANGE UP (ref 3.5–5.3)
SODIUM SERPL-SCNC: 138 MMOL/L — SIGNIFICANT CHANGE UP (ref 135–145)
T4 AB SER-ACNC: 11.1 UG/DL — SIGNIFICANT CHANGE UP (ref 4.6–12)
TIBC SERPL-MCNC: 226 UG/DL — SIGNIFICANT CHANGE UP (ref 220–430)
TSH SERPL-MCNC: 2.53 UU/ML — SIGNIFICANT CHANGE UP (ref 0.34–4.82)
UIBC SERPL-MCNC: 154 UG/DL — SIGNIFICANT CHANGE UP (ref 110–370)
VIT B12 SERPL-MCNC: 707 PG/ML — SIGNIFICANT CHANGE UP (ref 232–1245)

## 2025-03-13 PROCEDURE — 99233 SBSQ HOSP IP/OBS HIGH 50: CPT

## 2025-03-13 RX ORDER — MIRTAZAPINE 30 MG/1
7.5 TABLET, FILM COATED ORAL AT BEDTIME
Refills: 0 | Status: DISCONTINUED | OUTPATIENT
Start: 2025-03-13 | End: 2025-03-17

## 2025-03-13 RX ADMIN — LOSARTAN POTASSIUM 25 MILLIGRAM(S): 100 TABLET, FILM COATED ORAL at 09:24

## 2025-03-13 RX ADMIN — LEVETIRACETAM 500 MILLIGRAM(S): 10 INJECTION, SOLUTION INTRAVENOUS at 20:16

## 2025-03-13 RX ADMIN — MIRTAZAPINE 7.5 MILLIGRAM(S): 30 TABLET, FILM COATED ORAL at 20:12

## 2025-03-13 RX ADMIN — Medication 81 MILLIGRAM(S): at 05:33

## 2025-03-13 RX ADMIN — METOPROLOL SUCCINATE 50 MILLIGRAM(S): 50 TABLET, EXTENDED RELEASE ORAL at 20:12

## 2025-03-13 RX ADMIN — FUROSEMIDE 20 MILLIGRAM(S): 10 INJECTION INTRAMUSCULAR; INTRAVENOUS at 09:24

## 2025-03-13 RX ADMIN — ATORVASTATIN CALCIUM 80 MILLIGRAM(S): 80 TABLET, FILM COATED ORAL at 20:16

## 2025-03-13 RX ADMIN — LEVETIRACETAM 250 MILLIGRAM(S): 10 INJECTION, SOLUTION INTRAVENOUS at 09:23

## 2025-03-13 RX ADMIN — LACOSAMIDE 100 MILLIGRAM(S): 150 TABLET, FILM COATED ORAL at 09:24

## 2025-03-13 RX ADMIN — METOPROLOL SUCCINATE 50 MILLIGRAM(S): 50 TABLET, EXTENDED RELEASE ORAL at 09:24

## 2025-03-13 RX ADMIN — HEPARIN SODIUM 5000 UNIT(S): 1000 INJECTION INTRAVENOUS; SUBCUTANEOUS at 20:16

## 2025-03-13 RX ADMIN — HEPARIN SODIUM 5000 UNIT(S): 1000 INJECTION INTRAVENOUS; SUBCUTANEOUS at 09:24

## 2025-03-13 RX ADMIN — LACOSAMIDE 200 MILLIGRAM(S): 150 TABLET, FILM COATED ORAL at 20:08

## 2025-03-13 NOTE — PROGRESS NOTE ADULT - ASSESSMENT
MEDICATIONS  (STANDING):  aspirin  chewable 81 milliGRAM(s) Oral daily  atorvastatin 80 milliGRAM(s) Oral at bedtime  furosemide    Tablet 20 milliGRAM(s) Oral daily  heparin   Injectable 5000 Unit(s) SubCutaneous every 12 hours  lacosamide 100 milliGRAM(s) Oral <User Schedule>  lacosamide 200 milliGRAM(s) Oral <User Schedule>  levETIRAcetam 250 milliGRAM(s) Oral <User Schedule>  levETIRAcetam 500 milliGRAM(s) Oral <User Schedule>  losartan 25 milliGRAM(s) Oral daily  metoprolol tartrate 50 milliGRAM(s) Oral every 12 hours    MEDICATIONS  (PRN):      89 y/o female with past medical history AS, dementia (baseline A&O x 2–3), GERD, HLD, HTH -  recent hospitalization last month with Status epilepticus, Metabolic Encephalopathy, possible Meningitis, CVA and heart failure admitted in the hospital 3/10 with cc of fall.    #Unwitnessed Fall, Possibly 2/2 Mechanical Etiology vs Seizure vs Syncope. Unable to clinically determine  CTH/CTA/CTP/MRI Brain with no evidence of acute stroke or bleed, no LVO. Clinical presentation and CT C/A/P and UA with no evidence of acute infection.  - Monitor on Telemetry  - Neurochecks    #Hx of Seizure Disorder  EEG on this admission with no epileptiform activity noted  - Cont on keppra 500mg BID, lacosamide 100mg q8h    #Hx of CVA  Very small strokes noted in the bilateral occipital lobes and right parahippocampal gyrus during previous admission on MRI Brain on 2/20/25.  - Neuro input appreciated; Continue aspirin 81 mg/day, atorvastatin 40 mg/day    #Chronic HFrEF EF 40%   #HTN  #HLD  PT found to have elevated troponin which peaked at 22k likely iso of demand from status epileticus vs underlying CAD during previous admission. EF showed new decreased EF of 40-45% compared to prior.   - cardiology consulted, appreciate recs--patient/family has declined invasive work up   - cw aspirin 81 mg daily   - cw atorvastatin 80 mg qhs  - cw lasix 20 mg daily M,W,F  - cw losartan 25 mg daily   - cw lopressor 50 mg bid     #DVT Ppx  Heparin SC    Dispo:   3/13: Goal for discharge is to gauge effect of seizure medication on coginitation but in the setting of overnight agitation and subsequent administration of Rx, unable to gauge during the day as the effects of overnight medication clouding ability to discern if seizure medications cause any component of cognitivie debility. Trial of remeron tonight,. DIscussed with daughter and agreeable.     I spent a total of 53 minutes in face-to-face time with the patient and on the floor managing patient including coordination of care. Overall 50% of the time spent in discussion of the diagnosis, counseling and treatment plan.

## 2025-03-13 NOTE — PROGRESS NOTE ADULT - SUBJECTIVE AND OBJECTIVE BOX
CHIEF COMPLAINT: AMS/Confusion    SUBJECTIVE/SIGNIFICANT INTERVAL EVENTS/OVERNIGHT EVENTS:    3/12:   Saw and evaluated patient  Overnight with confusion/agitation s/p IM Ativan 0.25 + mittens placed. Confused and lethargic in morning. Discussed with neurology. EEG with spikes that could suggest underlying seizure disorder  Discussed in detail with daughter/HCP, Tammy Hayward ([phone]) and she feels that cognitive decline augmented by seizure medicaitons. Neurology dose reduced.   Plan is to watch cognitive status and possible further dose reduction and could lead to eventual deferral of seizure rx with the understanding of the risks  Also discussed heart status and excluding possible seizure disorder, episodes of unresponsiveness, syncope or event acute heart event likely in the setting of severe stenosis and systolic heart failure which likely to lead to revolving door to the hospital. Daughter verbalized understanding.   Discharge planning when cognition improves.   Limited ROS in the setting of AMS  Cleared by cardiology    3/13: Goal for discharge is to gauge effect of seizure medication on coginitation but in the setting of overnight agitation and subsequent administration of Rx, unable to gauge during the day as the effects of overnight medication clouding ability to discern if seizure medications cause any component of cognitivie debility. Trial of remeron tonight,. DIscussed with daughter and agreeable.       FROM H&P:  "Patient is a 89 y/o female with past medical history AS, dementia (baseline A&O x 2–3), GERD, HLD, HTH -  recent hospitalization last month with metabolic encephalopathy, CVA and heart failure admitted in the hospital 3/10 with cc of fall. Patient is a limited historian. Majority of the information obtained from the chart. Current resident of [facility].     Patient currently pending stroke and infectious etiology workup.       Medical progress: Denies any HA, CP, SOB. Comfortable.   Complaints: No new complaints  State of mind: AO x 1"    PHYSICAL EXAM:    T(C): 36.7 (03-13-25 @ 16:24), Max: 36.9 (03-12-25 @ 20:51)  HR: 81 (03-13-25 @ 16:24) (62 - 81)  BP: 154/65 (03-13-25 @ 16:24) (125/50 - 174/69)  RR: 18 (03-13-25 @ 16:24) (17 - 18)  SpO2: 94% (03-13-25 @ 16:24) (93% - 97%)    General: AAOx0; NAD; weak/frail  Head: AT/NC  ENT: Moist Mucous Membranes; No Injury  Eyes: EOMI; PERRL  Neck: Non-tender; No JVD  CVS: RRR, S1&S2, Murmur +  Respiratory: Lungs CTA B/L; Normal Respiratory Effort  Abdomen/GI: Soft, non-tender, non-distended, no guarding, no rebound, normal bowel sounds  Extremities: No cyanosis, No clubbing, No edema  MSK: No CVA tenderness, Normal ROM, No injury  Neuro: AAOx0, CNII-XII grossly intact, non-focal  Psych: Unable to evaluate  Skin: Clean, Dry and Intact      LABS:                          9.8    x     )-----------( x        ( 13 Mar 2025 04:28 )             30.5     03-13    138  |  105  |  23  ----------------------------<  113[H]  3.6   |  27  |  0.92    Ca    9.7      13 Mar 2025 04:28  Phos  3.6     03-13  Mg     2.1     03-13    TPro  6.5  /  Alb  2.4[L]  /  TBili  0.5  /  DBili  0.2  /  AST  38[H]  /  ALT  70  /  AlkPhos  182[H]  03-12    SARS-CoV-2: NotDetec (19 Feb 2025 11:37)    CAPILLARY BLOOD GLUCOSE                                10.0   5.31  )-----------( 373      ( 12 Mar 2025 07:21 )             30.5     03-12    139  |  106  |  18  ----------------------------<  93  3.5   |  25  |  0.95    Ca    9.6      12 Mar 2025 07:21    TPro  6.5  /  Alb  2.4[L]  /  TBili  0.5  /  DBili  0.2  /  AST  38[H]  /  ALT  70  /  AlkPhos  182[H]  03-12    SARS-CoV-2: NotDetec (19 Feb 2025 11:37)    CAPILLARY BLOOD GLUCOSE          Urinalysis with Rflx Culture (collected 10 Mar 2025 03:08)    LDL Cholesterol Calculated: 109: Troponin I, High Sensitivity (03.10.25 @ 04:02)   Troponin I, High Sensitivity Result: 124.20: Troponin I, High Sensitivity (02.23.25 @ 05:52)   Troponin I, High Sensitivity Result: 1762.27: Troponin I, High Sensitivity (02.22.25 @ 06:13)   Troponin I, High Sensitivity Result: 2335.97Troponin I, High Sensitivity (02.21.25 @ 05:47)   Troponin I, High Sensitivity Result: 4069.64      RADIOLOGY:  < from: MR Head No Cont (03.11.25 @ 11:10) >  IMPRESSION:    Study is motion degraded.    1.  No evidence of acute infarct or midline shift.  2.  Chronic ischemic changes as discussed above.    < end of copied text >  < from: US Abdomen Upper Quadrant Right (03.10.25 @ 08:10) >  IMPRESSION:  No cholelithiasis or evidence of acute cholecystitis.  Adenomyomatosis.  Increased right renal echogenicity, compatible with medical renal disease.    < end of copied text >  < from: CT Abdomen and Pelvis No Cont (03.10.25 @ 06:17) >  IMPRESSION:  1.   Study somewhat limited due to absence of contrast.  2.   Since the prior study of02/18/2025, there has been the development  increased left pleural effusion with development of compressive   atelectasis in  the left lower lobe. Due to absence of contrast, assessment of the left   hilum  is limited. Suggest contrast administrationCT scan to assess for the  possibility of left hilar mass/endobronchial lesion and if needed   bronchoscopy.  Dependent atelectasis is noted in the left upper lobe.  3.   The right lung demonstrates some slight increase in right pleural   effusion  as well as continued subsegmental atelectasis right middle lobe and   dependent  atelectasis right lower lobe. There is no evidence of pneumothorax.  4.   Atherosclerotic changes thoracic aorta without evidence aneurysm.   Valvular  calcification notedinvolving the aortic valve and coronary artery  calcification. As on the prior study there is cardiomegaly and trace  pericardial effusion.  5.   Worsening anasarca soft tissue of chest since prior study.  Other incidental findings as above    < end of copied text >      EKG:  < from: 12 Lead ECG (03.10.25 @ 04:28) >  Diagnosis Line Normal sinus rhythm  Possible Left atrial enlargement  Left axis deviation  T wave abnormality, consider anterolateral ischemia  Abnormal ECG  When compared with ECG of 18-FEB-2025 08:37,  Significant changes have occurred    < end of copied text >      ECHO:  < from: TTE W or WO Ultrasound Enhancing Agent (02.19.25 @ 13:11) >     CONCLUSIONS:      1. Left ventricular systolic function is mildly decreased with an ejection fraction visually estimated at 40 to 45 %.   2. Left atrium is mildly dilated.   3. Mild to moderate mitral regurgitation.   4. Moderate tricuspid regurgitation.   5. Estimated pulmonary artery systolic pressure is 49 mmHg, consistent with moderate pulmonary hypertension.   6. Mild left ventricular hypertrophy.   7. Mild mitral valve stenosis.   8. Mild to moderate aortic regurgitation.   9. Severe aortic stenosis.  10. The peak transaortic velocity is 3.96 m/s, peak transaortic gradient is 62.7 mmHg and mean transaortic gradient is 48.0 mmHg with an LVOT/aortic valve VTI ratio of 0.19. The effective orifice area is estimated at 0.60 cm² by the continuity equation.  11. There is moderate calcification of the mitral valve annulus.    < end of copied text >              I personally reviewed labs, imaging, ekg, orders and vitals.    Discussed case with:  [X]RN  [X]CM/KAILA  [X]Patient  [X]Family  [X]Specialist: Neurology

## 2025-03-14 LAB
ANION GAP SERPL CALC-SCNC: 8 MMOL/L — SIGNIFICANT CHANGE UP (ref 5–17)
BUN SERPL-MCNC: 28 MG/DL — HIGH (ref 7–23)
CALCIUM SERPL-MCNC: 10 MG/DL — SIGNIFICANT CHANGE UP (ref 8.5–10.1)
CHLORIDE SERPL-SCNC: 107 MMOL/L — SIGNIFICANT CHANGE UP (ref 96–108)
CO2 SERPL-SCNC: 26 MMOL/L — SIGNIFICANT CHANGE UP (ref 22–31)
CREAT SERPL-MCNC: 1.03 MG/DL — SIGNIFICANT CHANGE UP (ref 0.5–1.3)
EGFR: 49 ML/MIN/1.73M2 — LOW
EGFR: 49 ML/MIN/1.73M2 — LOW
GLUCOSE SERPL-MCNC: 103 MG/DL — HIGH (ref 70–99)
HCT VFR BLD CALC: 33.2 % — LOW (ref 34.5–45)
HGB BLD-MCNC: 10.7 G/DL — LOW (ref 11.5–15.5)
POTASSIUM SERPL-MCNC: 3.8 MMOL/L — SIGNIFICANT CHANGE UP (ref 3.5–5.3)
POTASSIUM SERPL-SCNC: 3.8 MMOL/L — SIGNIFICANT CHANGE UP (ref 3.5–5.3)
SODIUM SERPL-SCNC: 141 MMOL/L — SIGNIFICANT CHANGE UP (ref 135–145)

## 2025-03-14 PROCEDURE — 99233 SBSQ HOSP IP/OBS HIGH 50: CPT

## 2025-03-14 RX ADMIN — LEVETIRACETAM 500 MILLIGRAM(S): 10 INJECTION, SOLUTION INTRAVENOUS at 20:51

## 2025-03-14 RX ADMIN — LEVETIRACETAM 250 MILLIGRAM(S): 10 INJECTION, SOLUTION INTRAVENOUS at 09:45

## 2025-03-14 RX ADMIN — MIRTAZAPINE 7.5 MILLIGRAM(S): 30 TABLET, FILM COATED ORAL at 20:51

## 2025-03-14 RX ADMIN — ATORVASTATIN CALCIUM 80 MILLIGRAM(S): 80 TABLET, FILM COATED ORAL at 20:51

## 2025-03-14 RX ADMIN — METOPROLOL SUCCINATE 50 MILLIGRAM(S): 50 TABLET, EXTENDED RELEASE ORAL at 20:51

## 2025-03-14 RX ADMIN — LACOSAMIDE 200 MILLIGRAM(S): 150 TABLET, FILM COATED ORAL at 20:50

## 2025-03-14 RX ADMIN — HEPARIN SODIUM 5000 UNIT(S): 1000 INJECTION INTRAVENOUS; SUBCUTANEOUS at 09:47

## 2025-03-14 RX ADMIN — METOPROLOL SUCCINATE 50 MILLIGRAM(S): 50 TABLET, EXTENDED RELEASE ORAL at 09:46

## 2025-03-14 RX ADMIN — LACOSAMIDE 100 MILLIGRAM(S): 150 TABLET, FILM COATED ORAL at 09:55

## 2025-03-14 RX ADMIN — HEPARIN SODIUM 5000 UNIT(S): 1000 INJECTION INTRAVENOUS; SUBCUTANEOUS at 20:51

## 2025-03-14 RX ADMIN — Medication 81 MILLIGRAM(S): at 09:54

## 2025-03-14 RX ADMIN — LOSARTAN POTASSIUM 25 MILLIGRAM(S): 100 TABLET, FILM COATED ORAL at 09:46

## 2025-03-14 RX ADMIN — FUROSEMIDE 20 MILLIGRAM(S): 10 INJECTION INTRAMUSCULAR; INTRAVENOUS at 09:45

## 2025-03-14 NOTE — PROGRESS NOTE ADULT - ASSESSMENT
MEDICATIONS  (STANDING):  aspirin  chewable 81 milliGRAM(s) Oral daily  atorvastatin 80 milliGRAM(s) Oral at bedtime  furosemide    Tablet 20 milliGRAM(s) Oral daily  heparin   Injectable 5000 Unit(s) SubCutaneous every 12 hours  lacosamide 100 milliGRAM(s) Oral <User Schedule>  lacosamide 200 milliGRAM(s) Oral <User Schedule>  levETIRAcetam 250 milliGRAM(s) Oral <User Schedule>  levETIRAcetam 500 milliGRAM(s) Oral <User Schedule>  losartan 25 milliGRAM(s) Oral daily  metoprolol tartrate 50 milliGRAM(s) Oral every 12 hours    MEDICATIONS  (PRN):      89 y/o female with past medical history AS, dementia (baseline A&O x 2–3), GERD, HLD, HTH -  recent hospitalization last month with Status epilepticus, Metabolic Encephalopathy, possible Meningitis, CVA and heart failure admitted in the hospital 3/10 with cc of fall.    #Unwitnessed Fall, Possibly 2/2 Mechanical Etiology vs Seizure vs Syncope. Unable to clinically determine  CTH/CTA/CTP/MRI Brain with no evidence of acute stroke or bleed, no LVO. Clinical presentation and CT C/A/P and UA with no evidence of acute infection.  - Monitor on Telemetry  - Neurochecks    #Hx of Seizure Disorder  EEG on this admission with no epileptiform activity noted  - Cont on keppra 500mg BID, lacosamide 100mg q8h    #Hx of CVA  Very small strokes noted in the bilateral occipital lobes and right parahippocampal gyrus during previous admission on MRI Brain on 2/20/25.  - Neuro input appreciated; Continue aspirin 81 mg/day, atorvastatin 40 mg/day    #Chronic HFrEF EF 40%   #HTN  #HLD  PT found to have elevated troponin which peaked at 22k likely iso of demand from status epileticus vs underlying CAD during previous admission. EF showed new decreased EF of 40-45% compared to prior.   - cardiology consulted, appreciate recs--patient/family has declined invasive work up   - cw aspirin 81 mg daily   - cw atorvastatin 80 mg qhs  - cw lasix 20 mg daily M,W,F  - cw losartan 25 mg daily   - cw lopressor 50 mg bid     #DVT Ppx  Heparin SC    Dispo:   3/14: appears to have tolerated remeron well, no other meds overnight, may be close to goal for DC. d/w daughter  MEDICATIONS  (STANDING):  aspirin  chewable 81 milliGRAM(s) Oral daily  atorvastatin 80 milliGRAM(s) Oral at bedtime  furosemide    Tablet 20 milliGRAM(s) Oral daily  heparin   Injectable 5000 Unit(s) SubCutaneous every 12 hours  lacosamide 100 milliGRAM(s) Oral <User Schedule>  lacosamide 200 milliGRAM(s) Oral <User Schedule>  levETIRAcetam 250 milliGRAM(s) Oral <User Schedule>  levETIRAcetam 500 milliGRAM(s) Oral <User Schedule>  losartan 25 milliGRAM(s) Oral daily  metoprolol tartrate 50 milliGRAM(s) Oral every 12 hours    MEDICATIONS  (PRN):      89 y/o female with past medical history AS, dementia (baseline A&O x 2–3), GERD, HLD, HTH -  recent hospitalization last month with Status epilepticus, Metabolic Encephalopathy, possible Meningitis, CVA and heart failure admitted in the hospital 3/10 with cc of fall.    #Unwitnessed Fall, Possibly 2/2 Mechanical Etiology vs Seizure vs Syncope. Unable to clinically determine  CTH/CTA/CTP/MRI Brain with no evidence of acute stroke or bleed, no LVO. Clinical presentation and CT C/A/P and UA with no evidence of acute infection.  - Monitor on Telemetry  - Neurochecks    #Hx of Seizure Disorder  EEG on this admission with no epileptiform activity noted  - Cont on keppra 500mg BID, lacosamide 100mg q8h    #Hx of CVA  Very small strokes noted in the bilateral occipital lobes and right parahippocampal gyrus during previous admission on MRI Brain on 2/20/25.  - Neuro input appreciated; Continue aspirin 81 mg/day, atorvastatin 40 mg/day    #Chronic HFrEF EF 40%   #HTN  #HLD  PT found to have elevated troponin which peaked at 22k likely iso of demand from status epileticus vs underlying CAD during previous admission. EF showed new decreased EF of 40-45% compared to prior.   - cardiology consulted, appreciate recs--patient/family has declined invasive work up   - cw aspirin 81 mg daily   - cw atorvastatin 80 mg qhs  - cw lasix 20 mg daily M,W,F  - cw losartan 25 mg daily   - cw lopressor 50 mg bid     #DVT Ppx  Heparin SC    Dispo:   3/14: appears to have tolerated remeron well, no other meds overnight, may be close to goal for DC. d/w daughter at bedside > pt improving, c/w rachel, plan for DC tomorrow if no other barriers

## 2025-03-14 NOTE — PROGRESS NOTE ADULT - SUBJECTIVE AND OBJECTIVE BOX
CHIEF COMPLAINT: AMS/Confusion    SUBJECTIVE/SIGNIFICANT INTERVAL EVENTS/OVERNIGHT EVENTS:    3/12:   Saw and evaluated patient  Overnight with confusion/agitation s/p IM Ativan 0.25 + mittens placed. Confused and lethargic in morning. Discussed with neurology. EEG with spikes that could suggest underlying seizure disorder  Discussed in detail with daughter/HCP, Tammy Hayward ([phone]) and she feels that cognitive decline augmented by seizure medicaitons. Neurology dose reduced.   Plan is to watch cognitive status and possible further dose reduction and could lead to eventual deferral of seizure rx with the understanding of the risks  Also discussed heart status and excluding possible seizure disorder, episodes of unresponsiveness, syncope or event acute heart event likely in the setting of severe stenosis and systolic heart failure which likely to lead to revolving door to the hospital. Daughter verbalized understanding.   Discharge planning when cognition improves.   Limited ROS in the setting of AMS  Cleared by cardiology    3/13: Goal for discharge is to gauge effect of seizure medication on coginitation but in the setting of overnight agitation and subsequent administration of Rx, unable to gauge during the day as the effects of overnight medication clouding ability to discern if seizure medications cause any component of cognitivie debility. Trial of remeron tonight,. DIscussed with daughter and agreeable.     3/14: sleep in the morning but more awake , taking meds in PM. no meds overnight for agitation other than remeron     FROM H&P:  "Patient is a 89 y/o female with past medical history AS, dementia (baseline A&O x 2–3), GERD, HLD, HTH -  recent hospitalization last month with metabolic encephalopathy, CVA and heart failure admitted in the hospital 3/10 with cc of fall. Patient is a limited historian. Majority of the information obtained from the chart. Current resident of [facility].     Patient currently pending stroke and infectious etiology workup.       Medical progress: Denies any HA, CP, SOB. Comfortable.   Complaints: No new complaints  State of mind: AO x 1"    PHYSICAL EXAM:    T(C): 36.7 (03-13-25 @ 16:24), Max: 36.9 (03-12-25 @ 20:51)  HR: 81 (03-13-25 @ 16:24) (62 - 81)  BP: 154/65 (03-13-25 @ 16:24) (125/50 - 174/69)  RR: 18 (03-13-25 @ 16:24) (17 - 18)  SpO2: 94% (03-13-25 @ 16:24) (93% - 97%)    General: AAOx0; NAD; weak/frail  Head: AT/NC  ENT: Moist Mucous Membranes; No Injury  Eyes: EOMI; PERRL  Neck: Non-tender; No JVD  CVS: RRR, S1&S2, Murmur +  Respiratory: Lungs CTA B/L; Normal Respiratory Effort  Abdomen/GI: Soft, non-tender, non-distended, no guarding, no rebound, normal bowel sounds  Extremities: No cyanosis, No clubbing, No edema  MSK: No CVA tenderness, Normal ROM, No injury  Neuro: AAOx0, CNII-XII grossly intact, non-focal  Psych: Unable to evaluate  Skin: Clean, Dry and Intact      LABS:                          9.8    x     )-----------( x        ( 13 Mar 2025 04:28 )             30.5     03-13    138  |  105  |  23  ----------------------------<  113[H]  3.6   |  27  |  0.92    Ca    9.7      13 Mar 2025 04:28  Phos  3.6     03-13  Mg     2.1     03-13    TPro  6.5  /  Alb  2.4[L]  /  TBili  0.5  /  DBili  0.2  /  AST  38[H]  /  ALT  70  /  AlkPhos  182[H]  03-12    SARS-CoV-2: [facility] (19 Feb 2025 11:37)    CAPILLARY BLOOD GLUCOSE                                10.0   5.31  )-----------( 373      ( 12 Mar 2025 07:21 )             30.5     03-12    139  |  106  |  18  ----------------------------<  93  3.5   |  25  |  0.95    Ca    9.6      12 Mar 2025 07:21    TPro  6.5  /  Alb  2.4[L]  /  TBili  0.5  /  DBili  0.2  /  AST  38[H]  /  ALT  70  /  AlkPhos  182[H]  03-12    SARS-CoV-2: NotDetec (19 Feb 2025 11:37)    CAPILLARY BLOOD GLUCOSE          Urinalysis with Rflx Culture (collected 10 Mar 2025 03:08)    LDL Cholesterol Calculated: 109: Troponin I, High Sensitivity (03.10.25 @ 04:02)   Troponin I, High Sensitivity Result: 124.20: Troponin I, High Sensitivity (02.23.25 @ 05:52)   Troponin I, High Sensitivity Result: 1762.27: Troponin I, High Sensitivity (02.22.25 @ 06:13)   Troponin I, High Sensitivity Result: 2335.97Troponin I, High Sensitivity (02.21.25 @ 05:47)   Troponin I, High Sensitivity Result: 4069.64      RADIOLOGY:  < from: MR Head No Cont (03.11.25 @ 11:10) >  IMPRESSION:    Study is motion degraded.    1.  No evidence of acute infarct or midline shift.  2.  Chronic ischemic changes as discussed above.    < end of copied text >  < from: US Abdomen Upper Quadrant Right (03.10.25 @ 08:10) >  IMPRESSION:  No cholelithiasis or evidence of acute cholecystitis.  Adenomyomatosis.  Increased right renal echogenicity, compatible with medical renal disease.    < end of copied text >  < from: CT Abdomen and Pelvis No Cont (03.10.25 @ 06:17) >  IMPRESSION:  1.   Study somewhat limited due to absence of contrast.  2.   Since the prior study of02/18/2025, there has been the development  increased left pleural effusion with development of compressive   atelectasis in  the left lower lobe. Due to absence of contrast, assessment of the left   hilum  is limited. Suggest contrast administrationCT scan to assess for the  possibility of left hilar mass/endobronchial lesion and if needed   bronchoscopy.  Dependent atelectasis is noted in the left upper lobe.  3.   The right lung demonstrates some slight increase in right pleural   effusion  as well as continued subsegmental atelectasis right middle lobe and   dependent  atelectasis right lower lobe. There is no evidence of pneumothorax.  4.   Atherosclerotic changes thoracic aorta without evidence aneurysm.   Valvular  calcification notedinvolving the aortic valve and coronary artery  calcification. As on the prior study there is cardiomegaly and trace  pericardial effusion.  5.   Worsening anasarca soft tissue of chest since prior study.  Other incidental findings as above    < end of copied text >      EKG:  < from: 12 Lead ECG (03.10.25 @ 04:28) >  Diagnosis Line Normal sinus rhythm  Possible Left atrial enlargement  Left axis deviation  T wave abnormality, consider anterolateral ischemia  Abnormal ECG  When compared with ECG of 18-FEB-2025 08:37,  Significant changes have occurred    < end of copied text >      ECHO:  < from: TTE W or WO Ultrasound Enhancing Agent (02.19.25 @ 13:11) >     CONCLUSIONS:      1. Left ventricular systolic function is mildly decreased with an ejection fraction visually estimated at 40 to 45 %.   2. Left atrium is mildly dilated.   3. Mild to moderate mitral regurgitation.   4. Moderate tricuspid regurgitation.   5. Estimated pulmonary artery systolic pressure is 49 mmHg, consistent with moderate pulmonary hypertension.   6. Mild left ventricular hypertrophy.   7. Mild mitral valve stenosis.   8. Mild to moderate aortic regurgitation.   9. Severe aortic stenosis.  10. The peak transaortic velocity is 3.96 m/s, peak transaortic gradient is 62.7 mmHg and mean transaortic gradient is 48.0 mmHg with an LVOT/aortic valve VTI ratio of 0.19. The effective orifice area is estimated at 0.60 cm² by the continuity equation.  11. There is moderate calcification of the mitral valve annulus.    < end of copied text >              I personally reviewed labs, imaging, ekg, orders and vitals.    Discussed case with:  [X]RN  [X]LYNDA/KAILA  [X]Patient  [X]Family  [X]Specialist: Neurology

## 2025-03-15 PROCEDURE — 99232 SBSQ HOSP IP/OBS MODERATE 35: CPT

## 2025-03-15 RX ADMIN — MIRTAZAPINE 7.5 MILLIGRAM(S): 30 TABLET, FILM COATED ORAL at 21:47

## 2025-03-15 RX ADMIN — METOPROLOL SUCCINATE 50 MILLIGRAM(S): 50 TABLET, EXTENDED RELEASE ORAL at 09:02

## 2025-03-15 RX ADMIN — ATORVASTATIN CALCIUM 80 MILLIGRAM(S): 80 TABLET, FILM COATED ORAL at 21:47

## 2025-03-15 RX ADMIN — LEVETIRACETAM 500 MILLIGRAM(S): 10 INJECTION, SOLUTION INTRAVENOUS at 20:15

## 2025-03-15 RX ADMIN — METOPROLOL SUCCINATE 50 MILLIGRAM(S): 50 TABLET, EXTENDED RELEASE ORAL at 21:47

## 2025-03-15 RX ADMIN — HEPARIN SODIUM 5000 UNIT(S): 1000 INJECTION INTRAVENOUS; SUBCUTANEOUS at 09:02

## 2025-03-15 RX ADMIN — LACOSAMIDE 100 MILLIGRAM(S): 150 TABLET, FILM COATED ORAL at 09:02

## 2025-03-15 RX ADMIN — LACOSAMIDE 200 MILLIGRAM(S): 150 TABLET, FILM COATED ORAL at 20:15

## 2025-03-15 RX ADMIN — Medication 81 MILLIGRAM(S): at 09:04

## 2025-03-15 RX ADMIN — FUROSEMIDE 20 MILLIGRAM(S): 10 INJECTION INTRAMUSCULAR; INTRAVENOUS at 09:01

## 2025-03-15 RX ADMIN — LOSARTAN POTASSIUM 25 MILLIGRAM(S): 100 TABLET, FILM COATED ORAL at 09:02

## 2025-03-15 RX ADMIN — LEVETIRACETAM 250 MILLIGRAM(S): 10 INJECTION, SOLUTION INTRAVENOUS at 09:01

## 2025-03-15 RX ADMIN — HEPARIN SODIUM 5000 UNIT(S): 1000 INJECTION INTRAVENOUS; SUBCUTANEOUS at 21:47

## 2025-03-15 NOTE — PROGRESS NOTE ADULT - ASSESSMENT
MEDICATIONS  (STANDING):  aspirin  chewable 81 milliGRAM(s) Oral daily  atorvastatin 80 milliGRAM(s) Oral at bedtime  furosemide    Tablet 20 milliGRAM(s) Oral daily  heparin   Injectable 5000 Unit(s) SubCutaneous every 12 hours  lacosamide 100 milliGRAM(s) Oral <User Schedule>  lacosamide 200 milliGRAM(s) Oral <User Schedule>  levETIRAcetam 250 milliGRAM(s) Oral <User Schedule>  levETIRAcetam 500 milliGRAM(s) Oral <User Schedule>  losartan 25 milliGRAM(s) Oral daily  metoprolol tartrate 50 milliGRAM(s) Oral every 12 hours    MEDICATIONS  (PRN):      89 y/o female with past medical history AS, dementia (baseline A&O x 2–3), GERD, HLD, HTH -  recent hospitalization last month with Status epilepticus, Metabolic Encephalopathy, possible Meningitis, CVA and heart failure admitted in the hospital 3/10 with cc of fall.    #Unwitnessed Fall, Possibly 2/2 Mechanical Etiology vs Seizure vs Syncope. Unable to clinically determine  CTH/CTA/CTP/MRI Brain with no evidence of acute stroke or bleed, no LVO. Clinical presentation and CT C/A/P and UA with no evidence of acute infection.  - Monitor on Telemetry  - Neurochecks    #Hx of Seizure Disorder  EEG on this admission with no epileptiform activity noted  - Cont on keppra 500mg BID, lacosamide 100mg q8h    #Hx of CVA  Very small strokes noted in the bilateral occipital lobes and right parahippocampal gyrus during previous admission on MRI Brain on 2/20/25.  - Neuro input appreciated; Continue aspirin 81 mg/day, atorvastatin 40 mg/day    #Chronic HFrEF EF 40%   #HTN  #HLD  PT found to have elevated troponin which peaked at 22k likely iso of demand from status epileticus vs underlying CAD during previous admission. EF showed new decreased EF of 40-45% compared to prior.   - cardiology consulted, appreciate recs--patient/family has declined invasive work up   - cw aspirin 81 mg daily   - cw atorvastatin 80 mg qhs  - cw lasix 20 mg daily M,W,F  - cw losartan 25 mg daily   - cw lopressor 50 mg bid     #DVT Ppx  Heparin SC    Dispo:   3/15: discussed with daughter at bedside, pt more awake and alert but with moments of confusion/in a daze. discussed with daughter at bedside given age, comorbidies and risk of DC seizure meds all together, believe pt is medically optimized for NH, as more time here will result in hospital delirium. Responding well to remeron and will continue on dc however daughters w concenr re: alonzo, new referral sent for jair elam, d/w CM

## 2025-03-15 NOTE — PROGRESS NOTE ADULT - SUBJECTIVE AND OBJECTIVE BOX
CHIEF COMPLAINT: AMS/Confusion    SUBJECTIVE/SIGNIFICANT INTERVAL EVENTS/OVERNIGHT EVENTS:    3/12:   Saw and evaluated patient  Overnight with confusion/agitation s/p IM Ativan 0.25 + mittens placed. Confused and lethargic in morning. Discussed with neurology. EEG with spikes that could suggest underlying seizure disorder  Discussed in detail with daughter/HCP, Tammy Hayward ([phone]) and she feels that cognitive decline augmented by seizure medicaitons. Neurology dose reduced.   Plan is to watch cognitive status and possible further dose reduction and could lead to eventual deferral of seizure rx with the understanding of the risks  Also discussed heart status and excluding possible seizure disorder, episodes of unresponsiveness, syncope or event acute heart event likely in the setting of severe stenosis and systolic heart failure which likely to lead to revolving door to the hospital. Daughter verbalized understanding.   Discharge planning when cognition improves.   Limited ROS in the setting of AMS  Cleared by cardiology    3/13: Goal for discharge is to gauge effect of seizure medication on coginitation but in the setting of overnight agitation and subsequent administration of Rx, unable to gauge during the day as the effects of overnight medication clouding ability to discern if seizure medications cause any component of cognitivie debility. Trial of remeron tonight,. DIscussed with daughter and agreeable.     3/14: sleep in the morning but more awake , taking meds in PM. no meds overnight for agitation other than remeron     3/15: pt awake with intermittent moments of confusion, cooperative. given risk of dc seizure meds believe pt is medically optimized     FROM H&P:  "Patient is a 89 y/o female with past medical history AS, dementia (baseline A&O x 2–3), GERD, HLD, HTH -  recent hospitalization last month with metabolic encephalopathy, CVA and heart failure admitted in the hospital 3/10 with cc of fall. Patient is a limited historian. Majority of the information obtained from the chart. [facility] resident of [facility].     Patient currently pending stroke and infectious etiology workup.       Medical progress: Denies any HA, CP, SOB. Comfortable.   Complaints: No new complaints  State of mind: AO x 1"    PHYSICAL EXAM:    T(C): 36.7 (03-13-25 @ 16:24), Max: 36.9 (03-12-25 @ 20:51)  HR: 81 (03-13-25 @ 16:24) (62 - 81)  BP: 154/65 (03-13-25 @ 16:24) (125/50 - 174/69)  RR: 18 (03-13-25 @ 16:24) (17 - 18)  SpO2: 94% (03-13-25 @ 16:24) (93% - 97%)    General: AAOx1; NAD; weak/frail  Head: AT/NC  ENT: Moist Mucous Membranes; No Injury  Eyes: EOMI; PERRL  Neck: Non-tender; No JVD  CVS: RRR, S1&S2, Murmur +  Respiratory: Lungs CTA B/L; Normal Respiratory Effort  Abdomen/GI: Soft, non-tender, non-distended, no guarding, no rebound, normal bowel sounds  Extremities: No cyanosis, No clubbing, No edema  MSK: No CVA tenderness, Normal ROM, No injury  Neuro: AAOx1, CNII-XII grossly intact, non-focal  Psych: Unable to evaluate  Skin: Clean, Dry and Intact      LABS:                          9.8    x     )-----------( x        ( 13 Mar 2025 04:28 )             30.5     03-13    138  |  105  |  23  ----------------------------<  113[H]  3.6   |  27  |  0.92    Ca    9.7      13 Mar 2025 04:28  Phos  3.6     03-13  Mg     2.1     03-13    TPro  6.5  /  Alb  2.4[L]  /  TBili  0.5  /  DBili  0.2  /  AST  38[H]  /  ALT  70  /  AlkPhos  182[H]  03-12    SARS-CoV-2: [facility] (19 Feb 2025 11:37)    CAPILLARY BLOOD GLUCOSE                                10.0   5.31  )-----------( 373      ( 12 Mar 2025 07:21 )             30.5     03-12    139  |  106  |  18  ----------------------------<  93  3.5   |  25  |  0.95    Ca    9.6      12 Mar 2025 07:21    TPro  6.5  /  Alb  2.4[L]  /  TBili  0.5  /  DBili  0.2  /  AST  38[H]  /  ALT  70  /  AlkPhos  182[H]  03-12    SARS-CoV-2: NotDetec (19 Feb 2025 11:37)    CAPILLARY BLOOD GLUCOSE          Urinalysis with Rflx Culture (collected 10 Mar 2025 03:08)    LDL Cholesterol Calculated: 109: Troponin I, High Sensitivity (03.10.25 @ 04:02)   Troponin I, High Sensitivity Result: 124.20: Troponin I, High Sensitivity (02.23.25 @ 05:52)   Troponin I, High Sensitivity Result: 1762.27: Troponin I, High Sensitivity (02.22.25 @ 06:13)   Troponin I, High Sensitivity Result: 2335.97Troponin I, High Sensitivity (02.21.25 @ 05:47)   Troponin I, High Sensitivity Result: 4069.64      RADIOLOGY:  < from: MR Head No Cont (03.11.25 @ 11:10) >  IMPRESSION:    Study is motion degraded.    1.  No evidence of acute infarct or midline shift.  2.  Chronic ischemic changes as discussed above.    < end of copied text >  < from: US Abdomen Upper Quadrant Right (03.10.25 @ 08:10) >  IMPRESSION:  No cholelithiasis or evidence of acute cholecystitis.  Adenomyomatosis.  Increased right renal echogenicity, compatible with medical renal disease.    < end of copied text >  < from: CT Abdomen and Pelvis No Cont (03.10.25 @ 06:17) >  IMPRESSION:  1.   Study somewhat limited due to absence of contrast.  2.   Since the prior study of02/18/2025, there has been the development  increased left pleural effusion with development of compressive   atelectasis in  the left lower lobe. Due to absence of contrast, assessment of the left   hilum  is limited. Suggest contrast administrationCT scan to assess for the  possibility of left hilar mass/endobronchial lesion and if needed   bronchoscopy.  Dependent atelectasis is noted in the left upper lobe.  3.   The right lung demonstrates some slight increase in right pleural   effusion  as well as continued subsegmental atelectasis right middle lobe and   dependent  atelectasis right lower lobe. There is no evidence of pneumothorax.  4.   Atherosclerotic changes thoracic aorta without evidence aneurysm.   Valvular  calcification notedinvolving the aortic valve and coronary artery  calcification. As on the prior study there is cardiomegaly and trace  pericardial effusion.  5.   Worsening anasarca soft tissue of chest since prior study.  Other incidental findings as above    < end of copied text >      EKG:  < from: 12 Lead ECG (03.10.25 @ 04:28) >  Diagnosis Line Normal sinus rhythm  Possible Left atrial enlargement  Left axis deviation  T wave abnormality, consider anterolateral ischemia  Abnormal ECG  When compared with ECG of 18-FEB-2025 08:37,  Significant changes have occurred    < end of copied text >      ECHO:  < from: TTE W or WO Ultrasound Enhancing Agent (02.19.25 @ 13:11) >     CONCLUSIONS:      1. Left ventricular systolic function is mildly decreased with an ejection fraction visually estimated at 40 to 45 %.   2. Left atrium is mildly dilated.   3. Mild to moderate mitral regurgitation.   4. Moderate tricuspid regurgitation.   5. Estimated pulmonary artery systolic pressure is 49 mmHg, consistent with moderate pulmonary hypertension.   6. Mild left ventricular hypertrophy.   7. Mild mitral valve stenosis.   8. Mild to moderate aortic regurgitation.   9. Severe aortic stenosis.  10. The peak transaortic velocity is 3.96 m/s, peak transaortic gradient is 62.7 mmHg and mean transaortic gradient is 48.0 mmHg with an LVOT/aortic valve VTI ratio of 0.19. The effective orifice area is estimated at 0.60 cm² by the continuity equation.  11. There is moderate calcification of the mitral valve annulus.    < end of copied text >              I personally reviewed labs, imaging, ekg, orders and vitals.    Discussed case with:  [X]RN  [X]CM/KAILA  [X]Patient  [X]Family  [X]Specialist: Neurology

## 2025-03-16 PROCEDURE — 99232 SBSQ HOSP IP/OBS MODERATE 35: CPT

## 2025-03-16 RX ADMIN — MIRTAZAPINE 7.5 MILLIGRAM(S): 30 TABLET, FILM COATED ORAL at 21:35

## 2025-03-16 RX ADMIN — METOPROLOL SUCCINATE 50 MILLIGRAM(S): 50 TABLET, EXTENDED RELEASE ORAL at 21:35

## 2025-03-16 RX ADMIN — HEPARIN SODIUM 5000 UNIT(S): 1000 INJECTION INTRAVENOUS; SUBCUTANEOUS at 21:35

## 2025-03-16 RX ADMIN — LACOSAMIDE 100 MILLIGRAM(S): 150 TABLET, FILM COATED ORAL at 08:52

## 2025-03-16 RX ADMIN — LEVETIRACETAM 500 MILLIGRAM(S): 10 INJECTION, SOLUTION INTRAVENOUS at 20:24

## 2025-03-16 RX ADMIN — LEVETIRACETAM 250 MILLIGRAM(S): 10 INJECTION, SOLUTION INTRAVENOUS at 08:51

## 2025-03-16 RX ADMIN — ATORVASTATIN CALCIUM 80 MILLIGRAM(S): 80 TABLET, FILM COATED ORAL at 21:35

## 2025-03-16 RX ADMIN — LACOSAMIDE 200 MILLIGRAM(S): 150 TABLET, FILM COATED ORAL at 20:25

## 2025-03-16 RX ADMIN — HEPARIN SODIUM 5000 UNIT(S): 1000 INJECTION INTRAVENOUS; SUBCUTANEOUS at 08:51

## 2025-03-16 RX ADMIN — METOPROLOL SUCCINATE 50 MILLIGRAM(S): 50 TABLET, EXTENDED RELEASE ORAL at 08:51

## 2025-03-16 RX ADMIN — Medication 81 MILLIGRAM(S): at 06:08

## 2025-03-16 RX ADMIN — LOSARTAN POTASSIUM 25 MILLIGRAM(S): 100 TABLET, FILM COATED ORAL at 08:52

## 2025-03-16 RX ADMIN — FUROSEMIDE 20 MILLIGRAM(S): 10 INJECTION INTRAMUSCULAR; INTRAVENOUS at 08:52

## 2025-03-16 NOTE — PROGRESS NOTE ADULT - SUBJECTIVE AND OBJECTIVE BOX
CHIEF COMPLAINT: AMS/Confusion    SUBJECTIVE/SIGNIFICANT INTERVAL EVENTS/OVERNIGHT EVENTS:    3/12:   Saw and evaluated patient  Overnight with confusion/agitation s/p IM Ativan 0.25 + mittens placed. Confused and lethargic in morning. Discussed with neurology. EEG with spikes that could suggest underlying seizure disorder  Discussed in detail with daughter/HCP, Tammy Hayward ([phone]) and she feels that cognitive decline augmented by seizure medicaitons. Neurology dose reduced.   Plan is to watch cognitive status and possible further dose reduction and could lead to eventual deferral of seizure rx with the understanding of the risks  Also discussed heart status and excluding possible seizure disorder, episodes of unresponsiveness, syncope or event acute heart event likely in the setting of severe stenosis and systolic heart failure which likely to lead to revolving door to the hospital. Daughter verbalized understanding.   Discharge planning when cognition improves.   Limited ROS in the setting of AMS  Cleared by cardiology    3/13: Goal for discharge is to gauge effect of seizure medication on coginitation but in the setting of overnight agitation and subsequent administration of Rx, unable to gauge during the day as the effects of overnight medication clouding ability to discern if seizure medications cause any component of cognitivie debility. Trial of remeron tonight,. DIscussed with daughter and agreeable.     3/14: sleep in the morning but more awake , taking meds in PM. no meds overnight for agitation other than remeron     3/15: pt awake with intermittent moments of confusion, cooperative. given risk of dc seizure meds believe pt is medically optimized     3/16: no interval changes, remains at mental status above ^     FROM H&P:  "Patient is a 89 y/o female with past medical history AS, dementia (baseline A&O x 2–3), GERD, HLD, HTH -  recent hospitalization last month with metabolic encephalopathy, CVA and heart failure admitted in the hospital 3/10 with cc of fall. Patient is a limited historian. Majority of the information obtained from the chart. Current resident of [facility].     Patient currently pending stroke and infectious etiology workup.       Medical progress: Denies any HA, CP, SOB. Comfortable.   Complaints: No new complaints  State of mind: AO x 1"    PHYSICAL EXAM:    T(C): 36.7 (03-13-25 @ 16:24), Max: 36.9 (03-12-25 @ 20:51)  HR: 81 (03-13-25 @ 16:24) (62 - 81)  BP: 154/65 (03-13-25 @ 16:24) (125/50 - 174/69)  RR: 18 (03-13-25 @ 16:24) (17 - 18)  SpO2: 94% (03-13-25 @ 16:24) (93% - 97%)    General: AAOx1; NAD; weak/frail  Head: AT/NC  ENT: Moist Mucous Membranes;  Neck: Non-tender; No JVD  CVS: RRR, S1&S2, Murmur +  Respiratory: Lungs CTA B/L; Normal Respiratory Effort  Abdomen/GI: Soft, non-tender, non-distended, no guarding, no rebound, normal bowel sounds  Extremities: No cyanosis, No clubbing, No edema  Neuro: AAOx1, CNII-XII grossly intact, non-focal  Psych: Unable to evaluate  Skin: Clean, Dry and Intact      LABS:                          9.8    x     )-----------( x        ( 13 Mar 2025 04:28 )             30.5     03-13    138  |  105  |  23  ----------------------------<  113[H]  3.6   |  27  |  0.92    Ca    9.7      13 Mar 2025 04:28  Phos  3.6     03-13  Mg     2.1     03-13    TPro  6.5  /  Alb  2.4[L]  /  TBili  0.5  /  DBili  0.2  /  AST  38[H]  /  ALT  70  /  AlkPhos  182[H]  03-12    SARS-CoV-2: NotDetec (19 Feb 2025 11:37)    CAPILLARY BLOOD GLUCOSE                                10.0   5.31  )-----------( 373      ( 12 Mar 2025 07:21 )             30.5     03-12    139  |  106  |  18  ----------------------------<  93  3.5   |  25  |  0.95    Ca    9.6      12 Mar 2025 07:21    TPro  6.5  /  Alb  2.4[L]  /  TBili  0.5  /  DBili  0.2  /  AST  38[H]  /  ALT  70  /  AlkPhos  182[H]  03-12    SARS-CoV-2: NotDetec (19 Feb 2025 11:37)    CAPILLARY BLOOD GLUCOSE          Urinalysis with Rflx Culture (collected 10 Mar 2025 03:08)    LDL Cholesterol Calculated: 109: Troponin I, High Sensitivity (03.10.25 @ 04:02)   Troponin I, High Sensitivity Result: 124.20: Troponin I, High Sensitivity (02.23.25 @ 05:52)   Troponin I, High Sensitivity Result: 1762.27: Troponin I, High Sensitivity (02.22.25 @ 06:13)   Troponin I, High Sensitivity Result: 2335.97Troponin I, High Sensitivity (02.21.25 @ 05:47)   Troponin I, High Sensitivity Result: 4069.64      RADIOLOGY:  < from: MR Head No Cont (03.11.25 @ 11:10) >  IMPRESSION:    Study is motion degraded.    1.  No evidence of acute infarct or midline shift.  2.  Chronic ischemic changes as discussed above.    < end of copied text >  < from: US Abdomen Upper Quadrant Right (03.10.25 @ 08:10) >  IMPRESSION:  No cholelithiasis or evidence of acute cholecystitis.  Adenomyomatosis.  Increased right renal echogenicity, compatible with medical renal disease.    < end of copied text >  < from: CT Abdomen and Pelvis No Cont (03.10.25 @ 06:17) >  IMPRESSION:  1.   Study somewhat limited due to absence of contrast.  2.   Since the prior study of02/18/2025, there has been the development  increased left pleural effusion with development of compressive   atelectasis in  the left lower lobe. Due to absence of contrast, assessment of the left   hilum  is limited. Suggest contrast administrationCT scan to assess for the  possibility of left hilar mass/endobronchial lesion and if needed   bronchoscopy.  Dependent atelectasis is noted in the left upper lobe.  3.   The right lung demonstrates some slight increase in right pleural   effusion  as well as continued subsegmental atelectasis right middle lobe and   dependent  atelectasis right lower lobe. There is no evidence of pneumothorax.  4.   Atherosclerotic changes thoracic aorta without evidence aneurysm.   Valvular  calcification notedinvolving the aortic valve and coronary artery  calcification. As on the prior study there is cardiomegaly and trace  pericardial effusion.  5.   Worsening anasarca soft tissue of chest since prior study.  Other incidental findings as above    < end of copied text >      EKG:  < from: 12 Lead ECG (03.10.25 @ 04:28) >  Diagnosis Line Normal sinus rhythm  Possible Left atrial enlargement  Left axis deviation  T wave abnormality, consider anterolateral ischemia  Abnormal ECG  When compared with ECG of 18-FEB-2025 08:37,  Significant changes have occurred    < end of copied text >      ECHO:  < from: TTE W or WO Ultrasound Enhancing Agent (02.19.25 @ 13:11) >     CONCLUSIONS:      1. Left ventricular systolic function is mildly decreased with an ejection fraction visually estimated at 40 to 45 %.   2. Left atrium is mildly dilated.   3. Mild to moderate mitral regurgitation.   4. Moderate tricuspid regurgitation.   5. Estimated pulmonary artery systolic pressure is 49 mmHg, consistent with moderate pulmonary hypertension.   6. Mild left ventricular hypertrophy.   7. Mild mitral valve stenosis.   8. Mild to moderate aortic regurgitation.   9. Severe aortic stenosis.  10. The peak transaortic velocity is 3.96 m/s, peak transaortic gradient is 62.7 mmHg and mean transaortic gradient is 48.0 mmHg with an LVOT/aortic valve VTI ratio of 0.19. The effective orifice area is estimated at 0.60 cm² by the continuity equation.  11. There is moderate calcification of the mitral valve annulus.    < end of copied text >              I personally reviewed labs, imaging, ekg, orders and vitals.    Discussed case with:  [X]RN  [X]CM/KAILA  [X]Patient  [X]Family  [X]Specialist: Neurology

## 2025-03-16 NOTE — PROGRESS NOTE ADULT - NUTRITIONAL ASSESSMENT
This patient has been assessed with a concern for Malnutrition and has been determined to have a diagnosis/diagnoses of Severe protein-calorie malnutrition and Underweight (BMI < 19).    This patient is being managed with:   Diet Pureed-  Entered: Mar 10 2025  7:30AM  

## 2025-03-16 NOTE — PROGRESS NOTE ADULT - PROVIDER SPECIALTY LIST ADULT
Hospitalist
Neurology
Hospitalist
Hospitalist
Cardiology
Hospitalist
Palliative Care

## 2025-03-16 NOTE — PROGRESS NOTE ADULT - TIME BILLING
Time spent coordinating the patient's care. This includes reviewing documentation pertinent to this admission, results and imaging. Further tests, medications, and procedures have been ordered as indicated. Laboratory results and the plan of care were communicated to the patient and family.  Discussed care plan with nursing and will discuss plan and care with appropriate consultant(s).
Time spent  coordinating the patient's care. This includes reviewing documentation pertinent to this admission, results and imaging. Further tests, medications, and procedures have been ordered as indicated. Laboratory results and the plan of care were communicated to the patient. Discussed care plan with consultants including . Supporting documentation was completed and added to the patient's chart.
Time spent coordinating the patient's care. This includes reviewing documentation pertinent to this admission, results and imaging. Further tests, medications, and procedures have been ordered as indicated. Laboratory results and the plan of care were communicated to the patient and family.  Discussed care plan with nursing and will discuss plan and care with appropriate consultant(s).
Time spent coordinating the patient's care. This includes reviewing documentation pertinent to this admission, results and imaging. Further tests, medications, and procedures have been ordered as indicated. Laboratory results and the plan of care were communicated to the patient and family.  Discussed care plan with nursing and will discuss plan and care with appropriate consultant(s).

## 2025-03-16 NOTE — PROGRESS NOTE ADULT - REASON FOR ADMISSION
Confusion /  Fall.

## 2025-03-16 NOTE — PROGRESS NOTE ADULT - ASSESSMENT
MEDICATIONS  (STANDING):  aspirin  chewable 81 milliGRAM(s) Oral daily  atorvastatin 80 milliGRAM(s) Oral at bedtime  furosemide    Tablet 20 milliGRAM(s) Oral daily  heparin   Injectable 5000 Unit(s) SubCutaneous every 12 hours  lacosamide 100 milliGRAM(s) Oral <User Schedule>  lacosamide 200 milliGRAM(s) Oral <User Schedule>  levETIRAcetam 250 milliGRAM(s) Oral <User Schedule>  levETIRAcetam 500 milliGRAM(s) Oral <User Schedule>  losartan 25 milliGRAM(s) Oral daily  metoprolol tartrate 50 milliGRAM(s) Oral every 12 hours    MEDICATIONS  (PRN):      89 y/o female with past medical history AS, dementia (baseline A&O x 2–3), GERD, HLD, HTH -  recent hospitalization last month with Status epilepticus, Metabolic Encephalopathy, possible Meningitis, CVA and heart failure admitted in the hospital 3/10 with cc of fall.    #Unwitnessed Fall, Possibly 2/2 Mechanical Etiology vs Seizure vs Syncope. Unable to clinically determine  CTH/CTA/CTP/MRI Brain with no evidence of acute stroke or bleed, no LVO. Clinical presentation and CT C/A/P and UA with no evidence of acute infection.  - Monitor on Telemetry  - Neurochecks    #Hx of Seizure Disorder  EEG on this admission with no epileptiform activity noted  - Cont on keppra 500mg BID, lacosamide 100mg q8h    #Hx of CVA  Very small strokes noted in the bilateral occipital lobes and right parahippocampal gyrus during previous admission on MRI Brain on 2/20/25.  - Neuro input appreciated; Continue aspirin 81 mg/day, atorvastatin 40 mg/day    #Chronic HFrEF EF 40%   #HTN  #HLD  PT found to have elevated troponin which peaked at 22k likely iso of demand from status epileticus vs underlying CAD during previous admission. EF showed new decreased EF of 40-45% compared to prior.   - cardiology consulted, appreciate recs--patient/family has declined invasive work up   - cw aspirin 81 mg daily   - cw atorvastatin 80 mg qhs  - cw lasix 20 mg daily M,W,F  - cw losartan 25 mg daily   - cw lopressor 50 mg bid     #DVT Ppx  Heparin SC    Dispo:   3/16: referral sent to [facility], pending status

## 2025-03-17 ENCOUNTER — TRANSCRIPTION ENCOUNTER (OUTPATIENT)
Age: 89
End: 2025-03-17

## 2025-03-17 VITALS
OXYGEN SATURATION: 96 % | TEMPERATURE: 97 F | RESPIRATION RATE: 16 BRPM | DIASTOLIC BLOOD PRESSURE: 72 MMHG | SYSTOLIC BLOOD PRESSURE: 113 MMHG | HEART RATE: 75 BPM

## 2025-03-17 PROCEDURE — 99239 HOSP IP/OBS DSCHRG MGMT >30: CPT

## 2025-03-17 RX ORDER — ATORVASTATIN CALCIUM 80 MG/1
1 TABLET, FILM COATED ORAL
Qty: 0 | Refills: 0 | DISCHARGE
Start: 2025-03-17

## 2025-03-17 RX ORDER — FUROSEMIDE 10 MG/ML
1 INJECTION INTRAMUSCULAR; INTRAVENOUS
Qty: 0 | Refills: 0 | DISCHARGE
Start: 2025-03-17

## 2025-03-17 RX ORDER — LACOSAMIDE 150 MG/1
1 TABLET, FILM COATED ORAL
Qty: 0 | Refills: 0 | DISCHARGE
Start: 2025-03-17

## 2025-03-17 RX ORDER — LEVETIRACETAM 10 MG/ML
1 INJECTION, SOLUTION INTRAVENOUS
Qty: 0 | Refills: 0 | DISCHARGE
Start: 2025-03-17

## 2025-03-17 RX ORDER — MIRTAZAPINE 30 MG/1
1 TABLET, FILM COATED ORAL
Qty: 0 | Refills: 0 | DISCHARGE
Start: 2025-03-17

## 2025-03-17 RX ADMIN — LOSARTAN POTASSIUM 25 MILLIGRAM(S): 100 TABLET, FILM COATED ORAL at 09:32

## 2025-03-17 RX ADMIN — Medication 81 MILLIGRAM(S): at 09:32

## 2025-03-17 RX ADMIN — LEVETIRACETAM 250 MILLIGRAM(S): 10 INJECTION, SOLUTION INTRAVENOUS at 09:31

## 2025-03-17 RX ADMIN — LACOSAMIDE 100 MILLIGRAM(S): 150 TABLET, FILM COATED ORAL at 09:33

## 2025-03-17 RX ADMIN — METOPROLOL SUCCINATE 50 MILLIGRAM(S): 50 TABLET, EXTENDED RELEASE ORAL at 09:32

## 2025-03-17 RX ADMIN — FUROSEMIDE 20 MILLIGRAM(S): 10 INJECTION INTRAMUSCULAR; INTRAVENOUS at 09:31

## 2025-03-17 NOTE — DISCHARGE NOTE PROVIDER - DETAILS OF MALNUTRITION DIAGNOSIS/DIAGNOSES
This patient has been assessed with a concern for Malnutrition and was treated during this hospitalization for the following Nutrition diagnosis/diagnoses:     -  03/11/2025: Severe protein-calorie malnutrition   -  03/11/2025: Underweight (BMI < 19)

## 2025-03-17 NOTE — DISCHARGE NOTE PROVIDER - CARE PROVIDER_API CALL
Armando Caruso  Family Medicine  [address] [place] [id number]  Phone: [phone]  Fax: [phone]  Follow Up Time: 2 weeks   Armando Caruso  Family Medicine  [address] [place] [address]  Phone: [phone]  Fax: [phone]  Follow Up Time: 2 weeks    Armando Gould  Cardiovascular Disease  [address] [facility], [address] [address]  Phone: [phone]  Fax: [phone]  Follow Up Time: 1 month

## 2025-03-17 NOTE — DISCHARGE NOTE PROVIDER - NSDCCPCAREPLAN_GEN_ALL_CORE_FT
PRINCIPAL DISCHARGE DIAGNOSIS  Diagnosis: Metabolic encephalopathy  Assessment and Plan of Treatment: You presented with altered mental status. You had an extensive work up that showed you did not have a stroke. You were evaluated by a neurologist and it was thought you may have been having seizures but also may be more sedated due to seizure medications. Your medications were adjusted to assist with your mental status and you began to improve. You were also started on a medication to help you sleep at night called remeron which you responded well too.

## 2025-03-17 NOTE — DISCHARGE NOTE NURSING/CASE MANAGEMENT/SOCIAL WORK - NSDCVIVACCINE_GEN_ALL_CORE_FT
Td (adult) preservative free; 28-Sep-2019 16:56; Klever Davis (RN); [employer]; [id number] (Exp. Date: 04-Jul-2021); IntraMuscular; Deltoid Left.; 0.5 milliLiter(s); VIS (VIS Published: 28-Sep-2019, VIS Presented: 28-Sep-2019);   Tdap; 19-Jan-2025 09:19; Steffanie Campos (TIFFANIE); [employer]; [id number] (Exp. Date: 01-Aug-2026); IntraMuscular; Deltoid Left.; 0.5 milliLiter(s); VIS (VIS Published: 09-May-2013, VIS Presented: 19-Jan-2025);

## 2025-03-17 NOTE — DISCHARGE NOTE PROVIDER - PROVIDER TOKENS
PROVIDER:[TOKEN:[[id number]],FOLLOWUP:[2 weeks]] PROVIDER:[TOKEN:[[id number]],FOLLOWUP:[2 weeks]],PROVIDER:[TOKEN:[[id number]],FOLLOWUP:[1 month]]

## 2025-03-17 NOTE — DISCHARGE NOTE PROVIDER - NSDCMRMEDTOKEN_GEN_ALL_CORE_FT
acetaminophen 325 mg oral tablet: 2 tab(s) orally every 6 hours as needed for  pain max 3 g/24 hours  aspirin 81 mg oral tablet, chewable: 1 tab(s) orally once a day  atorvastatin 80 mg oral tablet: 1 tab(s) orally once a day (at bedtime)  cyanocobalamin 1000 mcg oral tablet: 1 tab(s) orally once a day  diclofenac 1% topical gel: Apply 2 grams topically to affected area 2 times daily to B/L knees for pain  ferrous sulfate: 1 tab(s) orally once a day  furosemide 20 mg oral tablet: 1 tab(s) orally once a day  lacosamide 100 mg oral tablet: 1 tab(s) orally once a day IN THE MORNING/8AM  lacosamide 200 mg oral tablet: 1 tab(s) orally once a day (at bedtime)  levETIRAcetam 250 mg oral tablet: 1 tab(s) orally once a day IN THE MORNING/ 8am  levETIRAcetam 500 mg oral tablet: 1 tab(s) orally once a day (at bedtime)  losartan 25 mg oral tablet: 1 tab(s) orally once a day  metoprolol tartrate 50 mg oral tablet: 1 tab(s) orally every 12 hours  mirtazapine 7.5 mg oral tablet: 1 tab(s) orally once a day (at bedtime)  nystatin 100,000 units/g topical powder: 1 Apply topically to affected area 2 times a day  senna (sennosides) 8.6 mg oral tablet: 2 tab(s) orally once a day (at bedtime)

## 2025-03-17 NOTE — DISCHARGE NOTE NURSING/CASE MANAGEMENT/SOCIAL WORK - PATIENT PORTAL LINK FT
You can access the FollowMyHealth Patient Portal offered by [facility] by registering at the following website: [email or web address]. By joining [employer]’s FollowMyHealth portal, you will also be able to view your health information using other applications (apps) compatible with our system.

## 2025-03-17 NOTE — DISCHARGE NOTE NURSING/CASE MANAGEMENT/SOCIAL WORK - FINANCIAL ASSISTANCE
[facility] provides services at a reduced cost to those who are determined to be eligible through [facility]’s financial assistance program. Information regarding [facility]’s financial assistance program can be found by going to [email or web address] or by calling [phone].

## 2025-03-17 NOTE — DISCHARGE NOTE PROVIDER - HOSPITAL COURSE
#Discharge: do not delete  89 y/o female with past medical history AS, dementia (baseline A&O x 2–3), GERD, HLD, HTH -  recent hospitalization last month with Status epilepticus, Metabolic Encephalopathy, possible Meningitis, CVA and heart failure admitted in the hospital 3/10 with cc of fall.    3/12:   Saw and evaluated patient  Overnight with confusion/agitation s/p IM Ativan 0.25 + mittens placed. Confused and lethargic in morning. Discussed with neurology. EEG with spikes that could suggest underlying seizure disorder  Discussed in detail with daughter/HCP, Tammy Hayward ([phone]) and she feels that cognitive decline augmented by seizure medicaitons. Neurology dose reduced.   Plan is to watch cognitive status and possible further dose reduction and could lead to eventual deferral of seizure rx with the understanding of the risks  Also discussed heart status and excluding possible seizure disorder, episodes of unresponsiveness, syncope or event acute heart event likely in the setting of severe stenosis and systolic heart failure which likely to lead to revolving door to the hospital. Daughter verbalized understanding.   Discharge planning when cognition improves.   Limited ROS in the setting of AMS  Cleared by cardiology    3/13: Goal for discharge is to gauge effect of seizure medication on coginitation but in the setting of overnight agitation and subsequent administration of Rx, unable to gauge during the day as the effects of overnight medication clouding ability to discern if seizure medications cause any component of cognitivie debility. Trial of remeron tonight,. DIscussed with daughter and agreeable.     3/14: sleep in the morning but more awake , taking meds in PM. no meds overnight for agitation other than remeron     3/15: pt awake with intermittent moments of confusion, cooperative. given risk of dc seizure meds believe pt is medically optimized     3/16: no interval changes, remains at mental status above ^     3/17: no interval events, awake, arousable and conversive       T(C): 36.3 (03-17-25 @ 08:17), Max: 37.1 (03-16-25 @ 16:32)  HR: 75 (03-17-25 @ 08:17) (60 - 77)  BP: 113/72 (03-17-25 @ 08:17) (107/68 - 152/51)  RR: 16 (03-17-25 @ 08:17) (16 - 18)  SpO2: 96% (03-17-25 @ 08:17) (93% - 96%)    Hospital course (by problem):     #Unwitnessed Fall, Possibly 2/2 Mechanical Etiology vs Seizure vs Syncope. Unable to clinically determine  CTH/CTA/CTP/MRI Brain with no evidence of acute stroke or bleed, no LVO. Clinical presentation and CT C/A/P and UA with no evidence of acute infection.  - Monitor on Telemetry  - Neurochecks    #Hx of Seizure Disorder  EEG on this admission with no epileptiform activity noted  - Cont on keppra 500mg BID, lacosamide 100mg q8h    #Hx of CVA  Very small strokes noted in the bilateral occipital lobes and right parahippocampal gyrus during previous admission on MRI Brain on 2/20/25.  - Neuro input appreciated; Continue aspirin 81 mg/day, atorvastatin 40 mg/day    #Chronic HFrEF EF 40%   #HTN  #HLD  PT found to have elevated troponin which peaked at 22k likely iso of demand from status epileticus vs underlying CAD during previous admission. EF showed new decreased EF of 40-45% compared to prior.   - cardiology consulted, appreciate recs--patient/family has declined invasive work up   - cw aspirin 81 mg daily   - cw atorvastatin 80 mg qhs  - cw lasix 20 mg daily M,W,F  - cw losartan 25 mg daily   - cw lopressor 50 mg bid       Patient was discharged to: (home/[facility]/acute rehab/hospice, etc, and with what services – home health PT/RN? Home O2?)    New medications:   Changes to old medications:  Medications that were stopped:    Items to follow up as outpatient:    Physical exam at the time of discharge:   General: NAD, sitting comfortably in bed   HEENT: PERRL/ EOMI, no scleral icterus, MMM  Respiratory: lungs CTA b/l, no wheezes/crackles, no accessory muscle use  Cardiovascular: Regular rhythm/rate; +S1 +S2  Gastrointestinal: Soft, NTND  Extremities: WWP, no cyanosis, no edema, pulses equal  Neurological: A&Ox3, no gross focal deficits, follows commands  Skin: Normal temperature, warm, dry

## 2025-03-17 NOTE — DISCHARGE NOTE PROVIDER - CARE PROVIDERS DIRECT ADDRESSES
,DirectAddress_Unknown ,DirectAddress_Unknown,[email or web address]

## 2025-03-21 DIAGNOSIS — Z78.1 PHYSICAL RESTRAINT STATUS: ICD-10-CM

## 2025-03-21 DIAGNOSIS — Z53.8 PROCEDURE AND TREATMENT NOT CARRIED OUT FOR OTHER REASONS: ICD-10-CM

## 2025-03-21 DIAGNOSIS — G40.909 EPILEPSY, UNSPECIFIED, NOT INTRACTABLE, WITHOUT STATUS EPILEPTICUS: ICD-10-CM

## 2025-03-21 DIAGNOSIS — G47.00 INSOMNIA, UNSPECIFIED: ICD-10-CM

## 2025-03-21 DIAGNOSIS — G92.8 OTHER TOXIC ENCEPHALOPATHY: ICD-10-CM

## 2025-03-21 DIAGNOSIS — Z79.82 LONG TERM (CURRENT) USE OF ASPIRIN: ICD-10-CM

## 2025-03-21 DIAGNOSIS — K21.9 GASTRO-ESOPHAGEAL REFLUX DISEASE WITHOUT ESOPHAGITIS: ICD-10-CM

## 2025-03-21 DIAGNOSIS — F03.911 UNSPECIFIED DEMENTIA, UNSPECIFIED SEVERITY, WITH AGITATION: ICD-10-CM

## 2025-03-21 DIAGNOSIS — E78.5 HYPERLIPIDEMIA, UNSPECIFIED: ICD-10-CM

## 2025-03-21 DIAGNOSIS — I35.0 NONRHEUMATIC AORTIC (VALVE) STENOSIS: ICD-10-CM

## 2025-03-21 DIAGNOSIS — I67.2 CEREBRAL ATHEROSCLEROSIS: ICD-10-CM

## 2025-03-21 DIAGNOSIS — R55 SYNCOPE AND COLLAPSE: ICD-10-CM

## 2025-03-21 DIAGNOSIS — R79.89 OTHER SPECIFIED ABNORMAL FINDINGS OF BLOOD CHEMISTRY: ICD-10-CM

## 2025-03-21 DIAGNOSIS — T42.6X5A ADVERSE EFFECT OF OTHER ANTIEPILEPTIC AND SEDATIVE-HYPNOTIC DRUGS, INITIAL ENCOUNTER: ICD-10-CM

## 2025-03-21 DIAGNOSIS — I25.2 OLD MYOCARDIAL INFARCTION: ICD-10-CM

## 2025-03-21 DIAGNOSIS — Z66 DO NOT RESUSCITATE: ICD-10-CM

## 2025-03-21 DIAGNOSIS — E43 UNSPECIFIED SEVERE PROTEIN-CALORIE MALNUTRITION: ICD-10-CM

## 2025-03-21 DIAGNOSIS — R74.8 ABNORMAL LEVELS OF OTHER SERUM ENZYMES: ICD-10-CM

## 2025-03-21 DIAGNOSIS — I50.22 CHRONIC SYSTOLIC (CONGESTIVE) HEART FAILURE: ICD-10-CM
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
## 2025-03-21 DIAGNOSIS — Z86.73 PERSONAL HISTORY OF TRANSIENT ISCHEMIC ATTACK (TIA), AND CEREBRAL INFARCTION WITHOUT RESIDUAL DEFICITS: ICD-10-CM
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
## 2025-03-21 DIAGNOSIS — I11.0 HYPERTENSIVE HEART DISEASE WITH HEART FAILURE: ICD-10-CM

## 2025-07-02 ENCOUNTER — INPATIENT (INPATIENT)
Facility: HOSPITAL | Age: 89
LOS: 7 days | Discharge: HOSPICE | DRG: 100 | End: 2025-07-10
Attending: STUDENT IN AN ORGANIZED HEALTH CARE EDUCATION/TRAINING PROGRAM | Admitting: STUDENT IN AN ORGANIZED HEALTH CARE EDUCATION/TRAINING PROGRAM
Payer: MEDICARE

## 2025-07-02 VITALS
WEIGHT: 141.98 LBS | OXYGEN SATURATION: 100 % | SYSTOLIC BLOOD PRESSURE: 155 MMHG | RESPIRATION RATE: 15 BRPM | HEIGHT: 64 IN | DIASTOLIC BLOOD PRESSURE: 70 MMHG | HEART RATE: 76 BPM

## 2025-07-02 DIAGNOSIS — N39.0 URINARY TRACT INFECTION, SITE NOT SPECIFIED: ICD-10-CM

## 2025-07-02 LAB
ADD ON TEST-SPECIMEN IN LAB: SIGNIFICANT CHANGE UP
ALBUMIN SERPL ELPH-MCNC: 2.9 G/DL — LOW (ref 3.3–5)
ALP SERPL-CCNC: 148 U/L — HIGH (ref 40–120)
ALT FLD-CCNC: 23 U/L — SIGNIFICANT CHANGE UP (ref 12–78)
ANION GAP SERPL CALC-SCNC: 6 MMOL/L — SIGNIFICANT CHANGE UP (ref 5–17)
APPEARANCE UR: ABNORMAL
AST SERPL-CCNC: 23 U/L — SIGNIFICANT CHANGE UP (ref 15–37)
BACTERIA # UR AUTO: ABNORMAL /HPF
BASOPHILS # BLD AUTO: 0.02 K/UL — SIGNIFICANT CHANGE UP (ref 0–0.2)
BASOPHILS NFR BLD AUTO: 0.2 % — SIGNIFICANT CHANGE UP (ref 0–2)
BILIRUB SERPL-MCNC: 0.3 MG/DL — SIGNIFICANT CHANGE UP (ref 0.2–1.2)
BILIRUB UR-MCNC: NEGATIVE — SIGNIFICANT CHANGE UP
BUN SERPL-MCNC: 26 MG/DL — HIGH (ref 7–23)
CALCIUM SERPL-MCNC: 9.5 MG/DL — SIGNIFICANT CHANGE UP (ref 8.5–10.1)
CAST: 4 /LPF — SIGNIFICANT CHANGE UP (ref 0–4)
CHLORIDE SERPL-SCNC: 104 MMOL/L — SIGNIFICANT CHANGE UP (ref 96–108)
CK SERPL-CCNC: 80 U/L — SIGNIFICANT CHANGE UP (ref 26–192)
CO2 SERPL-SCNC: 27 MMOL/L — SIGNIFICANT CHANGE UP (ref 22–31)
COLOR SPEC: SIGNIFICANT CHANGE UP
CREAT SERPL-MCNC: 1.04 MG/DL — SIGNIFICANT CHANGE UP (ref 0.5–1.3)
DIFF PNL FLD: ABNORMAL
EGFR: 49 ML/MIN/1.73M2 — LOW
EGFR: 49 ML/MIN/1.73M2 — LOW
EOSINOPHIL # BLD AUTO: 0.21 K/UL — SIGNIFICANT CHANGE UP (ref 0–0.5)
EOSINOPHIL NFR BLD AUTO: 2.2 % — SIGNIFICANT CHANGE UP (ref 0–6)
FLUAV AG NPH QL: SIGNIFICANT CHANGE UP
FLUBV AG NPH QL: SIGNIFICANT CHANGE UP
GLUCOSE BLDC GLUCOMTR-MCNC: 158 MG/DL — HIGH (ref 70–99)
GLUCOSE SERPL-MCNC: 103 MG/DL — HIGH (ref 70–99)
GLUCOSE UR QL: NEGATIVE MG/DL — SIGNIFICANT CHANGE UP
HCT VFR BLD CALC: 34.5 % — SIGNIFICANT CHANGE UP (ref 34.5–45)
HGB BLD-MCNC: 10.5 G/DL — LOW (ref 11.5–15.5)
IMM GRANULOCYTES # BLD AUTO: 0.05 K/UL — SIGNIFICANT CHANGE UP (ref 0–0.07)
IMM GRANULOCYTES NFR BLD AUTO: 0.5 % — SIGNIFICANT CHANGE UP (ref 0–0.9)
KETONES UR QL: ABNORMAL MG/DL
LACTATE SERPL-SCNC: 1.9 MMOL/L — SIGNIFICANT CHANGE UP (ref 0.7–2)
LEUKOCYTE ESTERASE UR-ACNC: ABNORMAL
LYMPHOCYTES # BLD AUTO: 2.04 K/UL — SIGNIFICANT CHANGE UP (ref 1–3.3)
LYMPHOCYTES NFR BLD AUTO: 21.1 % — SIGNIFICANT CHANGE UP (ref 13–44)
MAGNESIUM SERPL-MCNC: 2 MG/DL — SIGNIFICANT CHANGE UP (ref 1.6–2.6)
MCHC RBC-ENTMCNC: 30.3 PG — SIGNIFICANT CHANGE UP (ref 27–34)
MCHC RBC-ENTMCNC: 30.4 G/DL — LOW (ref 32–36)
MCV RBC AUTO: 99.4 FL — SIGNIFICANT CHANGE UP (ref 80–100)
MONOCYTES # BLD AUTO: 0.74 K/UL — SIGNIFICANT CHANGE UP (ref 0–0.9)
MONOCYTES NFR BLD AUTO: 7.6 % — SIGNIFICANT CHANGE UP (ref 2–14)
NEUTROPHILS # BLD AUTO: 6.63 K/UL — SIGNIFICANT CHANGE UP (ref 1.8–7.4)
NEUTROPHILS NFR BLD AUTO: 68.4 % — SIGNIFICANT CHANGE UP (ref 43–77)
NITRITE UR-MCNC: POSITIVE
NRBC # BLD AUTO: 0 K/UL — SIGNIFICANT CHANGE UP (ref 0–0)
NRBC # FLD: 0 K/UL — SIGNIFICANT CHANGE UP (ref 0–0)
NRBC BLD AUTO-RTO: 0 /100 WBCS — SIGNIFICANT CHANGE UP (ref 0–0)
PH UR: 5 — SIGNIFICANT CHANGE UP (ref 5–8)
PHOSPHATE SERPL-MCNC: 3.8 MG/DL — SIGNIFICANT CHANGE UP (ref 2.5–4.5)
PLATELET # BLD AUTO: 389 K/UL — SIGNIFICANT CHANGE UP (ref 150–400)
PMV BLD: 9.1 FL — SIGNIFICANT CHANGE UP (ref 7–13)
POTASSIUM SERPL-MCNC: 3.8 MMOL/L — SIGNIFICANT CHANGE UP (ref 3.5–5.3)
POTASSIUM SERPL-SCNC: 3.8 MMOL/L — SIGNIFICANT CHANGE UP (ref 3.5–5.3)
PROT SERPL-MCNC: 7.6 GM/DL — SIGNIFICANT CHANGE UP (ref 6–8.3)
PROT UR-MCNC: 100 MG/DL
RBC # BLD: 3.47 M/UL — LOW (ref 3.8–5.2)
RBC # FLD: 14.6 % — HIGH (ref 10.3–14.5)
RBC CASTS # UR COMP ASSIST: 1 /HPF — SIGNIFICANT CHANGE UP (ref 0–4)
RSV RNA NPH QL NAA+NON-PROBE: SIGNIFICANT CHANGE UP
SARS-COV-2 RNA SPEC QL NAA+PROBE: SIGNIFICANT CHANGE UP
SODIUM SERPL-SCNC: 137 MMOL/L — SIGNIFICANT CHANGE UP (ref 135–145)
SOURCE RESPIRATORY: SIGNIFICANT CHANGE UP
SP GR SPEC: 1.03 — SIGNIFICANT CHANGE UP (ref 1–1.03)
SQUAMOUS # UR AUTO: 5 /HPF — SIGNIFICANT CHANGE UP (ref 0–5)
UROBILINOGEN FLD QL: 1 MG/DL — SIGNIFICANT CHANGE UP (ref 0.2–1)
WBC # BLD: 9.69 K/UL — SIGNIFICANT CHANGE UP (ref 3.8–10.5)
WBC # FLD AUTO: 9.69 K/UL — SIGNIFICANT CHANGE UP (ref 3.8–10.5)
WBC UR QL: >998 /HPF — HIGH (ref 0–5)

## 2025-07-02 PROCEDURE — 73700 CT LOWER EXTREMITY W/O DYE: CPT | Mod: LT

## 2025-07-02 PROCEDURE — C9254: CPT

## 2025-07-02 PROCEDURE — 85027 COMPLETE CBC AUTOMATED: CPT

## 2025-07-02 PROCEDURE — 36415 COLL VENOUS BLD VENIPUNCTURE: CPT

## 2025-07-02 PROCEDURE — 73630 X-RAY EXAM OF FOOT: CPT | Mod: RT

## 2025-07-02 PROCEDURE — 73600 X-RAY EXAM OF ANKLE: CPT | Mod: LT

## 2025-07-02 PROCEDURE — 93010 ELECTROCARDIOGRAM REPORT: CPT

## 2025-07-02 PROCEDURE — 93306 TTE W/DOPPLER COMPLETE: CPT

## 2025-07-02 PROCEDURE — 80048 BASIC METABOLIC PNL TOTAL CA: CPT

## 2025-07-02 PROCEDURE — 73562 X-RAY EXAM OF KNEE 3: CPT | Mod: LT

## 2025-07-02 PROCEDURE — 83036 HEMOGLOBIN GLYCOSYLATED A1C: CPT

## 2025-07-02 PROCEDURE — 71045 X-RAY EXAM CHEST 1 VIEW: CPT

## 2025-07-02 PROCEDURE — 70450 CT HEAD/BRAIN W/O DYE: CPT | Mod: 26

## 2025-07-02 PROCEDURE — 87040 BLOOD CULTURE FOR BACTERIA: CPT

## 2025-07-02 PROCEDURE — 73552 X-RAY EXAM OF FEMUR 2/>: CPT | Mod: LT

## 2025-07-02 PROCEDURE — 71045 X-RAY EXAM CHEST 1 VIEW: CPT | Mod: 26

## 2025-07-02 PROCEDURE — 82962 GLUCOSE BLOOD TEST: CPT

## 2025-07-02 PROCEDURE — 84100 ASSAY OF PHOSPHORUS: CPT

## 2025-07-02 PROCEDURE — 97530 THERAPEUTIC ACTIVITIES: CPT | Mod: GO

## 2025-07-02 PROCEDURE — 93356 MYOCRD STRAIN IMG SPCKL TRCK: CPT

## 2025-07-02 PROCEDURE — 73590 X-RAY EXAM OF LOWER LEG: CPT | Mod: 50

## 2025-07-02 PROCEDURE — 73610 X-RAY EXAM OF ANKLE: CPT | Mod: LT

## 2025-07-02 PROCEDURE — 95813 EEG EXTND MNTR 61-119 MIN: CPT

## 2025-07-02 PROCEDURE — 80053 COMPREHEN METABOLIC PANEL: CPT

## 2025-07-02 PROCEDURE — 95700 EEG CONT REC W/VID EEG TECH: CPT

## 2025-07-02 PROCEDURE — 97166 OT EVAL MOD COMPLEX 45 MIN: CPT | Mod: GO

## 2025-07-02 PROCEDURE — 99285 EMERGENCY DEPT VISIT HI MDM: CPT

## 2025-07-02 PROCEDURE — 80164 ASSAY DIPROPYLACETIC ACD TOT: CPT

## 2025-07-02 PROCEDURE — 83735 ASSAY OF MAGNESIUM: CPT

## 2025-07-02 PROCEDURE — 80061 LIPID PANEL: CPT

## 2025-07-02 PROCEDURE — 76376 3D RENDER W/INTRP POSTPROCES: CPT

## 2025-07-02 PROCEDURE — 84146 ASSAY OF PROLACTIN: CPT

## 2025-07-02 PROCEDURE — 95714 VEEG EA 12-26 HR UNMNTR: CPT

## 2025-07-02 PROCEDURE — 80185 ASSAY OF PHENYTOIN TOTAL: CPT

## 2025-07-02 PROCEDURE — 92610 EVALUATE SWALLOWING FUNCTION: CPT | Mod: GN

## 2025-07-02 PROCEDURE — 99223 1ST HOSP IP/OBS HIGH 75: CPT

## 2025-07-02 RX ORDER — LIDOCAINE HYDROCHLORIDE 20 MG/ML
0 JELLY TOPICAL
Refills: 0 | DISCHARGE

## 2025-07-02 RX ORDER — LEVETIRACETAM 10 MG/ML
250 INJECTION, SOLUTION INTRAVENOUS EVERY 12 HOURS
Refills: 0 | Status: DISCONTINUED | OUTPATIENT
Start: 2025-07-02 | End: 2025-07-02

## 2025-07-02 RX ORDER — ACETAMINOPHEN 500 MG/5ML
650 LIQUID (ML) ORAL EVERY 6 HOURS
Refills: 0 | Status: DISCONTINUED | OUTPATIENT
Start: 2025-07-02 | End: 2025-07-10

## 2025-07-02 RX ORDER — CEFTRIAXONE 500 MG/1
1000 INJECTION, POWDER, FOR SOLUTION INTRAMUSCULAR; INTRAVENOUS ONCE
Refills: 0 | Status: COMPLETED | OUTPATIENT
Start: 2025-07-02 | End: 2025-07-02

## 2025-07-02 RX ORDER — LEVETIRACETAM 10 MG/ML
250 INJECTION, SOLUTION INTRAVENOUS ONCE
Refills: 0 | Status: DISCONTINUED | OUTPATIENT
Start: 2025-07-02 | End: 2025-07-02

## 2025-07-02 RX ORDER — METOPROLOL SUCCINATE 50 MG/1
5 TABLET, EXTENDED RELEASE ORAL ONCE
Refills: 0 | Status: COMPLETED | OUTPATIENT
Start: 2025-07-02 | End: 2025-07-02

## 2025-07-02 RX ORDER — LACOSAMIDE 150 MG/1
200 TABLET, FILM COATED ORAL ONCE
Refills: 0 | Status: DISCONTINUED | OUTPATIENT
Start: 2025-07-02 | End: 2025-07-02

## 2025-07-02 RX ORDER — ONDANSETRON HCL/PF 4 MG/2 ML
4 VIAL (ML) INJECTION EVERY 8 HOURS
Refills: 0 | Status: DISCONTINUED | OUTPATIENT
Start: 2025-07-02 | End: 2025-07-10

## 2025-07-02 RX ORDER — MELATONIN 5 MG
3 TABLET ORAL AT BEDTIME
Refills: 0 | Status: DISCONTINUED | OUTPATIENT
Start: 2025-07-02 | End: 2025-07-10

## 2025-07-02 RX ORDER — LORAZEPAM 4 MG/ML
2 VIAL (ML) INJECTION ONCE
Refills: 0 | Status: DISCONTINUED | OUTPATIENT
Start: 2025-07-02 | End: 2025-07-02

## 2025-07-02 RX ORDER — LORAZEPAM 4 MG/ML
2 VIAL (ML) INJECTION ONCE
Refills: 0 | Status: DISCONTINUED | OUTPATIENT
Start: 2025-07-02 | End: 2025-07-03

## 2025-07-02 RX ORDER — ATORVASTATIN CALCIUM 80 MG/1
80 TABLET, FILM COATED ORAL AT BEDTIME
Refills: 0 | Status: DISCONTINUED | OUTPATIENT
Start: 2025-07-02 | End: 2025-07-10

## 2025-07-02 RX ORDER — LEVETIRACETAM 10 MG/ML
500 INJECTION, SOLUTION INTRAVENOUS EVERY 12 HOURS
Refills: 0 | Status: DISCONTINUED | OUTPATIENT
Start: 2025-07-02 | End: 2025-07-10

## 2025-07-02 RX ORDER — ASPIRIN 325 MG
81 TABLET ORAL DAILY
Refills: 0 | Status: DISCONTINUED | OUTPATIENT
Start: 2025-07-02 | End: 2025-07-10

## 2025-07-02 RX ORDER — ESCITALOPRAM OXALATE 20 MG/1
5 TABLET ORAL DAILY
Refills: 0 | Status: DISCONTINUED | OUTPATIENT
Start: 2025-07-02 | End: 2025-07-10

## 2025-07-02 RX ORDER — LORAZEPAM 4 MG/ML
1 VIAL (ML) INJECTION ONCE
Refills: 0 | Status: DISCONTINUED | OUTPATIENT
Start: 2025-07-02 | End: 2025-07-02

## 2025-07-02 RX ORDER — TRAZODONE HCL 100 MG
25 TABLET ORAL AT BEDTIME
Refills: 0 | Status: DISCONTINUED | OUTPATIENT
Start: 2025-07-02 | End: 2025-07-10

## 2025-07-02 RX ORDER — METOPROLOL SUCCINATE 50 MG/1
50 TABLET, EXTENDED RELEASE ORAL ONCE
Refills: 0 | Status: DISCONTINUED | OUTPATIENT
Start: 2025-07-02 | End: 2025-07-02

## 2025-07-02 RX ORDER — HEPARIN SODIUM 1000 [USP'U]/ML
5000 INJECTION INTRAVENOUS; SUBCUTANEOUS EVERY 12 HOURS
Refills: 0 | Status: DISCONTINUED | OUTPATIENT
Start: 2025-07-02 | End: 2025-07-10

## 2025-07-02 RX ORDER — CEFTRIAXONE 500 MG/1
1000 INJECTION, POWDER, FOR SOLUTION INTRAMUSCULAR; INTRAVENOUS EVERY 24 HOURS
Refills: 0 | Status: COMPLETED | OUTPATIENT
Start: 2025-07-03 | End: 2025-07-05

## 2025-07-02 RX ORDER — CEFTRIAXONE 500 MG/1
1000 INJECTION, POWDER, FOR SOLUTION INTRAMUSCULAR; INTRAVENOUS ONCE
Refills: 0 | Status: DISCONTINUED | OUTPATIENT
Start: 2025-07-02 | End: 2025-07-02

## 2025-07-02 RX ORDER — MAGNESIUM, ALUMINUM HYDROXIDE 200-200 MG
30 TABLET,CHEWABLE ORAL EVERY 4 HOURS
Refills: 0 | Status: DISCONTINUED | OUTPATIENT
Start: 2025-07-02 | End: 2025-07-10

## 2025-07-02 RX ADMIN — LEVETIRACETAM 250 MILLIGRAM(S): 10 INJECTION, SOLUTION INTRAVENOUS at 20:23

## 2025-07-02 RX ADMIN — LEVETIRACETAM 500 MILLIGRAM(S): 10 INJECTION, SOLUTION INTRAVENOUS at 22:28

## 2025-07-02 RX ADMIN — METOPROLOL SUCCINATE 5 MILLIGRAM(S): 50 TABLET, EXTENDED RELEASE ORAL at 20:30

## 2025-07-02 RX ADMIN — Medication 1 MILLIGRAM(S): at 22:28

## 2025-07-02 RX ADMIN — Medication 75 MILLILITER(S): at 22:48

## 2025-07-02 RX ADMIN — LACOSAMIDE 100 MILLIGRAM(S): 150 TABLET, FILM COATED ORAL at 21:17

## 2025-07-02 RX ADMIN — CEFTRIAXONE 1000 MILLIGRAM(S): 500 INJECTION, POWDER, FOR SOLUTION INTRAMUSCULAR; INTRAVENOUS at 20:26

## 2025-07-02 RX ADMIN — Medication 1000 MILLILITER(S): at 18:05

## 2025-07-02 NOTE — ED ADULT NURSE REASSESSMENT NOTE - NS ED NURSE REASSESS COMMENT FT1
Received bedside report from TERE Limon & GAURI Shelley RN. Pt resting in stretcher with safety maintained. Respirations even and unlabored, no distress noted. Pt currently on 2L NC.

## 2025-07-02 NOTE — ED PROVIDER NOTE - PROGRESS NOTE DETAILS
Attending Cheryle Hylton MD: I Cheryle Hylton ED attending reviewed CT read in comparison to MRI done in March 2025 with no acute changes Attending Cheryle Hylton MD: Reviewed labs and images with daughter and patient   Discussed evidence of UTI, will start on Ceftriaxone 1g and patient will require admission, daughter aware and agreeable   Ordered for PM lacosamide, keppra, and metoprolol

## 2025-07-02 NOTE — H&P ADULT - ASSESSMENT
89F admitted to  for AMS 2/2 stroke vs seizure vs encephalopathy 2/2 UTI    #AMS 2/2 stroke vs seizure vs encephalopathy 2/2 UTI  -Ceftriaxone for UTI, f/u cultures.   -Start aspirin and high-intensity statin.  -F/u MRI wwo con in am  -F/u TTE and EEG  -PT/OT/SLP  -Bedside dysphagia screen  -Neuro-checks  -Monitor on tele  -Neurology consult.     #Hx of Seizure Disorder  -Lactate wnl, obtain CK and prolactin.   -PTA - Keppra 250 BID - continue  -F/u EEG  -Neuro consult.     #Chronic HFrEF EF 40%   #HTN  #HLD  -C/w aspirin 81 mg qd, atorvastatin 80 mg qhs,   -Hold metoprolol 50 qd, losartan 25 mg qd    #Pressure injuries  -Wound care.     #DVT ppx: Hepsubq  Fall, aspiration, and seizure precautions.    89F admitted to  for AMS 2/2 stroke vs seizure vs encephalopathy 2/2 UTI    #AMS 2/2 stroke vs seizure vs encephalopathy 2/2 UTI  -Ceftriaxone for UTI, f/u cultures.   -Start aspirin and high-intensity statin.  -F/u MRI wwo con in am  -F/u TTE and EEG  -PT/OT/SLP  -Bedside dysphagia screen  -Neuro-checks  -Monitor on tele  -Neurology consult.     #Hx of Seizure Disorder  -Lactate wnl, obtain CK and prolactin.   -PTA - Keppra 250 BID - increased to 500 BID  -F/u EEG  -Neuro consult.     #Chronic HFrEF EF 40%   #HTN  #HLD  -C/w aspirin 81 mg qd, atorvastatin 80 mg qhs,   -Hold metoprolol 50 qd, losartan 25 mg qd    #Pressure injuries  -Wound care.     #DVT ppx: Hepsubq  Fall, aspiration, and seizure precautions.

## 2025-07-02 NOTE — ED ADULT NURSE NOTE - CAS EDN DISCHARGE ASSESSMENT
[facility] Letter to Continue Work after   Negative Test    11/16/2021       Dear Anastacio Blas    Your NEGATIVE COVID-19 test dated 11/14 has been received.      You may continue to work if you do not have a fever or respiratory symptoms.    Reminder:  •  You must adhere to vigilant use of PPE and physical distancing in break rooms, nursing stations, offices, etc.  • If you develop symptoms, do NOT report to work.  Notify your leader and contact Employee Health via:  - Exposure Evaluation Tool  - SafeCheck  - Hotline (1-552.731.3397)    You will be contacted by [facility] with further guidance and testing if indicated.    Symptoms of COVID-19 Infection   • Fever or Chills  • Cough  • Shortness of breath or difficulty breathing  • Fatigue  • Muscle or body aches  • Headache  • New loss of taste or smell  • Sore throat  • Congestion or runny nose   • Nausea or vomiting  • Diarrhea    [facility]    CC: Manager    [email or web address]  Hotline: [phone]  Hours (M-F: 2862-5774, Sat-Sun: 08-12).  Please answer your phone if an outside line is calling.  Regardless of hours, we work 7 days a week and will follow up as appropriate.   Patient baseline mental status/Awake

## 2025-07-02 NOTE — ED PROVIDER NOTE - CLINICAL SUMMARY MEDICAL DECISION MAKING FREE TEXT BOX
Attending Cheryle Hylton MD: 89 yo F with PMHx of seizure disorder on Keppra, CKD, hyperlipidemia, encephalopathy presents from [facility] for altered mental status, possible seizure activity. Hx cannot be obtained from patient secondary to altered mental status.  Fadumo Trivedi at [facility] contacted for collateral, reports patient was found in her room in her bed with her eyes open but not responding to pain or voice prompting EMS evaluation and transfer to . Patient was in normal health and behavior up until now and tolerated PO this morning and has been compliant with meds.   Her daughter, Carol, at bedside reports new hematomas to bilateral shins, but confirmed 3 weeks ago being treated by nursing for wound care at the facility, denies falls     PE: well appearing, nontoxic, no respiratory distress.  Arousable to painful stimuli . Cardiac: regular rate and rhythm, no murmurs. Resp: Lungs clear to auscultation, no wheezing or crackles. Abd: Abdomen soft, nontender, nondistended. Hematomas/abrasions to bilateral shins. Psych normal mood.    MDM: Differential diagnosis includes but is not limited to seizure, UTI, CVA, medication side effect   Will assess for infectious etiology for patient's possible seizure/change in mental status Attending Cheryle Hylton MD: 89 yo F with PMHx of seizure disorder on Keppra, CKD, hyperlipidemia, encephalopathy presents from [facility] [place] for altered mental status, possible seizure activity. Hx cannot be obtained from patient secondary to altered mental status.  Fadumo Trivedi at [facility] contacted for collateral, reports patient was found in her room in her bed with her eyes open but not responding to pain or voice prompting EMS evaluation and transfer to . Patient was in normal health and behavior up until now and tolerated PO this morning and has been compliant with meds.   Her daughter, Carol, at bedside reports new hematomas to bilateral shins, but confirmed 3 weeks ago being treated by nursing for wound care at the facility, denies falls     PE: well appearing, nontoxic, no respiratory distress.  Arousable to painful stimuli . Cardiac: regular rate and rhythm, systolic murmur. Resp: Lungs clear to auscultation, no wheezing or crackles. Abd: Abdomen soft, nontender, nondistended. Hematomas/abrasions to bilateral shins. Psych normal mood.    MDM: Differential diagnosis includes but is not limited to seizure, UTI, CVA, medication side effect   Will assess for infectious etiology for patient's possible seizure/change in mental status

## 2025-07-02 NOTE — PATIENT PROFILE ADULT - FALL HARM RISK - HARM RISK INTERVENTIONS

## 2025-07-02 NOTE — PATIENT PROFILE ADULT - FUNCTIONAL ASSESSMENT - BASIC MOBILITY 6.
1-calculated by average/Not able to assess (calculate score using [facility] averaging method)

## 2025-07-02 NOTE — ED ADULT NURSE NOTE - OBJECTIVE STATEMENT
Pt is a 89 y/o female brought in by EMS from assisted living for seizure like activity and altered mental status. Pt not responding to questions but is alert to verbal stimuli. Pt is a 89 y/o female brought in by EMS from assisted living for seizure like activity and altered mental status. Pt is not answering questions but is alert to verbal stimuli. Daughter at bedside states pt has hx of HTN. Pt has periods of agitation and gritting teeth. Pt observed to have blood blisters on b/l lower shins which daughter states are new since admission to assisted living facility. Daughter states pt has bed sores on b/l feet which are currently wrapped in gauze.

## 2025-07-02 NOTE — PHARMACOTHERAPY INTERVENTION NOTE - COMMENTS
Medication reconciliation completed.  Reviewed Medication list and confirmed med allergies with list from Care Facility "[facility]"; confirmed with Dr. First Med[facility]x.

## 2025-07-02 NOTE — PATIENT PROFILE ADULT - PATIENT'S SEXUAL ORIENTATION
HOSPITALIST H&P/CONSULT  NAME:  Kirill Irizarry   Age:  69 y.o.  :   1940   MRN:   [id number]  PCP: Sebastian Welch MD  Consulting MD:  Treatment Team: Attending Provider: Ctaarina Rhoades MD; Primary Nurse: Fozia Collier  HPI:   Patient is a pleasant 89USD with PMHx significant for afib on eliquis, as well as CAD, CHF, HTN who presents to the hospital with a 4 hour history of cough, fevers/chills, general malaise, weakness. She had been in her typical state of health until this afternoon when she began experiencing rigors. She tells me that she feels as though she \"cannot get warm. \"  Her daughter went to check on her after she found out by phone that she was feeling ill, and on measurement of temperature, she had a reported fever of 101.7. This prompted family to bring her to the ER for further evaluation. Patient was found to have a leukocytosis of 19. Given her flu-like symptoms, a rapid flu was obtained which was reportedly negative. On cardiac monitoring, patient was found to be in afib with RVR with HR around 130s. She did not miss a dose of her home meds, which include metoprolol and digoxin as well as Eliquis, and denies any chest pain. She does not have SOB, but does report a weak cough which started this afternoon as well. No extremity edema of recent either. Of note, patient has a h/o PAD with R foot wound treated in hospital in 2017 with subsequent surgery. She has been following up with outpatient wound care and MD without difficulty, but patient's family does mention that her R medial bunion has been hurting a little more, although it does not appear to be infected per their inspection of recent.     Complete ROS done and is as stated in HPI or otherwise negative  Past Medical History:   Diagnosis Date    Acquired cyst of kidney     Anxiety     managed with medication     Aortic valve replaced     Atrial fibrillation, chronic ([facility] [address]     managed with medication and pacemaker    CAD (coronary artery disease)     CABG x 5    Calculus of kidney     Carotid artery stenosis without cerebral infarction     Chronic pain     GENERALIZED FROM ARTHRITIS    Gangrene associated with diabetes mellitus ([address] 5/26/2016    left great toe    GERD (gastroesophageal reflux disease)     managed with medication     Heart failure ([address]     History of kidney stones     multiple with surgical interventions    Hypercholesterolemia     managed with medication     Hypertension     Hypothyroidism     managed with medication     Ill-defined condition     pt takes eliquis     Microscopic hematuria     Nausea & vomiting     after anesthesia    Osteoarthritis     managed with medication     Pacemaker     Metronic pacemaker only    PUD (peptic ulcer disease)     no recent episodes    PVD (peripheral vascular disease) ([address]     left side x 1 stented    Rheumatoid arthritis(714.0)     managed with medication     Toe amputation status ([facility] [address]     left great toe     Type 2 diabetes mellitus ([facility] [address] Dx 2006    oral t and insulin/Avg / no s/s of low BS/ does not have a sensation / last A1C7.4    Vertigo     no treatment, happens occassionally      Past Surgical History:   Procedure Laterality Date    CABG, ARTERY-VEIN, FOUR  1993    states x 5    CARDIAC SURG PROCEDURE UNLIST      Cardiovert x 2-3 x last 2/4/2012    HX AMPUTATION Left 2016    great toe    HX AORTIC VALVE REPLACEMENT      HX APPENDECTOMY  1959    HX HEART CATHETERIZATION      HX HEENT      dental    HX HYSTERECTOMY  1988    HX LITHOTRIPSY      multiple    HX OPEN CHOLECYSTECTOMY      HX ORTHOPAEDIC Left     achilles tendon    HX ORTHOPAEDIC Left     \"toe surgery removing bones\"    HX PACEMAKER      HX PACEMAKER PLACEMENT      Metronic    HX UROLOGICAL Left 1980s    open L kidney removal stones    VASCULAR SURGERY PROCEDURE UNLIST Left 2-17-16    lower extremity arteriogram with stent x 1 placement    VASCULAR SURGERY PROCEDURE UNLIST Right 12/20/2017    2nd toe- resection metatarsal head      Prior to Admission Medications   Prescriptions Last Dose Informant Patient Reported? Taking? CALCIUM CARBONATE (CALCIUM 300 PO)   Yes No   Sig: Take 1 Tab by mouth daily. Stop seven days prior to surgery per anesthesia protocol. CYANOCOBALAMIN (VITAMIN B-12 PO)   Yes No   Sig: Take 1 Tab by mouth daily. Stop seven days prior to surgery per anesthesia protocol. DOCOSAHEXANOIC ACID/EPA (FISH OIL PO)   Yes No   Sig: Take 2 Tabs by mouth two (2) times a day. Stop seven days prior to surgery per anesthesia protocol. GLUC HCL/GLUC KENNY/AC-D-GLUCOS (GLUCOSAMINE COMPLEX PO)   Yes No   Sig: Take 1 Tab by mouth three (3) times daily. Stop seven days prior to surgery per anesthesia protocol. HYDROcodone-acetaminophen (NORCO) 5-325 mg per tablet   No No   Sig: Take 1 Tab by mouth every six (6) hours as needed. Max Daily Amount: 4 Tabs. Patient taking differently: Take 1 Tab by mouth every six (6) hours as needed. Take day of surgery per anesthesia protocol. Indications: Pain   OMEPRAZOLE (PRILOSEC PO)   Yes No   Sig: Take 20 mg by mouth daily. .Take day of surgery per anesthesia protocol. apixaban (ELIQUIS) 2.5 mg tablet   No No   Sig: Take 1 Tab by mouth every twelve (12) hours. aspirin delayed-release 81 mg tablet   Yes No   Sig: Take 81 mg by mouth daily. Morning   Take day of surgery per anesthesia protocol. digoxin (LANOXIN) 0.125 mg tablet   No No   Sig: Take 1 Tab by mouth daily. Patient taking differently: Take 0.125 mg by mouth daily. Morning  Take day of surgery per anesthesia protocol.   ergocalciferol (ERGOCALCIFEROL) 50,000 unit capsule   Yes No   Sig: Take 50,000 Units by mouth every month. Stop seven days prior to surgery per anesthesia protocol. ferrous sulfate 325 mg (65 mg iron) tablet   Yes No   Sig: Take  by mouth Daily (before lunch).    glipiZIDE (GLUCOTROL) 10 mg tablet   Yes No Sig: Take 10 mg by mouth daily. Two tablets in the morning   Indications: type 2 diabetes mellitus   insulin glargine (LANTUS SOLOSTAR) 100 unit/mL (3 mL) pen   Yes No   Si Units by SubCUTAneous route nightly. Pt to take 80% of nightly dose night prior to surgery. 7.2 units   linagliptin (TRADJENTA) 5 mg tablet   Yes No   Sig: Take 5 mg by mouth daily. Indications: type 2 diabetes mellitus, morning   lorazepam (ATIVAN) 1 mg tablet   Yes No   Sig: Take 1 mg by mouth two (2) times a day. Take day of surgery per anesthesia protocol. methimazole (TAPAZOLE) 5 mg tablet   Yes No   Sig: Take 5 mg by mouth daily. Take day of surgery per anesthesia protocol. Pt reports she alternates /12 tablets and two tablets every other day  Indications: hyperthyroidism, morning   metoprolol (LOPRESSOR) 50 mg tablet   No No   Sig: Take 1 Tab by mouth two (2) times a day. Patient taking differently: Take 50 mg by mouth two (2) times a day. Take day of surgery per anesthesia protocol. multivitamin (ONE A DAY) tablet   Yes No   Sig: Take 1 Tab by mouth daily. Stop seven days prior to surgery per anesthesia protocol. predniSONE (DELTASONE) 5 mg tablet   No No   Sig: Take 1 Tab by mouth daily (with breakfast). Patient taking differently: Take 5 mg by mouth daily (with breakfast). Take day of surgery per anesthesia protocol. traMADol (ULTRAM) 50 mg tablet   Yes No   Sig: Take 50 mg by mouth nightly.       Facility-Administered Medications: None     Allergies   Allergen Reactions    Multaq [Dronedarone] Nausea Only    Other Medication Other (comments)     STATINS CAUSE MUSCLE WEAKNESS  Cholesterol medications    Statins-Hmg-Coa Reductase Inhibitors Myalgia      Social History   Substance Use Topics    Smoking status: Never Smoker    Smokeless tobacco: Never Used    Alcohol use No      Family History   Problem Relation Age of Onset    Heart Disease Father     Heart Attack Father     Diabetes Father     Hypertension Father     High Cholesterol Father     Asthma Father     Heart Disease Other     Heart Surgery Other     Diabetes Mother     Stroke Mother      TIAs    Hypertension Mother     Other Mother      kidney stone    Kidney Disease Mother     Heart Disease Mother     High Cholesterol Mother    Sujata Mendez Mother     Cancer Sister      multiple myeloma    Hypertension Sister     Hypertension Sister     Hypertension Brother     Diabetes Brother     Cancer Brother     Hypertension Sister     Heart Disease Sister     Hypertension Sister     Hypertension Sister       Objective:     Visit Vitals    /67    Pulse 86    Temp 100.3 °F (37.9 °C)    Resp (!) 37    SpO2 91%      Temp (24hrs), Av.9 °F (37.2 °C), Min:97.5 °F (36.4 °C), Max:100.3 °F (37.9 °C)    Oxygen Therapy  O2 Sat (%): 91 % (18)  Pulse via Oximetry: 91 beats per minute (18)  O2 Device: Room air (18)  Physical Exam:  General:    Weak appearing, alert and cooperative. Head:   Normocephalic, without obvious abnormality, atraumatic. Nose:  Nares normal. No drainage or sinus tenderness. Lungs:   Clear to auscultation bilaterally. No Wheezing or Rhonchi. No rales. Heart:   Irregular rate and rhythm. Not currently tachycardic. Abdomen:   Thin. Soft, non-tender. Not distended. Bowel sounds normal.   Extremities: No cyanosis. No edema. No clubbing. R foot wrapped in ace bandage, no drainage present. Skin:     Texture, turgor normal. No rashes or lesions.   Not Jaundiced  Neurologic: Alert and oriented x 3, no focal deficits   Data Review:   Recent Results (from the past 24 hour(s))   INFLUENZA A & B AG (RAPID TEST)    Collection Time: 18  6:42 PM   Result Value Ref Range    Influenza A Ag NEGATIVE  NEG      Influenza B Ag NEGATIVE  NEG     CBC WITH AUTOMATED DIFF    Collection Time: 18  6:45 PM   Result Value Ref Range    WBC 19.4 (H) 4.3 - 11.1 K/uL    RBC 4.12 4.05 - 5.25 M/uL    HGB 13.5 11.7 - 15.4 g/dL    HCT 40.3 35.8 - 46.3 %    MCV 97.8 79.6 - 97.8 FL    MCH 32.8 26.1 - 32.9 PG    MCHC 33.5 31.4 - 35.0 g/dL    RDW 13.3 11.9 - 14.6 %    PLATELET 083 390 - 768 K/uL    MPV 9.4 (L) 10.8 - 14.1 FL    DF AUTOMATED      NEUTROPHILS 87 (H) 43 - 78 %    LYMPHOCYTES 8 (L) 13 - 44 %    MONOCYTES 5 4.0 - 12.0 %    EOSINOPHILS 0 (L) 0.5 - 7.8 %    BASOPHILS 0 0.0 - 2.0 %    IMMATURE GRANULOCYTES 0 0.0 - 5.0 %    ABS. NEUTROPHILS 16.9 (H) 1.7 - 8.2 K/UL    ABS. LYMPHOCYTES 1.5 0.5 - 4.6 K/UL    ABS. MONOCYTES 1.0 0.1 - 1.3 K/UL    ABS. EOSINOPHILS 0.0 0.0 - 0.8 K/UL    ABS. BASOPHILS 0.0 0.0 - 0.2 K/UL    ABS. IMM. GRANS. 0.1 0.0 - 0.5 K/UL   METABOLIC PANEL, COMPREHENSIVE    Collection Time: 02/13/18  6:45 PM   Result Value Ref Range    Sodium 137 136 - 145 mmol/L    Potassium 4.1 3.5 - 5.1 mmol/L    Chloride 100 98 - 107 mmol/L    CO2 25 21 - 32 mmol/L    Anion gap 12 7 - 16 mmol/L    Glucose 299 (H) 65 - 100 mg/dL    BUN 21 8 - 23 MG/DL    Creatinine 0.86 0.6 - 1.0 MG/DL    GFR est AA >60 >60 ml/min/1.73m2    GFR est non-AA >60 >60 ml/min/1.73m2    Calcium 9.3 8.3 - 10.4 MG/DL    Bilirubin, total 1.0 0.2 - 1.1 MG/DL    ALT (SGPT) 30 12 - 65 U/L    AST (SGOT) 45 (H) 15 - 37 U/L    Alk.  phosphatase 129 50 - 136 U/L    Protein, total 8.0 6.3 - 8.2 g/dL    Albumin 3.2 3.2 - 4.6 g/dL    Globulin 4.8 (H) 2.3 - 3.5 g/dL    A-G Ratio 0.7 (L) 1.2 - 3.5     MAGNESIUM    Collection Time: 02/13/18  6:45 PM   Result Value Ref Range    Magnesium 1.7 (L) 1.8 - 2.4 mg/dL   EKG, 12 LEAD, INITIAL    Collection Time: 02/13/18  7:26 PM   Result Value Ref Range    Ventricular Rate 154 BPM    Atrial Rate 170 BPM    QRS Duration 118 ms    Q-T Interval 264 ms    QTC Calculation (Bezet) 422 ms    Calculated R Axis 87 degrees    Calculated T Axis -75 degrees    Diagnosis       Atrial fibrillation with rapid ventricular response with premature   ventricular or aberrantly conducted complexes  Right bundle branch block  Septal infarct (cited on or before 20-DEC-2017)  T wave abnormality, consider inferior ischemia  Abnormal ECG  When compared with ECG of 20-DEC-2017 14:50,  Vent. rate has increased BY  74 BPM  Questionable change in initial forces of Septal leads  ST more depressed Lateral leads  T wave inversion no longer evident in Lateral leads     POC LACTIC ACID    Collection Time: 02/13/18  8:26 PM   Result Value Ref Range    Lactic Acid (POC) 2.1 (H) 0.5 - 1.9 mmol/L   URINE MICROSCOPIC    Collection Time: 02/13/18  8:51 PM   Result Value Ref Range    WBC 5-10 0 /hpf    RBC >100 (H) 0 /hpf    Epithelial cells 10-20 0 /hpf    Bacteria TRACE 0 /hpf    Casts 0-3 0 /lpf     Imaging /Procedures /Studies     Assessment and Plan:      Active Hospital Problems    Diagnosis Date Noted    Atrial fibrillation with rapid ventricular response ([facility] [address] 02/13/2018    Leukocytosis 02/13/2018    PAD (peripheral artery disease) ([facility] [address] 02/12/2016 2/17/16 (Dr Barrera [facility]) A/o, BLR Angiogram, L PT PTA (), L SFA PTA/Stent (6x 100 [place] PTX)       CHF (congestive heart failure) ([facility] [address] 06/23/2014    Hypomagnesemia 03/20/2014    Hyperglycemia 03/20/2014    Diabetes mellitus type 2, controlled ([facility] [address] 03/20/2014    Atrial fibrillation ([facility] [address] 03/15/2014    Essential hypertension, benign 04/29/2013    Dyslipidemia 04/29/2013    CAD (coronary artery disease)      HX CABG 1993, EF 55%         PLAN  Atrial Fibrillation with RVR  - RVR has resolved with initiation of cardizem drip by ER  - Will continue drip with titration down as tolerated, likely can be discontinued later tonight or tomorrow AM  - Continue home metoprolol and digoxin  - Continue Eliquis    Flu-like symptoms  - Patient has classic flu-like symptoms with fevers, chills, general malaise, weakness, as well as a leukocytosis  - ER gave dose of rocephin  - UA negative, CXR clear  - Will start Tamiflu given symptoms/presentation within 48 hours, even with negative flu swab given peak of flu season  - Tylenol for fevers/mild pain    Leukocytosis  - UA/CXR normal  - Given 1 dose rocephin in ER  - Will hold off on additional antibiotics at this time as this may be viral in nature  - Consider further evaluation of foot if symptoms worsen    DM2 with hyperglycemia  - Continue home meds  - Start SSI for BS coverage    H/O CHF  - Not in exacerbation  - Continue home meds    H/O CAD  - No chest pain  - Continue home meds    Hypomagnesemia  - Mg 1.7 in ER  - Pt given 2g of Mg in ER  - Recheck Mg in AM    Code Status: FULL    Anticipated discharge: 1-2 days    Signed By: Jez Han MD     February 13, 2018 Heterosexual

## 2025-07-02 NOTE — ED ADULT NURSE NOTE - CHIEF COMPLAINT QUOTE
patient brought in by EMS from [place] c/o seizure.  patient has hx seizures on keppra.  patient had seizure like activity upon EMS arrival.  given 5 mg IM versed PTA by EMS.  responsive to pain upon arrival to ED. VSS.

## 2025-07-02 NOTE — ED ADULT TRIAGE NOTE - CHIEF COMPLAINT QUOTE
patient brought in by EMS from [place] c/o seizure.  patient has hx seizures on keppra.  patient had seizure like activity upon EMS arrival.  given 5 mg IM versed PTA by EMS.  responsive to pain upon arrival to ED. VSS.

## 2025-07-02 NOTE — ED ADULT TRIAGE NOTE - WEIGHT METHOD
Pt is lying in the bed with mom at the bedside. Pt and mom expressed no needs at the time. No signs of distress noted, will continue to monitor. stated
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
## 2025-07-02 NOTE — H&P ADULT - NSHPLABSRESULTS_GEN_ALL_CORE
LABS:  cret                        10.5   9.69  )-----------( 389      ( 02 Jul 2025 16:56 )             34.5     07-02    137  |  104  |  26[H]  ----------------------------<  103[H]  3.8   |  27  |  1.04    Ca    9.5      02 Jul 2025 16:56  Phos  3.8     07-02  Mg     2.0     07-02    TPro  7.6  /  Alb  2.9[L]  /  TBili  0.3  /  DBili  x   /  AST  23  /  ALT  23  /  AlkPhos  148[H]  07-02    Urinalysis with Rflx Culture (collected 07-02-25 @ 17:52)    < from: Xray Chest 1 View- PORTABLE-Urgent (07.02.25 @ 17:22) >    IMPRESSION: No acute finding at this time.    --- End of Report ---    < end of copied text >    < from: CT Head No Cont (07.02.25 @ 17:14) >    IMPRESSION:    1. Left temporal lobe remote infarction    2. Ischemic white matter disease and atrophy typical for age    3. No definite adverse interval change March 2025    --- End of Report ---    < end of copied text >

## 2025-07-02 NOTE — RAPID RESPONSE TEAM SUMMARY - NSSITUATIONBACKGROUNDRRT_GEN_ALL_CORE
89 YO F with PMHx of dementia (baseline AOx2-3), L temporal CVA, seizures on keppra and vimpat (prior admission for status epilepticus in 2/2025), AS, GERD, HFrEF 40, HLD and HTN presented from [facility] for unresponsiveness. Admitted for AMS second to metabolic encephalopathy with UTI and concern for breakthrough seizures. RRT called for seizure like activity. Seen by bedside and noted with facial twitching, and mild tonic clonic activity. Seizure like activity spontaneous resolved and on exam patient with PERRL, not following commands, however withdraws to noxious stimuli x 4 extremities. Intermittent LUE clonus activity noted. -160s, , SPO2 100 on 2L NC and . Ativan 1mg IVP and keppra 500mg IVP dose given and no further seizures noted. CTH reviewed from admission with prior L temporal CVA noted, but no new findings. Patient remains on unit with seizure precautions continued. EEG and MRI BRAIN pending. On discharge in 3/2025 patient was on keppra and vimpat and recc discussion for AED adjustments with neurology.

## 2025-07-02 NOTE — PATIENT PROFILE ADULT - HOME ACCESSIBILITY CONCERNS
To confirm, Surgery is scheduled on 9/06/23. We will call you late afternoon the business day prior to surgery with your arrival time.    *Please report to the [facility] (1st Floor) located off of [facility] (2nd Entrance/Building on the left, in front of the flag pole).  Address: [address] [place] [address] [place]. [address]        INSTRUCTIONS IMPORTANT!!!  Do Not Eat, Drink, or Smoke after 12 midnight unless instructed otherwise by your Surgeon. OK to brush teeth, no gum, candy or mints!    *MEDICATION INSTRUCTIONS as instructed by Alyx Montejo NP: Morning of Surgery, please ONLY take:  -None      Diabetic Patients: If you take diabetic or weight loss medication, Do NOT take morning of surgery unless instructed by Doctor. Metformin to be stopped 24 hrs prior to surgery. Ozempic/ Mounjaro/ Wegovy or any weight loss injections to be stopped 7 days prior to surgery. DO NOT take long-acting insulin the evening before surgery. Blood sugars will be checked in pre-op by Nurse.    *Patients should HOLD all vitamins, herbal supplements, weight loss medication, aspirin products & NSAIDS 7 days prior to surgery, as these can thin the blood. Ok to take Tylenol.    ____  Avoid Alcoholic beverages 3 days prior to surgery, as it can thin the blood.  ____  NO Acrylic/fake nails or nail polish worn day of surgery (specifically hand/arm & foot surgeries).  ____  NO powder, lotions, deodorants, oils or cream on body.  ____  Remove all jewelry, piercings, & foreign objects prior to arrival and leave at home.  ____  Remove Dentures, Hearing Aids & Contact Lens prior to surgery.  ____  Bring photo ID and insurance information to hospital (Leave Valuables at Home).  ____  If going home the same day, arrange for a ride home. You will not be able to drive for 24 hrs if Anesthesia was used.   ____  Females (ages 11-60): may need to give a urine sample the morning of surgery; please see Pre op Nurse prior to using the  restroom.  ____  Males: Stop ED medications (Viagra, Cialis) 24 hrs prior to surgery.  ____  Wear clean, loose fitting clothing to allow for dressings/ bandages.      Bathing Instructions:    -Shower with anti-bacterial Soap (Hibiclens or Dial) the night before surgery and the morning of.   -Do not use Hibiclens on your face or genitals.   -Apply clean clothes after shower.  -Do not shave your face or body 2 days prior to surgery unless instructed otherwise by your Surgeon.  -Do not shave pubic hair 7 days prior to surgery (gyn pt's).    [facility]/Ride Policy:  Only 2 adults allowed in pre op/recovery area during your procedure. You MUST HAVE A RIDE HOME from a responsible adult that you know and trust. Medical Transport, Uber or Lyft can ONLY be used if patient has a responsible adult to accompany them during ride home.    Discharge Instructions: You will receive Post-op/Discharge instructions by your Discharge Nurse prior to going home.   *Prevention of surgical site infections:   -Keep incisions clean and dry.   -Do not soak/submerge incisions in water until completely healed.   -Do not apply lotions, powders, creams, or deodorants to site.   -Always make sure hands are cleaned with antibacterial soap/ alcohol-based  prior to touching the surgical site.        *Signs and symptoms of Infection:               -Redness and pain around the area where you had surgery               -Drainage of cloudy fluid from your surgical wound               -Fever, chills or any flu-like symptoms     >>>Call Surgeon office/on-call Surgeon if you experience any of these signs & symptoms post-surgery @ [phone]<<<       *If you are running late day of surgery, please call the Surgery Dept @ [phone].       *Billing question, please call  [phone]       Thank you,  -[facility] Pre Admit Dept.  [phone] or [phone]  M-F 7:30 am-4:00 pm (Closed Major Holidays)    Additional Tests  Scheduled Today:  Labs (1st Floor) Check in at the !       none

## 2025-07-02 NOTE — ED ADULT NURSE REASSESSMENT NOTE - NS ED NURSE REASSESS COMMENT FT1
Patient received from ED RN Nancy at 8:30pm. Pt is A&Ox0, nonverbal, confused. 2L NC. No signs and symptoms of acute distress. Seizure precautions in place. Follows basic commands. Pt with B/L Bri boots. B/L IVs. VSS. Provider Shafique as bedside to assess. Fall and safety precautions maintained. Care continues as ordered. Call bell within reach. Pt awaiting bed assignment and further orders.

## 2025-07-02 NOTE — H&P ADULT - NSHPPHYSICALEXAM_GEN_ALL_CORE
T(C): 36.8 (07-02-25 @ 20:31), Max: 36.8 (07-02-25 @ 20:31)  HR: 85 (07-02-25 @ 20:27) (69 - 85)  BP: 165/77 (07-02-25 @ 20:27) (146/88 - 165/77)  RR: 20 (07-02-25 @ 20:27) (15 - 20)  SpO2: 97% (07-02-25 @ 20:27) (97% - 100%)    General: frail, ill appearing  HEENT: non-traumatic, perrla  Cardio: s1s2   Lungs: comfortable breathing, clear to auscultation  Abdomen: Soft, non-distended  Neuro: AOx0, not following commands.   Ext: Pulses +2

## 2025-07-02 NOTE — ED ADULT NURSE NOTE - NSFALLHARMRISKINTERV_ED_ALL_ED

## 2025-07-02 NOTE — H&P ADULT - CONVERSATION DETAILS
A comprehensive goals of care discussion was held with the patient’s family due to the patient’s inability to meaningfully participate in medical decision-making secondary to advanced dementia and AMS. The patient lacks decision-making capacity, and daughter (Tammy Hayward) , who is the health care agent, was engaged in the discussion.  The conversation focused on the patient’s current clinical status, overall prognosis, and the anticipated outcomes of aggressive interventions including cardiopulmonary resuscitation, endotracheal intubation with mechanical ventilation, and escalation to intensive care if needed. The potential risks, burdens, and likelihood of meaningful recovery were reviewed.  The surrogate demonstrated understanding and expressed that after careful consideration of the patient’s values, goals, and prior conversations, they wished to establish a code status of DNR and DNI with trial of NIPPV. The family was encouraged to revisit these preferences as the clinical course evolves, and all questions were addressed at the time of the conversation.
- - -

## 2025-07-02 NOTE — RAPID RESPONSE TEAM SUMMARY - NSSITUATIONBACKGROUNDRRT_GEN_ALL_CORE
89 YO F with PMHx of dementia (baseline AOx2-3), L temporal CVA, seizures on keppra and vimpat (prior admission for status epilepticus in 2/2025), AS, GERD, HFrEF 40, HLD and HTN presented from [facility] for unresponsiveness. Admitted for AMS second to metabolic encephalopathy with UTI and concern for breakthrough seizures. RRT called for seizure like activity. Seen by bedside and noted with facial twitching, and mild tonic clonic activity. Seizure like activity spontaneous resolved and on exam patient with PERRL, not following commands, however withdraws to noxious stimuli x 4 extremities. Intermittent LUE clonus activity noted. -160s, , SPO2 100 on 2L NC and . Ativan 1mg IVP and keppra 500mg IVP dose given and no further seizures noted. CTH reviewed from admission with prior L temporal CVA noted, but no new findings. Patient remains on unit with seizure precautions continued. EEG and MRI BRAIN pending. On discharge in 3/2025 patient was on keppra and vimpat and recc discussion for AED adjustments with neurology.  89 YO F with PMHx of dementia (baseline AOx2-3), L temporal CVA, seizures on keppra and vimpat (prior admission for status epilepticus in 2/2025), AS, GERD, HFrEF 40, HLD and HTN presented from [facility] for unresponsiveness. Admitted for AMS second to metabolic encephalopathy with UTI and concern for breakthrough seizures. RRT called for seizure like activity. Seen by bedside and noted with facial twitching, and mild tonic clonic activity. Seizure like activity spontaneous resolved and on exam patient with PERRL, not following commands, however withdraws to noxious stimuli x 4 extremities. Intermittent LUE clonus activity noted. -160s, , SPO2 100 on 2L NC and . Ativan 1mg IVP and keppra 500mg IVP dose given and no further seizures noted. CTH reviewed from admission with prior L temporal CVA noted, but no new findings. Patient remains on unit with seizure precautions continued. EEG and MRI BRAIN pending. On discharge in 3/2025 patient was on keppra and vimpat and recc discussion for AED adjustments with neurology.    Anne Casper, PARTHA, PA-C  Intensive Care Unit/ Rapid Response   In [place] # [phone]

## 2025-07-03 ENCOUNTER — RESULT REVIEW (OUTPATIENT)
Age: 89
End: 2025-07-03

## 2025-07-03 ENCOUNTER — TRANSCRIPTION ENCOUNTER (OUTPATIENT)
Age: 89
End: 2025-07-03

## 2025-07-03 DIAGNOSIS — I10 ESSENTIAL (PRIMARY) HYPERTENSION: ICD-10-CM

## 2025-07-03 DIAGNOSIS — I50.22 CHRONIC SYSTOLIC (CONGESTIVE) HEART FAILURE: ICD-10-CM

## 2025-07-03 DIAGNOSIS — G40.909 EPILEPSY, UNSPECIFIED, NOT INTRACTABLE, WITHOUT STATUS EPILEPTICUS: ICD-10-CM

## 2025-07-03 DIAGNOSIS — E78.5 HYPERLIPIDEMIA, UNSPECIFIED: ICD-10-CM

## 2025-07-03 DIAGNOSIS — R41.82 ALTERED MENTAL STATUS, UNSPECIFIED: ICD-10-CM

## 2025-07-03 LAB
ALBUMIN SERPL ELPH-MCNC: 2.3 G/DL — LOW (ref 3.3–5)
ALP SERPL-CCNC: 102 U/L — SIGNIFICANT CHANGE UP (ref 40–120)
ALT FLD-CCNC: 17 U/L — SIGNIFICANT CHANGE UP (ref 12–78)
ANION GAP SERPL CALC-SCNC: 5 MMOL/L — SIGNIFICANT CHANGE UP (ref 5–17)
AST SERPL-CCNC: 18 U/L — SIGNIFICANT CHANGE UP (ref 15–37)
BILIRUB SERPL-MCNC: 0.2 MG/DL — SIGNIFICANT CHANGE UP (ref 0.2–1.2)
BUN SERPL-MCNC: 20 MG/DL — SIGNIFICANT CHANGE UP (ref 7–23)
CALCIUM SERPL-MCNC: 8.8 MG/DL — SIGNIFICANT CHANGE UP (ref 8.5–10.1)
CHLORIDE SERPL-SCNC: 112 MMOL/L — HIGH (ref 96–108)
CHOLEST SERPL-MCNC: 127 MG/DL — SIGNIFICANT CHANGE UP
CO2 SERPL-SCNC: 23 MMOL/L — SIGNIFICANT CHANGE UP (ref 22–31)
CREAT SERPL-MCNC: 0.78 MG/DL — SIGNIFICANT CHANGE UP (ref 0.5–1.3)
EGFR: 69 ML/MIN/1.73M2 — SIGNIFICANT CHANGE UP
EGFR: 69 ML/MIN/1.73M2 — SIGNIFICANT CHANGE UP
GLUCOSE SERPL-MCNC: 95 MG/DL — SIGNIFICANT CHANGE UP (ref 70–99)
HCT VFR BLD CALC: 27.3 % — LOW (ref 34.5–45)
HDLC SERPL-MCNC: 40 MG/DL — LOW
HGB BLD-MCNC: 8.7 G/DL — LOW (ref 11.5–15.5)
LDLC SERPL-MCNC: 70 MG/DL — SIGNIFICANT CHANGE UP
LIPID PNL WITH DIRECT LDL SERPL: 70 MG/DL — SIGNIFICANT CHANGE UP
MAGNESIUM SERPL-MCNC: 1.8 MG/DL — SIGNIFICANT CHANGE UP (ref 1.6–2.6)
MCHC RBC-ENTMCNC: 31.1 PG — SIGNIFICANT CHANGE UP (ref 27–34)
MCHC RBC-ENTMCNC: 31.9 G/DL — LOW (ref 32–36)
MCV RBC AUTO: 97.5 FL — SIGNIFICANT CHANGE UP (ref 80–100)
NONHDLC SERPL-MCNC: 87 MG/DL — SIGNIFICANT CHANGE UP
NRBC # BLD AUTO: 0 K/UL — SIGNIFICANT CHANGE UP (ref 0–0)
NRBC # FLD: 0 K/UL — SIGNIFICANT CHANGE UP (ref 0–0)
NRBC BLD AUTO-RTO: 0 /100 WBCS — SIGNIFICANT CHANGE UP (ref 0–0)
PHOSPHATE SERPL-MCNC: 2.7 MG/DL — SIGNIFICANT CHANGE UP (ref 2.5–4.5)
PLATELET # BLD AUTO: 305 K/UL — SIGNIFICANT CHANGE UP (ref 150–400)
PMV BLD: 9.2 FL — SIGNIFICANT CHANGE UP (ref 7–13)
POTASSIUM SERPL-MCNC: 3.8 MMOL/L — SIGNIFICANT CHANGE UP (ref 3.5–5.3)
POTASSIUM SERPL-SCNC: 3.8 MMOL/L — SIGNIFICANT CHANGE UP (ref 3.5–5.3)
PROLACTIN SERPL-MCNC: 14.1 NG/ML — SIGNIFICANT CHANGE UP (ref 3.4–24.1)
PROT SERPL-MCNC: 5.9 GM/DL — LOW (ref 6–8.3)
RBC # BLD: 2.8 M/UL — LOW (ref 3.8–5.2)
RBC # FLD: 14.5 % — SIGNIFICANT CHANGE UP (ref 10.3–14.5)
SODIUM SERPL-SCNC: 140 MMOL/L — SIGNIFICANT CHANGE UP (ref 135–145)
TRIGL SERPL-MCNC: 94 MG/DL — SIGNIFICANT CHANGE UP
WBC # BLD: 9.07 K/UL — SIGNIFICANT CHANGE UP (ref 3.8–10.5)
WBC # FLD AUTO: 9.07 K/UL — SIGNIFICANT CHANGE UP (ref 3.8–10.5)

## 2025-07-03 PROCEDURE — 73590 X-RAY EXAM OF LOWER LEG: CPT | Mod: 26,50

## 2025-07-03 PROCEDURE — 73630 X-RAY EXAM OF FOOT: CPT | Mod: 26,RT

## 2025-07-03 PROCEDURE — 76376 3D RENDER W/INTRP POSTPROCES: CPT | Mod: 26

## 2025-07-03 PROCEDURE — 93356 MYOCRD STRAIN IMG SPCKL TRCK: CPT

## 2025-07-03 PROCEDURE — 93306 TTE W/DOPPLER COMPLETE: CPT | Mod: 26

## 2025-07-03 PROCEDURE — 99233 SBSQ HOSP IP/OBS HIGH 50: CPT

## 2025-07-03 PROCEDURE — 95813 EEG EXTND MNTR 61-119 MIN: CPT | Mod: 26

## 2025-07-03 PROCEDURE — 99223 1ST HOSP IP/OBS HIGH 75: CPT

## 2025-07-03 RX ORDER — LORAZEPAM 4 MG/ML
1 VIAL (ML) INJECTION ONCE
Refills: 0 | Status: DISCONTINUED | OUTPATIENT
Start: 2025-07-03 | End: 2025-07-03

## 2025-07-03 RX ORDER — LORAZEPAM 4 MG/ML
2 VIAL (ML) INJECTION ONCE
Refills: 0 | Status: DISCONTINUED | OUTPATIENT
Start: 2025-07-03 | End: 2025-07-03

## 2025-07-03 RX ORDER — LACOSAMIDE 150 MG/1
150 TABLET, FILM COATED ORAL EVERY 12 HOURS
Refills: 0 | Status: DISCONTINUED | OUTPATIENT
Start: 2025-07-03 | End: 2025-07-04

## 2025-07-03 RX ORDER — LACOSAMIDE 150 MG/1
200 TABLET, FILM COATED ORAL ONCE
Refills: 0 | Status: DISCONTINUED | OUTPATIENT
Start: 2025-07-03 | End: 2025-07-03

## 2025-07-03 RX ORDER — LACOSAMIDE 150 MG/1
100 TABLET, FILM COATED ORAL EVERY 12 HOURS
Refills: 0 | Status: DISCONTINUED | OUTPATIENT
Start: 2025-07-03 | End: 2025-07-03

## 2025-07-03 RX ADMIN — Medication 75 MILLILITER(S): at 11:47

## 2025-07-03 RX ADMIN — HEPARIN SODIUM 5000 UNIT(S): 1000 INJECTION INTRAVENOUS; SUBCUTANEOUS at 03:27

## 2025-07-03 RX ADMIN — LACOSAMIDE 100 MILLIGRAM(S): 150 TABLET, FILM COATED ORAL at 11:43

## 2025-07-03 RX ADMIN — CEFTRIAXONE 1000 MILLIGRAM(S): 500 INJECTION, POWDER, FOR SOLUTION INTRAMUSCULAR; INTRAVENOUS at 20:50

## 2025-07-03 RX ADMIN — LEVETIRACETAM 500 MILLIGRAM(S): 10 INJECTION, SOLUTION INTRAVENOUS at 11:43

## 2025-07-03 RX ADMIN — LEVETIRACETAM 500 MILLIGRAM(S): 10 INJECTION, SOLUTION INTRAVENOUS at 20:58

## 2025-07-03 RX ADMIN — HEPARIN SODIUM 5000 UNIT(S): 1000 INJECTION INTRAVENOUS; SUBCUTANEOUS at 21:02

## 2025-07-03 RX ADMIN — LACOSAMIDE 100 MILLIGRAM(S): 150 TABLET, FILM COATED ORAL at 22:03

## 2025-07-03 RX ADMIN — Medication 1 MILLIGRAM(S): at 20:55

## 2025-07-03 RX ADMIN — Medication 2 MILLIGRAM(S): at 03:27

## 2025-07-03 RX ADMIN — HEPARIN SODIUM 5000 UNIT(S): 1000 INJECTION INTRAVENOUS; SUBCUTANEOUS at 11:43

## 2025-07-03 NOTE — PHYSICAL THERAPY INITIAL EVALUATION ADULT - GENERAL OBSERVATIONS, REHAB EVAL
Pt is unresponsive, lethargic and difficult to arouse. PLOF for past year is bedbound. Has had rapid response and seizure activity noted while in hospital. Having EEG today. Not appropriate for PT at this time.. Spoke with case management. Will re-eval for PT if there is improvement in medical status.

## 2025-07-03 NOTE — DIETITIAN INITIAL EVALUATION ADULT - ORAL NUTRITION SUPPLEMENTS
Add ensure + HP shake BID (350kcal, 20g protein) and Royce BID when medically feasible / if mentation improves

## 2025-07-03 NOTE — DIETITIAN INITIAL EVALUATION ADULT - ORAL INTAKE PTA/DIET HISTORY
Unable to obtain meaningful information 2/2 AMS, obtunded. As per shadow chart, resides at [facility]. No diet/wt hx within transfer paper work. As per previous RD note on 3/11/25: "diet hx at facility includes "ground texture" (minced and moist equivalent) thin liquids, 2.4-4.5 reduced sodium diet, receives Ensure plus TID. Likely w/ poor PO intake."

## 2025-07-03 NOTE — CONSULT NOTE ADULT - ATTENDING COMMENTS
Patient with stable, superficial wounds. Podiatry following. I agree with the resident's assessment and plan.

## 2025-07-03 NOTE — CONSULT NOTE ADULT - ASSESSMENT
89 F with a history of aortic stenosis, dementia (baseline alert and oriented ×2–3), GERD, hyperlipidemia, and hypertension, was sent from her assisted living facility on 7/2 for evaluation following an episode of unresponsiveness. Upon arrival to the ED, she was noted to be non-verbal and unable to provide history. Collateral information was obtained from her daughter and health care proxy, Tammy Hayward ([phone]). Per report, the patient has experienced a gradual decline in health over the past year. Over recent months, she has become bedbound with progressive cognitive decline. Previously, at her baseline, she was able to communicate, recognize family members, and independently eat and drink. On 7/2, staff at the facility witnessed seizure-like activity, prompting EMS activation and transfer to the hospital. No recent illness, trauma, or acute changes in medication were reported at the time of the event. Pt admitted for UTI. Course complicated by RRT for seizure like activity, neurology consulted. Palliative medicine is consulted for assistance with GOC.     Acute Metabolic Encephalopathy   Seizures  Dementia  - bedbound, progressive cognitive decline   - neurology following   - on vimpat, keppra    Acute UTI   - continue abx    Lower Extremity Wounds  - podiatry consulted  - prn pain control  - continue abx     GOC/ACP  Capacity: pt does not have capacity   HCP/Surrogate: no HCP form on file, daughter Tammy Hayward [phone]  Code Status: DNR/DNI with trial of NIV   MOLST:   Dispo Plan: continue current tx, plan for GOC with daughter mon 7/7    Process of Care  --Reviewed dx/treatment problems and alignment with Goals of Care    Physical Aspects of Care  --Pain  patient denies at this time  c/w current managment    --Bowel Regimen  denies constipation  risk for constipation d/t immobility  daily dulcolax    --Dyspnea  No SOB at this time  comfortable and in NAD    --Nausea Vomiting  denies    --Weakness  PT as tolerated     Psychological and Psychiatric Aspects of Care:   --Greif/Bereavment: emotional support provided  --Hx of psychiatric dx: none  -Pastoral Care Available PRN     Social Aspects of Care  -SW involved     Cultural Aspects  -Primary Language: English    Goals of Care:     We discussed Palliative Care team being a supportive team when a patient has ongoing illnesses.  We also discussed that it is not an end of life care service, but can help navigate symptoms and emotional support througout their hospital stay here.      Ethical and Legal Aspects:   NA        Discussed With: Case coordinated with attending and SW and RN     Time Spent: 90 minutes including the care, coordination and counseling of this patient, excluding time spent on ACP.

## 2025-07-03 NOTE — OCCUPATIONAL THERAPY INITIAL EVALUATION ADULT - ADDITIONAL COMMENTS
As per EMR. Pt with recent functional/cog decline and is recently bedbound at [facility] and 2 person assist.

## 2025-07-03 NOTE — DIETITIAN INITIAL EVALUATION ADULT - PERTINENT MEDS FT
MEDICATIONS  (STANDING):  aspirin enteric coated 81 milliGRAM(s) Oral daily  atorvastatin 80 milliGRAM(s) Oral at bedtime  cefTRIAXone Injectable. 1000 milliGRAM(s) IV Push every 24 hours  escitalopram 5 milliGRAM(s) Oral daily  heparin   Injectable 5000 Unit(s) SubCutaneous every 12 hours  lacosamide IVPB 100 milliGRAM(s) IV Intermittent every 12 hours  lacosamide IVPB 200 milliGRAM(s) IV Intermittent once  levETIRAcetam   Injectable 500 milliGRAM(s) IV Push every 12 hours  LORazepam   Injectable 1 milliGRAM(s) IV Push once  sodium chloride 0.9%. 1000 milliLiter(s) (75 mL/Hr) IV Continuous <Continuous>  traZODone 25 milliGRAM(s) Oral at bedtime    MEDICATIONS  (PRN):  acetaminophen     Tablet .. 650 milliGRAM(s) Oral every 6 hours PRN Temp greater or equal to 38C (100.4F), Mild Pain (1 - 3)  aluminum hydroxide/magnesium hydroxide/simethicone Suspension 30 milliLiter(s) Oral every 4 hours PRN Dyspepsia  melatonin 3 milliGRAM(s) Oral at bedtime PRN Insomnia  ondansetron Injectable 4 milliGRAM(s) IV Push every 8 hours PRN Nausea and/or Vomiting

## 2025-07-03 NOTE — CONSULT NOTE ADULT - ASSESSMENT
A: 89-year-old seen for the followin. Right foot wounds  2. Difficulty with ambulation   3. Bilateral lower leg hematomas    P:   Chart reviewed and Patient evaluated;  Applied betadine with dry sterile dressing to right anterior leg blisters. Allevyn pads placed over right heel wounds and left heel.   Continue use of CAIR boots and offload bilateral heels while bedbound  Continue abx as per ID  All additional care per Med [facility]  Podiatry will follow while in house      Case D/W attending Dr. Zuñiga          A: 89-year-old was seen for the followin. Partial thickness to Right Lateral Malleolus and heel  2. Hematoma secondary to soft tissue contusion to Bilateral lower Extremity, stable   3. Difficulty with ambulation       P:   Chart reviewed and Patient evaluated;  Xray ordered: will follow results   Applied betadine with dry sterile dressing to right anterior leg lesions. Allevyn pads placed over right heel wounds and left heel.   Continue use of CAIR boots and offload bilateral heels while bedbound or alternatively place two pillows proximal to the calf  Abx as per ID  All additional care per Med [facility]  Podiatry will follow while in house      Case D/W attending Dr. Zuñiga          A: 89-year-old was seen for the followin. Partial thickness to Right Lateral Malleolus and heel  2. Hematoma secondary to soft tissue contusion to Bilateral lower Extremity, stable   3. Difficulty with ambulation       P:   Chart reviewed and Patient evaluated;  Xray reviewedd: on wet reading showing no soft tissue emphysema, noted Monckeberg arterial sclerosis and Radiolucent line in the Right tibial consistent with fracture. Official read is pending  Applied betadine with dry sterile dressing to right anterior leg lesions. Allevyn pads placed over right heel wounds and left heel.   Continue use of CAIR boots and offload bilateral heels while bedbound or alternatively place two pillows proximal to the calf  Abx as per ID  Orthopedic Recc for Right Tibial Fx is pending  All additional care per Med appreciated  Podiatry will follow while in house      Case D/W attending Dr. Zuñiga          A: 89-year-old was seen for the followin. Partial thickness to Right Lateral Malleolus and heel  2. Hematoma secondary to soft tissue contusion to Bilateral lower Extremity, stable       P:   Chart reviewed and Patient evaluated;  Xray reviewedd: on wet reading showing no soft tissue emphysema, noted Monckeberg arterial sclerosis and Radiolucent line in the Right tibial consistent with fracture. Official read is pending  Applied betadine with dry sterile dressing to right anterior leg lesions. Allevyn pads placed over right heel wounds and left heel.   Continue use of CAIR boots and offload bilateral heels while bedbound or alternatively place two pillows proximal to the calf  Abx as per ID  Orthopedic Recc for Right Tibial Fx is pending  All additional care per Med appreciated  Podiatry will follow while in house      Case D/W attending Dr. Zuñiga

## 2025-07-03 NOTE — PROVIDER CONTACT NOTE (OTHER) - ASSESSMENT
upon assessing the pt she began to have jerking movements and stared blankly without responding to commands

## 2025-07-03 NOTE — OCCUPATIONAL THERAPY INITIAL EVALUATION ADULT - PERTINENT HX OF CURRENT PROBLEM, REHAB EVAL
89 F with a history of aortic stenosis, dementia (baseline alert and oriented ×2–3), GERD, hyperlipidemia, and hypertension, was sent from her assisted living facility for evaluation following an episode of unresponsiveness. Upon arrival to the ED, she was noted to be non-verbal and unable to provide history. Collateral information was obtained from her daughter and health care proxy, Tammy Hayward ([phone]). Per report, the patient has experienced a gradual decline in health over the past year. Over recent months, she has become bedbound with progressive cognitive decline. Previously, at her baseline, she was able to communicate, recognize family members, and independently eat and drink. Today, staff at the facility witnessed seizure-like activity, prompting EMS activation and transfer to the hospital. No recent illness, trauma, or acute changes in medication were reported at the time of the event.

## 2025-07-03 NOTE — DIETITIAN INITIAL EVALUATION ADULT - DIET TYPE
Suggest SLP consult to confirm consistency of diet for safest/least restrictive consistency diet/supplement (specify)

## 2025-07-03 NOTE — OCCUPATIONAL THERAPY INITIAL EVALUATION ADULT - LEVEL OF INDEPENDENCE: SUPINE/SIT, REHAB EVAL
NT- unable to assess pt functional status at this time. Limited due to pt mentation and level of arousal/alertness. To f/up in future tx sessions to decide if pt is appropriate for skilled OT services to make future progress to d/c location

## 2025-07-03 NOTE — DIETITIAN INITIAL EVALUATION ADULT - PERTINENT LABORATORY DATA
07-03    140  |  112[H]  |  20  ----------------------------<  95  3.8   |  23  |  0.78    Ca    8.8      03 Jul 2025 06:57  Phos  2.7     07-03  Mg     1.8     07-03    TPro  5.9[L]  /  Alb  2.3[L]  /  TBili  0.2  /  DBili  x   /  AST  18  /  ALT  17  /  AlkPhos  102  07-03  POCT Blood Glucose.: 158 mg/dL (07-02-25 @ 22:26)  A1C with Estimated Average Glucose Result: 5.8 % (03-10-25 @ 03:09)  A1C with Estimated Average Glucose Result: 5.4 % (07-29-24 @ 06:34)

## 2025-07-03 NOTE — DISCHARGE NOTE NURSING/CASE MANAGEMENT/SOCIAL WORK - NSDCPEFALRISK_GEN_ALL_CORE
For information on Fall & Injury Prevention, visit: [email or web address] OR  [email or web address] OR  https://www.cdc.gov/steadi/patient.html

## 2025-07-03 NOTE — DISCHARGE NOTE NURSING/CASE MANAGEMENT/SOCIAL WORK - PATIENT PORTAL LINK FT
You can access the FollowMyHealth Patient Portal offered by [facility] by registering at the following website: [email or web address]. By joining [facility]’s FollowMyHealth portal, you will also be able to view your health information using other applications (apps) compatible with our system.

## 2025-07-03 NOTE — DISCHARGE NOTE NURSING/CASE MANAGEMENT/SOCIAL WORK - NSDCVIVACCINE_GEN_ALL_CORE_FT
Td (adult) preservative free; 28-Sep-2019 16:56; Klever Davis (RN); [employer]; [id number] (Exp. Date: 04-Jul-2021); IntraMuscular; Deltoid Left.; 0.5 milliLiter(s); VIS (VIS Published: 28-Sep-2019, VIS Presented: 28-Sep-2019);   Tdap; 19-Jan-2025 09:19; Steffanie Campos (TIFFANIE); [employer]; [id number] (Exp. Date: 01-Aug-2026); IntraMuscular; Deltoid Left.; 0.5 milliLiter(s); VIS (VIS Published: 09-May-2013, VIS Presented: 19-Jan-2025);

## 2025-07-03 NOTE — DIETITIAN NUTRITION RISK NOTIFICATION - ADDITIONAL COMMENTS/DIETITIAN RECOMMENDATIONS
1) ADAT per MD  ** Suggest SLP consult to confirm consistency of diet for safest/least restrictive consistency diet**   2) Maintain aspiration precautions, back of bed >35 degrees.   3) Add ensure + HP shake BID (350kcal, 20g protein) and Royce BID when medically feasible/ if mentation improves   4) Monitor bowel movements, if no BM for >3 days, consider implementing bowel regimen.  5) Encourage protein-rich foods, maximize food preferences when medically feasible  6) Monitor lytes/ min and replete prn.  7) MVI w/ minerals daily to ensure 100% RDA met  8) Consider adding thiamine 100 mg daily 2/2 poor PO intake/ malnutrition  9) GOC regarding nutrition support confirmed is DNR/DNI w/ NFT  RD will continue to monitor PO intake, labs, hydration, and wt prn. Nutrition recommendations to continue upon discharge. Goal to continue to meet >/= 80% ENN via tolerated route. RD to F/U prn for changes to nutrition dc plan. 1) ADAT per MD  ** Suggest SLP consult to confirm consistency of diet for safest/least restrictive consistency diet**   2) Maintain aspiration precautions, back of bed >35 degrees.   3) Add ensure + HP shake BID (350kcal, 20g protein) and Royce BID when medically feasible/ if mentation improves   4) Monitor bowel movements, if no BM for >3 days, consider implementing bowel regimen.  5) Encourage protein-rich foods, maximize food preferences when medically feasible  6) Monitor lytes/ min and replete prn.  7) Recommend to add MVI w/minerals, Vit C 500 mg BID, add Zinc Sulfate 220 mg x 10 days to promote wound healing.  8) Consider adding thiamine 100 mg daily 2/2 poor PO intake/ malnutrition  9) GOC regarding nutrition support confirmed is DNR/DNI w/ NFT  RD will continue to monitor PO intake, labs, hydration, and wt prn. Nutrition recommendations to continue upon discharge. Goal to continue to meet >/= 80% ENN via tolerated route. RD to F/U prn for changes to nutrition dc plan.

## 2025-07-03 NOTE — OCCUPATIONAL THERAPY INITIAL EVALUATION ADULT - GENERAL OBSERVATIONS, REHAB EVAL
Pt rec'd semi-supine in bed with staff setting up 24hr EEG, tele, O2 3L/min via NC, pulse ox. Pt eye closed, lethargic, positioned toward left side, unable to arouse at this time, not appropriate for OOB assessment. OT to follow in further treatment sessions to assess if pt is good candidate for OT services. Bed alarm, NAD, VSS. With with RR last night.

## 2025-07-03 NOTE — PROVIDER CONTACT NOTE (CHANGE IN STATUS NOTIFICATION) - ASSESSMENT
pt awoke from her sleep and began to bite her hand and created a skin tear on her L hand soon after she began making jerking movements and was staring into space, not responding to verbal cues

## 2025-07-03 NOTE — CONSULT NOTE ADULT - ASSESSMENT
89 F with a history of aortic stenosis, dementia (baseline alert and oriented ×2–3), GERD, hyperlipidemia, and hypertension, was sent from her assisted living facility on 7/2 for evaluation following an episode of unresponsiveness.  She had an episode of staring, biting her hand and shaking around 3 AM and received 2 mg of lorazepam.    History of seizures, seizure like activity:  -Patient was previously on both levetiracetam and lacosamide.  -Lacosamide was weaned due to drowsiness  -Also now with UTI  -Lacosamide may have been more effective. We can transition to lacosamide monotherapy.  -EEG today  -Will continue levetiracetam 500 mg q12  -Add Lacosamide 100 mg q12 today and will gradually wean Levetiracetam    Discussed with patient's daughter.

## 2025-07-03 NOTE — EEG REPORT - NS EEG TEXT BOX
[facility]  REPORT OF ROUTINE EEG WITH VIDEO    [address] [id number]    Patient Name: JON RASHID    Age: 89 year, : 1926  MRN #: [id number]  Referring Physician: ADAMARIS ALEXIS #: [id number]    Study Date: 7/3/2025   Start Time: 09:38  End Time: 10:41    Study Information:    EEG Recording Technique:  The patient underwent routine EEG using Telemetry System hardware on the XLTek Digital System.  EEG data was stored on a computer hard drive.     EEG Placement and Labeling of Electrodes:  The EEG was performed utilizing 16  channel referential EEG connections (coronal over temporal over parasagittal montage) using all standard 10-20 electrode placements with EKG.  Recording was at a sampling rate of 256 samples per second per channel.    History:  89 year old woman with seizures.    Medication  TRAZODONE    ESCITALOPRAM    ATORVASTATIN    LORAZEPAM    LEVETIRACETAM    CEFTRIAXONE      Interpretation:        FINDINGS:  The background was continuous, spontaneously variable and reactive.  There is no waking record. The background consists of mostly theta activity with frequencies of up to 7 Hz seen posteriorly.      Background Slowing:  Mild to moderate generalized background slowing was present.    Focal Slowing:   Persistent right hemisphere focal slowing.    Sleep Background:  Normal sleep features are not seen.    Interictal Epileptiform Activity:   -Nearly continuous right hemisphere (broad distribution) periodic sharp waves seen with a frequency of 0.5-1 Hz  -At times, there are bilateral periodic sharp waves, with a right hemisphere predominance.    Activation Procedures:   Hyperventilation was not performed.  Photic stimulation was performed and did not induce any abnormalities.     Artifacts:  Intermittent myogenic and movement artifacts were noted.    ECG:   The single channel ECG recording did not show any significant arrhythmias.      EEG Summary/Classification:  This was an abnormal EEG due to:  -Lack of waking record or normal sleep features  -Mild to moderate generalized slowing  -Right hemisphere slowing  -Periodic sharp waves seen primarily over the right hemisphere (broadly) at times bilaterally    EEG Clinical Correlate/Impression:  The findings are suggestive of:  -Encephalopathy  -Focal cerebral dysfunction, right hemisphere  -Risk for seizures, most significantly from the right hemisphere. A left hemisphere focus cannot be excluded.    Consider a repeat study if clinically indicated.       ________________________________________  Maggie Douglas MD  Attending Physician  Director of Epilepsy at [facility]

## 2025-07-03 NOTE — DIETITIAN INITIAL EVALUATION ADULT - OTHER INFO
89 F with a history of aortic stenosis, dementia (baseline alert and oriented ×2–3), GERD, hyperlipidemia, and hypertension, was sent from her assisted living facility for evaluation following an episode of unresponsiveness. Upon arrival to the ED, she was noted to be non-verbal and unable to provide history. Collateral information was obtained from her daughter and health care proxy, Tammy Hayward ([phone]). Per report, the patient has experienced a gradual decline in health over the past year. Over recent months, she has become bedbound with progressive cognitive decline. Previously, at her baseline, she was able to communicate, recognize family members, and independently eat and drink. Today, staff at the facility witnessed seizure-like activity, prompting EMS activation and transfer to the hospital. GOC confirmed regarding nutrition support: is DNR/DNI w/ NFT  Admit for AMS 2/2 stroke vs seizure vs encephalopathy 2/2 UTI    Known to nutr services and dx'd w/ mal on multiple admissions; still meets criteria. RR called on 7/2 - for seizure like activity -> remained on unit, with seizure precautions continued. S/p EEG (results pending) on 7/3 and MRI brain pending. Unable to obtain diet/wt hx 2/2 AMS/obtunded. Bed scale wt of 116# taken by RD on 7/3/25. Wt hx as per EMR: 83# (bed scale wt taken by dietetic intern on 3/15/25); 97# (bed scale wt taken by RD on 2/19/25); 93# (bed scale wt taken by RD on 1/20/25); 110# (as per EMR on 7/29/25). Desirable wt gain of 33# x 4 mo noted. NFPE reveals mild-mod muscle/fat wasting - unable to assess legs 2/2 pressure cuffs. ADAT per MD. ** Suggest SLP consult to confirm consistency of diet for safest/least restrictive consistency diet..** Maintain aspiration precautions, back of bed >35 degrees. Add ensure + HP shake BID (350kcal, 20g protein) and Royce BID if mentation improves (provides 90 kcal, 2.5 g collagen, 7 g L-Arginine, 7 g L-Glutamine/ 1 packet) to promote wound healing. Royce is intended to support tissue building and collagen formation in the dietary management of wounds, which has been clinically shown to support wound healing (including pressure injuries, diabetic foot ulcers surgical incisions, burns, and other acute/chronic wounds) by enhancing collagen formation in as little as 2 weeks.

## 2025-07-03 NOTE — CONSULT NOTE ADULT - SUBJECTIVE AND OBJECTIVE BOX
Patient is a 89y old  Female who presents with a chief complaint of seizures    HPI:  89 F with a history of aortic stenosis, dementia (baseline alert and oriented ×2–3), GERD, hyperlipidemia, and hypertension, was sent from her assisted living facility on 7/2 for evaluation following an episode of unresponsiveness. Upon arrival to the ED, she was noted to be non-verbal and unable to provide history. Collateral information was obtained from her daughter and health care proxy, Tammy Hayward ([phone]). Per report, the patient has experienced a gradual decline in health over the past year. Over recent months, she has become bedbound with progressive cognitive decline. Previously, at her baseline, she was able to communicate, recognize family members, and independently eat and drink. On 7/2, staff at the facility witnessed seizure-like activity, prompting EMS activation and transfer to the hospital. No recent illness, trauma, or acute changes in medication were reported at the time of the event. 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 She has previously had EEGs showing generalized slowing, temporal slowing (bilaterally), focal epileptiform discharges seen on both hemispheres.  Of note, during the last inpatient neurology evaluation, she was on both levetiracetam and lacosamide.    PAST MEDICAL & SURGICAL HISTORY:  HTN (hypertension)      Seizure disorder          FAMILY HISTORY:      Social Hx:  Nonsmoker, no drug or alcohol use    MEDICATIONS  (STANDING):  aspirin enteric coated 81 milliGRAM(s) Oral daily  atorvastatin 80 milliGRAM(s) Oral at bedtime  cefTRIAXone Injectable. 1000 milliGRAM(s) IV Push every 24 hours  escitalopram 5 milliGRAM(s) Oral daily  heparin   Injectable 5000 Unit(s) SubCutaneous every 12 hours  levETIRAcetam   Injectable 500 milliGRAM(s) IV Push every 12 hours  LORazepam   Injectable 1 milliGRAM(s) IV Push once  sodium chloride 0.9%. 1000 milliLiter(s) (75 mL/Hr) IV Continuous <Continuous>  traZODone 25 milliGRAM(s) Oral at bedtime       Allergies    No Known Allergies    Intolerances        ROS: Pertinent positives in HPI, all other ROS were reviewed and are negative.      Vital Signs Last 24 Hrs  T(C): 37 (02 Jul 2025 22:07), Max: 37 (02 Jul 2025 22:07)  T(F): 98.6 (02 Jul 2025 22:07), Max: 98.6 (02 Jul 2025 22:07)  HR: 114 (02 Jul 2025 22:07) (68 - 114)  BP: 144/89 (02 Jul 2025 22:07) (144/89 - 165/77)  BP(mean): 100 (02 Jul 2025 20:27) (99 - 100)  RR: 18 (02 Jul 2025 22:07) (15 - 20)  SpO2: 100% (02 Jul 2025 22:07) (97% - 100%)    Parameters below as of 02 Jul 2025 22:07  Patient On (Oxygen Delivery Method): nasal cannula  O2 Flow (L/min): 2          Constitutional: awake and alert.  HEENT: PERRLA, EOMI,   Neck: Supple.  Respiratory: Breath sounds are clear bilaterally  Cardiovascular: S1 and S2, regular / irregular rhythm  Gastrointestinal: soft, nontender  Extremities:  no edema  Vascular: Caritid Bruit - no  Musculoskeletal: no joint swelling/tenderness, no abnormal movements  Skin: No rashes    Neurological exam:  HF: A x O x 3. Appropriately interactive, normal affect. Speech fluent, No Aphasia or paraphasic errors. Naming /repetition intact   CN: JUDE, EOMI, VFF, facial sensation normal, no NLFD, tongue midline, Palate moves equally, SCM equal bilaterally  Motor: No pronator drift, Strength 5/5 in all 4 ext, normal bulk and tone, no tremor, rigidity or bradykinesia.    Sens: Intact to light touch / PP/ VS/ JS    Reflexes: Symmetric and normal . BJ 2+, BR 2+, KJ 2+, AJ 2+, downgoing toes b/l  Coord:  No FNFA, dysmetria, EMELYN intact   Gait/Balance: Normal/Cannot test    NIHSS:          Labs:   07-03    140  |  112[H]  |  20  ----------------------------<  95  3.8   |  23  |  0.78    Ca    8.8      03 Jul 2025 06:57  Phos  2.7     07-03  Mg     1.8     07-03    TPro  5.9[L]  /  Alb  2.3[L]  /  TBili  0.2  /  DBili  x   /  AST  18  /  ALT  17  /  AlkPhos  102  07-03                              8.7    9.07  )-----------( 305      ( 03 Jul 2025 06:57 )             27.3       Radiology:  CT head 7/2/25:  1. Left temporal lobe remote infarction  2. Ischemic white matter disease and atrophy typical for age  3. No definite adverse interval change March 2025   Patient is a 89y old  Female who presents with a chief complaint of seizures    HPI:  89 F with a history of aortic stenosis, dementia (baseline alert and oriented ×2–3), GERD, hyperlipidemia, and hypertension, was sent from her assisted living facility on 7/2 for evaluation following an episode of unresponsiveness. Upon arrival to the ED, she was noted to be non-verbal and unable to provide history. Collateral information was obtained from her daughter and health care proxy, Tammy Hayward ([phone]). Per report, the patient has experienced a gradual decline in health over the past year. Over recent months, she has become bedbound with progressive cognitive decline. Previously, at her baseline, she was able to communicate, recognize family members, and independently eat and drink. On 7/2, staff at the facility witnessed seizure-like activity, prompting EMS activation and transfer to the hospital. No recent illness, trauma, or acute changes in medication were reported at the time of the event. 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 She has previously had EEGs showing generalized slowing, temporal slowing (bilaterally), focal epileptiform discharges seen on both hemispheres.  Of note, during the last inpatient neurology evaluation, she was on both levetiracetam and lacosamide.  I spoke to her daughter, who believes that lacosamide was discontinued since the last admission because of drowsiness.   At baseline she can feed herself. Her daughter says that her level of conversation depends on the week. Sometimes conversation is "lucid" and sometimes it is minimal.  Her daughter says that over the past week she has noted some shaking of her right arm and she seems angry. Ms. Diaz was also aware of shaking of the right arm. It would stop when her daughter touched the arm.    She had an episode of staring, biting her hand and shaking around 3 AM and received 2 mg of lorazepam.    Her daughter says that she has been started on Xanax which she thinks makes her "more aggressive" and Trazodone which has not helped with sleep.    She received lorazepam 1 mg at 22:00 and 2 mg at 3 AM    PAST MEDICAL & SURGICAL HISTORY:  HTN (hypertension)    Seizure disorder          FAMILY HISTORY:      Social Hx:  Nonsmoker, no drug or alcohol use    MEDICATIONS  (STANDING):  aspirin enteric coated 81 milliGRAM(s) Oral daily  atorvastatin 80 milliGRAM(s) Oral at bedtime  cefTRIAXone Injectable. 1000 milliGRAM(s) IV Push every 24 hours  escitalopram 5 milliGRAM(s) Oral daily  heparin   Injectable 5000 Unit(s) SubCutaneous every 12 hours  levETIRAcetam   Injectable 500 milliGRAM(s) IV Push every 12 hours  LORazepam   Injectable 1 milliGRAM(s) IV Push once  sodium chloride 0.9%. 1000 milliLiter(s) (75 mL/Hr) IV Continuous <Continuous>  traZODone 25 milliGRAM(s) Oral at bedtime       Allergies    No Known Allergies    Intolerances        ROS: Pertinent positives in HPI, all other ROS were reviewed and are negative.      Vital Signs Last 24 Hrs  T(C): 37 (02 Jul 2025 22:07), Max: 37 (02 Jul 2025 22:07)  T(F): 98.6 (02 Jul 2025 22:07), Max: 98.6 (02 Jul 2025 22:07)  HR: 114 (02 Jul 2025 22:07) (68 - 114)  BP: 144/89 (02 Jul 2025 22:07) (144/89 - 165/77)  BP(mean): 100 (02 Jul 2025 20:27) (99 - 100)  RR: 18 (02 Jul 2025 22:07) (15 - 20)  SpO2: 100% (02 Jul 2025 22:07) (97% - 100%)    Parameters below as of 02 Jul 2025 22:07  Patient On (Oxygen Delivery Method): nasal cannula  O2 Flow (L/min): 2          Constitutional: obtunded    Neck: Supple.  Respiratory: Breath sounds are clear bilaterally  Cardiovascular: S1 and S2, regular / irregular rhythm  Gastrointestinal: soft, nontender  Extremities:  no edema  Musculoskeletal: no joint swelling/tenderness, no abnormal movements  Skin: No rashes    Neurological exam:  Difficult to arouse  Responds to pain  CN: Eyes deviated downward, pupils 2 mg and sluggishly reactive  Motor: minimal spontaneous movements in upper extremities  Sensory: withdraws upper extremities to pain  Reflexes: hypoactive throughout, plantars equivocal  Coordination: unable to test  Gait: unable to test          Labs:   07-03    140  |  112[H]  |  20  ----------------------------<  95  3.8   |  23  |  0.78    Ca    8.8      03 Jul 2025 06:57  Phos  2.7     07-03  Mg     1.8     07-03    TPro  5.9[L]  /  Alb  2.3[L]  /  TBili  0.2  /  DBili  x   /  AST  18  /  ALT  17  /  AlkPhos  102  07-03                              8.7    9.07  )-----------( 305      ( 03 Jul 2025 06:57 )             27.3       Radiology:  CT head 7/2/25:  1. Left temporal lobe remote infarction  2. Ischemic white matter disease and atrophy typical for age  3. No definite adverse interval change March 2025

## 2025-07-03 NOTE — DIETITIAN INITIAL EVALUATION ADULT - NSFNSGIIOFT_GEN_A_CORE
HPI:    Patient ID: Jeanette Frausto is a 84 year old female.    HPI    Patient returns to the office today to discuss chronic medical issues as listed on the active problem list below.  Patient last seen in the office by me on Aug 7 for AWV.  Blood pressure was borderline at that time at 144/78.  During the last visit, the following changes were made: none   Since the last visit, the patient has seen the following doctors: Urogyne for testing. Rx with estradiol cream, trospium XR, pelvic floor therapy.  Also had needle biopsy of breast lesion on mammogram; all benign.    Today, the patient offers the following complaints: no major issues. Patient does check blood pressure at home. It has been running around 125-135/--.  Patient describes diet as pretty good.   For exercise, the patient is not that active. She is on her feet for 35-40 hours a week.   Tobacco and alcohol use reviewed.   Current medications reviewed.   Health maintenance issues reviewed.    Wt Readings from Last 6 Encounters:   11/11/24 148 lb (67.1 kg)   09/18/24 148 lb (67.1 kg)   08/07/24 148 lb (67.1 kg)   02/07/24 150 lb (68 kg)   08/28/23 148 lb (67.1 kg)   08/23/23 148 lb (67.1 kg)       Patient Active Problem List   Diagnosis    Presbyopia    Urinary incontinence    Essential hypertension    Osteopenia    Facet syndrome, lumbar    Primary osteoarthritis of right hip    Right groin pain    Vitreous floaters of both eyes    Orthopedic aftercare    Pseudophakia of both eyes        HISTORY:  Past Medical History:    Anxiety state    Cataract    Fibroids    High blood pressure    Incontinence    bladder    Osteoarthritis    PONV (postoperative nausea and vomiting)    Trigger index finger of right hand    cortisone injection, phys therapy    UTI (urinary tract infection)    medication    Visual impairment    readers      Past Surgical History:   Procedure Laterality Date    Appendectomy  01/01/1994    Cataract extraction extracapsular w/ intraocular  lens implantation Left 09/05/2023    done by Dr. Carrizales    Cataract extraction w/  intraocular lens implant Right 08/29/2023    Dr. Carrizales @ [facility]    Hip replacement surgery Right 2022    Hysterectomy  01/01/1994    [facility]    Needle biopsy right Right 10/14/2024     bx    Oophorectomy  01/01/1994    [facility]O      Family History   Problem Relation Age of Onset    Diabetes Father     Heart Disorder Father     Breast Cancer Paternal Cousin Female 47    Heart Disorder Mother     Hypertension Mother     Glaucoma Neg     Ovarian Cancer Neg     Macular degeneration Neg       Social History     Socioeconomic History    Marital status: Single   Tobacco Use    Smoking status: Never    Smokeless tobacco: Never   Vaping Use    Vaping status: Never Used   Substance and Sexual Activity    Alcohol use: No    Drug use: No    Sexual activity: Not Currently     Partners: Male   Other Topics Concern    Caffeine Concern Yes     Comment: coffee, 2 cups/day    Right Handed Yes          Review of Systems          Current Outpatient Medications   Medication Sig Dispense Refill    Trospium Chloride ER 60 MG Oral Capsule SR 24 Hr Take 1 capsule (60 mg total) by mouth daily. 90 capsule 3    estradiol (ESTRACE) 0.1 MG/GM Vaginal Cream Apply 0.5 gram vaginally 2 times per week. 42.5 g 3    triamcinolone 0.1 % External Cream Apply topically 2 (two) times daily as needed. 45 g 3    Betamethasone Dipropionate Aug 0.05 % External Ointment APPLY TO AFFECTED AREA TWICE A DAY IF NEEDED (Patient not taking: Reported on 9/18/2024) 45 g 1    Calcium Carbonate Antacid 600 MG Oral Chew Tab Chew 600 mg by mouth 2 (two) times daily.      Cholecalciferol (VITAMIN D3) 400 units Oral Tab Take by mouth.      loratadine 10 MG Oral Tab Take 1 tablet (10 mg total) by mouth daily as needed for Allergies. (Patient not taking: Reported on 9/18/2024)       Allergies:Allergies[1]     PHYSICAL EXAM:   There were no vitals taken for this visit.     Physical Exam  Constitutional:        Appearance: Normal appearance.   Cardiovascular:      Rate and Rhythm: Normal rate and regular rhythm.      Pulses: Normal pulses.      Heart sounds: Normal heart sounds.   Pulmonary:      Effort: Pulmonary effort is normal.      Breath sounds: Normal breath sounds.   Musculoskeletal:      Right lower leg: No edema.      Left lower leg: No edema.   Neurological:      Mental Status: She is alert.   Psychiatric:         Mood and Affect: Mood normal.                 ASSESSMENT/PLAN:   1. Essential hypertension  Blood pressure is well-controlled at home.  Well-controlled when checked by nurse today.  Little higher when checked by physician.  We will not start any medications at this time.  Keep working on diet and exercise.  Manage stress levels particular related to work.  Follow-up in 4 months or so for follow-up.  She will be due for Medicare annual in August of next year.    2. Urinary incontinence, unspecified type  Recently started trospium.  Also is going to start pelvic floor therapy.  Continue follow-up with urogynecology.         Meds This Visit:  Requested Prescriptions      No prescriptions requested or ordered in this encounter       Imaging & Referrals:  None         Jeffrey Quiroz MD        [1]   Allergies  Allergen Reactions    Sulfamethoxazole SWELLING     Other reaction(s): lip and tongue swelling    Trimethoprim SWELLING     Other reaction(s): lip and tongue swelling    Erythromycin OTHER (SEE COMMENTS)     Other reaction(s): Shakiness    Erythromycin Base [Erythromycin Stearate] OTHER (SEE COMMENTS)    Other OTHER (SEE COMMENTS)    Ciprofloxacin RASH      I&O's Detail  *none doc'd

## 2025-07-03 NOTE — DIETITIAN INITIAL EVALUATION ADULT - ETIOLOGY
r/t decreased ability to meet increased needs 2/2 stroke vs seizure vs encephalopathy 2/2 UTI on dementia, adv age

## 2025-07-03 NOTE — DISCHARGE NOTE NURSING/CASE MANAGEMENT/SOCIAL WORK - FINANCIAL ASSISTANCE
[facility] provides services at a reduced cost to those who are determined to be eligible through [facility]’s financial assistance program. Information regarding [facility]’s financial assistance program can be found by going to [email or web address] or by calling [phone].

## 2025-07-03 NOTE — SWALLOW BEDSIDE ASSESSMENT ADULT - SWALLOW EVAL: PROGNOSIS
2) Pt seen this AM and this afternoon. On both occasions, pt was obtunded. Unable to rouse pt. No purposeful oral motor movements could be elicited, no oral awareness to tactile stimuli identified, gag absent, & triggering of reflexive pharyngeal swallows was highly infrequent. Pt's exhibits inadequate alertness for/orientation to feeding atop non functional Oropharyngeal Dysphagia which precludes PO candidacy at this time. Not a candidate for nutritive swallow testing. Pt's obtundation/reduced alertness/non functional Oropharyngeal Dysphagia are in setting of encephalopathy from suspected seizure activity/UTI, advanced Dementia and prior non acute CVA.

## 2025-07-03 NOTE — OCCUPATIONAL THERAPY INITIAL EVALUATION ADULT - ADL RETRAINING, OT EVAL
Pt will improve OOB tolerance for up to 1hr by d/c. Pt will improve oral care to mod A by d/c. pt will improve hand washing to mod A by d/c. Pt will improve upper body dressing to max A by d/c

## 2025-07-03 NOTE — CONSULT NOTE ADULT - SUBJECTIVE AND OBJECTIVE BOX
Date of Consult: 7/3/2025    HPI:   89 F with a history of aortic stenosis, dementia (baseline alert and oriented ×2–3), GERD, hyperlipidemia, and hypertension, was sent from her assisted living facility for evaluation following an episode of unresponsiveness. Upon arrival to the ED, she was noted to be non-verbal and unable to provide history. Collateral information was obtained from her daughter and health care proxy, Tammy Hayward ([phone]). Per report, the patient has experienced a gradual decline in health over the past year. Over recent months, she has become bedbound with progressive cognitive decline. Previously, at her baseline, she was able to communicate, recognize family members, and independently eat and drink. Today, staff at the facility witnessed seizure-like activity, prompting EMS activation and transfer to the hospital. No recent illness, trauma, or acute changes in medication were reported at the time of the event. (02 Jul 2025 21:04)    Podiatry was consulted for bilateral leg and foot wounds. Patient was unresponsive and unable to hold a discussion.       PMH: HTN (hypertension)  Seizure disorder    PSH:    Allergies:No Known Allergies      Labs:                          8.7    9.07  )-----------( 305      ( 03 Jul 2025 06:57 )             27.3     WBC Trend  9.07 Date (07-03 @ 06:57)  9.69 Date (07-02 @ 16:56)      Chem  07-03    140  |  112[H]  |  20  ----------------------------<  95  3.8   |  23  |  0.78    Ca    8.8      03 Jul 2025 06:57  Phos  2.7     07-03  Mg     1.8     07-03    TPro  5.9[L]  /  Alb  2.3[L]  /  TBili  0.2  /  DBili  x   /  AST  18  /  ALT  17  /  AlkPhos  102  07-03      T(F): 98.6 (07-02-25 @ 22:07), Max: 98.6 (07-02-25 @ 22:07)  HR: 114 (07-02-25 @ 22:07) (68 - 114)  BP: 144/89 (07-02-25 @ 22:07) (144/89 - 165/77)  RR: 18 (07-02-25 @ 22:07) (15 - 20)  SpO2: 100% (07-02-25 @ 22:07) (97% - 100%)  Wt(kg): --    REVIEW OF SYSTEMS:  All other review of systems is negative unless indicated above    Physical Exam:   Constitutional: NAD, alert;  Lower Extremity Focus  Derm:  Skin warm, dry and supple bilateral.    Right: Two dark red fluid-filled hematomas on the anterior aspect of the leg, +fluctuance; lateral ankle wound (approx.2cm X 2 cm) with a fibrogranular wound bed, no malodor, no drainage, no erythema, no clinical signs of SOI  ; heel wound (approx. 0.4 cm X 0.2 cm) with slough, no malodor, no erythema, no fluctuance, no clinical signs of SOI    Left: Two dark hematomas on anterior aspect of leg, no fluctuance, + surrounding erythema; no open lesions  Vascular: Dorsalis Pedis and Posterior Tibial pulses 1/4.  Capillary re-fill time less then 3 seconds digits 1-5 bilateral.    Neuro: Unable to assess due to patient disposition  MSK: Unable to assess due to patient disposition       Date of Consult: 7/3/2025    HPI:   89 F with a history of aortic stenosis, dementia (baseline alert and oriented ×2–3), GERD, hyperlipidemia, and hypertension, was sent from her assisted living facility for evaluation following an episode of unresponsiveness. Upon arrival to the ED, she was noted to be non-verbal and unable to provide history. Collateral information was obtained from her daughter and health care proxy, Tammy Hayward ([phone]). Per report, the patient has experienced a gradual decline in health over the past year. Over recent months, she has become bedbound with progressive cognitive decline. Previously, at her baseline, she was able to communicate, recognize family members, and independently eat and drink. Today, staff at the facility witnessed seizure-like activity, prompting EMS activation and transfer to the hospital. No recent illness, trauma, or acute changes in medication were reported at the time of the event. (02 Jul 2025 21:04)  Podiatry was consulted for bilateral leg and foot wounds. Patient was unresponsive and couldn't contribute to HPI      PMH: HTN (hypertension)  Seizure disorder    PSH:    Allergies:  No Known Allergies      Labs:                          8.7    9.07  )-----------( 305      ( 03 Jul 2025 06:57 )             27.3     WBC Trend  9.07 Date (07-03 @ 06:57)  9.69 Date (07-02 @ 16:56)      Chem  07-03    140  |  112[H]  |  20  ----------------------------<  95  3.8   |  23  |  0.78    Ca    8.8      03 Jul 2025 06:57  Phos  2.7     07-03  Mg     1.8     07-03    TPro  5.9[L]  /  Alb  2.3[L]  /  TBili  0.2  /  DBili  x   /  AST  18  /  ALT  17  /  AlkPhos  102  07-03      T(F): 98.6 (07-02-25 @ 22:07), Max: 98.6 (07-02-25 @ 22:07)  HR: 114 (07-02-25 @ 22:07) (68 - 114)  BP: 144/89 (07-02-25 @ 22:07) (144/89 - 165/77)  RR: 18 (07-02-25 @ 22:07) (15 - 20)  SpO2: 100% (07-02-25 @ 22:07) (97% - 100%)  Wt(kg): --    REVIEW OF SYSTEMS:  All other review of systems is negative unless indicated above    Physical Exam:   Constitutional: NAD, alert;  Lower Extremity Focus  Derm:  Skin warm, dry and supple bilateral.    Right:  Bilateral Two bluish  raised lesions adjacent to each other on the anterior aspect of the upper leg below the knee, Open lesion to lateral malleolus,  wound is approx (2cm X 2 cm) with a fibrogranular wound bed, no malodor, no drainage, no erythema, no clinical signsof infection. Open lesion to the heel,  wound approx. (0.4 cm X 0.2 cm) with slough, no malodor, no erythema, no fluctuance, no clinical signs infection. Noted dry scaly skins on bilateral heels.  Vascular: Dorsalis Pedis and Posterior Tibial pulses weakly palpable.     Neuro: Couldn't be assessed due to patient disposition  MSK: Could not be assessed due to patient disposition

## 2025-07-03 NOTE — CHART NOTE - NSCHARTNOTEFT_GEN_A_CORE
Patient noted by nurse to have seizure like activity with rhythmic movements, staring and biting herself.   Ativan 2mg IV given as ordered and seizure like activity resolved.    Continuous Pulse ox placed on patient.   Patient is pending a neurology consult and EEG.

## 2025-07-03 NOTE — CONSULT NOTE ADULT - SUBJECTIVE AND OBJECTIVE BOX
89 F with a history of aortic stenosis, dementia (baseline alert and oriented ×2–3), GERD, hyperlipidemia, and hypertension, was sent from her assisted living facility on 7/2 for evaluation following an episode of unresponsiveness. Upon arrival to the ED, she was noted to be non-verbal and unable to provide history. Collateral information was obtained from her daughter and health care proxy, Tammy Hayward ([phone]). Per report, the patient has experienced a gradual decline in health over the past year. Over recent months, she has become bedbound with progressive cognitive decline. Previously, at her baseline, she was able to communicate, recognize family members, and independently eat and drink. On 7/2, staff at the facility witnessed seizure-like activity, prompting EMS activation and transfer to the hospital. No recent illness, trauma, or acute changes in medication were reported at the time of the event. Pt admitted for UTI. Course complicated by RRT for seizure like activity, neurology consulted. Palliative medicine is consulted for assistance with GOC.     Examined pt at bedside, pt lethargic, unable to participate in discussion. Podiatry team at bedside addressing lower extremity wounds.   Chart reviewed.      PAST MEDICAL & SURGICAL HISTORY:  HTN (hypertension)      Seizure disorder          SOCIAL HX:    Hx opiate tolerance ( )YES  ( x)NO    Baseline ADLs  (Prior to Admission)  ( ) Independent   (x )Dependent    FAMILY HISTORY:      Review of Systems:  Unable to obtain/Limited due to: current conditions       PHYSICAL EXAM:    Vital Signs Last 24 Hrs  T(C): 37 (02 Jul 2025 22:07), Max: 37 (02 Jul 2025 22:07)  T(F): 98.6 (02 Jul 2025 22:07), Max: 98.6 (02 Jul 2025 22:07)  HR: 114 (02 Jul 2025 22:07) (68 - 114)  BP: 144/89 (02 Jul 2025 22:07) (144/89 - 165/77)  BP(mean): 100 (02 Jul 2025 20:27) (99 - 100)  RR: 18 (02 Jul 2025 22:07) (15 - 20)  SpO2: 100% (02 Jul 2025 22:07) (97% - 100%)    Parameters below as of 02 Jul 2025 22:07  Patient On (Oxygen Delivery Method): nasal cannula  O2 Flow (L/min): 2    Daily Height in cm: 162.56 (02 Jul 2025 16:11)    Daily     PPSV2: 50  %  FAST:    General: elderly female, lethargic, NAD   Lungs: normal resp effort   Cardiac: tachycardic   GI: soft, nontender   Ext: b/l wounds lower extremities         LABS:                        8.7    9.07  )-----------( 305      ( 03 Jul 2025 06:57 )             27.3     07-03    140  |  112[H]  |  20  ----------------------------<  95  3.8   |  23  |  0.78    Ca    8.8      03 Jul 2025 06:57  Phos  2.7     07-03  Mg     1.8     07-03    TPro  5.9[L]  /  Alb  2.3[L]  /  TBili  0.2  /  DBili  x   /  AST  18  /  ALT  17  /  AlkPhos  102  07-03      Albumin: Albumin: 2.3 g/dL (07-03 @ 06:57)      Allergies    No Known Allergies    Intolerances      MEDICATIONS  (STANDING):  aspirin enteric coated 81 milliGRAM(s) Oral daily  atorvastatin 80 milliGRAM(s) Oral at bedtime  cefTRIAXone Injectable. 1000 milliGRAM(s) IV Push every 24 hours  escitalopram 5 milliGRAM(s) Oral daily  heparin   Injectable 5000 Unit(s) SubCutaneous every 12 hours  lacosamide IVPB 100 milliGRAM(s) IV Intermittent every 12 hours  levETIRAcetam   Injectable 500 milliGRAM(s) IV Push every 12 hours  LORazepam   Injectable 1 milliGRAM(s) IV Push once  traZODone 25 milliGRAM(s) Oral at bedtime    MEDICATIONS  (PRN):  acetaminophen     Tablet .. 650 milliGRAM(s) Oral every 6 hours PRN Temp greater or equal to 38C (100.4F), Mild Pain (1 - 3)  aluminum hydroxide/magnesium hydroxide/simethicone Suspension 30 milliLiter(s) Oral every 4 hours PRN Dyspepsia  melatonin 3 milliGRAM(s) Oral at bedtime PRN Insomnia  ondansetron Injectable 4 milliGRAM(s) IV Push every 8 hours PRN Nausea and/or Vomiting      RADIOLOGY/ADDITIONAL STUDIES:

## 2025-07-03 NOTE — PHYSICAL THERAPY INITIAL EVALUATION ADULT - PERTINENT HX OF CURRENT PROBLEM, REHAB EVAL
89 F with a history of aortic stenosis, dementia (baseline alert and oriented ×2–3), GERD, hyperlipidemia, and hypertension, was sent from her assisted living facility for evaluation following an episode of unresponsiveness. Upon arrival to the ED, she was noted to be non-verbal and unable to provide history. Collateral information was obtained from her daughter and health care proxy, Tammy Hayward ([phone]). Per report, the patient has experienced a gradual decline in health over the past year. Over recent months, she has become bedbound with progressive cognitive decline. Previously, at her baseline, she was able to communicate, recognize family members, and independently eat and drink. Today, staff at the facility witnessed seizure-like activity, prompting EMS activation and transfer to the hospital. No recent illness, trauma, or acute changes in medication were reported at the time of the event.

## 2025-07-03 NOTE — PROGRESS NOTE ADULT - SUBJECTIVE AND OBJECTIVE BOX
CHIEF COMPLAINT/INTERVAL HISTORY:    Patient is a 89y old  Female who presents with a chief complaint of     HPI:  89 F with a history of aortic stenosis, dementia (baseline alert and oriented ×2–3), GERD, hyperlipidemia, and hypertension, was sent from her assisted living facility for evaluation following an episode of unresponsiveness. Upon arrival to the ED, she was noted to be non-verbal and unable to provide history. Collateral information was obtained from her daughter and health care proxy, Tammy Hayward ([phone]). Per report, the patient has experienced a gradual decline in health over the past year. Over recent months, she has become bedbound with progressive cognitive decline. Previously, at her baseline, she was able to communicate, recognize family members, and independently eat and drink. Today, staff at the facility witnessed seizure-like activity, prompting EMS activation and transfer to the hospital. No recent illness, trauma, or acute changes in medication were reported at the time of the event. (02 Jul 2025 21:04)      Adm for uti, was RRT last night for seizures    ICU Vital Signs Last 24 Hrs  T(C): 37 (02 Jul 2025 22:07), Max: 37 (02 Jul 2025 22:07)  T(F): 98.6 (02 Jul 2025 22:07), Max: 98.6 (02 Jul 2025 22:07)  HR: 114 (02 Jul 2025 22:07) (68 - 114)  BP: 144/89 (02 Jul 2025 22:07) (144/89 - 165/77)  BP(mean): 100 (02 Jul 2025 20:27) (99 - 100)  ABP: --  ABP(mean): --  RR: 18 (02 Jul 2025 22:07) (15 - 20)  SpO2: 100% (02 Jul 2025 22:07) (97% - 100%)    O2 Parameters below as of 02 Jul 2025 22:07  Patient On (Oxygen Delivery Method): nasal cannula  O2 Flow (L/min): 2                  PHYSICAL EXAM:      NERVOUS SYSTEM:    CHEST/LUNG: Clear to auscultation bilaterally; No rales, rhonchi, wheezing, or rubs  HEART: Regular rate and rhythm; No murmurs, rubs, or gallops  ABDOMEN: Soft, Nontender, Nondistended; Bowel sounds present  EXTREMITIES:  2+ Peripheral Pulses, No clubbing, cyanosis, or edema    LABS:                        8.7    9.07  )-----------( 305      ( 03 Jul 2025 06:57 )             27.3     07-03    140  |  112[H]  |  20  ----------------------------<  95  3.8   |  23  |  0.78    Ca    8.8      03 Jul 2025 06:57  Phos  2.7     07-03  Mg     1.8     07-03    TPro  5.9[L]  /  Alb  2.3[L]  /  TBili  0.2  /  DBili  x   /  AST  18  /  ALT  17  /  AlkPhos  102  07-03      CAPILLARY BLOOD GLUCOSE      POCT Blood Glucose.: 158 mg/dL (02 Jul 2025 22:26)      RECENT CULTURES:      RADIOLOGY & ADDITIONAL TESTS: personally reviewed    89F admitted to  for AMS 2/2 stroke vs seizure vs encephalopathy 2/2 UTI    #AMS 2/2 stroke vs seizure vs encephalopathy 2/2 UTI  -Ceftriaxone for UTI, f/u cultures.       #Hx of Seizure Disorder  -Lactate wnl, obtain CK and prolactin.   -PTA - Keppra 250 BID - increased to 500 BID  -F/u EEG  -Neuro consult.     #Chronic HFrEF EF 40%   #HTN  #HLD  -C/w aspirin 81 mg qd, atorvastatin 80 mg qhs,   -Hold metoprolol 50 qd, losartan 25 mg qd    #Pressure injuries  -Wound care.     # Dilutional anemia    #DVT ppx: Hepsubq  Fall, aspiration, and seizure precautions.             time spent: 50min     CHIEF COMPLAINT/INTERVAL HISTORY:    Patient is a 89y old  Female who presents with a chief complaint of     HPI:  89 F with a history of aortic stenosis, dementia (baseline alert and oriented ×2–3), GERD, hyperlipidemia, and hypertension, was sent from her assisted living facility for evaluation following an episode of unresponsiveness. Upon arrival to the ED, she was noted to be non-verbal and unable to provide history. Collateral information was obtained from her daughter and health care proxy, Tammy Hayward ([phone]). Per report, the patient has experienced a gradual decline in health over the past year. Over recent months, she has become bedbound with progressive cognitive decline. Previously, at her baseline, she was able to communicate, recognize family members, and independently eat and drink. Today, staff at the facility witnessed seizure-like activity, prompting EMS activation and transfer to the hospital. No recent illness, trauma, or acute changes in medication were reported at the time of the event. (02 Jul 2025 21:04)      Adm for uti, was RRT last night for seizures; she is s/p IV Ativan probably postictal too, unresponsive with twitching movements mostly left arm; daughter present    ICU Vital Signs Last 24 Hrs  T(C): 37 (02 Jul 2025 22:07), Max: 37 (02 Jul 2025 22:07)  T(F): 98.6 (02 Jul 2025 22:07), Max: 98.6 (02 Jul 2025 22:07)  HR: 114 (02 Jul 2025 22:07) (68 - 114)  BP: 144/89 (02 Jul 2025 22:07) (144/89 - 165/77)  BP(mean): 100 (02 Jul 2025 20:27) (99 - 100)  ABP: --  ABP(mean): --  RR: 18 (02 Jul 2025 22:07) (15 - 20)  SpO2: 100% (02 Jul 2025 22:07) (97% - 100%)    O2 Parameters below as of 02 Jul 2025 22:07  Patient On (Oxygen Delivery Method): nasal cannula  O2 Flow (L/min): 2      PHYSICAL EXAM:      NERVOUS SYSTEM:  lethargic, doesn't open eyes spasms LUE  CHEST/LUNG: Clear to auscultation bilaterally; No rales, rhonchi, wheezing, or rubs  HEART: Regular rate and rhythm; No murmurs, rubs, or gallops  ABDOMEN: Soft, Nontender, Nondistended; Bowel sounds present  EXTREMITIES:  2+ Peripheral Pulses, No clubbing, cyanosis, or edema    LABS:                        8.7    9.07  )-----------( 305      ( 03 Jul 2025 06:57 )             27.3     07-03    140  |  112[H]  |  20  ----------------------------<  95  3.8   |  23  |  0.78    Ca    8.8      03 Jul 2025 06:57  Phos  2.7     07-03  Mg     1.8     07-03    TPro  5.9[L]  /  Alb  2.3[L]  /  TBili  0.2  /  DBili  x   /  AST  18  /  ALT  17  /  AlkPhos  102  07-03      CAPILLARY BLOOD GLUCOSE      POCT Blood Glucose.: 158 mg/dL (02 Jul 2025 22:26)      RECENT CULTURES:      RADIOLOGY & ADDITIONAL TESTS: personally reviewed    89F admitted to  for AMS 2/2 stroke vs seizure vs encephalopathy 2/2 UTI    #AMS 2/2 stroke vs seizure vs encephalopathy 2/2 UTI  -Ceftriaxone for UTI, f/u cultures.       #Hx of Seizure Disorder  -Lactate wnl, obtain CK and prolactin.   -PTA - Keppra 250 BID - increased to 500 BID  -F/u EEG  -Neuro consult noted meds adjusted  no need for MRI plus she couldn't cooperate    #Chronic HFrEF EF 40%   #HTN  #HLD  -C/w aspirin 81 mg qd, atorvastatin 80 mg qhs,   -Hold metoprolol 50 qd, losartan 25 mg qd    #Pressure injuries  -Wound care.     # Dilutional anemia    #DVT ppx: Hepsubq  Fall, aspiration, and seizure precautions.       d/w daughter      time spent: 50min

## 2025-07-03 NOTE — DIETITIAN INITIAL EVALUATION ADULT - NSFNSPHYEXAMSKINFT_GEN_A_CORE
Callum = 18   Pressure Injury 1: Right:, ankle, Unstageable  Pressure Injury 2: Right:, heel, Unstageable  Pressure Injury 3: Left:, ankle, Suspected deep tissue injury  Pressure Injury 4: Right:, heel, Stage I

## 2025-07-04 LAB
A1C WITH ESTIMATED AVERAGE GLUCOSE RESULT: 5.6 % — SIGNIFICANT CHANGE UP (ref 4–5.6)
ANION GAP SERPL CALC-SCNC: 7 MMOL/L — SIGNIFICANT CHANGE UP (ref 5–17)
BUN SERPL-MCNC: 15 MG/DL — SIGNIFICANT CHANGE UP (ref 7–23)
CALCIUM SERPL-MCNC: 8.9 MG/DL — SIGNIFICANT CHANGE UP (ref 8.5–10.1)
CHLORIDE SERPL-SCNC: 110 MMOL/L — HIGH (ref 96–108)
CO2 SERPL-SCNC: 22 MMOL/L — SIGNIFICANT CHANGE UP (ref 22–31)
CREAT SERPL-MCNC: 0.64 MG/DL — SIGNIFICANT CHANGE UP (ref 0.5–1.3)
EGFR: 80 ML/MIN/1.73M2 — SIGNIFICANT CHANGE UP
EGFR: 80 ML/MIN/1.73M2 — SIGNIFICANT CHANGE UP
ESTIMATED AVERAGE GLUCOSE: 114 MG/DL — SIGNIFICANT CHANGE UP (ref 68–114)
GLUCOSE SERPL-MCNC: 99 MG/DL — SIGNIFICANT CHANGE UP (ref 70–99)
HCT VFR BLD CALC: 27 % — LOW (ref 34.5–45)
HGB BLD-MCNC: 8.7 G/DL — LOW (ref 11.5–15.5)
MCHC RBC-ENTMCNC: 31.1 PG — SIGNIFICANT CHANGE UP (ref 27–34)
MCHC RBC-ENTMCNC: 32.2 G/DL — SIGNIFICANT CHANGE UP (ref 32–36)
MCV RBC AUTO: 96.4 FL — SIGNIFICANT CHANGE UP (ref 80–100)
NRBC # BLD AUTO: 0 K/UL — SIGNIFICANT CHANGE UP (ref 0–0)
NRBC # FLD: 0 K/UL — SIGNIFICANT CHANGE UP (ref 0–0)
NRBC BLD AUTO-RTO: 0 /100 WBCS — SIGNIFICANT CHANGE UP (ref 0–0)
PLATELET # BLD AUTO: 311 K/UL — SIGNIFICANT CHANGE UP (ref 150–400)
PMV BLD: 9.6 FL — SIGNIFICANT CHANGE UP (ref 7–13)
POTASSIUM SERPL-MCNC: 3.7 MMOL/L — SIGNIFICANT CHANGE UP (ref 3.5–5.3)
POTASSIUM SERPL-SCNC: 3.7 MMOL/L — SIGNIFICANT CHANGE UP (ref 3.5–5.3)
RBC # BLD: 2.8 M/UL — LOW (ref 3.8–5.2)
RBC # FLD: 14.5 % — SIGNIFICANT CHANGE UP (ref 10.3–14.5)
SODIUM SERPL-SCNC: 139 MMOL/L — SIGNIFICANT CHANGE UP (ref 135–145)
WBC # BLD: 9.67 K/UL — SIGNIFICANT CHANGE UP (ref 3.8–10.5)
WBC # FLD AUTO: 9.67 K/UL — SIGNIFICANT CHANGE UP (ref 3.8–10.5)

## 2025-07-04 PROCEDURE — 99233 SBSQ HOSP IP/OBS HIGH 50: CPT

## 2025-07-04 PROCEDURE — 99232 SBSQ HOSP IP/OBS MODERATE 35: CPT

## 2025-07-04 PROCEDURE — 73562 X-RAY EXAM OF KNEE 3: CPT | Mod: 26,LT

## 2025-07-04 PROCEDURE — 73610 X-RAY EXAM OF ANKLE: CPT | Mod: 26,LT

## 2025-07-04 PROCEDURE — 73590 X-RAY EXAM OF LOWER LEG: CPT | Mod: 26,LT

## 2025-07-04 PROCEDURE — 73700 CT LOWER EXTREMITY W/O DYE: CPT | Mod: 26,LT

## 2025-07-04 PROCEDURE — 73600 X-RAY EXAM OF ANKLE: CPT | Mod: 26,XE,LT

## 2025-07-04 PROCEDURE — 76376 3D RENDER W/INTRP POSTPROCES: CPT | Mod: 26

## 2025-07-04 PROCEDURE — 95720 EEG PHY/QHP EA INCR W/VEEG: CPT

## 2025-07-04 PROCEDURE — 73552 X-RAY EXAM OF FEMUR 2/>: CPT | Mod: 26,LT

## 2025-07-04 RX ORDER — SODIUM CHLORIDE 9 G/1000ML
1000 INJECTION, SOLUTION INTRAVENOUS
Refills: 0 | Status: DISCONTINUED | OUTPATIENT
Start: 2025-07-04 | End: 2025-07-05

## 2025-07-04 RX ORDER — ACETAMINOPHEN 500 MG/5ML
650 LIQUID (ML) ORAL ONCE
Refills: 0 | Status: COMPLETED | OUTPATIENT
Start: 2025-07-04 | End: 2025-07-04

## 2025-07-04 RX ORDER — LACOSAMIDE 150 MG/1
200 TABLET, FILM COATED ORAL EVERY 12 HOURS
Refills: 0 | Status: DISCONTINUED | OUTPATIENT
Start: 2025-07-04 | End: 2025-07-10

## 2025-07-04 RX ORDER — FOSPHENYTOIN SODIUM 50 MG/ML
750 INJECTION INTRAMUSCULAR; INTRAVENOUS ONCE
Refills: 0 | Status: COMPLETED | OUTPATIENT
Start: 2025-07-04 | End: 2025-07-04

## 2025-07-04 RX ORDER — LORAZEPAM 4 MG/ML
1 VIAL (ML) INJECTION ONCE
Refills: 0 | Status: DISCONTINUED | OUTPATIENT
Start: 2025-07-04 | End: 2025-07-10

## 2025-07-04 RX ADMIN — HEPARIN SODIUM 5000 UNIT(S): 1000 INJECTION INTRAVENOUS; SUBCUTANEOUS at 09:10

## 2025-07-04 RX ADMIN — FOSPHENYTOIN SODIUM 130 MILLIGRAM(S) PE: 50 INJECTION INTRAMUSCULAR; INTRAVENOUS at 13:52

## 2025-07-04 RX ADMIN — SODIUM CHLORIDE 100 MILLILITER(S): 9 INJECTION, SOLUTION INTRAVENOUS at 14:49

## 2025-07-04 RX ADMIN — LEVETIRACETAM 500 MILLIGRAM(S): 10 INJECTION, SOLUTION INTRAVENOUS at 21:36

## 2025-07-04 RX ADMIN — LACOSAMIDE 100 MILLIGRAM(S): 150 TABLET, FILM COATED ORAL at 10:20

## 2025-07-04 RX ADMIN — Medication 650 MILLIGRAM(S): at 01:29

## 2025-07-04 RX ADMIN — LACOSAMIDE 100 MILLIGRAM(S): 150 TABLET, FILM COATED ORAL at 21:39

## 2025-07-04 RX ADMIN — HEPARIN SODIUM 5000 UNIT(S): 1000 INJECTION INTRAVENOUS; SUBCUTANEOUS at 21:37

## 2025-07-04 RX ADMIN — LEVETIRACETAM 500 MILLIGRAM(S): 10 INJECTION, SOLUTION INTRAVENOUS at 09:10

## 2025-07-04 RX ADMIN — CEFTRIAXONE 1000 MILLIGRAM(S): 500 INJECTION, POWDER, FOR SOLUTION INTRAMUSCULAR; INTRAVENOUS at 21:36

## 2025-07-04 NOTE — CONSULT NOTE ADULT - SUBJECTIVE AND OBJECTIVE BOX
Patient is a 89yFemale from nursing home w/ waxing and waning mental statues, currently non-verbal, admitted for seizure work up, presents with L Tibial Shaft fx. Spoke with patients daughter Tammy as patient was unable to participate in exam. Family states that she has been declining in mental and functional status. Has been non ambulatory for last 2 months, prior ambulated with walker. States that the nursing home told her she banger her leg during a transfer but unknown when it occurred. Daughter states patient was not complaining of any pain. States she saw bruises on her shins for the first time 2 days ago when she was admitted. Denies any falls/HT/LOC. Denies any other injuries. Takes Aspirin at baseline.     PHMx:  Urinary tract infection      HTN (hypertension)    Seizure disorder    HTN (hypertension)    Acute UTI    AMS (altered mental status)    Seizure disorder    Chronic HFrEF (heart failure with reduced ejection fraction)    HTN (hypertension)    HLD (hyperlipidemia)    SEIZURE          VITALS:  T(C): 36.7 (07-04-25 @ 08:25), Max: 38 (07-04-25 @ 00:52)  HR: 91 (07-04-25 @ 08:25) (60 - 91)  BP: 161/83 (07-04-25 @ 08:25) (152/67 - 170/68)  RR: 18 (07-04-25 @ 08:25) (18 - 18)  SpO2: 93% (07-04-25 @ 08:25) (93% - 96%)    VITALS:  Gen: NAD, Resting comfortably - unresponsive to commands, non-verbal, exam limited   LLE:  Skin intact, bloody fracture blisters noted on anterior shins   Grimaces upon palpation of shin   Motor: gross movement of ankle observed  Sensory: withdraws foot and ankle to noxious stimuli   + DP  Compartments soft and compressible  No calf tenderness    Secondary Assessment:  NC/AT, NTTP of clavicles, NTTP of Pelvis  UEs: NTTP of Shoulders, Elbows, Wrists, Hands; NT with AROM/PROM of Shoulders, Elbows, Wrists, Hands; AIN/PIN/Med/Uln/Msc/Rad/Ax intact  RLEs: Bruise noted on anterior shin, Able to passively SLR without pain, NT with Log Roll, NT with Heel Strike, NTTP of Hip, Knee, Ankle, Foot; NT with PROM of Hip, Knee, Ankle, Foot; Q/H/Gsc/TA/EHL/FHL intact     Imaging:  XR L Tib/Fib: Distal 1/3 tibial shaft Fx      ASSESSMENT AND PLAN:  89yFemale with L Distal 1/3 Tibial Shaft Fx, unclear mechanism, family states nursing home told her she banged her shin while being transferred. Placed in Long leg splint.     *****Incomplete*****  NWB LLE in Long leg splint   Pain control prn   CT LLE ordered to evaluate for posterior mal fx   DVT ppx per primary   *****Incomplete*****  To discuss with Dr. Jolley for any further recommendations and management  Patient is a 89yFemale from nursing home w/ waxing and waning mental statues, currently non-verbal, admitted for seizure work up, presents with L Tibial Shaft fx. Spoke with patients daughter Tammy as patient was unable to participate in exam. Family states that she has been declining in mental and functional status. Has been non ambulatory for last 2 months, prior ambulated with walker. States that the nursing home told her she banger her leg during a transfer but unknown when it occurred. Daughter states patient was not complaining of any pain. States she saw bruises on her shins for the first time 2 days ago when she was admitted. Denies any falls/HT/LOC. Denies any other injuries. Takes Aspirin at baseline.     PHMx:  Urinary tract infection      HTN (hypertension)    Seizure disorder    HTN (hypertension)    Acute UTI    AMS (altered mental status)    Seizure disorder    Chronic HFrEF (heart failure with reduced ejection fraction)    HTN (hypertension)    HLD (hyperlipidemia)    SEIZURE          VITALS:  T(C): 36.7 (07-04-25 @ 08:25), Max: 38 (07-04-25 @ 00:52)  HR: 91 (07-04-25 @ 08:25) (60 - 91)  BP: 161/83 (07-04-25 @ 08:25) (152/67 - 170/68)  RR: 18 (07-04-25 @ 08:25) (18 - 18)  SpO2: 93% (07-04-25 @ 08:25) (93% - 96%)    VITALS:  Gen: NAD, Resting comfortably - unresponsive to commands, non-verbal, exam limited   LLE:  Skin intact, bloody fracture blisters noted on anterior shins   Grimaces upon palpation of shin   Motor: gross movement of ankle observed  Sensory: withdraws foot and ankle to noxious stimuli   + DP  Compartments soft and compressible  No calf tenderness    Secondary Assessment:  NC/AT, NTTP of clavicles, NTTP of Pelvis  UEs: NTTP of Shoulders, Elbows, Wrists, Hands; NT with AROM/PROM of Shoulders, Elbows, Wrists, Hands; AIN/PIN/Med/Uln/Msc/Rad/Ax intact  RLEs: Bruise noted on anterior shin, Able to passively SLR without pain, NT with Log Roll, NT with Heel Strike, NTTP of Hip, Knee, Ankle, Foot; NT with PROM of Hip, Knee, Ankle, Foot; Q/H/Gsc/TA/EHL/FHL intact     Imaging:  XR L Tib/Fib: Distal 1/3 tibial shaft Fx    CT LLE:  IMPRESSION:  1.  Nondisplaced oblique fracture of the tibial diaphysis.  2.  Nondisplaced oblique fracture of the distal fibula.      ASSESSMENT AND PLAN:  89yFemale with L Distal 1/3 Tibial Shaft Fx and Lateral Mal Fx, unclear mechanism, family states nursing home told her she banged her shin while being transferred. Placed in Long leg splint.     NWB LLE in Long leg splint  Keep splint clean, dry, and intact    Pain control prn   DVT ppx per primary   No acute orthopaedic surgical intervention indicated at this time.  Patient to follow up with Dr. Jolley 2 weeks after DC as an outpatient for further evaluation and management.   All of the patient's Family's questions and concerns were answered and addressed.   Discussed with Dr. Jolley who is in agreement with above plan  Patient is a 89yFemale from nursing home w/ waxing and waning mental statues, currently non-verbal, admitted for seizure work up, presents with L Tibial Shaft fx. Spoke with patients daughter Tammy as patient was unable to participate in exam. Family states that she has been declining in mental and functional status. Has been non ambulatory for last 2 months, prior ambulated with walker. States that the nursing home told her she banger her leg during a transfer but unknown when it occurred. Daughter states patient was not complaining of any pain. States she saw bruises on her shins for the first time 2 days ago when she was admitted. Denies any falls/HT/LOC. Denies any other injuries. Takes Aspirin at baseline.     PHMx:  Urinary tract infection      HTN (hypertension)    Seizure disorder    HTN (hypertension)    Acute UTI    AMS (altered mental status)    Seizure disorder    Chronic HFrEF (heart failure with reduced ejection fraction)    HTN (hypertension)    HLD (hyperlipidemia)    SEIZURE          VITALS:  T(C): 36.7 (07-04-25 @ 08:25), Max: 38 (07-04-25 @ 00:52)  HR: 91 (07-04-25 @ 08:25) (60 - 91)  BP: 161/83 (07-04-25 @ 08:25) (152/67 - 170/68)  RR: 18 (07-04-25 @ 08:25) (18 - 18)  SpO2: 93% (07-04-25 @ 08:25) (93% - 96%)    VITALS:  Gen: NAD, Resting comfortably - unresponsive to commands, non-verbal, exam limited   LLE:  Skin intact, bloody fracture blisters noted on anterior shins   Grimaces upon palpation of shin   Motor: gross movement of ankle observed  Sensory: withdraws foot and ankle to noxious stimuli   + DP  Compartments soft and compressible  No calf tenderness    Secondary Assessment:  NC/AT, NTTP of clavicles, NTTP of Pelvis  UEs: NTTP of Shoulders, Elbows, Wrists, Hands; NT with AROM/PROM of Shoulders, Elbows, Wrists, Hands; AIN/PIN/Med/Uln/Msc/Rad/Ax intact  RLEs: Bruise noted on anterior shin, Able to passively SLR without pain, NT with Log Roll, NT with Heel Strike, NTTP of Hip, Knee, Ankle, Foot; NT with PROM of Hip, Knee, Ankle, Foot; Q/H/Gsc/TA/EHL/FHL intact     Imaging:  XR L Tib/Fib: Distal 1/3 tibial shaft Fx    CT LLE:  IMPRESSION:  1.  Nondisplaced oblique fracture of the tibial diaphysis.  2.  Nondisplaced oblique fracture of the distal fibula.      ASSESSMENT AND PLAN:  89yFemale with L Distal 1/3 Tibial Shaft Fx and Lateral Mal Fx, unclear mechanism, family states nursing home told her she banged her shin while being transferred. Placed in Long leg splint.     NWB LLE in Long leg splint  Keep splint clean, dry, and intact    Pain control prn   Ice and elevate the leg as needed, make sure ice pack has water tight seal   DVT ppx per primary   No acute orthopaedic surgical intervention indicated at this time.  Patient to follow up with Dr. Jolley 2 weeks after DC as an outpatient for further evaluation and management.   All of the patient's Family's questions and concerns were answered and addressed.   Discussed with Dr. Jolley who is in agreement with above plan

## 2025-07-04 NOTE — PROGRESS NOTE ADULT - SUBJECTIVE AND OBJECTIVE BOX
Interval History:  7/4/25: No new events.  Remains obtunded    MEDICATIONS  (STANDING):  aspirin enteric coated 81 milliGRAM(s) Oral daily  atorvastatin 80 milliGRAM(s) Oral at bedtime  cefTRIAXone Injectable. 1000 milliGRAM(s) IV Push every 24 hours  escitalopram 5 milliGRAM(s) Oral daily  heparin   Injectable 5000 Unit(s) SubCutaneous every 12 hours  lacosamide IVPB 200 milliGRAM(s) IV Intermittent every 12 hours  levETIRAcetam   Injectable 500 milliGRAM(s) IV Push every 12 hours  traZODone 25 milliGRAM(s) Oral at bedtime    MEDICATIONS  (PRN):  acetaminophen     Tablet .. 650 milliGRAM(s) Oral every 6 hours PRN Temp greater or equal to 38C (100.4F), Mild Pain (1 - 3)  aluminum hydroxide/magnesium hydroxide/simethicone Suspension 30 milliLiter(s) Oral every 4 hours PRN Dyspepsia  LORazepam   Injectable 1 milliGRAM(s) IV Push once PRN Agitation  melatonin 3 milliGRAM(s) Oral at bedtime PRN Insomnia  ondansetron Injectable 4 milliGRAM(s) IV Push every 8 hours PRN Nausea and/or Vomiting      Allergies    No Known Allergies    Intolerances        PHYSICAL EXAM:  Vital Signs Last 24 Hrs  T(F): 98.1 (07-04-25 @ 08:25)  HR: 91 (07-04-25 @ 08:25)  BP: 161/83 (07-04-25 @ 08:25)  RR: 18 (07-04-25 @ 08:25)    GENERAL: NAD  HEAD:  in EEG wrap  Neuro:  Obtunded, says "Ow" to painful stimuli otherwise unresponsive  CN: PERRL, no facial weakness observed  motor: normal tone, no spontaneous movements  sensory: reacts to pain in all four extremities  coordination: unable to test    LABS:                        8.7    9.67  )-----------( 311      ( 04 Jul 2025 06:56 )             27.0     07-04    139  |  110[H]  |  15  ----------------------------<  99  3.7   |  22  |  0.64    Ca    8.9      04 Jul 2025 06:56  Phos  2.7     07-03  Mg     1.8     07-03    TPro  5.9[L]  /  Alb  2.3[L]  /  TBili  0.2  /  DBili  x   /  AST  18  /  ALT  17  /  AlkPhos  102  07-03      Urinalysis Basic - ( 04 Jul 2025 06:56 )    Color: x / Appearance: x / SG: x / pH: x  Gluc: 99 mg/dL / Ketone: x  / Bili: x / Urobili: x   Blood: x / Protein: x / Nitrite: x   Leuk Esterase: x / RBC: x / WBC x   Sq Epi: x / Non Sq Epi: x / Bacteria: x        RADIOLOGY & ADDITIONAL STUDIES:    CT head 7/2/25:  1. Left temporal lobe remote infarction  2. Ischemic white matter disease and atrophy typical for age  3. No definite adverse interval change March 2025

## 2025-07-04 NOTE — PROGRESS NOTE ADULT - SUBJECTIVE AND OBJECTIVE BOX
CC: Urinary tract infection      HPI:  89 F with a history of aortic stenosis, dementia (baseline alert and oriented ×2–3), GERD, hyperlipidemia, and hypertension, was sent from her assisted living facility for evaluation following an episode of unresponsiveness. Upon arrival to the ED, she was noted to be non-verbal and unable to provide history. Collateral information was obtained from her daughter and health care proxy, Tammy Hayward ([phone]). Per report, the patient has experienced a gradual decline in health over the past year. Over recent months, she has become bedbound with progressive cognitive decline. Previously, at her baseline, she was able to communicate, recognize family members, and independently eat and drink. Today, staff at the facility witnessed seizure-like activity, prompting EMS activation and transfer to the hospital. No recent illness, trauma, or acute changes in medication were reported at the time of the event. (02 Jul 2025 21:04)      INTERVAL HPI/OVERNIGHT EVENTS:    Vital Signs Last 24 Hrs  T(C): 36.7 (04 Jul 2025 08:25), Max: 38 (04 Jul 2025 00:52)  T(F): 98.1 (04 Jul 2025 08:25), Max: 100.4 (04 Jul 2025 00:52)  HR: 91 (04 Jul 2025 08:25) (60 - 91)  BP: 161/83 (04 Jul 2025 08:25) (152/67 - 170/68)  BP(mean): --  RR: 18 (04 Jul 2025 08:25) (18 - 18)  SpO2: 93% (04 Jul 2025 08:25) (93% - 96%)    Parameters below as of 04 Jul 2025 08:25  Patient On (Oxygen Delivery Method): nasal cannula  O2 Flow (L/min): 3      REVIEW OF SYSTEMS:  All other review of systems is negative unless indicated above.          PHYSICAL EXAM:  General: in no acute distress  Eyes: PERRLA, EOMI; conjunctiva and sclera clear  Head: Normocephalic; atraumatic  ENMT: No nasal discharge; airway clear  Neck: Supple; non tender; no masses  Respiratory: No wheezes, rales or rhonchi  Cardiovascular: Regular rate and rhythm. S1 and S2 Normal; No murmurs, gallops or rubs  Gastrointestinal: Soft non-tender non-distended; Normal bowel sounds  Genitourinary: No  suprapubic  tenderness  Extremities: Normal range of motion, No clubbing, cyanosis or edema  Vascular: Peripheral pulses palpable 2+ bilaterally  Neurological: Alert and oriented x4  Skin: Warm and dry. No acute rash  Lymph Nodes: No acute cervical adenopathy  Musculoskeletal: Normal muscle tone, without deformities  Psychiatric: Cooperative and appropriate    LABS:                             8.7    9.67  )-----------( 311      ( 04 Jul 2025 06:56 )             27.0     04 Jul 2025 06:56    139    |  110    |  15     ----------------------------<  99     3.7     |  22     |  0.64     Ca    8.9        04 Jul 2025 06:56  Phos  2.7       03 Jul 2025 06:57  Mg     1.8       03 Jul 2025 06:57    TPro  5.9    /  Alb  2.3    /  TBili  0.2    /  DBili  x      /  AST  18     /  ALT  17     /  AlkPhos  102    03 Jul 2025 06:57        LIVER FUNCTIONS - ( 03 Jul 2025 06:57 )  Alb: 2.3 g/dL / Pro: 5.9 gm/dL / ALK PHOS: 102 U/L / ALT: 17 U/L / AST: 18 U/L / GGT: x           Urinalysis Basic - ( 04 Jul 2025 06:56 )    Color: x / Appearance: x / SG: x / pH: x  Gluc: 99 mg/dL / Ketone: x  / Bili: x / Urobili: x   Blood: x / Protein: x / Nitrite: x   Leuk Esterase: x / RBC: x / WBC x   Sq Epi: x / Non Sq Epi: x / Bacteria: x      Culture - Blood (07.02.25 @ 20:24)   Specimen Source: Blood None  Culture Results:   No growth at 24 hours    Culture - Urine (07.02.25 @ 17:52)   Specimen Source: Urine None  Culture Results:   >100,000 CFU/ml Gram Negative Rods    MEDICATIONS  (STANDING):  aspirin enteric coated 81 milliGRAM(s) Oral daily  atorvastatin 80 milliGRAM(s) Oral at bedtime  cefTRIAXone Injectable. 1000 milliGRAM(s) IV Push every 24 hours  escitalopram 5 milliGRAM(s) Oral daily  heparin   Injectable 5000 Unit(s) SubCutaneous every 12 hours  lacosamide IVPB 150 milliGRAM(s) IV Intermittent every 12 hours  levETIRAcetam   Injectable 500 milliGRAM(s) IV Push every 12 hours  traZODone 25 milliGRAM(s) Oral at bedtime    MEDICATIONS  (PRN):  acetaminophen     Tablet .. 650 milliGRAM(s) Oral every 6 hours PRN Temp greater or equal to 38C (100.4F), Mild Pain (1 - 3)  aluminum hydroxide/magnesium hydroxide/simethicone Suspension 30 milliLiter(s) Oral every 4 hours PRN Dyspepsia  LORazepam   Injectable 1 milliGRAM(s) IV Push once PRN Agitation  melatonin 3 milliGRAM(s) Oral at bedtime PRN Insomnia  ondansetron Injectable 4 milliGRAM(s) IV Push every 8 hours PRN Nausea and/or Vomiting      RADIOLOGY & ADDITIONAL TESTS:      ACC: [id number] EXAM:  XR CHEST PORTABLE URGENT 1V   ORDERED BY: ELY NEGRON     PROCEDURE DATE:  07/02/2025          INTERPRETATION:  AP erect chest on July 2, 2025 at 5:15 PM. Patient had a   seizure.    Heart possibly enlarged.    On March10 this year there were bilateral infiltrates left greater than   right. Presently lungs are clear. No fracture. Advanced shoulder   degeneration noted.    IMPRESSION: No acute finding at this time.          ACC: [id number] EXAM:  CT BRAIN   ORDERED BY: ELY NEGRON     PROCEDURE DATE:  07/02/2025          INTERPRETATION:  CT head without IV contrast    CLINICAL INFORMATION:     [facility]    TECHNIQUE:  Contiguous axial 3 mm thick images were acquired.  This data   set was reconstructed in the sagittal and coronal planes.  Contrast was   not administered for this examination.  CONTRAST:    None  DOSE INFORMATION:   This scan was performed using automatic exposure   control (radiation dose reduction software) to obtain a diagnostic image   quality scan with patient dose as low as reasonably achievable.   Total   DLP for this examination is estimated at 778 mGy*cm.    COMPARISON:    CT head stroke code and CT angiography 3/10/2025 and MR   brain 3/11/2025    FINDINGS:    BRAIN:    The brain  remains significant for remote infarction within the   left temporal lobe posterior middle temporal gyrus region. Cortical loss   is associated with white matter diminished attenuation that extends to   the ependymal margin of the atrium and temporal horn of the left lateral   ventricle. No interval lesion is recognized.   Cerebral cortical   gray-white matter differentiation is maintained.  No acute cerebral   cortical infarct is seen.  No intracranial hemorrhage is found.  No mass   effect is found in the brain.    Moderately extensive patchy and confluent indistinct diminished   attenuation within the deep cerebral hemispheric white matter suggests   ischemic white matter disease. This most prominently involves the   periatrial and posterior periventricular white matter but is widespread.   At least mild brainstem involvement is present.    CSF SPACES:  The ventricles, sulci and basal cisterns appear moderately   dilated reflecting diffuse brain volume loss.    VESSELS:   Intracranial vasculature is significant for atherosclerotic   calcifications within the cavernous and clinoid segments of the internal   carotid arteries. There is also involvement of each vertebral artery.    HEAD AND NECK STRUCTURES:  The orbits demonstrate small calcifications at   each optic disc without associated mass lesion. There is evidence of   globe surgery.  The included paranasal sinuses  are clear.  The nasal   cavity appears intact.  The nasopharynx is symmetric.  The central skull   base is intact.  The temporal bones demonstrate patent petrous air cells.    The calvarium appears unremarkable.      IMPRESSION:    1. Left temporal lobe remote infarction  2. Ischemic white matter disease and atrophy typical for age  3. No definite adverse interval change March 2025     CC: Urinary tract infection      HPI:  89 F with a history of aortic stenosis, dementia, GERD, hyperlipidemia, and hypertension, was sent from her assisted living facility for evaluation following an episode of unresponsiveness. Upon arrival to the ED, she was noted to be non-verbal and unable to provide history. Collateral information was obtained from her daughter and health care proxy, Tammy Hayward ([phone]). Per report, the patient has experienced a gradual decline in health over the past year. Over recent months, she has become bedbound with progressive cognitive decline. Previously, at her baseline, she was able to communicate, recognize family members, and independently eat and drink. Today, staff at the facility witnessed seizure-like activity, prompting EMS activation and transfer to the hospital. No recent illness, trauma, or acute changes in medication were reported at the time of the event.     INTERVAL HPI/ OVERNIGHT EVENTS:  chart reviewed, Pt was seen and examined,   obtunded, looks comfortable  moves on touch but does not open eyes, not answering or follows commands. Ongoing EEG     Vital Signs Last 24 Hrs  T(C): 36.7 (04 Jul 2025 08:25), Max: 38 (04 Jul 2025 00:52)  T(F): 98.1 (04 Jul 2025 08:25), Max: 100.4 (04 Jul 2025 00:52)  HR: 91 (04 Jul 2025 08:25) (60 - 91)  BP: 161/83 (04 Jul 2025 08:25) (152/67 - 170/68)  RR: 18 (04 Jul 2025 08:25) (18 - 18)  SpO2: 93% (04 Jul 2025 08:25) (93% - 96%)    Parameters below as of 04 Jul 2025 08:25  Patient On (Oxygen Delivery Method): nasal cannula  O2 Flow (L/min): 3      REVIEW OF SYSTEMS:  All other review of systems is negative unless indicated above.      PHYSICAL EXAM:  General: in no acute distress  Eyes: PERRLA,  conjunctiva and sclera clear  Head: Normocephalic; atraumatic  ENMT: No nasal discharge; airway clear  Respiratory: Decreased BS,  No wheezes, rales  Cardiovascular: Regular rate and rhythm. S1 and S2 Normal; + NAJMA  Gastrointestinal: Soft non-tender non-distended; Normal bowel sounds  Genitourinary: No  suprapubic  tenderness  Extremities: LE in boots, L anterior shin with old hematoma  Neurological: Obtunded  responds with grimacing to touch  Musculoskeletal: B/l LE in boots.       LABS:                             8.7    9.67  )-----------( 311      ( 04 Jul 2025 06:56 )             27.0     04 Jul 2025 06:56    139    |  110    |  15     ----------------------------<  99     3.7     |  22     |  0.64     Ca    8.9        04 Jul 2025 06:56  Phos  2.7       03 Jul 2025 06:57  Mg     1.8       03 Jul 2025 06:57    TPro  5.9    /  Alb  2.3    /  TBili  0.2    /  DBili  x      /  AST  18     /  ALT  17     /  AlkPhos  102    03 Jul 2025 06:57        LIVER FUNCTIONS - ( 03 Jul 2025 06:57 )  Alb: 2.3 g/dL / Pro: 5.9 gm/dL / ALK PHOS: 102 U/L / ALT: 17 U/L / AST: 18 U/L / GGT: x           Urinalysis Basic - ( 04 Jul 2025 06:56 )    Color: x / Appearance: x / SG: x / pH: x  Gluc: 99 mg/dL / Ketone: x  / Bili: x / Urobili: x   Blood: x / Protein: x / Nitrite: x   Leuk Esterase: x / RBC: x / WBC x   Sq Epi: x / Non Sq Epi: x / Bacteria: x      Culture - Blood (07.02.25 @ 20:24)   Specimen Source: Blood None  Culture Results:   No growth at 24 hours    Culture - Urine (07.02.25 @ 17:52)   Specimen Source: Urine None  Culture Results:   >100,000 CFU/ml Gram Negative Rods    MEDICATIONS  (STANDING):  aspirin enteric coated 81 milliGRAM(s) Oral daily  atorvastatin 80 milliGRAM(s) Oral at bedtime  cefTRIAXone Injectable. 1000 milliGRAM(s) IV Push every 24 hours  escitalopram 5 milliGRAM(s) Oral daily  heparin   Injectable 5000 Unit(s) SubCutaneous every 12 hours  lacosamide IVPB 150 milliGRAM(s) IV Intermittent every 12 hours  levETIRAcetam   Injectable 500 milliGRAM(s) IV Push every 12 hours  traZODone 25 milliGRAM(s) Oral at bedtime    MEDICATIONS  (PRN):  acetaminophen     Tablet .. 650 milliGRAM(s) Oral every 6 hours PRN Temp greater or equal to 38C (100.4F), Mild Pain (1 - 3)  aluminum hydroxide/magnesium hydroxide/simethicone Suspension 30 milliLiter(s) Oral every 4 hours PRN Dyspepsia  LORazepam   Injectable 1 milliGRAM(s) IV Push once PRN Agitation  melatonin 3 milliGRAM(s) Oral at bedtime PRN Insomnia  ondansetron Injectable 4 milliGRAM(s) IV Push every 8 hours PRN Nausea and/or Vomiting      RADIOLOGY & ADDITIONAL TESTS:      ACC: [id number] EXAM:  XR CHEST PORTABLE URGENT 1V   ORDERED BY: ELY NEGRON     PROCEDURE DATE:  07/02/2025          INTERPRETATION:  AP erect chest on July 2, 2025 at 5:15 PM. Patient had a   seizure.    Heart possibly enlarged.    On March10 this year there were bilateral infiltrates left greater than   right. Presently lungs are clear. No fracture. Advanced shoulder   degeneration noted.    IMPRESSION: No acute finding at this time.          ACC: [id number] EXAM:  CT BRAIN   ORDERED BY: ELY NEGRON     PROCEDURE DATE:  07/02/2025          INTERPRETATION:  CT head without IV contrast    CLINICAL INFORMATION:     [facility]    TECHNIQUE:  Contiguous axial 3 mm thick images were acquired.  This data   set was reconstructed in the sagittal and coronal planes.  Contrast was   not administered for this examination.  CONTRAST:    None  DOSE INFORMATION:   This scan was performed using automatic exposure   control (radiation dose reduction software) to obtain a diagnostic image   quality scan with patient dose as low as reasonably achievable.   Total   DLP for this examination is estimated at 778 mGy*cm.    COMPARISON:    CT head stroke code and CT angiography 3/10/2025 and MR   brain 3/11/2025    FINDINGS:    BRAIN:    The brain  remains significant for remote infarction within the   left temporal lobe posterior middle temporal gyrus region. Cortical loss   is associated with white matter diminished attenuation that extends to   the ependymal margin of the atrium and temporal horn of the left lateral   ventricle. No interval lesion is recognized.   Cerebral cortical   gray-white matter differentiation is maintained.  No acute cerebral   cortical infarct is seen.  No intracranial hemorrhage is found.  No mass   effect is found in the brain.    Moderately extensive patchy and confluent indistinct diminished   attenuation within the deep cerebral hemispheric white matter suggests   ischemic white matter disease. This most prominently involves the   periatrial and posterior periventricular white matter but is widespread.   At least mild brainstem involvement is present.    CSF SPACES:  The ventricles, sulci and basal cisterns appear moderately   dilated reflecting diffuse brain volume loss.    VESSELS:   Intracranial vasculature is significant for atherosclerotic   calcifications within the cavernous and clinoid segments of the internal   carotid arteries. There is also involvement of each vertebral artery.    HEAD AND NECK STRUCTURES:  The orbits demonstrate small calcifications at   each optic disc without associated mass lesion. There is evidence of   globe surgery.  The included paranasal sinuses  are clear.  The nasal   cavity appears intact.  The nasopharynx is symmetric.  The central skull   base is intact.  The temporal bones demonstrate patent petrous air cells.    The calvarium appears unremarkable.      IMPRESSION:    1. Left temporal lobe remote infarction  2. Ischemic white matter disease and atrophy typical for age  3. No definite adverse interval change March 2025

## 2025-07-04 NOTE — EEG REPORT - NS EEG TEXT BOX
[facility]  REPORT OF CONTINUOUS VIDEO EEG    [address] [place] [address]    Patient Name: JON RASHID    Age: 89 year, : 1926  MRN #: [id number], Holman: [id number]  Referring Physician: GELY  EEG #: [id number]    Study Date: 7/3/2025 – 2025  Start Time: 7/3/2025 at 11:03  End time: 2025 at 10:27    Study Information:    EEG Recording Technique:  The patient underwent continuous Video-EEG monitoring, using Telemetry System hardware on the XLTek Digital System. EEG and video data were stored on a computer hard drive with important events saved in digital archive files. The material was reviewed by a physician (electroencephalographer / epileptologist) on a daily basis. Jan and seizure detection algorithms were utilized and reviewed. An EEG Technician attended to the patient, and was available throughout daytime work hours.  The  neurologist was available in person or on call 24-hours per day.    EEG Placement and Labeling of Electrodes:  The EEG was performed utilizing 16 channel referential EEG connections (coronal over temporal over parasagittal montage) using all standard 10-20 electrode placements with EKG, with additional electrodes placed in the inferior temporal region using the modified 10-10 montage electrode placements for elective admissions, or if deemed necessary. Recording was at a sampling rate of 256 samples per second per channel. Time synchronized digital video recording was done simultaneously with EEG recording. A low light infrared camera was used for low light recording.     History:  89 year old woman with seizures    Medication  TRAZODONE    LORAZEPAM  LACOSAMIDE  LEVETIRACETAM    ESCITALOPRAM    ATORVASTATIN    CEFTRIAXONE      Interpretation:    [id number]  Startin/3/2025    Daily EEG Visual Analysis  FINDINGS:   Awake Background:  The background was continuous, spontaneously variable and reactive.  There is no waking record.   There is no well defined posterior dominant rhythm.  At times the background is comprised of mostly theta activity.  At other times there are periods of suppression for 1-2 seconds between diffuse, sharply contoured fast activity.      Background Slowing:  -Generalized slowing: No waking record. Diffuse generalized slowing is present.  -Focal slowing: There is evidence of increased slowing over the right hemisphere.     Sleep Background:  Normal features of sleep are not seen.    Interictal Epileptiform Activity:   -Early on there are nearly continuous right hemisphere (broad distribution) periodic sharp waves with a frequency of 0.5-1.5 Hz. At times these are seen bilaterally with a right hemisphere predominance.  -Later in the study there are nearly continuous generalized periodic discharges with triphasic morphology with a frequency of 1-1.5 Hz.      Events:  -The patient was obtunded throughout the study. No definite clinical events or seizures were recorded during this study.    Activation Procedures:   -No activation procedures were performed during this study.    Artifacts:  Intermittent myogenic and movement artifact noted.     EKG: No significant arrhythmias are seen.       EEG Summary/Classification:  Markedly abnormal EEG due to the presence of:  -Lack of waking record or normal sleep features  -Generalized slowing  -Superimposed right hemisphere slowing  -Periods more suggestive of burst suppression pattern  -Frequent, right hemisphere periodic sharp waves with a frequency of 0.5-1.5 Hz, at time seen bilaterally with a right hemisphere predominance.  -Generalized periodic discharges with triphasic morphology with a frequency of 0.5-1.5 Hz    EEG Impression:  Findings are indicative of:  -Marked encephalopathy  -Right hemisphere dysfunction  -Risk for seizures, most significantly from the right hemisphere  -Generalized periodic discharges with triphasic morphology may be seen in various etiologies of encephalopathy. Non-convulsive status epilepticus cannot be ruled out.        ______________________________________________  Maggie Douglas MD  Director of Epilepsy at [facility]

## 2025-07-04 NOTE — PROGRESS NOTE ADULT - ASSESSMENT
#AMS 2/2 stroke vs seizure vs encephalopathy 2/2 UTI  -Ceftriaxone for UTI, f/u cultures.       #Hx of Seizure Disorder  -Lactate wnl, obtain CK and prolactin.   -PTA - Keppra 250 BID - increased to 500 BID  -F/u EEG  -Neuro consult noted meds adjusted  no need for MRI plus she couldn't cooperate    #Chronic HFrEF EF 40%   #HTN  #HLD  -C/w aspirin 81 mg qd, atorvastatin 80 mg qhs,   -Hold metoprolol 50 qd, losartan 25 mg qd    #Pressure injuries  -Wound care.     # Dilutional anemia    #DVT ppx: Hepsubq  Fall, aspiration, and seizure precautions.    89 F with a history of aortic stenosis, dementia (baseline alert and oriented ×2–3), seizure disorder, GERD, hyperlipidemia, and hypertension admitted for:       #AMS  likely due to seizures and also infection   #Hx of Seizure Disorder  CT head neg luther acute findings  EEG  d/w Dr Douglas, noted ongoing sharp waves, could be still seizures.   On Keppra continue 500mg IV BID for now  Started on Vimpat this admission will Upitrate to 200mg IV BID  Also dose of Fosphenytoin given   C/w EEG   Pt is NPO 2/2 mental status  Will start D5NS at 100ml/h, reeval in am   If  MOre awake will need S&S       # GNR UTI  UA abnormal   UCX: + GNR, f/u final results  C/w Ceftriaxone       #Chronic HFrEF EF 40%, stable   #HTN  #HLD  -C/w aspirin 81 mg qd, atorvastatin 80 mg qhs On hold due to NPO   -Hold metoprolol 50 qd, losartan 25 mg qd  - BP elevated, will order Hydralazine PRN for SMP>160    #  L Distal 1/3 Tibial Shaft Fx and Lateral Mal Fx  D/w Ortho team   NWB LLE   Plan for Long leg splint  Will need CT LLE after splint placed     #Pressure injuries  -Wound care.     # Acute on Chronic  anemia  BAseline Hb ~10  Hb down to 8.7 , likely has dilutional component as Pt had IVF   Monitor closely   No signs od acute bleeding     # JACKY, prerenal   Likely 2/2 poor po intake  CR on admission 1.04   Cr this am 0.64  S/p IVF  Monitor renal FX       #DVT ppx: Hepsubq      Fall, aspiration, and seizure precautions.     ACP: DNR/DNI, palliative team on board, planned family meeting on 7/7/25

## 2025-07-04 NOTE — PROGRESS NOTE ADULT - ASSESSMENT
89 F with a history of aortic stenosis, dementia (baseline alert and oriented ×2–3), GERD, hyperlipidemia, and hypertension, was sent from her assisted living facility on 7/2 for evaluation following an episode of unresponsiveness.  She had an episode of staring, biting her hand and shaking around 3 AM and received 2 mg of lorazepam.    History of seizures, witnessed seizure like activity, altered mental status:  -She has nearly continuous discharges on EEG, some appear triphasic in morphology others more clearly epileptiform.  -Patient was previously on both levetiracetam and lacosamide.  -Lacosamide was weaned due to drowsiness  -Also now with UTI  -Lacosamide restarted. Increased dose to 200 mg q12  -Continue levetiracetam 500 mg q12  -Eventual goal to wean off levetiracetam and maintain lacosamide monotherapy if possible.  -Continue monitoring on EEG  -UTI may also be contributing to the clinical picture. Continue treatment with antibiotics.    Discussed with Dr. Barker.  Will f/u

## 2025-07-05 LAB
-  AMOXICILLIN/CLAVULANIC ACID: SIGNIFICANT CHANGE UP
-  AMPICILLIN/SULBACTAM: SIGNIFICANT CHANGE UP
-  AMPICILLIN: SIGNIFICANT CHANGE UP
-  AZTREONAM: SIGNIFICANT CHANGE UP
-  CEFAZOLIN: SIGNIFICANT CHANGE UP
-  CEFEPIME: SIGNIFICANT CHANGE UP
-  CEFOXITIN: SIGNIFICANT CHANGE UP
-  CEFTRIAXONE: SIGNIFICANT CHANGE UP
-  CIPROFLOXACIN: SIGNIFICANT CHANGE UP
-  ERTAPENEM: SIGNIFICANT CHANGE UP
-  GENTAMICIN: SIGNIFICANT CHANGE UP
-  IMIPENEM: SIGNIFICANT CHANGE UP
-  LEVOFLOXACIN: SIGNIFICANT CHANGE UP
-  MEROPENEM: SIGNIFICANT CHANGE UP
-  NITROFURANTOIN: SIGNIFICANT CHANGE UP
-  PIPERACILLIN/TAZOBACTAM: SIGNIFICANT CHANGE UP
-  TIGECYCLINE: SIGNIFICANT CHANGE UP
-  TOBRAMYCIN: SIGNIFICANT CHANGE UP
-  TRIMETHOPRIM/SULFAMETHOXAZOLE: SIGNIFICANT CHANGE UP
ANION GAP SERPL CALC-SCNC: 5 MMOL/L — SIGNIFICANT CHANGE UP (ref 5–17)
BUN SERPL-MCNC: 9 MG/DL — SIGNIFICANT CHANGE UP (ref 7–23)
CALCIUM SERPL-MCNC: 8.5 MG/DL — SIGNIFICANT CHANGE UP (ref 8.5–10.1)
CHLORIDE SERPL-SCNC: 111 MMOL/L — HIGH (ref 96–108)
CO2 SERPL-SCNC: 24 MMOL/L — SIGNIFICANT CHANGE UP (ref 22–31)
CREAT SERPL-MCNC: 0.57 MG/DL — SIGNIFICANT CHANGE UP (ref 0.5–1.3)
CULTURE RESULTS: ABNORMAL
EGFR: 83 ML/MIN/1.73M2 — SIGNIFICANT CHANGE UP
EGFR: 83 ML/MIN/1.73M2 — SIGNIFICANT CHANGE UP
GLUCOSE SERPL-MCNC: 135 MG/DL — HIGH (ref 70–99)
HCT VFR BLD CALC: 26.6 % — LOW (ref 34.5–45)
HGB BLD-MCNC: 8.6 G/DL — LOW (ref 11.5–15.5)
MCHC RBC-ENTMCNC: 31 PG — SIGNIFICANT CHANGE UP (ref 27–34)
MCHC RBC-ENTMCNC: 32.3 G/DL — SIGNIFICANT CHANGE UP (ref 32–36)
MCV RBC AUTO: 96 FL — SIGNIFICANT CHANGE UP (ref 80–100)
METHOD TYPE: SIGNIFICANT CHANGE UP
NRBC # BLD AUTO: 0 K/UL — SIGNIFICANT CHANGE UP (ref 0–0)
NRBC # FLD: 0 K/UL — SIGNIFICANT CHANGE UP (ref 0–0)
NRBC BLD AUTO-RTO: 0 /100 WBCS — SIGNIFICANT CHANGE UP (ref 0–0)
ORGANISM # SPEC MICROSCOPIC CNT: ABNORMAL
ORGANISM # SPEC MICROSCOPIC CNT: SIGNIFICANT CHANGE UP
PHENYTOIN FREE SERPL-MCNC: 12.5 UG/ML — SIGNIFICANT CHANGE UP (ref 10–20)
PLATELET # BLD AUTO: 237 K/UL — SIGNIFICANT CHANGE UP (ref 150–400)
PMV BLD: 9.1 FL — SIGNIFICANT CHANGE UP (ref 7–13)
POTASSIUM SERPL-MCNC: 3.2 MMOL/L — LOW (ref 3.5–5.3)
POTASSIUM SERPL-SCNC: 3.2 MMOL/L — LOW (ref 3.5–5.3)
RBC # BLD: 2.77 M/UL — LOW (ref 3.8–5.2)
RBC # FLD: 14.6 % — HIGH (ref 10.3–14.5)
SODIUM SERPL-SCNC: 140 MMOL/L — SIGNIFICANT CHANGE UP (ref 135–145)
SPECIMEN SOURCE: SIGNIFICANT CHANGE UP
WBC # BLD: 8.25 K/UL — SIGNIFICANT CHANGE UP (ref 3.8–10.5)
WBC # FLD AUTO: 8.25 K/UL — SIGNIFICANT CHANGE UP (ref 3.8–10.5)

## 2025-07-05 PROCEDURE — 99232 SBSQ HOSP IP/OBS MODERATE 35: CPT

## 2025-07-05 PROCEDURE — 95720 EEG PHY/QHP EA INCR W/VEEG: CPT

## 2025-07-05 PROCEDURE — 71045 X-RAY EXAM CHEST 1 VIEW: CPT | Mod: 26

## 2025-07-05 PROCEDURE — 99233 SBSQ HOSP IP/OBS HIGH 50: CPT

## 2025-07-05 RX ADMIN — LACOSAMIDE 100 MILLIGRAM(S): 150 TABLET, FILM COATED ORAL at 10:36

## 2025-07-05 RX ADMIN — LEVETIRACETAM 500 MILLIGRAM(S): 10 INJECTION, SOLUTION INTRAVENOUS at 09:25

## 2025-07-05 RX ADMIN — Medication 100 MILLIEQUIVALENT(S): at 12:25

## 2025-07-05 RX ADMIN — Medication 100 MILLIEQUIVALENT(S): at 11:14

## 2025-07-05 RX ADMIN — CEFTRIAXONE 1000 MILLIGRAM(S): 500 INJECTION, POWDER, FOR SOLUTION INTRAMUSCULAR; INTRAVENOUS at 20:01

## 2025-07-05 RX ADMIN — LEVETIRACETAM 500 MILLIGRAM(S): 10 INJECTION, SOLUTION INTRAVENOUS at 20:58

## 2025-07-05 RX ADMIN — Medication 100 MILLIEQUIVALENT(S): at 09:24

## 2025-07-05 RX ADMIN — LACOSAMIDE 100 MILLIGRAM(S): 150 TABLET, FILM COATED ORAL at 21:07

## 2025-07-05 RX ADMIN — HEPARIN SODIUM 5000 UNIT(S): 1000 INJECTION INTRAVENOUS; SUBCUTANEOUS at 09:25

## 2025-07-05 RX ADMIN — HEPARIN SODIUM 5000 UNIT(S): 1000 INJECTION INTRAVENOUS; SUBCUTANEOUS at 20:58

## 2025-07-05 RX ADMIN — SODIUM CHLORIDE 100 MILLILITER(S): 9 INJECTION, SOLUTION INTRAVENOUS at 01:22

## 2025-07-05 NOTE — SWALLOW BEDSIDE ASSESSMENT ADULT - SWALLOW EVAL: DIAGNOSIS
1) Pt seen this AM and this afternoon. On both occasions, pt was obtunded. Unable to rouse pt. Unable to elicit eye opening. Unable to elicit joint attention. Unable to direct to communicative tasks, follow commands or verbalize, despite max cues. Pt's obtundation/reduced alertness is atop known severe Cognitive Linguistic Dysfunction. Obtundation/underlying Cognitive Linguistic Dysfunction are in setting of encephalopathy from suspected seizure activity/UTI, advanced Dementia and prior non acute CVA. Needs must be anticipated.
Pt likely has underlying preservation of oral pharyngeal swallow skills, but presently shows non-functional dsyphagia 2/2 cont'd obtundation

## 2025-07-05 NOTE — SWALLOW BEDSIDE ASSESSMENT ADULT - COMMENTS
As per H&P: "89 F with a history of aortic stenosis, dementia (baseline alert and oriented ×2–3), GERD, hyperlipidemia, and hypertension, was sent from her assisted living facility for evaluation following an episode of unresponsiveness. Upon arrival to the ED, she was noted to be non-verbal and unable to provide history. Collateral information was obtained from her daughter and health care proxy, Tammy Hayward ([phone]). Per report, the patient has experienced a gradual decline in health over the past year. Over recent months, she has become bedbound with progressive cognitive decline. Previously, at her baseline, she was able to communicate, recognize family members, and independently eat and drink. Today, staff at the facility witnessed seizure-like activity, prompting EMS activation and transfer to the hospital. No recent illness, trauma, or acute changes in medication were reported at the time of the event." Service is consulted for PO intake. Pt was seen by Service/Mr. Cagle on 7/3. at which time pt was not cleared for PO intake 2/2 to obtundation. Rx was to maintain NPO status.
The pt was admitted to  from [place] with reduced responsiveness/witnessed seizure activity. Hospital course is remarkable UTI, multiple pressure wounds and reduced responsiveness after witnessed seizure activity. 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 The pt was previously hospitalized at this facility in 7/24 after being found by her son in a slumped position in bed with altered sensorium. Hospital course in 7/24 was remarkable for elevated Troponin, elevated blood pressure, leukocytosis, elevated lactate, right bronchiectasis on chest imaging, renal insufficiency and encephalopathy/cognitive dysfunction Other selected prior medical history is remarkable for seizure disorder, Dementia, HTN, HLD, AS and GERD.

## 2025-07-05 NOTE — PROGRESS NOTE ADULT - SUBJECTIVE AND OBJECTIVE BOX
Interval History:  7/5/25: RN says she was calling out overnight      MEDICATIONS  (STANDING):  aspirin enteric coated 81 milliGRAM(s) Oral daily  atorvastatin 80 milliGRAM(s) Oral at bedtime  cefTRIAXone Injectable. 1000 milliGRAM(s) IV Push every 24 hours  escitalopram 5 milliGRAM(s) Oral daily  heparin   Injectable 5000 Unit(s) SubCutaneous every 12 hours  lacosamide IVPB 200 milliGRAM(s) IV Intermittent every 12 hours  levETIRAcetam   Injectable 500 milliGRAM(s) IV Push every 12 hours  potassium chloride  10 mEq/100 mL IVPB 10 milliEquivalent(s) IV Intermittent every 1 hour  traZODone 25 milliGRAM(s) Oral at bedtime    MEDICATIONS  (PRN):  acetaminophen     Tablet .. 650 milliGRAM(s) Oral every 6 hours PRN Temp greater or equal to 38C (100.4F), Mild Pain (1 - 3)  aluminum hydroxide/magnesium hydroxide/simethicone Suspension 30 milliLiter(s) Oral every 4 hours PRN Dyspepsia  LORazepam   Injectable 1 milliGRAM(s) IV Push once PRN Agitation  melatonin 3 milliGRAM(s) Oral at bedtime PRN Insomnia  ondansetron Injectable 4 milliGRAM(s) IV Push every 8 hours PRN Nausea and/or Vomiting      Allergies    No Known Allergies    Intolerances        PHYSICAL EXAM:  Vital Signs Last 24 Hrs  T(F): 98.1 (07-05-25 @ 07:47)  HR: 84 (07-05-25 @ 07:47)  BP: 131/74 (07-05-25 @ 07:47)  RR: 17 (07-05-25 @ 07:47)    GENERAL: minimally responsive  HEAD:  Atraumatic, Normocephalic  Neuro:  Moans to sternal rub  When eyes were opened, she held them open for another 1-2 seconds  Some spontaneous movements of right upper extremity noted  Yelled "ow" to painful stimuli in the upper extremities      LABS:                        8.6    8.25  )-----------( 237      ( 05 Jul 2025 06:40 )             26.6     07-05    140  |  111[H]  |  9   ----------------------------<  135[H]  3.2[L]   |  24  |  0.57    Ca    8.5      05 Jul 2025 06:40        Urinalysis Basic - ( 05 Jul 2025 06:40 )    Color: x / Appearance: x / SG: x / pH: x  Gluc: 135 mg/dL / Ketone: x  / Bili: x / Urobili: x   Blood: x / Protein: x / Nitrite: x   Leuk Esterase: x / RBC: x / WBC x   Sq Epi: x / Non Sq Epi: x / Bacteria: x        RADIOLOGY & ADDITIONAL STUDIES:      CT head 7/2/25:  1. Left temporal lobe remote infarction  2. Ischemic white matter disease and atrophy typical for age  3. No definite adverse interval change March 2025

## 2025-07-05 NOTE — SWALLOW BEDSIDE ASSESSMENT ADULT - SLP GENERAL OBSERVATIONS
1) Pt seen this AM and this afternoon. On both occasions, pt was obtunded. Unable to rouse pt. Unable to elicit eye opening. Unable to elicit joint attention. Unable to direct to communicative tasks, follow commands or verbalize, despite max cues. Pt's obtundation/reduced alertness are atop known severe Cognitive Linguistic Dysfunction. Obtundation/underlying Cognitive Linguistic Dysfunction in setting of encephalopathy from suspected seizure activity/UTI, advanced Dementia and prior non acute CVA. Needs must be anticipated
Pt was lying in bed undergoing EEG. Eyes closed in NAD, apparently sleeping quietly, eyelid appear to be twitching. Unarousable to voice and touch.

## 2025-07-05 NOTE — EEG REPORT - NS EEG TEXT BOX
[facility]  REPORT OF CONTINUOUS VIDEO EEG    [address] [place] [address]    Patient Name: JON RASHID    Age: 89 year, : 1926  MRN #: [id number], Holman: [id number]  Referring Physician: GELY  EEG #: [id number]    Study Date: 2025 – 2025  Start Time: 2025 at 10:41  End Time: 2025 at 09:21    Study Information:    EEG Recording Technique:  The patient underwent continuous Video-EEG monitoring, using Telemetry System hardware on the XLTek Digital System. EEG and video data were stored on a computer hard drive with important events saved in digital archive files. The material was reviewed by a physician (electroencephalographer / epileptologist) on a daily basis. Jan and seizure detection algorithms were utilized and reviewed. An EEG Technician attended to the patient, and was available throughout daytime work hours.  The  neurologist was available in person or on call 24-hours per day.    EEG Placement and Labeling of Electrodes:  The EEG was performed utilizing 16 channel referential EEG connections (coronal over temporal over parasagittal montage) using all standard 10-20 electrode placements with EKG, with additional electrodes placed in the inferior temporal region using the modified 10-10 montage electrode placements for elective admissions, or if deemed necessary. Recording was at a sampling rate of 256 samples per second per channel. Time synchronized digital video recording was done simultaneously with EEG recording. A low light infrared camera was used for low light recording.     History:  89 year old woman with seizures and encephalopathy.    Medication  TRAZODONE    LORAZEPAM    LEVETIRACETAM    LACOSAMIDE    ESCITALOPRAM    ATORVASTATIN    CEFTRIAXONE      Interpretation:    [id number]  Startin2025    Daily EEG Visual Analysis  Awake Background:  The background was continuous, spontaneously variable and reactive.  There is no waking record.   There is no well defined posterior dominant rhythm.  At times the background is comprised of mostly theta activity.  At other times there are periods of suppression for 1-2 seconds between diffuse, sharply contoured fast activity.      Background Slowing:  -Generalized slowing: No waking record. Diffuse generalized slowing is present.  -Focal slowing: There is evidence of increased slowing over the right hemisphere.     Sleep Background:  Normal features of sleep are not seen.    Interictal Epileptiform Activity:   -Periods with nearly continuous generalized periodic discharges with triphasic morphology with a frequency of 1-1.5 Hz.  -Periods with generalized bursts of polyspikes, at times more prominent over the right hemisphere, superimposed on a background of sharply contoured theta activity. There are also intermittent periods of suppression of the background for 1-2 seconds.    Events:  -The patient was obtunded throughout the study. No definite clinical events or seizures were recorded during this study.    Activation Procedures:   -No activation procedures were performed during this study.    Artifacts:  Intermittent myogenic and movement artifact noted.     EKG: No significant arrhythmias are seen.       EEG Summary/Classification:  Markedly abnormal EEG due to the presence of:  -Lack of waking record or normal sleep features  -Generalized slowing  -Superimposed right hemisphere slowing  -Periods more suggestive of burst suppression pattern  -Generalized periodic discharges with triphasic morphology with a frequency of 0.5-1.5 Hz  -Bursts of polyspikes, right more than left, superimposed on a background of sharply contoured theta activity.    EEG Impression:  Findings are indicative of:  -Marked encephalopathy  -Right hemisphere dysfunction  -Risk for seizures, most significantly from the right hemisphere  -Generalized periodic discharges with triphasic morphology may be seen in various etiologies of encephalopathy. Non-convulsive status epilepticus cannot be ruled out.        ______________________________________________  Maggie Douglas MD  Director of Epilepsy at [facility]

## 2025-07-05 NOTE — SWALLOW BEDSIDE ASSESSMENT ADULT - ASR SWALLOW DENTITION
Unable to formally assess due to altered mentation with reduced active participation/resistive mouth closing on stimulation.
as far as could be passively visualized since pt was not awake./present and adequate

## 2025-07-05 NOTE — PROGRESS NOTE ADULT - ASSESSMENT
89 F with a history of aortic stenosis, dementia (baseline alert and oriented ×2–3), GERD, hyperlipidemia, and hypertension, was sent from her assisted living facility on 7/2 for evaluation following an episode of unresponsiveness.  She had an episode of staring, biting her hand and shaking around 3 AM and received 2 mg of lorazepam.    History of seizures, witnessed seizure like activity, altered mental status:  -She has nearly continuous discharges on EEG, some appear triphasic in morphology others more clearly epileptiform.  No improvement in EEG at this time, but she is slightly more responsive  -Continue levetiracetam 500 mg q12  -Continue Lacosamide 200 mg BID  Gave loading dose of phenytoin on 7/4 with hopes of improving EEG without real effect.  -Continue treatment of UTI   -If no improvement by tomorrow, consider addition of another anticonvulsant, temporarily.  Family meeting is planned for 7/7 to discuss goals of care.     Dr. Dixon will cover beginning 7/6 and f/u needed.

## 2025-07-05 NOTE — SWALLOW BEDSIDE ASSESSMENT ADULT - MUCOSAL QUALITY
YARELIS
Unable to formally assess due to altered mentation with reduced active participation/resistive mouth closing on stimulation.

## 2025-07-05 NOTE — SWALLOW BEDSIDE ASSESSMENT ADULT - SWALLOW EVAL: SECRETION MANAGEMENT
Unable to formally assess due to altered mentation with reduced active participation/resistive mouth closing on stimulation.
YARELIS

## 2025-07-05 NOTE — SWALLOW BEDSIDE ASSESSMENT ADULT - ASR SWALLOW RECOMMEND DIAG
DO NOT FEEL THAT AN MBS WOULD CHANGE CLINICAL MANAGEMENT AT THIS JUNCTURE IN TIME.
not clinically indicated

## 2025-07-05 NOTE — PROGRESS NOTE ADULT - ASSESSMENT
89 F with a history of aortic stenosis, dementia (baseline alert and oriented ×2–3), seizure disorder, GERD, hyperlipidemia, and hypertension admitted for:       #AMS likely due to seizures and also infection   #Hx of Seizure Disorder  CT head neg luther acute findings  EEG  d/w Dr Douglas: s nearly continuous discharges on EEG, some appear triphasic in morphology others more clearly epileptiform  - neurology recc cont lacosamide and keppra (and 1 loading dose of pheytoin)  - If no improvement, neuro will add another AED  - f/u 24 hours EEG which will be complete this am  - PT more awake, trial of puree diet (with assistance) with official speech and swallow  - Stop ivf, pt has severe AS and risk of fluid overload. Now on 3L at 96%  - CXR      # GNR UTI  UA abnormal   UCX: Klebsiella, f/u sensitivities  C/w Ceftriaxone       #Chronic HFrEF EF 40%, stable   #Acute hypoxemic resp failure  #HTN  #HLD  - r/o CHF, order cxr  -C/w aspirin 81 mg qd, atorvastatin 80 mg qhs On hold due to NPO   -Hold metoprolol 50 qd, losartan 25 mg qd  - BP elevated, will order Hydralazine PRN for SMP>160    #  L Distal 1/3 Tibial Shaft Fx and Lateral Mal Fx  D/w Ortho team   NWB LLE   Plan for Long leg splint  ortho recc appreciated    #Pressure injuries  -Wound care.     # Acute on Chronic  anemia  BAseline Hb ~10  Hb down to 8.7 , likely has dilutional component as Pt had IVF   Monitor closely   No signs of acute bleeding     # JACKY, prerenal   Likely 2/2 poor po intake  resolved  If pt not able to eat or drink, high risk of volume depletion   D/W dtr that temporary means of feed may need to be considered if she fails speech and swallow eval today  GOC meeting monday      #DVT ppx: HSQ    Fall, aspiration, and seizure precautions.     ACP: DNR/DNI, palliative team on board, planned family meeting on 7/7/25   Prognosis guarded

## 2025-07-05 NOTE — SWALLOW BEDSIDE ASSESSMENT ADULT - ORAL PHASE
See below for specific non nutritive clinical swallowing testing findings.
no oral processing of minimal bolus placed in the mouth.

## 2025-07-05 NOTE — SWALLOW BEDSIDE ASSESSMENT ADULT - ADDITIONAL RECOMMENDATIONS
mouth care
HOSPITALIST AND NUTRITION F/U. CONSIDER POTENTIAL BENEFIT OF A PALLIATIVE CARE CONSULT. SUGGEST NPO RESTRICTION AT THIS TIME GIVEN OBTUNDATION AND NON FUNCTIONAL OROPHARYNGEAL DYSPHAGIA. PT IS NOT A CANDIDATE FOR ORAL FEEDING AT THIS TIME WHILE IN UNRESPONSIVE STATE. PT IS NOT A SPEECH OR SWALLOW THERAPY CANDIDATE AT THIS TIME FOR THE SAME REASON. PT'S NEEDS MUST BE ANTICIPATED. LONG TERM ENTERAL FEEDING MEANS NOT LIKELY TO ENHANCE LIFE QUALITY

## 2025-07-05 NOTE — SWALLOW BEDSIDE ASSESSMENT ADULT - SWALLOW EVAL: CRITERIA FOR SKILLED INTERVENTION MET
PT IS NOT A CANDIDATE FOR SPEECH PATHOLOGY INTERVENTION AT THIS TIME GIVEN OBTUNDATION WITH POOR STIMULABILITY TO THERAPY CUES. THEIR IS NO SERVICE OF IMMEDIATE BENEFIT THAT CAN BE OFFERED BY SPEECH PATHOLOGY AT THIS JUNCTURE IN TIME. AS SUCH, THIS SERVICE WILL NOT ACTIVELY FOLLOW IN HOUSE. RECONSULT PRN ONLY IF PT CAN REMAIN IN A WAKEFUL STATE/BECOMES INTERACTIVE AND CONDITION WARRANTS.
will follow for possible ability to take PO.

## 2025-07-05 NOTE — SWALLOW BEDSIDE ASSESSMENT ADULT - SWALLOW EVAL: RECOMMENDED DIET
SUGGEST NPO RESTRICTION AT THIS TIME GIVEN OBTUNDATION AND NON FUNCTIONAL OROPHARYNGEAL DYSPHAGIA. PT IS NOT A CANDIDATE FOR ORAL FEEDING AT THIS TIME WHILE IN UNRESPONSIVE STATE.
Keep NPO including meds

## 2025-07-05 NOTE — PROGRESS NOTE ADULT - SUBJECTIVE AND OBJECTIVE BOX
CC: Urinary tract infection      HPI:  89 F with a history of aortic stenosis, dementia, GERD, hyperlipidemia, and hypertension, was sent from her assisted living facility for evaluation following an episode of unresponsiveness. Upon arrival to the ED, she was noted to be non-verbal and unable to provide history. Collateral information was obtained from her daughter and health care proxy, Tammy Hayward ([phone]). Per report, the patient has experienced a gradual decline in health over the past year. Over recent months, she has become bedbound with progressive cognitive decline. Previously, at her baseline, she was able to communicate, recognize family members, and independently eat and drink. Today, staff at the facility witnessed seizure-like activity, prompting EMS activation and transfer to the hospital. No recent illness, trauma, or acute changes in medication were reported at the time of the event.     INTERVAL HPI/ OVERNIGHT EVENTS:  EEG in progress. Dtr at bedside -states mothers mental status generally wakes and wanes. She was awake tis am and was able to recognize dtr and asking to eat/drink. Per nursing, no o/n events. PT has been npo since for 48 hours    ICU Vital Signs Last 24 Hrs  T(C): 36.7 (05 Jul 2025 07:47), Max: 36.9 (04 Jul 2025 23:19)  T(F): 98.1 (05 Jul 2025 07:47), Max: 98.4 (04 Jul 2025 23:19)  HR: 84 (05 Jul 2025 07:47) (74 - 84)  BP: 131/74 (05 Jul 2025 07:47) (131/74 - 139/90)  BP(mean): --  ABP: --  ABP(mean): --  RR: 17 (05 Jul 2025 07:47) (17 - 18)  SpO2: 94% (05 Jul 2025 07:47) (93% - 96%)    O2 Parameters below as of 05 Jul 2025 07:47  Patient On (Oxygen Delivery Method): nasal cannula  O2 Flow (L/min): 3          Parameters below as of 04 Jul 2025 08:25  Patient On (Oxygen Delivery Method): nasal cannula  O2 Flow (L/min): 3      REVIEW OF SYSTEMS:  All other review of systems is negative unless indicated above.          PHYSICAL EXAM:    Constitutional: NAD, keeps eyes closed but responds when asked to open eyes, asking for food  HEENT: PERR, EOMI, [place], deaf in left ear, [place] in right ear  Neck: Soft and supple, No LAD, No JVD  Respiratory: Breath sounds are clear bilaterally, No wheezing, rales or rhonchi  Cardiovascular: S1 and S2, regular rate and rhythm, no Murmurs, gallops or rubs  Gastrointestinal: Bowel Sounds present, soft, nontender, nondistended, no guarding, no rebound  Extremities: No peripheral edema  Vascular: 2+ peripheral pulses  Neurological: A/O x 1, no focal deficits  Musculoskeletal: 5/5 strength b/l upper and lower extremities  Skin: No rashes          LABS:                             8.7    9.67  )-----------( 311      ( 04 Jul 2025 06:56 )             27.0     04 Jul 2025 06:56    139    |  110    |  15     ----------------------------<  99     3.7     |  22     |  0.64     Ca    8.9        04 Jul 2025 06:56  Phos  2.7       03 Jul 2025 06:57  Mg     1.8       03 Jul 2025 06:57    TPro  5.9    /  Alb  2.3    /  TBili  0.2    /  DBili  x      /  AST  18     /  ALT  17     /  AlkPhos  102    03 Jul 2025 06:57        LIVER FUNCTIONS - ( 03 Jul 2025 06:57 )  Alb: 2.3 g/dL / Pro: 5.9 gm/dL / ALK PHOS: 102 U/L / ALT: 17 U/L / AST: 18 U/L / GGT: x           Urinalysis Basic - ( 04 Jul 2025 06:56 )    Color: x / Appearance: x / SG: x / pH: x  Gluc: 99 mg/dL / Ketone: x  / Bili: x / Urobili: x   Blood: x / Protein: x / Nitrite: x   Leuk Esterase: x / RBC: x / WBC x   Sq Epi: x / Non Sq Epi: x / Bacteria: x      Culture - Blood (07.02.25 @ 20:24)   Specimen Source: Blood None  Culture Results:   No growth at 24 hours    Culture - Urine (07.02.25 @ 17:52)   Specimen Source: Urine None  Culture Results:   >100,000 CFU/ml Gram Negative Rods    MEDICATIONS  (STANDING):  aspirin enteric coated 81 milliGRAM(s) Oral daily  atorvastatin 80 milliGRAM(s) Oral at bedtime  cefTRIAXone Injectable. 1000 milliGRAM(s) IV Push every 24 hours  escitalopram 5 milliGRAM(s) Oral daily  heparin   Injectable 5000 Unit(s) SubCutaneous every 12 hours  lacosamide IVPB 150 milliGRAM(s) IV Intermittent every 12 hours  levETIRAcetam   Injectable 500 milliGRAM(s) IV Push every 12 hours  traZODone 25 milliGRAM(s) Oral at bedtime    MEDICATIONS  (PRN):  acetaminophen     Tablet .. 650 milliGRAM(s) Oral every 6 hours PRN Temp greater or equal to 38C (100.4F), Mild Pain (1 - 3)  aluminum hydroxide/magnesium hydroxide/simethicone Suspension 30 milliLiter(s) Oral every 4 hours PRN Dyspepsia  LORazepam   Injectable 1 milliGRAM(s) IV Push once PRN Agitation  melatonin 3 milliGRAM(s) Oral at bedtime PRN Insomnia  ondansetron Injectable 4 milliGRAM(s) IV Push every 8 hours PRN Nausea and/or Vomiting      RADIOLOGY & ADDITIONAL TESTS:      ACC: [id number] EXAM:  XR CHEST PORTABLE URGENT 1V   ORDERED BY: ELY NEGRON     PROCEDURE DATE:  07/02/2025          INTERPRETATION:  AP erect chest on July 2, 2025 at 5:15 PM. Patient had a   seizure.    Heart possibly enlarged.    On March10 this year there were bilateral infiltrates left greater than   right. Presently lungs are clear. No fracture. Advanced shoulder   degeneration noted.    IMPRESSION: No acute finding at this time.          ACC: [id number] EXAM:  CT BRAIN   ORDERED BY: ELY NEGRON     PROCEDURE DATE:  07/02/2025          INTERPRETATION:  CT head without IV contrast    CLINICAL INFORMATION:     [facility]    TECHNIQUE:  Contiguous axial 3 mm thick images were acquired.  This data   set was reconstructed in the sagittal and coronal planes.  Contrast was   not administered for this examination.  CONTRAST:    None  DOSE INFORMATION:   This scan was performed using automatic exposure   control (radiation dose reduction software) to obtain a diagnostic image   quality scan with patient dose as low as reasonably achievable.   Total   DLP for this examination is estimated at 778 mGy*cm.    COMPARISON:    CT head stroke code and CT angiography 3/10/2025 and MR   brain 3/11/2025    FINDINGS:    BRAIN:    The brain  remains significant for remote infarction within the   left temporal lobe posterior middle temporal gyrus region. Cortical loss   is associated with white matter diminished attenuation that extends to   the ependymal margin of the atrium and temporal horn of the left lateral   ventricle. No interval lesion is recognized.   Cerebral cortical   gray-white matter differentiation is maintained.  No acute cerebral   cortical infarct is seen.  No intracranial hemorrhage is found.  No mass   effect is found in the brain.    Moderately extensive patchy and confluent indistinct diminished   attenuation within the deep cerebral hemispheric white matter suggests   ischemic white matter disease. This most prominently involves the   periatrial and posterior periventricular white matter but is widespread.   At least mild brainstem involvement is present.    CSF SPACES:  The ventricles, sulci and basal cisterns appear moderately   dilated reflecting diffuse brain volume loss.    VESSELS:   Intracranial vasculature is significant for atherosclerotic   calcifications within the cavernous and clinoid segments of the internal   carotid arteries. There is also involvement of each vertebral artery.    HEAD AND NECK STRUCTURES:  The orbits demonstrate small calcifications at   each optic disc without associated mass lesion. There is evidence of   globe surgery.  The included paranasal sinuses  are clear.  The nasal   cavity appears intact.  The nasopharynx is symmetric.  The central skull   base is intact.  The temporal bones demonstrate patent petrous air cells.    The calvarium appears unremarkable.      IMPRESSION:    1. Left temporal lobe remote infarction  2. Ischemic white matter disease and atrophy typical for age  3. No definite adverse interval change March 2025

## 2025-07-05 NOTE — SWALLOW BEDSIDE ASSESSMENT ADULT - ORAL PREPARATORY PHASE
See below for specific non nutritive clinical swallowing testing findings.
No oral alerting response to presence of spoon at lip or mouth. no mouth closure and liquid dribbled from the right.

## 2025-07-05 NOTE — SWALLOW BEDSIDE ASSESSMENT ADULT - ASR SWALLOW LINGUAL MOBILITY
YARELIS
Unable to formally assess due to altered mentation with reduced active participation/resistive mouth closing on stimulation. With that being stated, ecchymosis was noted at site of right tongue apex.

## 2025-07-05 NOTE — SWALLOW BEDSIDE ASSESSMENT ADULT - NS SPL SWALLOW CLINIC TRIAL FT
Pt is unable to take PO of any texture 2/2 to continued obtundation.  Keep NPO including meds. Consider alternative nutrition. Disc w/ Nsg. Service will follow.
Pt seen this AM and this afternoon. On both occasions, pt was obtunded. Unable to rouse pt. No purposeful oral motor movements could be elicited, no oral awareness to tactile stimuli identified, gag absent, & triggering of reflexive pharyngeal swallows was highly infrequent. Pt's exhibits inadequate alertness for/orientation to feeding atop non functional Oropharyngeal Dysphagia which precludes PO candidacy at this time. Not a candidate for nutritive swallow testing. Pt's obtundation/reduced alertness/non functional Oropharyngeal Dysphagia are in setting of encephalopathy from suspected seizure activity/UTI, advanced Dementia and prior non acute CVA.

## 2025-07-05 NOTE — SWALLOW BEDSIDE ASSESSMENT ADULT - PHARYNGEAL PHASE
No pharyngeal swallow activated. Pt is unable to participate in eating routines.
See below for specific non nutritive clinical swallowing testing findings.

## 2025-07-05 NOTE — SWALLOW BEDSIDE ASSESSMENT ADULT - SWALLOW EVAL: ORAL MUSCULATURE
Pt passively allowed mouth care. Procedure did not rouse her./unable to assess due to poor participation/comprehension

## 2025-07-06 PROCEDURE — 99233 SBSQ HOSP IP/OBS HIGH 50: CPT

## 2025-07-06 PROCEDURE — 95720 EEG PHY/QHP EA INCR W/VEEG: CPT

## 2025-07-06 RX ORDER — FUROSEMIDE 10 MG/ML
20 INJECTION INTRAMUSCULAR; INTRAVENOUS ONCE
Refills: 0 | Status: COMPLETED | OUTPATIENT
Start: 2025-07-06 | End: 2025-07-06

## 2025-07-06 RX ADMIN — LEVETIRACETAM 500 MILLIGRAM(S): 10 INJECTION, SOLUTION INTRAVENOUS at 10:38

## 2025-07-06 RX ADMIN — Medication 110 MILLIGRAM(S): at 22:00

## 2025-07-06 RX ADMIN — HEPARIN SODIUM 5000 UNIT(S): 1000 INJECTION INTRAVENOUS; SUBCUTANEOUS at 22:00

## 2025-07-06 RX ADMIN — FUROSEMIDE 20 MILLIGRAM(S): 10 INJECTION INTRAMUSCULAR; INTRAVENOUS at 12:12

## 2025-07-06 RX ADMIN — LACOSAMIDE 100 MILLIGRAM(S): 150 TABLET, FILM COATED ORAL at 10:38

## 2025-07-06 RX ADMIN — HEPARIN SODIUM 5000 UNIT(S): 1000 INJECTION INTRAVENOUS; SUBCUTANEOUS at 10:38

## 2025-07-06 RX ADMIN — LACOSAMIDE 100 MILLIGRAM(S): 150 TABLET, FILM COATED ORAL at 22:00

## 2025-07-06 RX ADMIN — Medication 10 MILLIGRAM(S): at 23:45

## 2025-07-06 RX ADMIN — Medication 195 MILLIGRAM(S): at 12:12

## 2025-07-06 RX ADMIN — Medication 10 MILLIGRAM(S): at 12:12

## 2025-07-06 RX ADMIN — LEVETIRACETAM 500 MILLIGRAM(S): 10 INJECTION, SOLUTION INTRAVENOUS at 21:59

## 2025-07-06 NOTE — PROGRESS NOTE ADULT - SUBJECTIVE AND OBJECTIVE BOX
HPI:  89 woman  PMHx dementia, HTN< seizures admitted for change in MS. Placed on V EEG monitoring for persistent non convulsive seizures.    MEDICATIONS  (STANDING):  aspirin enteric coated 81 milliGRAM(s) Oral daily  atorvastatin 80 milliGRAM(s) Oral at bedtime  escitalopram 5 milliGRAM(s) Oral daily  heparin   Injectable 5000 Unit(s) SubCutaneous every 12 hours  lacosamide IVPB 200 milliGRAM(s) IV Intermittent every 12 hours  levETIRAcetam   Injectable 500 milliGRAM(s) IV Push every 12 hours  traZODone 25 milliGRAM(s) Oral at bedtime    MEDICATIONS  (PRN):  acetaminophen     Tablet .. 650 milliGRAM(s) Oral every 6 hours PRN Temp greater or equal to 38C (100.4F), Mild Pain (1 - 3)  aluminum hydroxide/magnesium hydroxide/simethicone Suspension 30 milliLiter(s) Oral every 4 hours PRN Dyspepsia  LORazepam   Injectable 1 milliGRAM(s) IV Push once PRN Agitation  melatonin 3 milliGRAM(s) Oral at bedtime PRN Insomnia  ondansetron Injectable 4 milliGRAM(s) IV Push every 8 hours PRN Nausea and/or Vomiting    Vital Signs Last 24 Hrs  T(C): 37.3 (06 Jul 2025 07:50), Max: 37.3 (06 Jul 2025 07:50)  T(F): 99.1 (06 Jul 2025 07:50), Max: 99.1 (06 Jul 2025 07:50)  HR: 95 (06 Jul 2025 07:50) (87 - 95)  BP: 163/96 (06 Jul 2025 07:50) (150/93 - 170/97)  BP(mean): --  RR: 21 (06 Jul 2025 07:50) (20 - 21)  SpO2: 96% (06 Jul 2025 07:50) (94% - 96%)    Parameters below as of 06 Jul 2025 07:50  Patient On (Oxygen Delivery Method): nasal cannula  O2 Flow (L/min): 2      Neurological Exam:    HF: Awake, not responsive, moans, not following commands.     CN: Pupils are equal and reactive. Extra ocular muscles are grossly intact, L NLF flattening, tongue midline.     Motor: Moves RUE spontaneously, LUE flaccid, withdraws  LEs to painful stim.     Sensory: as above    DTR: 0-1/4 all 4 extremities. Babinski are mute.    Co-ord:  Cannot test.     Basic Metabolic Panel in AM (07.05.25 @ 06:40)   Sodium: 140 mmol/L  Potassium: 3.2 mmol/L  Chloride: 111 mmol/L  Carbon Dioxide: 24 mmol/L  Anion Gap: 5 mmol/L  Blood Urea Nitrogen: 9 mg/dL  Creatinine: 0.57 mg/dL  Glucose: 135 mg/dL  Calcium: 8.5 mg/dL  eGFR: 83:     WBC Count: 8.25 K/uL  RBC Count: 2.77 M/uL  Hemoglobin: 8.6 g/dL  Hematocrit: 26.6 %  Mean Cell Volume: 96.0 fl  Mean Cell Hemoglobin: 31.0 pg  Mean Cell Hemoglobin Conc: 32.3 g/dL  Red Cell Distrib Width: 14.6 %  Platelet Count - Automated: 237 K/uL    Phenytoin Level, Serum: 12.5 ug/mL (07.05.25 @ 06:40)       < from: CT Head No Cont (07.02.25 @ 17:14) >  FINDINGS:    BRAIN:    The brain  remains significant for remote infarction within the   left temporal lobe posterior middle temporal gyrus region. Cortical loss   is associated with white matter diminished attenuation that extends to   the ependymal margin of the atrium and temporal horn of the left lateral   ventricle. No interval lesion is recognized.   Cerebral cortical   gray-white matter differentiation is maintained.  No acute cerebral   cortical infarct is seen.  No intracranial hemorrhage is found.  No mass   effect is found in the brain.    Moderately extensive patchy and confluent indistinct diminished   attenuation within the deep cerebral hemispheric white matter suggests   ischemic white matter disease. This most prominently involves the   periatrial and posterior periventricular white matter but is widespread.   At least mild brainstem involvement is present.    CSF SPACES:  The ventricles, sulci and basal cisterns appear moderately   dilated reflecting diffuse brain volume loss.    VESSELS:   Intracranial vasculature is significant for atherosclerotic   calcifications within the cavernous and clinoid segments of the internal   carotid arteries. There is also involvement of each vertebral artery.    HEAD AND NECK STRUCTURES:  The orbits demonstrate small calcifications at   each optic disc without associated mass lesion. There is evidence of   globe surgery.  The included paranasal sinuses  are clear.  The nasal   cavity appears intact.  The nasopharynx is symmetric.  The central skull   base is intact.  The temporal bones demonstrate patent petrous air cells.    The calvarium appears unremarkable.      IMPRESSION:    1. Left temporal lobe remote infarction    2. Ischemic white matter disease and atrophy typical for age    3. No definite adverse interval change March 2025    --- End of Report ---    < end of copied text >        < from: MR Head No Cont (03.11.25 @ 11:10) >  FINDINGS:    Study is motion degraded.    No diffusion restriction to suggest acute infarct. Foci of increased   T2/STIR signal in the deep and periventricular white matter as well as   the diann, compatible with chronic small vessel disease.   Encephalomalacia/gliotic change in the left temporal lobe. Parenchymal   volume loss resulting in a ex vacuo dilatation appearance of the   bilateral ventricles. The visualized extra axial spaces and basal   cisterns are within normal limits. No midline shift or mass effect   present.    The craniocervical junction is within normal limits. The sella is largely   CSF filled. The major intracranial vessels demonstrate the expected   signal void related to vascular flow. Mild mucosal thickening in the   ethmoid air cells. Small bilateral mastoid effusions, left larger than   right. The visualized orbits are within normal limits.      IMPRESSION:    Study is motion degraded.    1.  No evidence of acute infarct or midline shift.  2.  Chronic ischemic changes as discussed above.    < end of copied text >          TECH INFORMATION:   Patient Status: Uncooperative, Lethargic and Recording started July 5, 2025 at 9:27 AM, and terminated July 6, 2025 at 7:57 AM.        History:  This is a 89 year old Female patient with a history of treatment resistant epilepsy.    EEG Report:  Reason for Test: medical management of seizure exacerbation and management of status epilepticus.    DESCRIPTION OF RECORDING:   The recording is generally well organized.     No posterior dominant rhythm is present.     The background consists of generalized polymorphic synchronous theta and delta which is generally medium in amplitude.     Some superimposed faster frequencies are present.     No normal features of wakefulness, drowsiness or sleep are present.     Hyperventilation was not performed.     Photic stimulation was not performed.     Semirhythmic low amplitude theta slowing is present in the right hemispheric region.     Nearly continuous high right hemispheric spikes and sharp waves are present.     These epileptiform discharge(s) had an electro negative maximum over the following electrode(s). T4-T6.    SPECIAL FINDINGS:   The recording is invariant to stimulation.     The recording shows a burst - suppression pattern. Periodic mild to higher amplitude 1 to 2/s spike and polyspike and wave discharges highest amplitude over the right hemisphere especially in the posterior serial temporal head region bursts lasting 1 to 3 seconds in duration, with variable duration amplitude suppression of 1 to 2 seconds.     There is superimposed muscle artifact.    EVENTS:   ·  The portions of the study demarcated by spike and seizure detection algorithms were reviewed. Rare episodes of spike and polyspike and wave 1.5 to 2/s activity seen originating over the right hemisphere in the mid to posterior temporal head region of variable durations, marked clinically on the video by subtle left gaze deviation, and mouth movement.    EEG CLASSIFICATION:   Findings: Abnormal.     Continuous polymorphic right temporal theta and delta slowing.     Generalized background slowing semirhythmic theta and delta.     Poly spike and waves right posterior temporal.     Special findings: Burst-suppression pattern.    INTERPRETATION:   This is an abnormal EEG.     This is an abnormal EEG consistent with severe bilateral diffuse or multifocal.     Additionally, this finding is consistent with a partial seizure disorder having a right posterior temporal origin.     This finding is consistent with a structural lesion in the right hemispheric area.     This recording indicates an active seizure focus in the right posterior temporal cortex.      Electronic Signatures:  Dinesh Dixon)  (Signed 06-Jul-2025 10:08)  	Authored: TECH INFORMATION, DESCRIPTION OF RECORDING, SPECIAL FINDINGS, EVENTS, EEG CLA

## 2025-07-06 NOTE — PROGRESS NOTE ADULT - ASSESSMENT
A: 89-year-old was seen for the following:   -Partial thickness to Right Lateral Malleolus and heel  -Difficulty with ambulation       P:   Chart reviewed and Patient evaluated;  Xray Rt foot reviewed on wet reading showing no acute radiographic findings  WC: Betadine DSD to the wounds on the RLE   Continue use of CAIR boots and offload bilateral heels while bedbound or alternatively place two pillows proximal to the calf  Orthopedic Recc for Right Tibial Fx appreciated  No acute podiatry intervention warranted at this time. Pt to continue with local wound care  All additional care per Med appreciated  Podiatry will follow while in house  Case D/W attending Dr. Zuñiga

## 2025-07-06 NOTE — PROGRESS NOTE ADULT - SUBJECTIVE AND OBJECTIVE BOX
HOSPITALIST ATTENDING PROGRESS NOTE    Chart and meds reviewed.  Patient seen and examined.    Subjective:    Seen this morning with daughter at bedside.  Some minor left-sided facial twitching noted which was self-limited.  Otherwise no evidence of any convulsions on exam.  Patient unable to provide any meaningful history but able to converse with daughter.    Video EEG overnight showing active seizure focus at right posterior oral cortex.    All other systems reviewed and found to be negative with the exception of what has been described above.    MEDICATIONS  (STANDING):  aspirin enteric coated 81 milliGRAM(s) Oral daily  atorvastatin 80 milliGRAM(s) Oral at bedtime  escitalopram 5 milliGRAM(s) Oral daily  heparin   Injectable 5000 Unit(s) SubCutaneous every 12 hours  lacosamide IVPB 200 milliGRAM(s) IV Intermittent every 12 hours  levETIRAcetam   Injectable 500 milliGRAM(s) IV Push every 12 hours  traZODone 25 milliGRAM(s) Oral at bedtime  valproate sodium   IVPB 500 milliGRAM(s) IV Intermittent every 12 hours  valproate sodium   IVPB 1500 milliGRAM(s) IV Intermittent once    MEDICATIONS  (PRN):  acetaminophen     Tablet .. 650 milliGRAM(s) Oral every 6 hours PRN Temp greater or equal to 38C (100.4F), Mild Pain (1 - 3)  aluminum hydroxide/magnesium hydroxide/simethicone Suspension 30 milliLiter(s) Oral every 4 hours PRN Dyspepsia  LORazepam   Injectable 1 milliGRAM(s) IV Push once PRN Agitation  melatonin 3 milliGRAM(s) Oral at bedtime PRN Insomnia  ondansetron Injectable 4 milliGRAM(s) IV Push every 8 hours PRN Nausea and/or Vomiting      VITALS:  T(F): 99.1 (07-06-25 @ 07:50), Max: 99.1 (07-06-25 @ 07:50)  HR: 95 (07-06-25 @ 07:50) (87 - 95)  BP: 163/96 (07-06-25 @ 07:50) (150/93 - 170/97)  RR: 21 (07-06-25 @ 07:50) (20 - 21)  SpO2: 96% (07-06-25 @ 07:50) (94% - 96%)  Wt(kg): --    I&O's Summary    05 Jul 2025 07:01 - 06 Jul 2025 07:00  --------------------------------------------------------  IN: 370 mL / OUT: 600 mL / NET: -230 mL        CAPILLARY BLOOD GLUCOSE          PHYSICAL EXAM:  Constitutional: NAD, eyes open  HEENT: PERR, EOMI, [place], deaf in left ear, [place] in right ear  Neck: Soft and supple, No LAD, No JVD  Respiratory: Breath sounds are clear bilaterally, No wheezing, rales or rhonchi  Cardiovascular: S1 and S2, regular rate and rhythm, no Murmurs, gallops or rubs  Gastrointestinal: Bowel Sounds present, soft, nontender, nondistended, no guarding, no rebound  Extremities: No peripheral edema  Vascular: 2+ peripheral pulses  Neurological: A/O x 1, unable to follow commands   Skin: No rashes    LABS:                            8.6    8.25  )-----------( 237      ( 05 Jul 2025 06:40 )             26.6     07-05    140  |  111[H]  |  9   ----------------------------<  135[H]  3.2[L]   |  24  |  0.57    Ca    8.5      05 Jul 2025 06:40              Urinalysis Basic - ( 05 Jul 2025 06:40 )    Color: x / Appearance: x / SG: x / pH: x  Gluc: 135 mg/dL / Ketone: x  / Bili: x / Urobili: x   Blood: x / Protein: x / Nitrite: x   Leuk Esterase: x / RBC: x / WBC x   Sq Epi: x / Non Sq Epi: x / Bacteria: x              CULTURES:      Additional results/Imaging, I have personally reviewed:    Telemetry, personally reviewed:

## 2025-07-06 NOTE — PROGRESS NOTE ADULT - ASSESSMENT
89-year-old woman with a history of dementia, history of seizures, admitted for change in mental status placed on video EEG recording, and study shows persistent, nearly continuous epileptic discharges central right originate from the right hemisphere.  Initial imaging CT of the head showed no acute changes.  Patient with similar presentation earlier in the year, at that time MRI of the head show again no acute changes.  Patient maintained on levetiracetam 500 mg every 12, and lacosamide at 200 mg twice a day. On July 4, giving a loading dose of phenytoin without any significant change in clinical status, or EEG findings.  Plan:  Give Depacon loading dose, then 500 mg q 12  f/u levels in AM  Continue lacosamide 200 mg twice a day.  Continue levetiracetam 500 mg every 12.  Continue V EEG

## 2025-07-06 NOTE — EEG REPORT - PATIENT STATUS
Uncooperative/Lethargic Recording started July 5, 2025 at 9:27 AM, and terminated July 6, 2025 at 7:57 AM./Uncooperative/Lethargic

## 2025-07-06 NOTE — PROGRESS NOTE ADULT - SUBJECTIVE AND OBJECTIVE BOX
7/6/2025: pt seen by podiatry team today AM, resting bedside, non verbal. RLE is offloading boot, LLE with splint on.    PMH: HTN (hypertension)  Seizure disorder    PSH:    Allergies:  No Known Allergies      Labs:                        8.6    8.25  )-----------( 237      ( 05 Jul 2025 06:40 )             26.6   07-05    140  |  111[H]  |  9   ----------------------------<  135[H]  3.2[L]   |  24  |  0.57    Ca    8.5      05 Jul 2025 06:40    Vital Signs Last 24 Hrs  T(C): 37.3 (06 Jul 2025 07:50), Max: 37.3 (06 Jul 2025 07:50)  T(F): 99.1 (06 Jul 2025 07:50), Max: 99.1 (06 Jul 2025 07:50)  HR: 95 (06 Jul 2025 11:58) (87 - 95)  BP: 171/98 (06 Jul 2025 11:58) (150/93 - 171/98)  BP(mean): --  RR: 21 (06 Jul 2025 07:50) (20 - 21)  SpO2: 96% (06 Jul 2025 07:50) (94% - 96%)    Parameters below as of 06 Jul 2025 07:50  Patient On (Oxygen Delivery Method): nasal cannula  O2 Flow (L/min): 2    REVIEW OF SYSTEMS:  All other review of systems is negative unless indicated above    Physical Exam:   Constitutional: NAD, alert;  Lower Extremity Focus  Derm:  Skin warm, dry and supple bilateral.    Right foot partial thickness wound at the level of lateral mal, wound is approx (2cm X 2 cm) with a fibrogranular wound bed, no malodor, no drainage, no erythema, no clinical SOI. Open lesion to the heel,  wound approx. (0.4 cm X 0.2 cm) with slough, no malodor, no erythema, no fluctuance, no clinical signs infection. LLE with cast  Vascular: Dorsalis Pedis and Posterior Tibial pulses weakly palpable.     Neuro: Couldn't be assessed due to patient disposition  MSK: Could not be assessed due to patient disposition    < from: Xray Foot AP + Lateral + Oblique, Right (07.03.25 @ 17:26) >  INTERPRETATION:  INDICATIONS: Right foot wounds.    COMPARISON: None.    FINDINGS:    3 views of the right foot. There is no evidence of fracture or   dislocation. The soft tissues are within normal limits. Vascular   calcifications. No radiodense foreign body.    IMPRESSION: No fracture or dislocation.    --- End of Report ---    < end of copied text >

## 2025-07-06 NOTE — PROGRESS NOTE ADULT - ASSESSMENT
89 F with a history of aortic stenosis, dementia (baseline alert and oriented ×2–3), seizure disorder, GERD, hyperlipidemia, and hypertension admitted for:     #AMS likely due to seizures and also infection   #Hx of Seizure Disorder  CT head neg luther acute findings  EEG  d/w Dr Douglas: s nearly continuous discharges on EEG, some appear triphasic in morphology others more clearly epileptiform  – neurology recc cont lacosamide and keppra (and 1 loading dose of pheytoin)  – Video EEG showing seizures at right posterior temporal cortex  – Depakote loading dose now then 500 mg every 12 hours  – Follow-up Depakote levels in a.m.  – Continue with Vimpat 200 mg twice daily  – Continue with Keppra 500 mg every 12 hours  – maintain NPO per SLP    #Chronic HFrEF EF 40%, stable   #Acute hypoxemic resp failure  #HTN  #HLD  #aortic stenosis, severe   - r/o CHF, order cxr  - C/w aspirin 81 mg qd, atorvastatin 80 mg qhs On hold due to NPO   - Hold metoprolol 50 qd, losartan 25 mg qd  – Chest x-ray showing opacification of left base consistent with effusion/infiltrate, mild interstitial edema  – Will trial small dose Lasix by way of 20 mg IV now  – For afterload reduction, will maintain patient on hydralazine IV as needed for SBP greater than 160 mmHg.    # GNR UTI  UA abnormal   UCX: Klebsiella, f/u sensitivities  C/w Ceftriaxone     #  L Distal 1/3 Tibial Shaft Fx and Lateral Mal Fx  D/w Ortho team   NWB LLE   Plan for Long leg splint  ortho recc appreciated    #Pressure injuries  -Wound care.     # Acute on Chronic  anemia  BAseline Hb ~10  Hb down to 8.7 , likely has dilutional component as Pt had IVF   Monitor closely   No signs of acute bleeding     # JACKY, prerenal   Likely 2/2 poor po intake  resolved  If pt not able to eat or drink, high risk of volume depletion   D/W dtr that temporary means of feed may need to be considered if she fails speech and swallow eval today  GO meeting monday      #DVT ppx: HSQ    Fall, aspiration, and seizure precautions.     ACP: DNR/DNI, palliative team on board, planned family meeting on 7/7/25   Prognosis guarded

## 2025-07-07 LAB
ANION GAP SERPL CALC-SCNC: 9 MMOL/L — SIGNIFICANT CHANGE UP (ref 5–17)
BUN SERPL-MCNC: 18 MG/DL — SIGNIFICANT CHANGE UP (ref 7–23)
CALCIUM SERPL-MCNC: 9.5 MG/DL — SIGNIFICANT CHANGE UP (ref 8.5–10.1)
CHLORIDE SERPL-SCNC: 106 MMOL/L — SIGNIFICANT CHANGE UP (ref 96–108)
CO2 SERPL-SCNC: 26 MMOL/L — SIGNIFICANT CHANGE UP (ref 22–31)
CREAT SERPL-MCNC: 0.8 MG/DL — SIGNIFICANT CHANGE UP (ref 0.5–1.3)
EGFR: 67 ML/MIN/1.73M2 — SIGNIFICANT CHANGE UP
EGFR: 67 ML/MIN/1.73M2 — SIGNIFICANT CHANGE UP
GLUCOSE SERPL-MCNC: 85 MG/DL — SIGNIFICANT CHANGE UP (ref 70–99)
HCT VFR BLD CALC: 33.2 % — LOW (ref 34.5–45)
HGB BLD-MCNC: 10.8 G/DL — LOW (ref 11.5–15.5)
MCHC RBC-ENTMCNC: 30.9 PG — SIGNIFICANT CHANGE UP (ref 27–34)
MCHC RBC-ENTMCNC: 32.5 G/DL — SIGNIFICANT CHANGE UP (ref 32–36)
MCV RBC AUTO: 94.9 FL — SIGNIFICANT CHANGE UP (ref 80–100)
NRBC # BLD AUTO: 0 K/UL — SIGNIFICANT CHANGE UP (ref 0–0)
NRBC # FLD: 0 K/UL — SIGNIFICANT CHANGE UP (ref 0–0)
NRBC BLD AUTO-RTO: 0 /100 WBCS — SIGNIFICANT CHANGE UP (ref 0–0)
PLATELET # BLD AUTO: 333 K/UL — SIGNIFICANT CHANGE UP (ref 150–400)
PMV BLD: 9.5 FL — SIGNIFICANT CHANGE UP (ref 7–13)
POTASSIUM SERPL-MCNC: 3.3 MMOL/L — LOW (ref 3.5–5.3)
POTASSIUM SERPL-SCNC: 3.3 MMOL/L — LOW (ref 3.5–5.3)
RBC # BLD: 3.5 M/UL — LOW (ref 3.8–5.2)
RBC # FLD: 15 % — HIGH (ref 10.3–14.5)
SODIUM SERPL-SCNC: 141 MMOL/L — SIGNIFICANT CHANGE UP (ref 135–145)
VALPROATE SERPL-MCNC: 48 UG/ML — LOW (ref 50–100)
WBC # BLD: 10.36 K/UL — SIGNIFICANT CHANGE UP (ref 3.8–10.5)
WBC # FLD AUTO: 10.36 K/UL — SIGNIFICANT CHANGE UP (ref 3.8–10.5)

## 2025-07-07 PROCEDURE — 99233 SBSQ HOSP IP/OBS HIGH 50: CPT

## 2025-07-07 PROCEDURE — 95720 EEG PHY/QHP EA INCR W/VEEG: CPT

## 2025-07-07 RX ORDER — LORAZEPAM 4 MG/ML
2 VIAL (ML) INJECTION ONCE
Refills: 0 | Status: DISCONTINUED | OUTPATIENT
Start: 2025-07-07 | End: 2025-07-07

## 2025-07-07 RX ADMIN — Medication 100 MILLIEQUIVALENT(S): at 17:59

## 2025-07-07 RX ADMIN — Medication 57.5 MILLIGRAM(S): at 21:32

## 2025-07-07 RX ADMIN — LEVETIRACETAM 500 MILLIGRAM(S): 10 INJECTION, SOLUTION INTRAVENOUS at 11:07

## 2025-07-07 RX ADMIN — Medication 100 MILLIEQUIVALENT(S): at 14:26

## 2025-07-07 RX ADMIN — Medication 1000 MILLIGRAM(S): at 11:08

## 2025-07-07 RX ADMIN — Medication 100 MILLIEQUIVALENT(S): at 15:51

## 2025-07-07 RX ADMIN — HEPARIN SODIUM 5000 UNIT(S): 1000 INJECTION INTRAVENOUS; SUBCUTANEOUS at 21:32

## 2025-07-07 RX ADMIN — Medication 2 MILLIGRAM(S): at 10:28

## 2025-07-07 RX ADMIN — LACOSAMIDE 100 MILLIGRAM(S): 150 TABLET, FILM COATED ORAL at 21:32

## 2025-07-07 RX ADMIN — LEVETIRACETAM 500 MILLIGRAM(S): 10 INJECTION, SOLUTION INTRAVENOUS at 21:32

## 2025-07-07 RX ADMIN — HEPARIN SODIUM 5000 UNIT(S): 1000 INJECTION INTRAVENOUS; SUBCUTANEOUS at 11:08

## 2025-07-07 RX ADMIN — LACOSAMIDE 100 MILLIGRAM(S): 150 TABLET, FILM COATED ORAL at 11:08

## 2025-07-07 NOTE — PROGRESS NOTE ADULT - NSPROGADDITIONALINFOA_GEN_ALL_CORE
Spoke with patient's daughter, reviewed her mothers present condition, EEG findings persistent epileptic discharges. treatment, potential outcome which at this time is poor, in view of her age underlying conditions.  Decision made to continue treatment for now.

## 2025-07-07 NOTE — PROGRESS NOTE ADULT - ASSESSMENT
89 F with a history of aortic stenosis, dementia (baseline alert and oriented ×2–3), GERD, hyperlipidemia, and hypertension, was sent from her assisted living facility on 7/2 for evaluation following an episode of unresponsiveness. Upon arrival to the ED, she was noted to be non-verbal and unable to provide history. Collateral information was obtained from her daughter and health care proxy, Tammy Hayward ([phone]). Per report, the patient has experienced a gradual decline in health over the past year. Over recent months, she has become bedbound with progressive cognitive decline. Previously, at her baseline, she was able to communicate, recognize family members, and independently eat and drink. On 7/2, staff at the facility witnessed seizure-like activity, prompting EMS activation and transfer to the hospital. No recent illness, trauma, or acute changes in medication were reported at the time of the event. Pt admitted for UTI. Course complicated by RRT for seizure like activity, neurology consulted. Palliative medicine is consulted for assistance with GOC.     Acute Metabolic Encephalopathy   Seizures  Dementia  - bedbound, progressive cognitive decline   - neurology following   - on vimpat, keppra, depacon  - on 24hr EEG monitoring     Acute UTI   - continue abx    Lower Extremity Wounds  - podiatry consulted  - prn pain control  - continue abx     GOC/ACP  Capacity: pt does not have capacity   HCP/Surrogate: no HCP form on file, daughter Tammy Hayward [phone]  Code Status: DNR/DNI with trial of NIV   MOLST:   Dispo Plan: awaiting input from neurology team, considering hospice care - back at [place] vs inpatient based on pt's ability to tolerate PO and other symptom burden     Process of Care  --Reviewed dx/treatment problems and alignment with Goals of Care    Physical Aspects of Care  --Pain  patient denies at this time  c/w current managment    --Bowel Regimen  denies constipation  risk for constipation d/t immobility  daily dulcolax    --Dyspnea  No SOB at this time  comfortable and in NAD    --Nausea Vomiting  denies    --Weakness  PT as tolerated     Psychological and Psychiatric Aspects of Care:   --Greif/Bereavment: emotional support provided  --Hx of psychiatric dx: none  -Pastoral Care Available PRN     Social Aspects of Care  -SW involved     Cultural Aspects  -Primary Language: English    Goals of Care:     We discussed Palliative Care team being a supportive team when a patient has ongoing illnesses.  We also discussed that it is not an end of life care service, but can help navigate symptoms and emotional support througout their hospital stay here.      Ethical and Legal Aspects:   NA        Discussed With: Case coordinated with attending and SW and RN     Time Spent: 90 minutes including the care, coordination and counseling of this patient, excluding time spent on ACP.

## 2025-07-07 NOTE — PROGRESS NOTE ADULT - SUBJECTIVE AND OBJECTIVE BOX
HOSPITALIST ATTENDING PROGRESS NOTE    Chart and meds reviewed.  Patient seen and examined.    Subjective:  Patient noted to be with persistent epileptiform activity originating from right posterior temporal focus on video EEG.  This morning, patient with worsening left-sided convulsive facial spasms consistent with seizure.    Daughter at bedside, amenable to speaking to palliative care team–pending further recommendations from neurology regarding overall prognosis and response to multiple AEDs.  Daughter considering comfort care option, prefers home hospice with VNS.    All other systems reviewed and found to be negative with the exception of what has been described above.    MEDICATIONS  (STANDING):  aspirin enteric coated 81 milliGRAM(s) Oral daily  atorvastatin 80 milliGRAM(s) Oral at bedtime  escitalopram 5 milliGRAM(s) Oral daily  heparin   Injectable 5000 Unit(s) SubCutaneous every 12 hours  lacosamide IVPB 200 milliGRAM(s) IV Intermittent every 12 hours  levETIRAcetam   Injectable 500 milliGRAM(s) IV Push every 12 hours  potassium chloride  10 mEq/100 mL IVPB 10 milliEquivalent(s) IV Intermittent every 1 hour  traZODone 25 milliGRAM(s) Oral at bedtime  valproate sodium   IVPB 750 milliGRAM(s) IV Intermittent every 12 hours    MEDICATIONS  (PRN):  acetaminophen     Tablet .. 650 milliGRAM(s) Oral every 6 hours PRN Temp greater or equal to 38C (100.4F), Mild Pain (1 - 3)  aluminum hydroxide/magnesium hydroxide/simethicone Suspension 30 milliLiter(s) Oral every 4 hours PRN Dyspepsia  hydrALAZINE Injectable 10 milliGRAM(s) IV Push every 6 hours PRN SBP > 160 mmHg  LORazepam   Injectable 1 milliGRAM(s) IV Push once PRN Agitation  melatonin 3 milliGRAM(s) Oral at bedtime PRN Insomnia  ondansetron Injectable 4 milliGRAM(s) IV Push every 8 hours PRN Nausea and/or Vomiting      VITALS:  T(F): 98.4 (07-07-25 @ 08:17), Max: 99.9 (07-06-25 @ 16:18)  HR: 95 (07-07-25 @ 08:17) (88 - 95)  BP: 134/94 (07-07-25 @ 08:17) (129/56 - 176/67)  RR: 20 (07-07-25 @ 08:17) (20 - 20)  SpO2: 98% (07-07-25 @ 08:17) (96% - 98%)  Wt(kg): --    I&O's Summary    06 Jul 2025 07:01  -  07 Jul 2025 07:00  --------------------------------------------------------  IN: 0 mL / OUT: 1050 mL / NET: -1050 mL        CAPILLARY BLOOD GLUCOSE          PHYSICAL EXAM:  Constitutional: actively seizing, left sided facial twitching noted   Respiratory: Breath sounds are clear bilaterally, No wheezing, rales or rhonchi  Cardiovascular: S1 and S2, regular rate and rhythm, no Murmurs, gallops or rubs  Gastrointestinal: Bowel Sounds present, soft, nontender, nondistended, no guarding, no rebound  Extremities: No peripheral edema  Vascular: 2+ peripheral pulses  Neurological: A/O x0  Skin: No rashes    LABS:                            10.8   10.36 )-----------( 333      ( 07 Jul 2025 09:25 )             33.2     07-07    141  |  106  |  18  ----------------------------<  85  3.3[L]   |  26  |  0.80    Ca    9.5      07 Jul 2025 09:25              Urinalysis Basic - ( 07 Jul 2025 09:25 )    Color: x / Appearance: x / SG: x / pH: x  Gluc: 85 mg/dL / Ketone: x  / Bili: x / Urobili: x   Blood: x / Protein: x / Nitrite: x   Leuk Esterase: x / RBC: x / WBC x   Sq Epi: x / Non Sq Epi: x / Bacteria: x              CULTURES:      Additional results/Imaging, I have personally reviewed:    Telemetry, personally reviewed:

## 2025-07-07 NOTE — EEG REPORT - CLINICAL DESRIPTION DETAILS1
During these seizure episodes, there is subtle eye deviation to the left, and at times associated with rhythmic motion of the mouth.

## 2025-07-07 NOTE — EEG REPORT - PATIENT STATUS
Recording started July 6, 2025 at 11 AM, and terminated July 7, 2025 at 12:14 PM./Uncooperative/Lethargic/Comatose

## 2025-07-07 NOTE — PROGRESS NOTE ADULT - ASSESSMENT
89-year-old woman with a history of dementia, history of seizures, admitted for change in mental status placed on video EEG recording, and study shows persistent, nearly continuous epileptic discharges central right originate from the right hemisphere.  Initial imaging CT of the head showed no acute changes.  Patient with similar presentation earlier in the year, at that time MRI of the head show again no acute changes.  Patient maintained on levetiracetam 500 mg every 12, and lacosamide at 200 mg twice a day. On July 4, giving a loading dose of phenytoin without any significant change in clinical status, or EEG findings.  Yesterday given Depacon 1500 mg x 1 bolus, and placed on Depacon 500 mg every 12.  2-day remains with persistent right focal status.  Recommend:  Give extra Depacon 1000 mg IV x 1.  Continue Depacon 750 mg IV every 12  Continue lacosamide 200 mg every 12  Continue levetiracetam 100 mg every 12  Follow-up valproic acid in a.m. tomorrow.         89-year-old woman with a history of dementia, history of seizures, admitted for change in mental status placed on video EEG recording, and study shows persistent, nearly continuous epileptic discharges central right originate from the right hemisphere.  Initial imaging CT of the head showed no acute changes.  Patient with similar presentation earlier in the year, at that time MRI of the head show again no acute changes.  Patient maintained on levetiracetam 500 mg every 12, and lacosamide at 200 mg twice a day. On July 4, giving a loading dose of phenytoin without any significant change in clinical status, or EEG findings.  Yesterday given Depacon 1500 mg x 1 bolus, and placed on Depacon 500 mg every 12.  2-day remains with persistent right focal status.  Recommend:  Give extra Depacon 1000 mg IV x 1.  Continue Depacon 750 mg IV every 12  Continue lacosamide 200 mg every 12  Continue levetiracetam 100 mg every 12  Follow-up valproic acid in a.m. tomorrow.      Discussed with Dr. Hussein.

## 2025-07-07 NOTE — PROGRESS NOTE ADULT - ASSESSMENT
A: 89-year-old was seen for the following:   -Partial thickness to Right Lateral Malleolus and heel  -Difficulty with ambulation       P:   Chart reviewed and Patient evaluated;  Xray Rt foot reviewed on wet reading showing no acute radiographic findings  WC: Betadine DSD to the wounds on the RLE   Continue use of CAIR boots and offload bilateral heels while bedbound or alternatively place two pillows proximal to the calf  Orthopedic Recc for Right Tibial Fx appreciated  No acute podiatry intervention warranted at this time. Pt to continue with local wound care  All additional care per Med appreciated  Podiatry will follow while in house    Case D/W attending Dr. Zuñiga       Wound care Instructions for Right Lateral ankle and Heel wounds to be changed Every other day:  1. Please Gently remove the old dressings  2. Apply betadine pain to Lateral ankle and heel wounds, cover with allevyn pad   Thank you     A: 89-year-old was seen for the following:   -Partial thickness to Right Lateral Malleolus and heel  -Difficulty with ambulation       P:   Chart reviewed and Patient evaluated;  Xray Rt foot reviewed on wet reading showing no acute radiographic findings  WC: Betadine DSD to the wounds on the RLE   Continue use of CAIR boots and offload bilateral heels while bedbound or alternatively place two pillows proximal to the calf  Orthopedic Recc for Right Tibial Fx appreciated  No acute podiatry intervention warranted at this time. Pt to continue with local wound care  All additional care per Med appreciated  Podiatry will sign off with nursing order. Please reconsult when needed.    Case D/W attending Dr. Zuñiga       Wound care Instructions for Right Lateral ankle and Heel wounds to be changed Every other day:  1. Please Gently remove the old dressings  2. Apply betadine pain to Lateral ankle and heel wounds, cover with allevyn pad   Thank you

## 2025-07-07 NOTE — PROGRESS NOTE ADULT - SUBJECTIVE AND OBJECTIVE BOX
7/7/2025: Pt seen by podiatry team. Pt was resting comfortably bedside. RLE is offloading boot, LLE with splint on.    PMH: HTN (hypertension)  Seizure disorder    PSH:    Allergies:  No Known Allergies      Labs:                                 10.8   10.36 )-----------( 333      ( 07 Jul 2025 09:25 )             33.2             07-07    141  |  106  |  18  ----------------------------<  85  3.3[L]   |  26  |  0.80    Ca    9.5      07 Jul 2025 09:25    Vital Signs Last 24 Hrs  T(C): 36.9 (07 Jul 2025 08:17), Max: 37.7 (06 Jul 2025 16:18)  T(F): 98.4 (07 Jul 2025 08:17), Max: 99.9 (06 Jul 2025 16:18)  HR: 95 (07 Jul 2025 08:17) (88 - 95)  BP: 134/94 (07 Jul 2025 08:17) (129/56 - 176/67)  BP(mean): --  RR: 20 (07 Jul 2025 08:17) (20 - 20)  SpO2: 98% (07 Jul 2025 08:17) (96% - 98%)    Parameters below as of 07 Jul 2025 08:17  Patient On (Oxygen Delivery Method): nasal cannula  O2 Flow (L/min): 2      REVIEW OF SYSTEMS:  All other review of systems is negative unless indicated above    Physical Exam:   Constitutional: NAD, alert;  Lower Extremity Focus  Derm:  Skin warm, dry and supple bilateral.    Right foot partial thickness wound at the level of lateral mal, wound is approx (2cm X 2 cm) with a fibrogranular wound bed, no malodor, no drainage, no erythema, no clinical SOI. Open lesion to the heel,  wound approx. (0.4 cm X 0.2 cm) with slough, no malodor, no erythema, no fluctuance, no clinical signs infection. LLE with cast  Vascular: Dorsalis Pedis and Posterior Tibial pulses weakly palpable.     Neuro: Couldn't be assessed due to patient disposition  MSK: Could not be assessed due to patient disposition    < from: Xray Foot AP + Lateral + Oblique, Right (07.03.25 @ 17:26) >  INTERPRETATION:  INDICATIONS: Right foot wounds.    COMPARISON: None.    FINDINGS:    3 views of the right foot. There is no evidence of fracture or   dislocation. The soft tissues are within normal limits. Vascular   calcifications. No radiodense foreign body.    IMPRESSION: No fracture or dislocation.    --- End of Report ---    < end of copied text >

## 2025-07-07 NOTE — PROGRESS NOTE ADULT - ASSESSMENT
89 F with a history of aortic stenosis, dementia (baseline alert and oriented ×2–3), seizure disorder, GERD, hyperlipidemia, and hypertension admitted for:     #AMS likely due to seizures and also infection   #Hx of Seizure Disorder  CT head neg luther acute findings  EEG  d/w Dr Douglas: s nearly continuous discharges on EEG, some appear triphasic in morphology others more clearly epileptiform  – neurology recc cont lacosamide and keppra (and 1 loading dose of pheytoin)  – Video EEG showing seizures at right posterior temporal cortex  – maintain NPO per SLP  – Persistent seizure activity, now with convulsive episodes mainly involving left face  Give extra Depacon 1000 mg IV x 1.  Continue Depacon 750 mg IV every 12  Continue lacosamide 200 mg every 12  Continue levetiracetam 100 mg every 12  Follow-up valproic acid in a.m. tomorrow.      #Chronic HFrEF EF 40%, stable   #Acute hypoxemic resp failure  #HTN  #HLD  #aortic stenosis, severe   - r/o CHF, order cxr  - C/w aspirin 81 mg qd, atorvastatin 80 mg qhs On hold due to NPO   - Hold metoprolol 50 qd, losartan 25 mg qd  – Chest x-ray showing opacification of left base consistent with effusion/infiltrate, mild interstitial edema  – s/p Lasix  20 mg IV 7/6  – For afterload reduction, will maintain patient on hydralazine IV as needed for SBP greater than 160 mmHg.    # GNR UTI  UA abnormal   UCX: Klebsiella, f/u sensitivities  C/w Ceftriaxone     #  L Distal 1/3 Tibial Shaft Fx and Lateral Mal Fx  D/w Ortho team   NWB LLE   Plan for Long leg splint  ortho recc appreciated    #Pressure injuries  -Wound care.     # Acute on Chronic  anemia  BAseline Hb ~10  Hb down to 8.7 , likely has dilutional component as Pt had IVF   Monitor closely   No signs of acute bleeding     # JACKY, prerenal   Likely 2/2 poor po intake  resolved  If pt not able to eat or drink, high risk of volume depletion       #DVT ppx: HSQ    Fall, aspiration, and seizure precautions.     ACP: DNR/DNI, palliative team on board, planned family meeting on 7/7/25 - daughter leaning towards comfort approach, pending further input from neurology   Prognosis guarded

## 2025-07-07 NOTE — PROGRESS NOTE ADULT - SUBJECTIVE AND OBJECTIVE BOX
Examined pt at bedside, pt asleep, NAD,  on 24hr EEG monitoring .  Daughter Tammy at bedside - see note for [facility]       Review of Systems  Unable to obtain/Limited due to: current condition         PHYSICAL EXAM:    Vital Signs Last 24 Hrs  T(C): 36.9 (2025 08:17), Max: 37.7 (2025 16:18)  T(F): 98.4 (2025 08:17), Max: 99.9 (2025 16:18)  HR: 95 (2025 08:17) (88 - 95)  BP: 134/94 (2025 08:17) (129/56 - 176/67)  BP(mean): --  RR: 20 (2025 08:17) (20 - 20)  SpO2: 98% (2025 08:17) (96% - 98%)    Parameters below as of 2025 08:17  Patient On (Oxygen Delivery Method): nasal cannula  O2 Flow (L/min): 2    Daily     Daily Weight in k.1 (2025 06:17)    PPSV2: 40 %  FAST:    General: elderly female, asleep, NAD   Lungs: normal resp effort   Cardiac: rrr  GI: soft, nontender  Ext: well perfused, b/l bandages over wounds   Neuro: on EEG     LABS:                        10.8   10.36 )-----------( 333      ( 2025 09:25 )             33.2     -    141  |  106  |  18  ----------------------------<  85  3.3[L]   |  26  |  0.80    Ca    9.5      2025 09:25        Albumin: Albumin: 2.3 g/dL ( @ 06:57)      Allergies    No Known Allergies    Intolerances      MEDICATIONS  (STANDING):  aspirin enteric coated 81 milliGRAM(s) Oral daily  atorvastatin 80 milliGRAM(s) Oral at bedtime  escitalopram 5 milliGRAM(s) Oral daily  heparin   Injectable 5000 Unit(s) SubCutaneous every 12 hours  lacosamide IVPB 200 milliGRAM(s) IV Intermittent every 12 hours  levETIRAcetam   Injectable 500 milliGRAM(s) IV Push every 12 hours  potassium chloride  10 mEq/100 mL IVPB 10 milliEquivalent(s) IV Intermittent every 1 hour  traZODone 25 milliGRAM(s) Oral at bedtime  valproate sodium   IVPB 750 milliGRAM(s) IV Intermittent every 12 hours    MEDICATIONS  (PRN):  acetaminophen     Tablet .. 650 milliGRAM(s) Oral every 6 hours PRN Temp greater or equal to 38C (100.4F), Mild Pain (1 - 3)  aluminum hydroxide/magnesium hydroxide/simethicone Suspension 30 milliLiter(s) Oral every 4 hours PRN Dyspepsia  hydrALAZINE Injectable 10 milliGRAM(s) IV Push every 6 hours PRN SBP > 160 mmHg  LORazepam   Injectable 1 milliGRAM(s) IV Push once PRN Agitation  melatonin 3 milliGRAM(s) Oral at bedtime PRN Insomnia  ondansetron Injectable 4 milliGRAM(s) IV Push every 8 hours PRN Nausea and/or Vomiting      RADIOLOGY:

## 2025-07-07 NOTE — PROGRESS NOTE ADULT - SUBJECTIVE AND OBJECTIVE BOX
HPI:  89 F MHx dementia, HTN< seizures admitted for change in MS. Placed on V EEG monitoring for persistent non convulsive seizures.    MEDICATIONS  (STANDING):  aspirin enteric coated 81 milliGRAM(s) Oral daily  atorvastatin 80 milliGRAM(s) Oral at bedtime  escitalopram 5 milliGRAM(s) Oral daily  heparin   Injectable 5000 Unit(s) SubCutaneous every 12 hours  lacosamide IVPB 200 milliGRAM(s) IV Intermittent every 12 hours  levETIRAcetam   Injectable 500 milliGRAM(s) IV Push every 12 hours  traZODone 25 milliGRAM(s) Oral at bedtime  valproate sodium   IVPB 500 milliGRAM(s) IV Intermittent every 12 hours  valproate sodium Injectable 1000 milliGRAM(s) IV Push once    MEDICATIONS  (PRN):  acetaminophen     Tablet .. 650 milliGRAM(s) Oral every 6 hours PRN Temp greater or equal to 38C (100.4F), Mild Pain (1 - 3)  aluminum hydroxide/magnesium hydroxide/simethicone Suspension 30 milliLiter(s) Oral every 4 hours PRN Dyspepsia  hydrALAZINE Injectable 10 milliGRAM(s) IV Push every 6 hours PRN SBP > 160 mmHg  LORazepam   Injectable 1 milliGRAM(s) IV Push once PRN Agitation  melatonin 3 milliGRAM(s) Oral at bedtime PRN Insomnia  ondansetron Injectable 4 milliGRAM(s) IV Push every 8 hours PRN Nausea and/or Vomiting    Vital Signs Last 24 Hrs  T(C): 36.9 (07-07-25 @ 08:17), Max: 37.7 (07-06-25 @ 16:18)  T(F): 98.4 (07-07-25 @ 08:17), Max: 99.9 (07-06-25 @ 16:18)  HR: 95 (07-07-25 @ 08:17) (88 - 100)  BP: 134/94 (07-07-25 @ 08:17) (129/56 - 176/67)  BP(mean): --  RR: 20 (07-07-25 @ 08:17) (20 - 20)  SpO2: 98% (07-07-25 @ 08:17) (96% - 98%)        Neurological Exam:    HF: Eyes open, doesnt acknowledge examiner.     CN:Pupils are equal and reactive. Left gaze preference, rhythmic jerks of face.     Motor: LUE flaccid, moves RUE spont.     Sensory: withdraws RUE to painful stim    DTR: 0-1/4 all 4 extremities. Babinski are mute bilateral.    Co-ord:  Cannot test.     Basic Metabolic Panel (07.07.25 @ 09:25)   Sodium: 141 mmol/L  Potassium: 3.3 mmol/L  Chloride: 106 mmol/L  Carbon Dioxide: 26 mmol/L  Anion Gap: 9 mmol/L  Blood Urea Nitrogen: 18 mg/dL  Creatinine: 0.80 mg/dL  Glucose: 85 mg/dL  Calcium: 9.5 mg/dL  eGFR: 67:  Complete Blood Count (07.07.25 @ 09:25)   Auto NRBC: 0 /100 WBCs  WBC Count: 10.36 K/uL  RBC Count: 3.50 M/uL  Hemoglobin: 10.8 g/dL  Hematocrit: 33.2 %  Mean Cell Volume: 94.9 fl  Mean Cell Hemoglobin: 30.9 pg  Mean Cell Hemoglobin Conc: 32.5 g/dL  Red Cell Distrib Width: 15.0 %  Platelet Count - Automated: 333 K/uL    Valproic Acid Level, Serum: 48 ug/mL (07.07.25 @ 09:25)     < from: EEG w/ Video Each 12-26 Hours, Unmonitored (07.06.25 @ 10:58) >    ACC: [id number] EXAM:  VEEG EA 12-26 HR UNMONITORED   ORDERED BY: KYE HONG     PROCEDURE DATE:  07/06/2025          INTERPRETATION:  24 hour video EEG recording started July 5, 2025 and   ended July 6, 2025.    IMPRESSION: Abnormal, becauseof generalized diffuse cerebral   dysfunction, right hemisphere structural and epileptiform activities with   right hemisphere focal seizures. Please see full report in the chart.    --- End of Report ---    < end of copied text >

## 2025-07-08 LAB
ANION GAP SERPL CALC-SCNC: 7 MMOL/L — SIGNIFICANT CHANGE UP (ref 5–17)
BUN SERPL-MCNC: 23 MG/DL — SIGNIFICANT CHANGE UP (ref 7–23)
CALCIUM SERPL-MCNC: 9.1 MG/DL — SIGNIFICANT CHANGE UP (ref 8.5–10.1)
CHLORIDE SERPL-SCNC: 108 MMOL/L — SIGNIFICANT CHANGE UP (ref 96–108)
CO2 SERPL-SCNC: 27 MMOL/L — SIGNIFICANT CHANGE UP (ref 22–31)
CREAT SERPL-MCNC: 0.62 MG/DL — SIGNIFICANT CHANGE UP (ref 0.5–1.3)
CULTURE RESULTS: SIGNIFICANT CHANGE UP
CULTURE RESULTS: SIGNIFICANT CHANGE UP
EGFR: 81 ML/MIN/1.73M2 — SIGNIFICANT CHANGE UP
EGFR: 81 ML/MIN/1.73M2 — SIGNIFICANT CHANGE UP
GLUCOSE SERPL-MCNC: 70 MG/DL — SIGNIFICANT CHANGE UP (ref 70–99)
HCT VFR BLD CALC: 31.2 % — LOW (ref 34.5–45)
HGB BLD-MCNC: 10 G/DL — LOW (ref 11.5–15.5)
MCHC RBC-ENTMCNC: 30.7 PG — SIGNIFICANT CHANGE UP (ref 27–34)
MCHC RBC-ENTMCNC: 32.1 G/DL — SIGNIFICANT CHANGE UP (ref 32–36)
MCV RBC AUTO: 95.7 FL — SIGNIFICANT CHANGE UP (ref 80–100)
NRBC # BLD AUTO: 0 K/UL — SIGNIFICANT CHANGE UP (ref 0–0)
NRBC # FLD: 0 K/UL — SIGNIFICANT CHANGE UP (ref 0–0)
NRBC BLD AUTO-RTO: 0 /100 WBCS — SIGNIFICANT CHANGE UP (ref 0–0)
PLATELET # BLD AUTO: 283 K/UL — SIGNIFICANT CHANGE UP (ref 150–400)
PMV BLD: 9 FL — SIGNIFICANT CHANGE UP (ref 7–13)
POTASSIUM SERPL-MCNC: 3.7 MMOL/L — SIGNIFICANT CHANGE UP (ref 3.5–5.3)
POTASSIUM SERPL-SCNC: 3.7 MMOL/L — SIGNIFICANT CHANGE UP (ref 3.5–5.3)
RBC # BLD: 3.26 M/UL — LOW (ref 3.8–5.2)
RBC # FLD: 14.8 % — HIGH (ref 10.3–14.5)
SODIUM SERPL-SCNC: 142 MMOL/L — SIGNIFICANT CHANGE UP (ref 135–145)
SPECIMEN SOURCE: SIGNIFICANT CHANGE UP
SPECIMEN SOURCE: SIGNIFICANT CHANGE UP
VALPROATE SERPL-MCNC: 74 UG/ML — SIGNIFICANT CHANGE UP (ref 50–100)
WBC # BLD: 6.01 K/UL — SIGNIFICANT CHANGE UP (ref 3.8–10.5)
WBC # FLD AUTO: 6.01 K/UL — SIGNIFICANT CHANGE UP (ref 3.8–10.5)

## 2025-07-08 PROCEDURE — 95720 EEG PHY/QHP EA INCR W/VEEG: CPT

## 2025-07-08 PROCEDURE — 99232 SBSQ HOSP IP/OBS MODERATE 35: CPT

## 2025-07-08 RX ADMIN — Medication 10 MILLIGRAM(S): at 09:52

## 2025-07-08 RX ADMIN — LACOSAMIDE 100 MILLIGRAM(S): 150 TABLET, FILM COATED ORAL at 21:34

## 2025-07-08 RX ADMIN — Medication 57.5 MILLIGRAM(S): at 22:25

## 2025-07-08 RX ADMIN — LACOSAMIDE 100 MILLIGRAM(S): 150 TABLET, FILM COATED ORAL at 09:54

## 2025-07-08 RX ADMIN — LEVETIRACETAM 500 MILLIGRAM(S): 10 INJECTION, SOLUTION INTRAVENOUS at 21:16

## 2025-07-08 RX ADMIN — Medication 57.5 MILLIGRAM(S): at 11:02

## 2025-07-08 RX ADMIN — HEPARIN SODIUM 5000 UNIT(S): 1000 INJECTION INTRAVENOUS; SUBCUTANEOUS at 11:17

## 2025-07-08 RX ADMIN — HEPARIN SODIUM 5000 UNIT(S): 1000 INJECTION INTRAVENOUS; SUBCUTANEOUS at 21:16

## 2025-07-08 RX ADMIN — LEVETIRACETAM 500 MILLIGRAM(S): 10 INJECTION, SOLUTION INTRAVENOUS at 09:54

## 2025-07-08 NOTE — EEG REPORT - BURST SUPPRESSION DETAILS
Periodic mild to higher amplitude 1 to 2/s spike and polyspike and wave discharges highest amplitude over the right hemisphere especially in the posterior serial temporal head region bursts lasting 1 to 3 seconds in duration, with variable duration amplitude suppression of 1 to 2 seconds.
Noted throughout the record, sharply contoured bi- triphasic in configuration moderate to high amplitude 1 to 2/s discharge with highest amplitude of the right hemisphere especially the mid to posterior temporal head region, with variable duration and intervals of attenuation of the underlying rhythms lasting 1 to 2 seconds.
Episodes of 1 to 2/s spike, polyspike and wave discharges seen best over the right hemisphere especially the mid to posterior temporal head region, followed by periods of background amplitude attenuation lasting 1 to 2 seconds in duration.

## 2025-07-08 NOTE — CHART NOTE - NSCHARTNOTEFT_GEN_A_CORE
Clinical Nutrition Follow Up Note:    *89 year old admitted for AMS -> stroke vs seizure vs encephalopathy 2/2 UTI  PMH of aortic stenosis, dementia (baseline alert and oriented ×2–3), GERD, hyperlipidemia, and hypertension, was sent from her assisted living facility for evaluation following an episode of unresponsiveness. Upon arrival to the ED, she was noted to be non-verbal and unable to provide history. Collateral information was obtained from her daughter and health care proxy, Tammy Hayward ([phone]). Per report, the patient has experienced a gradual decline in health over the past year. Over recent months, she has become bedbound with progressive cognitive decline. Previously, at her baseline, she was able to communicate, recognize family members, and independently eat and drink. Today, staff at the facility witnessed seizure-like activity, prompting EMS activation and transfer to the hospital. GOC confirmed regarding nutrition support: is DNR/DNI w/ NFT  IA completed on 7/3/25     *Current Status: 7/8 - Seen by palliative on 7/7 - discussion w/ HCP daughter; pt w/o capacity -> inpatient hospice vs. home hospice. Seen by SLP on 7/3 & 7/5 - keep NPO including meds; unable to assess due to poor participation/comprehension. Has been NPO x 6 days; however, is DNR/DNI w/ NFT. No nutrition interventions warranted; RD to not follow. Reconsult RD if GOC/ status changes.    *Labs Reviewed:  07-08    142  |  108  |  23  ----------------------------<  70  3.7   |  27  |  0.62    Ca    9.1      08 Jul 2025 07:16        BMI: BMI (kg/m2): 24.4 (07-02-25 @ 16:11)  HbA1c: A1C with Estimated Average Glucose Result: 5.6 % (07-03-25 @ 06:57)  Glucose: POCT Blood Glucose.: 158 mg/dL (07-02-25 @ 22:26)    BP: 178/72 (07-08-25 @ 08:47) (129/56 - 178/72)Vital Signs Last 24 Hrs  T(C): 36.5 (07-08-25 @ 08:47), Max: 36.7 (07-07-25 @ 23:49)  T(F): 97.7 (07-08-25 @ 08:47), Max: 98.1 (07-07-25 @ 23:49)  HR: 87 (07-08-25 @ 08:47) (72 - 87)  BP: 178/72 (07-08-25 @ 08:47) (140/59 - 178/72)  BP(mean): --  RR: 19 (07-08-25 @ 08:47) (18 - 20)  SpO2: 100% (07-08-25 @ 08:47) (96% - 100%)    Lipid Panel: Date/Time: 07-03-25 @ 06:57  Cholesterol, Serum: 127  LDL Cholesterol Calculated: 70  HDL Cholesterol, Serum: 40  Triglycerides, Serum: 94      CAPILLARY BLOOD GLUCOSE  *none doc'd       *pertinent meds: Vimpat, Keppra, Ativan, 10mEq KCl given x 24 hrs, Depacon     *I and O's:    07-07-25 @ 07:01  -  07-08-25 @ 07:00  --------------------------------------------------------  IN:  Total IN: 0 mL    OUT:    Voided (mL): 450 mL  Total OUT: 450 mL    Total NET: -450 mL      *BM: none doc'd; w/ fecal incon  ; pt NOT bowel regimen.  *robbin score of 8 : R ankle unstageable, R heel unstageable, L ankle SDTI, stage 1 R heel, PU documented  *Edema: none doc'd       *PO intake: NPO x 6 days; consuming ~0% of estimated nutr needs.    *Malnutrition dx: Pt meets criteria for moderate malnutrition in context of acute on chronic illness r/t decreased ability to meet increased needs 2/2 stroke vs seizure vs encephalopathy 2/2 UTI on dementia, adv age AEB mild-mod muscle/fat wasting    Diet, NPO:   Except Medications (07-02-25 @ 22:38) [Active]      Estimated Needs: Based on 52.6Kg taken by RD on 7/3/25  Calories: 1578-1841Kcal (30-35Kcal/Kg)  Protein: 95-116g (1.8-2.2g/Kg)  Fluids:  1578-1841mL (30-35mL/Kg)    *Wt Hx:  Daily   Height (cm): 162.6 (07-02-25 @ 16:11)  Weight (kg): 64.4 (07-02-25 @ 16:11)  BMI (kg/m2): 24.4 (07-02-25 @ 16:11)  BSA (m2): 1.69 (07-02-25 @ 16:11)    *Goal: No nutrition goals & interventions warranted; RD to not follow. Reconsult RD if GOC/ status changes.    Recommendations:  1) ADAT per MD   2) No nutrition interventions warranted; RD to not follow. Reconsult RD if GOC/ status changes.      Denisse Lee, MS, RDN, CDN [phone]

## 2025-07-08 NOTE — EEG REPORT - THI PORTIONS OF STUDY
Findings consistent with the above described generalized epileptic discharges, when compared to the video recording, there is a slight gaze deviation to the left, occasional jerking movements of the mouth.
There are frequent generalized spike, polyspike and wave at 2 to 3/s moderate to high amplitude discharges with highest amplitude over the right hemisphere especially the frontal to temporal head region.
Rare episodes of spike and polyspike and wave 1.5 to 2/s activity seen originating over the right hemisphere in the mid to posterior temporal head region of variable durations, marked clinically on the video by subtle left gaze deviation, and mouth movement.

## 2025-07-08 NOTE — EEG REPORT - NS EEG HISTORY CONTINUOUS VIDEO EEG SUG
medical management of seizure exacerbation/management of status epilepticus
management of status epilepticus
medical management of seizure exacerbation

## 2025-07-08 NOTE — PROGRESS NOTE ADULT - SUBJECTIVE AND OBJECTIVE BOX
HOSPITALIST ATTENDING PROGRESS NOTE    Chart and meds reviewed.  Patient seen and examined.    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  Patient seen this morning, still having twitches bilateral face.  Unresponsive to voice or touch.    All other systems reviewed and found to be negative with the exception of what has been described above.    MEDICATIONS  (STANDING):  aspirin enteric coated 81 milliGRAM(s) Oral daily  atorvastatin 80 milliGRAM(s) Oral at bedtime  escitalopram 5 milliGRAM(s) Oral daily  heparin   Injectable 5000 Unit(s) SubCutaneous every 12 hours  lacosamide IVPB 200 milliGRAM(s) IV Intermittent every 12 hours  levETIRAcetam   Injectable 500 milliGRAM(s) IV Push every 12 hours  traZODone 25 milliGRAM(s) Oral at bedtime  valproate sodium   IVPB 750 milliGRAM(s) IV Intermittent every 12 hours    MEDICATIONS  (PRN):  acetaminophen     Tablet .. 650 milliGRAM(s) Oral every 6 hours PRN Temp greater or equal to 38C (100.4F), Mild Pain (1 - 3)  aluminum hydroxide/magnesium hydroxide/simethicone Suspension 30 milliLiter(s) Oral every 4 hours PRN Dyspepsia  hydrALAZINE Injectable 10 milliGRAM(s) IV Push every 6 hours PRN SBP > 160 mmHg  LORazepam   Injectable 1 milliGRAM(s) IV Push once PRN Agitation  melatonin 3 milliGRAM(s) Oral at bedtime PRN Insomnia  ondansetron Injectable 4 milliGRAM(s) IV Push every 8 hours PRN Nausea and/or Vomiting      VITALS:  T(F): 97.7 (07-08-25 @ 15:48), Max: 98.1 (07-07-25 @ 23:49)  HR: 66 (07-08-25 @ 15:48) (66 - 87)  BP: 111/59 (07-08-25 @ 15:48) (111/59 - 178/72)  RR: 20 (07-08-25 @ 15:48) (18 - 20)  SpO2: 100% (07-08-25 @ 15:48) (96% - 100%)  Wt(kg): --    I&O's Summary    07 Jul 2025 07:01  -  08 Jul 2025 07:00  --------------------------------------------------------  IN: 0 mL / OUT: 450 mL / NET: -450 mL    08 Jul 2025 07:01  -  08 Jul 2025 17:04  --------------------------------------------------------  IN: 0 mL / OUT: 150 mL / NET: -150 mL        CAPILLARY BLOOD GLUCOSE          PHYSICAL EXAM:  Constitutional: actively seizing, left sided facial twitching noted   Respiratory: Breath sounds are clear bilaterally, No wheezing, rales or rhonchi  Cardiovascular: S1 and S2, regular rate and rhythm, no Murmurs, gallops or rubs  Gastrointestinal: Bowel Sounds present, soft, nontender, nondistended, no guarding, no rebound  Extremities: No peripheral edema  Vascular: 2+ peripheral pulses  Neurological: A/O x0  Skin: No rashes    LABS:                            10.0   6.01  )-----------( 283      ( 08 Jul 2025 07:16 )             31.2     07-08    142  |  108  |  23  ----------------------------<  70  3.7   |  27  |  0.62    Ca    9.1      08 Jul 2025 07:16              Urinalysis Basic - ( 08 Jul 2025 07:16 )    Color: x / Appearance: x / SG: x / pH: x  Gluc: 70 mg/dL / Ketone: x  / Bili: x / Urobili: x   Blood: x / Protein: x / Nitrite: x   Leuk Esterase: x / RBC: x / WBC x   Sq Epi: x / Non Sq Epi: x / Bacteria: x              CULTURES:      Additional results/Imaging, I have personally reviewed:    Telemetry, personally reviewed:

## 2025-07-08 NOTE — EEG REPORT - THIS IS AN ABNORMAL EEG
"CHIEF COMPLAINT  Follow-up for back and joint pain.    Subjective   Anne Jewell is a 71 y.o. female  who presents for follow-up.  She has a history of back and joint pain.  Today her pain is 5/10VAS in severity. She describes her pain as continuous aching pain.  Her pain is worsened by certain movements and activities, bending, twisting, lifting, and use of her joints, it is improved by rest and medication. She continues with OxyContin 80 mg 2 tablets every 12 hours and baclofen 10 mg TID (prescribed by PCP).  This medication regimen decreases her pain by a moderate amount. \"It gives me my life back\".  She has returned to swimming weekly for physical activity. ADLs by self. She denies any side effects including somnolence or constipation.     Patient remained masked during entire encounter. No cough present. I donned a mask and eye protection throughout entire visit. Prior to donning mask and eye protection, hand hygiene was performed, as well as when it was doffed.  I was closer than 6 feet, but not for an extended period of time. No obvious exposure to any bodily fluids.    Back Pain  This is a chronic problem. The current episode started more than 1 year ago. The problem occurs constantly. The problem is unchanged. The pain is present in the lumbar spine. The quality of the pain is described as aching. The pain radiates to the left foot and right foot. The pain is at a severity of 5/10. The pain is moderate. The symptoms are aggravated by stress (weather changes). Associated symptoms include numbness (bilateral feet). Pertinent negatives include no abdominal pain, bladder incontinence, bowel incontinence, chest pain, dysuria, fever, headaches or weakness. She has tried analgesics, bed rest, home exercises and heat (OxyContin, aquatherapy) for the symptoms. The treatment provided moderate relief.   Joint Pain  This is a chronic problem. The current episode started more than 1 year ago. The problem occurs "
constantly. The problem has been unchanged. Associated symptoms include arthralgias, fatigue, joint swelling, myalgias, neck pain and numbness (bilateral feet). Pertinent negatives include no abdominal pain, chest pain, chills, congestion, coughing, fever, headaches, nausea, vomiting or weakness. She has tried oral narcotics for the symptoms. The treatment provided moderate relief.      PEG Assessment   What number best describes your pain on average in the past week?6  What number best describes how, during the past week, pain has interfered with your enjoyment of life?5  What number best describes how, during the past week, pain has interfered with your general activity?  5    The following portions of the patient's history were reviewed and updated as appropriate: allergies, current medications, past family history, past medical history, past social history, past surgical history and problem list.    Review of Systems   Constitutional: Positive for fatigue. Negative for chills and fever.   HENT: Negative for congestion.    Eyes: Negative for visual disturbance.   Respiratory: Negative for cough, shortness of breath and wheezing.    Cardiovascular: Positive for palpitations. Negative for chest pain and leg swelling.   Gastrointestinal: Negative for abdominal pain, bowel incontinence, constipation, diarrhea, nausea and vomiting.   Genitourinary: Negative for bladder incontinence, difficulty urinating and dysuria.   Musculoskeletal: Positive for arthralgias, back pain, joint swelling, myalgias and neck pain.   Neurological: Positive for numbness (bilateral feet). Negative for weakness and headaches.   Psychiatric/Behavioral: Positive for sleep disturbance. Negative for suicidal ideas. The patient is not nervous/anxious.      --  The aforementioned information the Chief Complaint section and above subjective data including any HPI data, and also the Review of Systems data, has been personally reviewed and 
"affirmed.  --    Vitals:    07/27/21 1126   BP: 146/69   Pulse: 58   Resp: 18   Temp: 97.1 °F (36.2 °C)   SpO2: 98%   Weight: 90.1 kg (198 lb 11.2 oz)   Height: 154.9 cm (61\")   PainSc:   5   PainLoc: Generalized     Objective   Physical Exam  Vitals and nursing note reviewed.   Constitutional:       Appearance: Normal appearance. She is well-developed.   Eyes:      General: Lids are normal.   Cardiovascular:      Rate and Rhythm: Normal rate.   Pulmonary:      Effort: Pulmonary effort is normal.   Musculoskeletal:      Cervical back: Normal range of motion.      Lumbar back: Tenderness present. Decreased range of motion.      Comments: Kyphotic curvature noted  Diffuse arthritic changes   Neurological:      Mental Status: She is alert and oriented to person, place, and time.      Gait: Gait abnormal (rollator).   Psychiatric:         Attention and Perception: Attention normal.         Mood and Affect: Mood normal.         Speech: Speech normal.         Behavior: Behavior normal.         Judgment: Judgment normal.       Assessment/Plan   Diagnoses and all orders for this visit:    1. Encounter for long-term (current) use of high-risk medication (Primary)    2. Chronic pain syndrome    3. Lumbar radiculopathy    4. Generalized osteoarthritis    5. Degeneration of intervertebral disc of lumbar region    6. Rheumatoid arthritis, involving unspecified site, unspecified whether rheumatoid factor present (CMS/[facility])    --- The urine drug screen confirmation from 5/25/221 has been reviewed and the result is appropriate based on patient history and VICENTA report  --- Refill OxyContin 80 mg. DNF 8/5/2021 applied. Patient appears stable with current regimen. No adverse effects. Regarding continuation of opioids, there is no evidence of aberrant behavior or any red flags.  The patient continues with appropriate response to opioid therapy. ADL's remain intact by self.   --- CSA updated 12/17/2020  --- Follow-up 1 month or sooner "
if needed.      [facility]ICENTA REPORT  As part of the patient's treatment plan, I am prescribing controlled substances. The patient has been made aware of appropriate use of such medications, including potential risk of somnolence, limited ability to drive and/or work safely, and the potential for dependence or overdose. It has also bee made clear that these medications are for use by this patient only, without concomitant use of alcohol or other substances unless prescribed.     Patient has completed prescribing agreement detailing terms of continued prescribing of controlled substances, including monitoring VICENTA reports, urine drug screening, and pill counts if necessary. The patient is aware that inappropriate use will results in cessation of prescribing such medications.    As the clinician, I personally reviewed the VICENTA from 7/27/2021 while the patient was in the office today.    History and physical exam exhibit continued safe and appropriate use of controlled substances.     Dictated utilizing Dragon dictation.   
Statement Selected

## 2025-07-08 NOTE — EEG REPORT - NS EEG RECRD BACKGROUND Q12 RES
polymorphic asynchronous theta and delta
polymorphic asynchronous theta and delta
polymorphic synchronous theta and delta

## 2025-07-08 NOTE — CHART NOTE - NSCHARTNOTEFT_GEN_A_CORE
Message left via telephone for daughterTammy to follow up and offer support.  Awaiting call back.  Our team to continue to follow.

## 2025-07-08 NOTE — PROGRESS NOTE ADULT - ASSESSMENT
89-year-old woman with a history of dementia, history of seizures, admitted for change in mental status. video EEG recording, and study shows persistent, nearly continuous epileptic discharges central right originate from the right hemisphere.  Initial imaging CT of the head showed no acute changes. Patient maintained on levetiracetam 500 mg every 12, and lacosamide at 200 mg twice a day. Depacon added 750 mg every 12. VPA serum level this AM therapeutic. No change in V EEG. Prognosis poor.  Would consider adding a fourth agent, phenobarbital, or anesthetic but this raises the possibility of respiratory compromise.  Discussion with the daughter yesterday, which she stated she would prefer not to go down that route.  Will discontinue video EEG.    Recommend:  Follow-up with palliative care  Continue Depacon 750 mg IV every 12  Continue lacosamide 200 mg every 12  Continue levetiracetam 500 mg every 12

## 2025-07-08 NOTE — PROGRESS NOTE ADULT - SUBJECTIVE AND OBJECTIVE BOX
HPI:    89 F MHx dementia, HTN< seizures admitted for change in MS. Overnight continues on V EEG monitoring for persistent non convulsive seizures.      MEDICATIONS  (STANDING):  aspirin enteric coated 81 milliGRAM(s) Oral daily  atorvastatin 80 milliGRAM(s) Oral at bedtime  escitalopram 5 milliGRAM(s) Oral daily  heparin   Injectable 5000 Unit(s) SubCutaneous every 12 hours  lacosamide IVPB 200 milliGRAM(s) IV Intermittent every 12 hours  levETIRAcetam   Injectable 500 milliGRAM(s) IV Push every 12 hours  traZODone 25 milliGRAM(s) Oral at bedtime  valproate sodium   IVPB 750 milliGRAM(s) IV Intermittent every 12 hours    MEDICATIONS  (PRN):  acetaminophen     Tablet .. 650 milliGRAM(s) Oral every 6 hours PRN Temp greater or equal to 38C (100.4F), Mild Pain (1 - 3)  aluminum hydroxide/magnesium hydroxide/simethicone Suspension 30 milliLiter(s) Oral every 4 hours PRN Dyspepsia  hydrALAZINE Injectable 10 milliGRAM(s) IV Push every 6 hours PRN SBP > 160 mmHg  LORazepam   Injectable 1 milliGRAM(s) IV Push once PRN Agitation  melatonin 3 milliGRAM(s) Oral at bedtime PRN Insomnia  ondansetron Injectable 4 milliGRAM(s) IV Push every 8 hours PRN Nausea and/or Vomiting      Vital Signs Last 24 Hrs  T(C): 36.5 (08 Jul 2025 08:47), Max: 36.7 (07 Jul 2025 23:49)  T(F): 97.7 (08 Jul 2025 08:47), Max: 98.1 (07 Jul 2025 23:49)  HR: 87 (08 Jul 2025 08:47) (72 - 87)  BP: 178/72 (08 Jul 2025 08:47) (140/59 - 178/72)  BP(mean): --  RR: 19 (08 Jul 2025 08:47) (18 - 20)  SpO2: 100% (08 Jul 2025 08:47) (96% - 100%)    Parameters below as of 08 Jul 2025 08:47  Patient On (Oxygen Delivery Method): nasal cannula  O2 Flow (L/min): 2    Neurological Exam:    HF: Eyes open, non verbal, not responding or following commands    CN:  Pupils are equal and reactive. Extra ocular muscles are intact. L NLFD. Tongue is midline. Other CN II-XII are intact.     Motor: not cooperative, moves RUE spont, not purposeful.     Sensory: slight withdrawal to feet with painful stim    DTR: 0-1/4 all 4 extremities. Babinski are mute bilateral.    Co-ord:  Cannot test.     Basic Metabolic Panel (07.08.25 @ 07:16)   Sodium: 142 mmol/L  Potassium: 3.7 mmol/L  Chloride: 108 mmol/L  Carbon Dioxide: 27 mmol/L  Anion Gap: 7 mmol/L  Blood Urea Nitrogen: 23 mg/dL  Creatinine: 0.62 mg/dL  Glucose: 70 mg/dL  Calcium: 9.1 mg/dL  eGFR: 81:  Valproic Acid Level, Serum: 74 ug/mL (07.08.25 @ 07:16)   < from: CT Head No Cont (07.02.25 @ 17:14) >  FINDINGS:    BRAIN:    The brain  remains significant for remote infarction within the   left temporal lobe posterior middle temporal gyrus region. Cortical loss   is associated with white matter diminished attenuation that extends to   the ependymal margin of the atrium and temporal horn of the left lateral   ventricle. No interval lesion is recognized.   Cerebral cortical   gray-white matter differentiation is maintained.  No acute cerebral   cortical infarct is seen.  No intracranial hemorrhage is found.  No mass   effect is found in the brain.    Moderately extensive patchy and confluent indistinct diminished   attenuation within the deep cerebral hemispheric white matter suggests   ischemic white matter disease. This most prominently involves the   periatrial and posterior periventricular white matter but is widespread.   At least mild brainstem involvement is present.    CSF SPACES:  The ventricles, sulci and basal cisterns appear moderately   dilated reflecting diffuse brain volume loss.    VESSELS:   Intracranial vasculature is significant for atherosclerotic   calcifications within the cavernous and clinoid segments of the internal   carotid arteries. There is also involvement of each vertebral artery.    HEAD AND NECK STRUCTURES:  The orbits demonstrate small calcifications at   each optic disc without associated mass lesion. There is evidence of   globe surgery.  The included paranasal sinuses  are clear.  The nasal   cavity appears intact.  The nasopharynx is symmetric.  The central skull   base is intact.  The temporal bones demonstrate patent petrous air cells.    The calvarium appears unremarkable.      IMPRESSION:    1. Left temporal lobe remote infarction    2. Ischemic white matter disease and atrophy typical for age    3. No definite adverse interval change March 2025    < end of copied text >  EEG CLASSIFICATION:   Findings: Abnormal.     Generalized background slowing polymorphic and semirhythmic theta and delta.     Generalized: spikes and sharp waves.     Special findings: Burst-suppression pattern.    INTERPRETATION:   This is an abnormal EEG.     This is an abnormal EEG consistent with severe bilateral diffuse or multifocal.     Additionally, this finding is consistent with a partial seizure disorder having a right posterior temporal origin.     This recording indicates an active seizure focus in the right posterior temporal cortex.      Electronic Signatures:  Dinesh Dixon)  (Signed 08-Jul-2025 12:36)  	Authored: TECH INFORMATION, DESCRIPTION OF RECORDING, SPECIAL FINDINGS, EVENTS, EEG CLASSIFICATION, INTERPRETATION

## 2025-07-08 NOTE — PROGRESS NOTE ADULT - ASSESSMENT
89 F with a history of aortic stenosis, dementia (baseline alert and oriented ×2–3), seizure disorder, GERD, hyperlipidemia, and hypertension admitted for:     #AMS likely due to seizures and also infection   #Hx of Seizure Disorder  CT head neg luther acute findings  EEG  d/w Dr Douglas: s nearly continuous discharges on EEG, some appear triphasic in morphology others more clearly epileptiform  – neurology recc cont lacosamide and keppra (and 1 loading dose of pheytoin)  – Video EEG showing seizures at right posterior temporal cortex  – maintain NPO per SLP  – Persistent seizure activity, now with convulsive episodes mainly involving left face  Give extra Depacon 1000 mg IV x 1.  Continue Depacon 750 mg IV every 12  Continue lacosamide 200 mg every 12  Continue levetiracetam 100 mg every 12      #Chronic HFrEF EF 40%, stable   #Acute hypoxemic resp failure  #HTN  #HLD  #aortic stenosis, severe   - r/o CHF, order cxr  - C/w aspirin 81 mg qd, atorvastatin 80 mg qhs On hold due to NPO   - Hold metoprolol 50 qd, losartan 25 mg qd  – Chest x-ray showing opacification of left base consistent with effusion/infiltrate, mild interstitial edema  – s/p Lasix  20 mg IV 7/6  – For afterload reduction, will maintain patient on hydralazine IV as needed for SBP greater than 160 mmHg.    # GNR UTI  UA abnormal   UCX: Klebsiella, f/u sensitivities  C/w Ceftriaxone     #  L Distal 1/3 Tibial Shaft Fx and Lateral Mal Fx  D/w Ortho team   NWB LLE   Plan for Long leg splint  ortho recc appreciated    #Pressure injuries  -Wound care.     # Acute on Chronic  anemia  BAseline Hb ~10  Hb down to 8.7 , likely has dilutional component as Pt had IVF   Monitor closely   No signs of acute bleeding     # JACKY, prerenal   Likely 2/2 poor po intake  resolved  If pt not able to eat or drink, high risk of volume depletion       #DVT ppx: HSQ    Fall, aspiration, and seizure precautions.     ACP: DNR/DNI, palliative team on board, planned family meeting on 7/7/25 - daughter leaning towards comfort approach, pending further input from neurology   Prognosis guarded

## 2025-07-08 NOTE — EEG REPORT - BILATERAL PERIODIC EPILEP DETAILS
There are frequent runs of bilateral, high amplitude spike and polyspike and wave discharges 1-1/2 to 2/s.  Is running 5 to 30 seconds in duration.

## 2025-07-09 LAB
ANION GAP SERPL CALC-SCNC: 9 MMOL/L — SIGNIFICANT CHANGE UP (ref 5–17)
BUN SERPL-MCNC: 28 MG/DL — HIGH (ref 7–23)
CALCIUM SERPL-MCNC: 9.3 MG/DL — SIGNIFICANT CHANGE UP (ref 8.5–10.1)
CHLORIDE SERPL-SCNC: 108 MMOL/L — SIGNIFICANT CHANGE UP (ref 96–108)
CO2 SERPL-SCNC: 24 MMOL/L — SIGNIFICANT CHANGE UP (ref 22–31)
CREAT SERPL-MCNC: 0.66 MG/DL — SIGNIFICANT CHANGE UP (ref 0.5–1.3)
EGFR: 80 ML/MIN/1.73M2 — SIGNIFICANT CHANGE UP
EGFR: 80 ML/MIN/1.73M2 — SIGNIFICANT CHANGE UP
GLUCOSE SERPL-MCNC: 69 MG/DL — LOW (ref 70–99)
HCT VFR BLD CALC: 32.4 % — LOW (ref 34.5–45)
HGB BLD-MCNC: 10.2 G/DL — LOW (ref 11.5–15.5)
MCHC RBC-ENTMCNC: 30.5 PG — SIGNIFICANT CHANGE UP (ref 27–34)
MCHC RBC-ENTMCNC: 31.5 G/DL — LOW (ref 32–36)
MCV RBC AUTO: 97 FL — SIGNIFICANT CHANGE UP (ref 80–100)
NRBC # BLD AUTO: 0 K/UL — SIGNIFICANT CHANGE UP (ref 0–0)
NRBC # FLD: 0 K/UL — SIGNIFICANT CHANGE UP (ref 0–0)
NRBC BLD AUTO-RTO: 0 /100 WBCS — SIGNIFICANT CHANGE UP (ref 0–0)
PLATELET # BLD AUTO: 268 K/UL — SIGNIFICANT CHANGE UP (ref 150–400)
PMV BLD: 9.7 FL — SIGNIFICANT CHANGE UP (ref 7–13)
POTASSIUM SERPL-MCNC: 3.7 MMOL/L — SIGNIFICANT CHANGE UP (ref 3.5–5.3)
POTASSIUM SERPL-SCNC: 3.7 MMOL/L — SIGNIFICANT CHANGE UP (ref 3.5–5.3)
RBC # BLD: 3.34 M/UL — LOW (ref 3.8–5.2)
RBC # FLD: 14.6 % — HIGH (ref 10.3–14.5)
SODIUM SERPL-SCNC: 141 MMOL/L — SIGNIFICANT CHANGE UP (ref 135–145)
WBC # BLD: 6.83 K/UL — SIGNIFICANT CHANGE UP (ref 3.8–10.5)
WBC # FLD AUTO: 6.83 K/UL — SIGNIFICANT CHANGE UP (ref 3.8–10.5)

## 2025-07-09 PROCEDURE — 99233 SBSQ HOSP IP/OBS HIGH 50: CPT

## 2025-07-09 PROCEDURE — 99232 SBSQ HOSP IP/OBS MODERATE 35: CPT

## 2025-07-09 RX ADMIN — LACOSAMIDE 100 MILLIGRAM(S): 150 TABLET, FILM COATED ORAL at 10:34

## 2025-07-09 RX ADMIN — HEPARIN SODIUM 5000 UNIT(S): 1000 INJECTION INTRAVENOUS; SUBCUTANEOUS at 10:33

## 2025-07-09 RX ADMIN — LEVETIRACETAM 500 MILLIGRAM(S): 10 INJECTION, SOLUTION INTRAVENOUS at 10:34

## 2025-07-09 RX ADMIN — Medication 57.5 MILLIGRAM(S): at 21:29

## 2025-07-09 RX ADMIN — LACOSAMIDE 100 MILLIGRAM(S): 150 TABLET, FILM COATED ORAL at 21:30

## 2025-07-09 RX ADMIN — LEVETIRACETAM 500 MILLIGRAM(S): 10 INJECTION, SOLUTION INTRAVENOUS at 20:58

## 2025-07-09 RX ADMIN — Medication 57.5 MILLIGRAM(S): at 11:32

## 2025-07-09 RX ADMIN — HEPARIN SODIUM 5000 UNIT(S): 1000 INJECTION INTRAVENOUS; SUBCUTANEOUS at 20:58

## 2025-07-09 NOTE — PROGRESS NOTE ADULT - SUBJECTIVE AND OBJECTIVE BOX
HPI:  [birth year]   89 F MHx dementia, HTN< seizures admitted for change in MS. No sign events overnight, unchanged, min responsive        MEDICATIONS  (STANDING):  aspirin enteric coated 81 milliGRAM(s) Oral daily  atorvastatin 80 milliGRAM(s) Oral at bedtime  escitalopram 5 milliGRAM(s) Oral daily  heparin   Injectable 5000 Unit(s) SubCutaneous every 12 hours  lacosamide IVPB 200 milliGRAM(s) IV Intermittent every 12 hours  levETIRAcetam   Injectable 500 milliGRAM(s) IV Push every 12 hours  traZODone 25 milliGRAM(s) Oral at bedtime  valproate sodium   IVPB 750 milliGRAM(s) IV Intermittent every 12 hours    MEDICATIONS  (PRN):  acetaminophen     Tablet .. 650 milliGRAM(s) Oral every 6 hours PRN Temp greater or equal to 38C (100.4F), Mild Pain (1 - 3)  aluminum hydroxide/magnesium hydroxide/simethicone Suspension 30 milliLiter(s) Oral every 4 hours PRN Dyspepsia  hydrALAZINE Injectable 10 milliGRAM(s) IV Push every 6 hours PRN SBP > 160 mmHg  LORazepam   Injectable 1 milliGRAM(s) IV Push once PRN Agitation  melatonin 3 milliGRAM(s) Oral at bedtime PRN Insomnia  ondansetron Injectable 4 milliGRAM(s) IV Push every 8 hours PRN Nausea and/or Vomiting      Vital Signs Last 24 Hrs  T(C): 36.5 (09 Jul 2025 07:54), Max: 36.7 (08 Jul 2025 23:21)  T(F): 97.7 (09 Jul 2025 07:54), Max: 98.1 (08 Jul 2025 23:21)  HR: 79 (09 Jul 2025 07:54) (66 - 81)  BP: 169/63 (09 Jul 2025 07:54) (105/56 - 169/63)  BP(mean): --  RR: 17 (09 Jul 2025 07:54) (17 - 20)  SpO2: 100% (09 Jul 2025 07:54) (99% - 100%)    Parameters below as of 09 Jul 2025 07:54  Patient On (Oxygen Delivery Method): nasal cannula  O2 Flow (L/min): 2    Neurological Exam:    HF: Unresponsive to verbal commands    CN: Pupils are equal and reactive. Extra ocular muscles are intact. There is no facial droop or asymmetry. Tongue is midline. Sensation is intact in the face. Other CN II-XII are intact.     Motor: Limited, moves RUE spont, withdraws RLE to pain, not LUE.     Sensory: as above     DTR: 1-2/4 all 4 extremities. Babinski is negative on R, wearing Boot on L foot.    Co-ord:  cannot test    Complete Blood Count (07.09.25 @ 08:13)   Auto NRBC: 0 /100 WBCs  WBC Count: 6.83 K/uL  RBC Count: 3.34 M/uL  Hemoglobin: 10.2 g/dL  Hematocrit: 32.4 %  Mean Cell Volume: 97.0 fl  Mean Cell Hemoglobin: 30.5 pg  Mean Cell Hemoglobin Conc: 31.5 g/dL  Red Cell Distrib Width: 14.6 %  Platelet Count - Automated: 268 K/uL  MPV: 9.7 fL  Basic Metabolic Panel (07.09.25 @ 08:13)   Sodium: 141 mmol/L  Potassium: 3.7 mmol/L  Chloride: 108 mmol/L  Carbon Dioxide: 24 mmol/L  Anion Gap: 9 mmol/L  Blood Urea Nitrogen: 28 mg/dL  Creatinine: 0.66 mg/dL  Glucose: 69 mg/dL  Calcium: 9.3 mg/dL  eGFR: 80:     EEG CLASSIFICATION:   Findings: Abnormal.     Generalized background slowing polymorphic and semirhythmic theta and delta.     Generalized: spikes and sharp waves.     Special findings: Burst-suppression pattern.    INTERPRETATION:   This is an abnormal EEG.     This is an abnormal EEG consistent with severe bilateral diffuse or multifocal.     Additionally, this finding is consistent with a partial seizure disorder having a right posterior temporal origin.     This recording indicates an active seizure focus in the right posterior temporal cortex.      Electronic Signatures:  Dinesh Dixon)  (Signed 08-Jul-2025 12:36)  	Authored: TECH INFOR

## 2025-07-09 NOTE — PROGRESS NOTE ADULT - CONVERSATION DETAILS
Palliative team revisited with pt and daughter at bedside. Pt with continuous seizures, unable to participate in discussion.   Daughter states she is awaiting update from hospitalist and neurologist today.   We discussed concern for ongoing seizures despite further optimization of medications.   We again reviewed hospice philosophy and inpatient hospice criteria which pt does meet at this time due to need for IV AEDs for seizure control. Also reviewed prognosis, daughter asks if pt would receive follow up care regaring LLE cast, to which we advised that hospice care would focus on keeping pt pain free and out of discomfort, however there would be no orthopedic/podiatric follow up as we worry her time is limited.   Daughter aware of poor prognosis, however believes her mother will "surprise us all and live much longer than that" also shares a story about a friend's  who was at inpatient hospice for over a year - we explained that inpatient hospice is not a long term facility and if pt were to stabilize there, the team there would work with daughter to determine next steps, however this is very unlikely.     At this time Tammy states she would like to talk to hospitalist further. She also states she will need to discuss with her sister and brother. States brother is the most unrealistic as he has seen pt "bounce back" from previous episodes, however Tammy feels pt will not improve this time. She says that brother states "she's living" however Tammy states "this isn't living"     Additional emotional support and counseling provided.
Palliative team met with Tammy power at the bedside to discuss GOC, assist with planning and provide supportive counseling.  Palliative role explained.  Emotional support provided.  Daughter shared Pts overall decline the past year, noting a year ago Pt resided at home independently, cooking, cleaning and navigating 4 flights of stairs.  Daughter shared Pt moved into [place] [facility] about 2 months ago following HERBER at [facility].  She states since February Pt has been bedbound, required wound care but was able to communicate with family and recognize family members.  Daughters feelings explored.  Support provided.    Daughter shared Pts independence was very important to her noting her mother had expressed her desire to remain at home therefore she could die at home.  She notes how difficult having aide services at home was following Pts loss of independence.  We discussed the option to change the focus of care from cure and treatment to comfort and quality with the support of hospice.  The philosophy of hospice discussed.  We reviewed stopping medical work ups, hospitalizations, blood work, xrays, etc to treat symptoms to ensure Pt is comfortable and not in distress.  The services provided at home vs inpatient hospice reviewed.  We reviewed the qualifications for inpatient hospice.    Advance directives reviewed.  Daughter reports she is the health care agent.  She states she gave a copy of the HCP to HH the past 2 hospitalizations.  States she informs her sister and brother of her decisions.  Her youngest brother has down syndrome and is not involved in decisions.  MOLST confirmed, DNR/DNI/trial NIV.      Daughter considering a comfort focus, awaiting neurology input before making a decision.  Plan to be further determined.  We discussed inpatient vs home hospice.  Daughter shared she would desire [facility] inpatient hospice if Pt does not improve.  Educated of palliative team availability.  Emotional support provided.  Our team to continue to follow.

## 2025-07-09 NOTE — PROGRESS NOTE ADULT - ASSESSMENT
89 F with a history of aortic stenosis, dementia (baseline alert and oriented ×2–3), seizure disorder, GERD, hyperlipidemia, and hypertension admitted for:     #AMS likely due to seizures and also infection   #Hx of Seizure Disorder  CT head neg luther acute findings  EEG  d/w Dr Douglas: s nearly continuous discharges on EEG, some appear triphasic in morphology others more clearly epileptiform  – neurology recc cont lacosamide and keppra (and 1 loading dose of pheytoin)  – Video EEG showing seizures at right posterior temporal cortex  – maintain NPO per SLP  – Persistent seizure activity, now with convulsive episodes mainly involving left face  Give extra Depacon 1000 mg IV x 1.  Continue Depacon 750 mg IV every 12  Continue lacosamide 200 mg every 12  Continue levetiracetam 100 mg every 12      #Chronic HFrEF EF 40%, stable   #Acute hypoxemic resp failure  #HTN  #HLD  #aortic stenosis, severe   - r/o CHF, order cxr  - C/w aspirin 81 mg qd, atorvastatin 80 mg qhs On hold due to NPO   - Hold metoprolol 50 qd, losartan 25 mg qd  – Chest x-ray showing opacification of left base consistent with effusion/infiltrate, mild interstitial edema  – s/p Lasix  20 mg IV 7/6  – For afterload reduction, will maintain patient on hydralazine IV as needed for SBP greater than 160 mmHg.    # GNR UTI  UA abnormal   UCX: Klebsiella, f/u sensitivities  C/w Ceftriaxone     #  L Distal 1/3 Tibial Shaft Fx and Lateral Mal Fx  D/w Ortho team   NWB LLE   Plan for Long leg splint  ortho recc appreciated    #Pressure injuries  -Wound care.     # Acute on Chronic  anemia  BAseline Hb ~10  Hb down to 8.7 , likely has dilutional component as Pt had IVF   Monitor closely   No signs of acute bleeding     # JACKY, prerenal   Likely 2/2 poor po intake  resolved  If pt not able to eat or drink, high risk of volume depletion       #DVT ppx: HSQ    Fall, aspiration, and seizure precautions.     ACP: DNR/DNI, palliative team on board

## 2025-07-09 NOTE — PROGRESS NOTE ADULT - PROVIDER SPECIALTY LIST ADULT
Hospitalist
Neurology
Podiatry
Hospitalist
Neurology
Podiatry
Neurology
Hospitalist
Palliative Care
Hospitalist
Palliative Care

## 2025-07-09 NOTE — PROGRESS NOTE ADULT - ASSESSMENT
89 F with a history of aortic stenosis, dementia (baseline alert and oriented ×2–3), GERD, hyperlipidemia, and hypertension, was sent from her assisted living facility on 7/2 for evaluation following an episode of unresponsiveness. Upon arrival to the ED, she was noted to be non-verbal and unable to provide history. Collateral information was obtained from her daughter and health care proxy, Tammy Hayward ([phone]). Per report, the patient has experienced a gradual decline in health over the past year. Over recent months, she has become bedbound with progressive cognitive decline. Previously, at her baseline, she was able to communicate, recognize family members, and independently eat and drink. On 7/2, staff at the facility witnessed seizure-like activity, prompting EMS activation and transfer to the hospital. No recent illness, trauma, or acute changes in medication were reported at the time of the event. Pt admitted for UTI. Course complicated by RRT for seizure like activity, neurology consulted. Palliative medicine is consulted for assistance with GOC.     Acute Metabolic Encephalopathy   Seizures  Dementia  - bedbound, progressive cognitive decline   - neurology following   - on vimpat, keppra, depacon    Acute UTI   - continue abx    L tibial and fibia fx  Lower Extremity Wounds  - podiatry, ortho consulted , pt s/p LLE cast   - prn pain control, wound care   - continue abx     GOC/ACP  Capacity: pt does not have capacity   HCP/Surrogate: no HCP form on file, daughter Tammy Hayward [phone]  Code Status: DNR/DNI with trial of NIV   MOLST:   Dispo Plan: for inpatient hospice at [facility] - wants to discuss with hospitalist as well as her siblings.     Process of Care  --Reviewed dx/treatment problems and alignment with Goals of Care    Physical Aspects of Care  --Pain  patient denies at this time  c/w current managment    --Bowel Regimen  denies constipation  risk for constipation d/t immobility  daily dulcolax    --Dyspnea  No SOB at this time  comfortable and in NAD    --Nausea Vomiting  denies    --Weakness  PT as tolerated     Psychological and Psychiatric Aspects of Care:   --Greif/Bereavment: emotional support provided  --Hx of psychiatric dx: none  -Pastoral Care Available PRN     Social Aspects of Care  -SW involved     Cultural Aspects  -Primary Language: English    Goals of Care:     We discussed Palliative Care team being a supportive team when a patient has ongoing illnesses.  We also discussed that it is not an end of life care service, but can help navigate symptoms and emotional support througout their hospital stay here.      Ethical and Legal Aspects:   NA        Discussed With: Case coordinated with attending and SW and RN     Time Spent: 90 minutes including the care, coordination and counseling of this patient, excluding time spent on ACP.

## 2025-07-09 NOTE — PROGRESS NOTE ADULT - SUBJECTIVE AND OBJECTIVE BOX
Examined pt at bedside, pt with continuous facial twitching around the mouth, daughter at bedside. Discussed next steps - see note for GOC.     Review of Systems:  Unable to obtain/Limited due to: current condition         PHYSICAL EXAM:    Vital Signs Last 24 Hrs  T(C): 36.5 (09 Jul 2025 07:54), Max: 36.7 (08 Jul 2025 23:21)  T(F): 97.7 (09 Jul 2025 07:54), Max: 98.1 (08 Jul 2025 23:21)  HR: 79 (09 Jul 2025 07:54) (66 - 81)  BP: 169/63 (09 Jul 2025 07:54) (105/56 - 169/63)  BP(mean): --  RR: 17 (09 Jul 2025 07:54) (17 - 20)  SpO2: 100% (09 Jul 2025 07:54) (99% - 100%)    Parameters below as of 09 Jul 2025 07:54  Patient On (Oxygen Delivery Method): nasal cannula  O2 Flow (L/min): 2    Daily     Daily     PPSV2: 40  %  FAST:    General: elderly female, NAD  HEENT: eyes open, +facial twitching   Lungs: normal resp eeffort   Cardiac: rrr  GI: soft, nontender   Ext: well perfused, LLE with cast       LABS:                        10.2   6.83  )-----------( 268      ( 09 Jul 2025 08:13 )             32.4     07-09    141  |  108  |  28[H]  ----------------------------<  69[L]  3.7   |  24  |  0.66    Ca    9.3      09 Jul 2025 08:13        Albumin: Albumin: 2.3 g/dL (07-03 @ 06:57)      Allergies    No Known Allergies    Intolerances      MEDICATIONS  (STANDING):  aspirin enteric coated 81 milliGRAM(s) Oral daily  atorvastatin 80 milliGRAM(s) Oral at bedtime  escitalopram 5 milliGRAM(s) Oral daily  heparin   Injectable 5000 Unit(s) SubCutaneous every 12 hours  lacosamide IVPB 200 milliGRAM(s) IV Intermittent every 12 hours  levETIRAcetam   Injectable 500 milliGRAM(s) IV Push every 12 hours  traZODone 25 milliGRAM(s) Oral at bedtime  valproate sodium   IVPB 750 milliGRAM(s) IV Intermittent every 12 hours    MEDICATIONS  (PRN):  acetaminophen     Tablet .. 650 milliGRAM(s) Oral every 6 hours PRN Temp greater or equal to 38C (100.4F), Mild Pain (1 - 3)  aluminum hydroxide/magnesium hydroxide/simethicone Suspension 30 milliLiter(s) Oral every 4 hours PRN Dyspepsia  hydrALAZINE Injectable 10 milliGRAM(s) IV Push every 6 hours PRN SBP > 160 mmHg  LORazepam   Injectable 1 milliGRAM(s) IV Push once PRN Agitation  melatonin 3 milliGRAM(s) Oral at bedtime PRN Insomnia  ondansetron Injectable 4 milliGRAM(s) IV Push every 8 hours PRN Nausea and/or Vomiting      RADIOLOGY:

## 2025-07-09 NOTE — PROGRESS NOTE ADULT - ASSESSMENT
89-year-old woman with a history of dementia, seizures, admitted for change in mental status. video EEG recording, and study shows persistent, nearly continuous epileptic discharges central right originate from the right hemisphere.  Initial imaging CT of the head showed no acute changes. On mx anticonvulsants. No change in clinical response or V EEG. Prognosis poor.  Pending transfer to hospice.  Recommend:  Follow-up with palliative care  Continue Depacon 750 mg IV every 12  Continue lacosamide 200 mg every 12  Continue levetiracetam 500 mg every 12    Discussed with Dr. Hussein

## 2025-07-09 NOTE — GOALS OF CARE CONVERSATION - ADVANCED CARE PLANNING - CONVERSATION DETAILS
Palliative team met with daughter at the bedside to follow up and further determine plans.  We discussed the option to change the focus of care from cure and treatment to comfort and quality with the support of hospice.  We discussed making an inpatient hospice referral to manage symptoms with IV medications.  We discussed stopping medical work ups, hospitalizations, blood work, xrays, etc to treat symptoms as they arise to ensure Pt is comfortable and not in distress.  Pts current medical condition reviewd, daughter aware of Pts poor prognosis.  Informed daughter referral could be placed to [facility] for inpatient hospice.  Daughter does not want referral at this time.  Desires to speak with her brother and sister before making a referral.  She inquired how long Pt could stay at the hospital, discharge process reviewed.  She also inquired what inpatient hospice would do if Pt were to make a significant improvement, our team noted that if Pt was not appropriate for inpatient hospice [facility] could transition to [facility] with home hospice.      Inpatient hospice referral discussed, daughter does not want referral just yet as she desires to speak with hospitalist and siblings first.  DNR/DNI/trial NIV MOLST in place.  Educated daughter of palliative team availability.  Emotional support provided.  Our team to continue to follow.
HPI:  89 F with a history of aortic stenosis, dementia (baseline alert and oriented ×2–3), GERD, hyperlipidemia, and hypertension, was sent from her assisted living facility for evaluation following an episode of unresponsiveness. Upon arrival to the ED, she was noted to be non-verbal and unable to provide history. Collateral information was obtained from her daughter and health care proxy, Tammy Hayward ([phone]). Per report, the patient has experienced a gradual decline in health over the past year. Over recent months, she has become bedbound with progressive cognitive decline. Previously, at her baseline, she was able to communicate, recognize family members, and independently eat and drink. Today, staff at the facility witnessed seizure-like activity, prompting EMS activation and transfer to the hospital. No recent illness, trauma, or acute changes in medication were reported at the time of the event. (02 Jul 2025 21:04)      PERTINENT PMH REVIEWED:  [ x ] YES [ ] NO           Primary Contact:      tono Munoz    HCP [ x ] Surrogate [   ] Guardian [   ]   --reports she is health care agent     Mental Status: Pt lacks capacity  Concerns of Depression [  ] -not identified  Anxiety [   ]  -not identified  Baseline ADLs (prior to admission):  Independent [ ] moderately [ ] fully   Dependent   [  ] moderately [ x ]fully    Family Meeting attendees: GOC discussed    Anticipated Grief: Patient[  ] Family [ x ]    Caregiver [place] Assessed: Yes [ x ] No [  ]    Yazidism: Judaism    Spiritual Concerns: Not identified,  available for support.    Goals of Care: likely comfort     Previous Services: [facility]    ADVANCE DIRECTIVES:   -Pt lacks capacity  -no HCP form on file, daughter Tammy Hayward [phone] reports she gave copy of HCP to hospital the last 2 hospitalizations, reports she is health care agent  - DNR/DNI with trial of NIV     Anticipated D/C Plan: to be further determined                     Summary:  Palliative team met with Tammy power at the bedside to discuss GOC, assist with planning and provide supportive counseling.  Palliative role explained.  Emotional support provided.  Daughter shared Pts overall decline the past year, noting a year ago Pt resided at home independently, cooking, cleaning and navigating 4 flights of stairs.  Daughter shared Pt moved into [place] about 2 months ago following [facility] at [facility].  She states since February Pt has been bedbound, required wound care but was able to communicate with family and recognize family members.  Daughters feelings explored.  Support provided.    Daughter shared Pts independence was very important to her noting her mother had expressed her desire to remain at home therefore she could die at home.  She notes how difficult having aide services at home was following Pts loss of independence.  We discussed the option to change the focus of care from cure and treatment to comfort and quality with the support of hospice.  The philosophy of hospice discussed.  We reviewed stopping medical work ups, hospitalizations, blood work, xrays, etc to treat symptoms to ensure Pt is comfortable and not in distress.  The services provided at home vs inpatient hospice reviewed.  We reviewed the qualifications for inpatient hospice.    Advance directives reviewed.  Daughter reports she is the health care agent.  She states she gave a copy of the HCP to HH the past 2 hospitalizations.  States she informs her sister and brother of her decisions.  Her youngest brother has down syndrome and is not involved in decisions.  MOLST confirmed, DNR/DNI/trial NIV.      Daughter considering a comfort focus, awaiting neurology input before making a decision.  Plan to be further determined.  We discussed inpatient vs home hospice.  Daughter shared she would desire [facility] inpatient hospice if Pt does not improve.  Educated of palliative team availability.  Emotional support provided.  Our team to continue to follow.

## 2025-07-09 NOTE — PROGRESS NOTE ADULT - NUTRITIONAL ASSESSMENT
This patient has been assessed with a concern for Malnutrition and has been determined to have a diagnosis/diagnoses of Moderate protein-calorie malnutrition and Underweight (BMI < 19).    This patient is being managed with:   Diet NPO-  Except Medications  Entered: Jul 2 2025 10:38PM  

## 2025-07-09 NOTE — PROGRESS NOTE ADULT - SUBJECTIVE AND OBJECTIVE BOX
HOSPITALIST ATTENDING PROGRESS NOTE    Chart and meds reviewed.  Patient seen and examined.    Subjective:  Patient still seizing, there is no notable neurologic recovery at this time.  Extensive discussion with daughter was had with palliative care and medical team.  Daughter is aware that mother's prognosis is poor.  She most further discussed with her brothers about further care.    All other systems reviewed and found to be negative with the exception of what has been described above.    MEDICATIONS  (STANDING):  aspirin enteric coated 81 milliGRAM(s) Oral daily  atorvastatin 80 milliGRAM(s) Oral at bedtime  escitalopram 5 milliGRAM(s) Oral daily  heparin   Injectable 5000 Unit(s) SubCutaneous every 12 hours  lacosamide IVPB 200 milliGRAM(s) IV Intermittent every 12 hours  levETIRAcetam   Injectable 500 milliGRAM(s) IV Push every 12 hours  traZODone 25 milliGRAM(s) Oral at bedtime  valproate sodium   IVPB 750 milliGRAM(s) IV Intermittent every 12 hours    MEDICATIONS  (PRN):  acetaminophen     Tablet .. 650 milliGRAM(s) Oral every 6 hours PRN Temp greater or equal to 38C (100.4F), Mild Pain (1 - 3)  aluminum hydroxide/magnesium hydroxide/simethicone Suspension 30 milliLiter(s) Oral every 4 hours PRN Dyspepsia  hydrALAZINE Injectable 10 milliGRAM(s) IV Push every 6 hours PRN SBP > 160 mmHg  LORazepam   Injectable 1 milliGRAM(s) IV Push once PRN Agitation  melatonin 3 milliGRAM(s) Oral at bedtime PRN Insomnia  ondansetron Injectable 4 milliGRAM(s) IV Push every 8 hours PRN Nausea and/or Vomiting      VITALS:  T(F): 97.7 (07-09-25 @ 07:54), Max: 98.1 (07-08-25 @ 23:21)  HR: 79 (07-09-25 @ 07:54) (66 - 81)  BP: 169/63 (07-09-25 @ 07:54) (105/56 - 169/63)  RR: 17 (07-09-25 @ 07:54) (17 - 20)  SpO2: 100% (07-09-25 @ 07:54) (99% - 100%)  Wt(kg): --    I&O's Summary    08 Jul 2025 07:01  -  09 Jul 2025 07:00  --------------------------------------------------------  IN: 0 mL / OUT: 450 mL / NET: -450 mL        CAPILLARY BLOOD GLUCOSE          PHYSICAL EXAM:  Constitutional: actively seizing, left sided facial twitching noted   Respiratory: Breath sounds are clear bilaterally, No wheezing, rales or rhonchi  Cardiovascular: S1 and S2, regular rate and rhythm, no Murmurs, gallops or rubs  Gastrointestinal: Bowel Sounds present, soft, nontender, nondistended, no guarding, no rebound  Extremities: No peripheral edema  Vascular: 2+ peripheral pulses  Neurological: A/O x0  Skin: No rashes    LABS:                            10.2   6.83  )-----------( 268      ( 09 Jul 2025 08:13 )             32.4     07-09    141  |  108  |  28[H]  ----------------------------<  69[L]  3.7   |  24  |  0.66    Ca    9.3      09 Jul 2025 08:13              Urinalysis Basic - ( 09 Jul 2025 08:13 )    Color: x / Appearance: x / SG: x / pH: x  Gluc: 69 mg/dL / Ketone: x  / Bili: x / Urobili: x   Blood: x / Protein: x / Nitrite: x   Leuk Esterase: x / RBC: x / WBC x   Sq Epi: x / Non Sq Epi: x / Bacteria: x              CULTURES:      Additional results/Imaging, I have personally reviewed:    Telemetry, personally reviewed:

## 2025-07-10 ENCOUNTER — TRANSCRIPTION ENCOUNTER (OUTPATIENT)
Age: 89
End: 2025-07-10

## 2025-07-10 VITALS
RESPIRATION RATE: 20 BRPM | OXYGEN SATURATION: 97 % | HEART RATE: 69 BPM | SYSTOLIC BLOOD PRESSURE: 170 MMHG | DIASTOLIC BLOOD PRESSURE: 61 MMHG | TEMPERATURE: 98 F

## 2025-07-10 LAB
ANION GAP SERPL CALC-SCNC: 8 MMOL/L — SIGNIFICANT CHANGE UP (ref 5–17)
BUN SERPL-MCNC: 23 MG/DL — SIGNIFICANT CHANGE UP (ref 7–23)
CALCIUM SERPL-MCNC: 9.4 MG/DL — SIGNIFICANT CHANGE UP (ref 8.5–10.1)
CHLORIDE SERPL-SCNC: 108 MMOL/L — SIGNIFICANT CHANGE UP (ref 96–108)
CO2 SERPL-SCNC: 24 MMOL/L — SIGNIFICANT CHANGE UP (ref 22–31)
CREAT SERPL-MCNC: 0.54 MG/DL — SIGNIFICANT CHANGE UP (ref 0.5–1.3)
EGFR: 84 ML/MIN/1.73M2 — SIGNIFICANT CHANGE UP
EGFR: 84 ML/MIN/1.73M2 — SIGNIFICANT CHANGE UP
GLUCOSE SERPL-MCNC: 78 MG/DL — SIGNIFICANT CHANGE UP (ref 70–99)
HCT VFR BLD CALC: 33.6 % — LOW (ref 34.5–45)
HGB BLD-MCNC: 10.9 G/DL — LOW (ref 11.5–15.5)
MCHC RBC-ENTMCNC: 31.1 PG — SIGNIFICANT CHANGE UP (ref 27–34)
MCHC RBC-ENTMCNC: 32.4 G/DL — SIGNIFICANT CHANGE UP (ref 32–36)
MCV RBC AUTO: 96 FL — SIGNIFICANT CHANGE UP (ref 80–100)
NRBC # BLD AUTO: 0 K/UL — SIGNIFICANT CHANGE UP (ref 0–0)
NRBC # FLD: 0 K/UL — SIGNIFICANT CHANGE UP (ref 0–0)
NRBC BLD AUTO-RTO: 0 /100 WBCS — SIGNIFICANT CHANGE UP (ref 0–0)
PLATELET # BLD AUTO: 255 K/UL — SIGNIFICANT CHANGE UP (ref 150–400)
PMV BLD: 9 FL — SIGNIFICANT CHANGE UP (ref 7–13)
POTASSIUM SERPL-MCNC: 3.5 MMOL/L — SIGNIFICANT CHANGE UP (ref 3.5–5.3)
POTASSIUM SERPL-SCNC: 3.5 MMOL/L — SIGNIFICANT CHANGE UP (ref 3.5–5.3)
RBC # BLD: 3.5 M/UL — LOW (ref 3.8–5.2)
RBC # FLD: 14.5 % — SIGNIFICANT CHANGE UP (ref 10.3–14.5)
SODIUM SERPL-SCNC: 140 MMOL/L — SIGNIFICANT CHANGE UP (ref 135–145)
WBC # BLD: 6.8 K/UL — SIGNIFICANT CHANGE UP (ref 3.8–10.5)
WBC # FLD AUTO: 6.8 K/UL — SIGNIFICANT CHANGE UP (ref 3.8–10.5)

## 2025-07-10 PROCEDURE — 99239 HOSP IP/OBS DSCHRG MGMT >30: CPT

## 2025-07-10 RX ORDER — ESCITALOPRAM OXALATE 20 MG/1
1 TABLET ORAL
Refills: 0 | DISCHARGE

## 2025-07-10 RX ORDER — FERROUS SULFATE 137(45) MG
1 TABLET, EXTENDED RELEASE ORAL
Refills: 0 | DISCHARGE

## 2025-07-10 RX ORDER — MELATONIN 5 MG
1 TABLET ORAL
Refills: 0 | DISCHARGE

## 2025-07-10 RX ORDER — LACOSAMIDE 150 MG/1
20 TABLET, FILM COATED ORAL
Qty: 0 | Refills: 0 | DISCHARGE
Start: 2025-07-10

## 2025-07-10 RX ORDER — TRAZODONE HCL 100 MG
0.5 TABLET ORAL
Refills: 0 | DISCHARGE

## 2025-07-10 RX ORDER — LIDOCAINE HYDROCHLORIDE 20 MG/ML
1 JELLY TOPICAL
Refills: 0 | DISCHARGE

## 2025-07-10 RX ORDER — ALPRAZOLAM 0.5 MG
0.5 TABLET, EXTENDED RELEASE 24 HR ORAL
Refills: 0 | DISCHARGE

## 2025-07-10 RX ORDER — ASPIRIN 325 MG
0 TABLET ORAL
Refills: 0 | DISCHARGE

## 2025-07-10 RX ORDER — ACETAMINOPHEN 500 MG/5ML
1 LIQUID (ML) ORAL
Refills: 0 | DISCHARGE

## 2025-07-10 RX ORDER — LEVETIRACETAM 10 MG/ML
5 INJECTION, SOLUTION INTRAVENOUS
Qty: 0 | Refills: 0 | DISCHARGE
Start: 2025-07-10

## 2025-07-10 RX ADMIN — LEVETIRACETAM 500 MILLIGRAM(S): 10 INJECTION, SOLUTION INTRAVENOUS at 09:17

## 2025-07-10 RX ADMIN — HEPARIN SODIUM 5000 UNIT(S): 1000 INJECTION INTRAVENOUS; SUBCUTANEOUS at 09:17

## 2025-07-10 RX ADMIN — LACOSAMIDE 100 MILLIGRAM(S): 150 TABLET, FILM COATED ORAL at 11:52

## 2025-07-10 RX ADMIN — Medication 57.5 MILLIGRAM(S): at 09:17

## 2025-07-10 NOTE — DISCHARGE NOTE PROVIDER - DETAILS OF MALNUTRITION DIAGNOSIS/DIAGNOSES
This patient has been assessed with a concern for Malnutrition and was treated during this hospitalization for the following Nutrition diagnosis/diagnoses:     -  07/03/2025: Moderate protein-calorie malnutrition   -  07/03/2025: Underweight (BMI < 19)

## 2025-07-10 NOTE — DISCHARGE NOTE PROVIDER - NSCORESITESY/N_GEN_A_CORE_RD
Refill request for:    Requested Prescriptions     Pending Prescriptions Disp Refills   • TRAZODONE HCL 50 MG Oral Tab [Pharmacy Med Name: TRAZODONE 50MG TABLETS] 30 tablet 0     Sig: TAKE 1 TABLET(50 MG) BY MOUTH EVERY NIGHT        Last Prescribed Alessio Espino Yes

## 2025-07-10 NOTE — DISCHARGE NOTE PROVIDER - NSDCMRMEDTOKEN_GEN_ALL_CORE_FT
lacosamide 200 mg/20 mL intravenous solution: 20 milliliter(s) intravenous every 12 hours  levETIRAcetam 100 mg/mL intravenous solution: 5 milliliter(s) intravenous every 12 hours  valproic acid (as valproate sodium) 100 mg/mL intravenous solution: 7.5 milliliter(s) intravenous every 12 hours

## 2025-07-10 NOTE — DISCHARGE NOTE PROVIDER - NSDCCPCAREPLAN_GEN_ALL_CORE_FT
PRINCIPAL DISCHARGE DIAGNOSIS  Diagnosis: Acute UTI  Assessment and Plan of Treatment: completed antibiotic treatment while admitted, no role for further antibiotics      SECONDARY DISCHARGE DIAGNOSES  Diagnosis: Seizure disorder  Assessment and Plan of Treatment: Persistent epileptiform activity.  Continue with Keppra, Vimpat, Depakote IV on discharge with hospice.

## 2025-07-10 NOTE — DISCHARGE NOTE PROVIDER - HOSPITAL COURSE
89 F with a history of aortic stenosis, dementia (baseline alert and oriented ×2–3), GERD, hyperlipidemia, and hypertension, was sent from her assisted living facility for evaluation following an episode of unresponsiveness. Upon arrival to the ED, she was noted to be non-verbal and unable to provide history. Collateral information was obtained from her daughter and health care proxy, Tammy Hayward ([phone]). Per report, the patient has experienced a gradual decline in health over the past year. Over recent months, she has become bedbound with progressive cognitive decline. Previously, at her baseline, she was able to communicate, recognize family members, and independently eat and drink. Day of admission, staff at the facility witnessed seizure-like activity, prompting EMS activation and transfer to the hospital. No recent illness, trauma, or acute changes in medication were reported at the time of the event.    Upon admission, patient was found to have persistent epileptiform activity multiple EEG reports.  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  She was tried on multiple antiepileptic drugs and ultimately placed on Depakote, Vimpat, Keppra without any notable neurologic recovery.  Potential phenobarbital and propofol drip were discussed with daughter however agreed that patient is to remain DNR/DNI and that these would be not in line with her wishes.  Extensive discussion was had with palliative team and patient's family ultimately opts for inpatient hospice.    Patient stable for transfer to inpatient hospice 7/10/2025.    #AMS likely due to seizures and also infection   #Hx of Seizure Disorder  CT head neg luther acute findings  EEG  d/w Dr Douglas: s nearly continuous discharges on EEG, some appear triphasic in morphology others more clearly epileptiform  – neurology recc cont lacosamide and keppra (and 1 loading dose of pheytoin)  – Video EEG showing seizures at right posterior temporal cortex  – maintain NPO per SLP  – Persistent seizure activity, now with convulsive episodes mainly involving left face  Give extra Depacon 1000 mg IV x 1.  Continue Depacon 750 mg IV every 12  Continue lacosamide 200 mg every 12  Continue levetiracetam 100 mg every 12      #Chronic HFrEF EF 40%, stable   #Acute hypoxemic resp failure  #HTN  #HLD  #aortic stenosis, severe   - r/o CHF, order cxr  - C/w aspirin 81 mg qd, atorvastatin 80 mg qhs On hold due to NPO   - Hold metoprolol 50 qd, losartan 25 mg qd  – Chest x-ray showing opacification of left base consistent with effusion/infiltrate, mild interstitial edema  – s/p Lasix  20 mg IV 7/6  – For afterload reduction, will maintain patient on hydralazine IV as needed for SBP greater than 160 mmHg.    # GNR UTI  UA abnormal   UCX: Klebsiella, f/u sensitivities  C/w Ceftriaxone - completed prior to dc    #  L Distal 1/3 Tibial Shaft Fx and Lateral Mal Fx  D/w Ortho team   NWB LLE   Plan for Long leg splint  ortho recc appreciated    #Pressure injuries  -Wound care.     # Acute on Chronic  anemia  BAseline Hb ~10  Hb down to 8.7 , likely has dilutional component as Pt had IVF   Monitor closely   No signs of acute bleeding     # JACKY, prerenal   Likely 2/2 poor po intake  resolved  If pt not able to eat or drink, high risk of volume depletion       #DVT ppx: HSQ    Fall, aspiration, and seizure precautions.     ACP: DNR/DNI, palliative team on board

## 2025-07-10 NOTE — DISCHARGE NOTE PROVIDER - ATTENDING DISCHARGE PHYSICAL EXAMINATION:
Home
Constitutional: actively seizing, left sided facial twitching noted   Respiratory: Breath sounds are clear bilaterally, No wheezing, rales or rhonchi  Cardiovascular: S1 and S2, regular rate and rhythm, no Murmurs, gallops or rubs  Gastrointestinal: Bowel Sounds present, soft, nontender, nondistended, no guarding, no rebound  Extremities: No peripheral edema  Vascular: 2+ peripheral pulses  Neurological: A/O x0  Skin: No rashes

## 2025-07-10 NOTE — DISCHARGE NOTE PROVIDER - NSDCCAREPROVSEEN_GEN_ALL_CORE_FT
Ramu, Hamzah Hussein, Jarrod Cardoza, Remi Douglas, Maggie Núñez, Consuelo Sheikh, Garett Veliz, Pastor Torre, Candice Main, Lionel Mahoney, Samaria Schmitt, Bryan Casper, Anne Nazario, Myra

## 2025-07-18 DIAGNOSIS — I35.0 NONRHEUMATIC AORTIC (VALVE) STENOSIS: ICD-10-CM

## 2025-07-18 DIAGNOSIS — E78.5 HYPERLIPIDEMIA, UNSPECIFIED: ICD-10-CM

## 2025-07-18 DIAGNOSIS — I11.0 HYPERTENSIVE HEART DISEASE WITH HEART FAILURE: ICD-10-CM

## 2025-07-18 DIAGNOSIS — B96.1 KLEBSIELLA PNEUMONIAE [K. PNEUMONIAE] AS THE CAUSE OF DISEASES CLASSIFIED ELSEWHERE: ICD-10-CM

## 2025-07-18 DIAGNOSIS — L89.612 PRESSURE ULCER OF RIGHT HEEL, STAGE 2: ICD-10-CM

## 2025-07-18 DIAGNOSIS — N39.0 URINARY TRACT INFECTION, SITE NOT SPECIFIED: ICD-10-CM

## 2025-07-18 DIAGNOSIS — Z79.82 LONG TERM (CURRENT) USE OF ASPIRIN: ICD-10-CM

## 2025-07-18 DIAGNOSIS — S82.202A UNSPECIFIED FRACTURE OF SHAFT OF LEFT TIBIA, INITIAL ENCOUNTER FOR CLOSED FRACTURE: ICD-10-CM

## 2025-07-18 DIAGNOSIS — K21.9 GASTRO-ESOPHAGEAL REFLUX DISEASE WITHOUT ESOPHAGITIS: ICD-10-CM

## 2025-07-18 DIAGNOSIS — N17.9 ACUTE KIDNEY FAILURE, UNSPECIFIED: ICD-10-CM

## 2025-07-18 DIAGNOSIS — D64.9 ANEMIA, UNSPECIFIED: ICD-10-CM

## 2025-07-18 DIAGNOSIS — G93.41 METABOLIC ENCEPHALOPATHY: ICD-10-CM

## 2025-07-18 DIAGNOSIS — I50.22 CHRONIC SYSTOLIC (CONGESTIVE) HEART FAILURE: ICD-10-CM
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
## 2025-07-18 DIAGNOSIS — E44.0 MODERATE PROTEIN-CALORIE MALNUTRITION: ICD-10-CM

## 2025-07-18 DIAGNOSIS — J96.01 ACUTE RESPIRATORY FAILURE WITH HYPOXIA: ICD-10-CM

## 2025-07-18 DIAGNOSIS — Z74.01 BED CONFINEMENT STATUS: ICD-10-CM

## 2025-07-18 DIAGNOSIS — G40.109 LOCALIZATION-RELATED (FOCAL) (PARTIAL) SYMPTOMATIC EPILEPSY AND EPILEPTIC SYNDROMES WITH SIMPLE PARTIAL SEIZURES, NOT INTRACTABLE, WITHOUT STATUS EPILEPTICUS: ICD-10-CM

## 2025-07-18 DIAGNOSIS — S82.832A OTHER FRACTURE OF UPPER AND LOWER END OF LEFT FIBULA, INITIAL ENCOUNTER FOR CLOSED FRACTURE: ICD-10-CM

## 2025-07-18 DIAGNOSIS — Z66 DO NOT RESUSCITATE: ICD-10-CM
